# Patient Record
Sex: FEMALE | Race: BLACK OR AFRICAN AMERICAN | Employment: OTHER | URBAN - METROPOLITAN AREA
[De-identification: names, ages, dates, MRNs, and addresses within clinical notes are randomized per-mention and may not be internally consistent; named-entity substitution may affect disease eponyms.]

---

## 2017-05-28 ENCOUNTER — HOSPITAL ENCOUNTER (OUTPATIENT)
Facility: HOSPITAL | Age: 72
Setting detail: OBSERVATION
Discharge: HOME | End: 2017-06-01
Attending: EMERGENCY MEDICINE | Admitting: INTERNAL MEDICINE
Payer: MEDICARE

## 2017-05-28 ENCOUNTER — APPOINTMENT (OUTPATIENT)
Dept: GENERAL RADIOLOGY | Facility: HOSPITAL | Age: 72
End: 2017-05-28
Attending: EMERGENCY MEDICINE
Payer: MEDICARE

## 2017-05-28 ENCOUNTER — APPOINTMENT (OUTPATIENT)
Dept: ULTRASOUND IMAGING | Facility: HOSPITAL | Age: 72
End: 2017-05-28
Attending: HOSPITALIST
Payer: MEDICARE

## 2017-05-28 ENCOUNTER — APPOINTMENT (OUTPATIENT)
Dept: NUCLEAR MEDICINE | Facility: HOSPITAL | Age: 72
End: 2017-05-28
Attending: EMERGENCY MEDICINE
Payer: MEDICARE

## 2017-05-28 ENCOUNTER — APPOINTMENT (OUTPATIENT)
Dept: CV DIAGNOSTICS | Facility: HOSPITAL | Age: 72
End: 2017-05-28
Attending: HOSPITALIST
Payer: MEDICARE

## 2017-05-28 ENCOUNTER — APPOINTMENT (OUTPATIENT)
Dept: CT IMAGING | Facility: HOSPITAL | Age: 72
End: 2017-05-28
Attending: HOSPITALIST
Payer: MEDICARE

## 2017-05-28 DIAGNOSIS — I10 BENIGN ESSENTIAL HTN: ICD-10-CM

## 2017-05-28 DIAGNOSIS — R07.9 ACUTE CHEST PAIN: Primary | ICD-10-CM

## 2017-05-28 DIAGNOSIS — E11.8 TYPE 2 DIABETES MELLITUS WITH COMPLICATION, WITHOUT LONG-TERM CURRENT USE OF INSULIN (HCC): ICD-10-CM

## 2017-05-28 DIAGNOSIS — M54.9 MODERATE BACK PAIN: ICD-10-CM

## 2017-05-28 DIAGNOSIS — I25.10 CORONARY ARTERY DISEASE INVOLVING NATIVE HEART WITHOUT ANGINA PECTORIS, UNSPECIFIED VESSEL OR LESION TYPE: ICD-10-CM

## 2017-05-28 DIAGNOSIS — N18.9 CHRONIC KIDNEY DISEASE, UNSPECIFIED CKD STAGE: ICD-10-CM

## 2017-05-28 PROBLEM — K21.9 ESOPHAGEAL REFLUX: Chronic | Status: ACTIVE | Noted: 2017-05-28

## 2017-05-28 PROBLEM — E11.9 TYPE II OR UNSPECIFIED TYPE DIABETES MELLITUS WITHOUT MENTION OF COMPLICATION, NOT STATED AS UNCONTROLLED: Chronic | Status: ACTIVE | Noted: 2017-05-28

## 2017-05-28 PROCEDURE — 71275 CT ANGIOGRAPHY CHEST: CPT | Performed by: HOSPITALIST

## 2017-05-28 PROCEDURE — 93306 TTE W/DOPPLER COMPLETE: CPT | Performed by: HOSPITALIST

## 2017-05-28 PROCEDURE — 78580 LUNG PERFUSION IMAGING: CPT | Performed by: EMERGENCY MEDICINE

## 2017-05-28 PROCEDURE — 71010 XR CHEST AP PORTABLE  (CPT=71010): CPT | Performed by: EMERGENCY MEDICINE

## 2017-05-28 PROCEDURE — 99220 INITIAL OBSERVATION CARE,LEVL III: CPT | Performed by: INTERNAL MEDICINE

## 2017-05-28 PROCEDURE — 93970 EXTREMITY STUDY: CPT | Performed by: HOSPITALIST

## 2017-05-28 RX ORDER — SODIUM CHLORIDE 9 MG/ML
INJECTION, SOLUTION INTRAVENOUS CONTINUOUS
Status: ACTIVE | OUTPATIENT
Start: 2017-05-28 | End: 2017-05-28

## 2017-05-28 RX ORDER — ACETAMINOPHEN AND CODEINE PHOSPHATE 300; 30 MG/1; MG/1
1 TABLET ORAL EVERY 4 HOURS PRN
Status: DISCONTINUED | OUTPATIENT
Start: 2017-05-28 | End: 2017-06-01

## 2017-05-28 RX ORDER — ENOXAPARIN SODIUM 100 MG/ML
40 INJECTION SUBCUTANEOUS DAILY
Status: DISCONTINUED | OUTPATIENT
Start: 2017-05-28 | End: 2017-05-28

## 2017-05-28 RX ORDER — HYDROMORPHONE HYDROCHLORIDE 1 MG/ML
0.5 INJECTION, SOLUTION INTRAMUSCULAR; INTRAVENOUS; SUBCUTANEOUS EVERY 30 MIN PRN
Status: DISCONTINUED | OUTPATIENT
Start: 2017-05-28 | End: 2017-05-28

## 2017-05-28 RX ORDER — DOCUSATE SODIUM 100 MG/1
100 CAPSULE, LIQUID FILLED ORAL 2 TIMES DAILY
Status: DISCONTINUED | OUTPATIENT
Start: 2017-05-28 | End: 2017-06-01

## 2017-05-28 RX ORDER — MAGNESIUM HYDROXIDE/ALUMINUM HYDROXICE/SIMETHICONE 120; 1200; 1200 MG/30ML; MG/30ML; MG/30ML
10 SUSPENSION ORAL EVERY 6 HOURS PRN
Status: DISCONTINUED | OUTPATIENT
Start: 2017-05-28 | End: 2017-06-01

## 2017-05-28 RX ORDER — NITROGLYCERIN 0.4 MG/1
0.4 TABLET SUBLINGUAL EVERY 5 MIN PRN
Status: DISCONTINUED | OUTPATIENT
Start: 2017-05-28 | End: 2017-06-01

## 2017-05-28 RX ORDER — HEPARIN SODIUM AND DEXTROSE 10000; 5 [USP'U]/100ML; G/100ML
INJECTION INTRAVENOUS CONTINUOUS
Status: DISCONTINUED | OUTPATIENT
Start: 2017-05-28 | End: 2017-05-28

## 2017-05-28 RX ORDER — MONTELUKAST SODIUM 10 MG/1
10 TABLET ORAL NIGHTLY
Status: DISCONTINUED | OUTPATIENT
Start: 2017-05-28 | End: 2017-06-01

## 2017-05-28 RX ORDER — DEXTROSE MONOHYDRATE 25 G/50ML
50 INJECTION, SOLUTION INTRAVENOUS
Status: DISCONTINUED | OUTPATIENT
Start: 2017-05-28 | End: 2017-06-01

## 2017-05-28 RX ORDER — ACETAMINOPHEN AND CODEINE PHOSPHATE 300; 30 MG/1; MG/1
2 TABLET ORAL EVERY 4 HOURS PRN
Status: DISCONTINUED | OUTPATIENT
Start: 2017-05-28 | End: 2017-06-01

## 2017-05-28 RX ORDER — ALLOPURINOL 300 MG/1
300 TABLET ORAL DAILY
Status: DISCONTINUED | OUTPATIENT
Start: 2017-05-28 | End: 2017-06-01

## 2017-05-28 RX ORDER — ACETAMINOPHEN AND CODEINE PHOSPHATE 300; 30 MG/1; MG/1
2 TABLET ORAL EVERY 4 HOURS PRN
Status: DISCONTINUED | OUTPATIENT
Start: 2017-05-28 | End: 2017-05-28

## 2017-05-28 RX ORDER — KETOROLAC TROMETHAMINE 5 MG/ML
1 SOLUTION OPHTHALMIC 4 TIMES DAILY
Status: DISCONTINUED | OUTPATIENT
Start: 2017-05-28 | End: 2017-05-28

## 2017-05-28 RX ORDER — GABAPENTIN 300 MG/1
300 CAPSULE ORAL 3 TIMES DAILY
Status: DISCONTINUED | OUTPATIENT
Start: 2017-05-28 | End: 2017-06-01

## 2017-05-28 RX ORDER — ASPIRIN 325 MG
325 TABLET ORAL DAILY
Status: DISCONTINUED | OUTPATIENT
Start: 2017-05-28 | End: 2017-06-01

## 2017-05-28 RX ORDER — HYDROCHLOROTHIAZIDE 12.5 MG/1
12.5 CAPSULE, GELATIN COATED ORAL DAILY
Status: DISCONTINUED | OUTPATIENT
Start: 2017-05-28 | End: 2017-06-01

## 2017-05-28 RX ORDER — HEPARIN SODIUM 5000 [USP'U]/ML
80 INJECTION INTRAVENOUS; SUBCUTANEOUS ONCE
Status: DISCONTINUED | OUTPATIENT
Start: 2017-05-28 | End: 2017-05-28

## 2017-05-28 RX ORDER — ONDANSETRON 2 MG/ML
4 INJECTION INTRAMUSCULAR; INTRAVENOUS ONCE
Status: COMPLETED | OUTPATIENT
Start: 2017-05-28 | End: 2017-05-28

## 2017-05-28 RX ORDER — ENOXAPARIN SODIUM 100 MG/ML
40 INJECTION SUBCUTANEOUS NIGHTLY
Status: DISCONTINUED | OUTPATIENT
Start: 2017-05-28 | End: 2017-05-31

## 2017-05-28 RX ORDER — ALBUTEROL SULFATE 2.5 MG/3ML
2.5 SOLUTION RESPIRATORY (INHALATION) EVERY 6 HOURS PRN
Status: DISCONTINUED | OUTPATIENT
Start: 2017-05-28 | End: 2017-06-01

## 2017-05-28 RX ORDER — VALSARTAN 320 MG/1
320 TABLET ORAL DAILY
Status: DISCONTINUED | OUTPATIENT
Start: 2017-05-28 | End: 2017-06-01

## 2017-05-28 RX ORDER — ONDANSETRON 2 MG/ML
4 INJECTION INTRAMUSCULAR; INTRAVENOUS EVERY 4 HOURS PRN
Status: DISCONTINUED | OUTPATIENT
Start: 2017-05-28 | End: 2017-06-01

## 2017-05-28 RX ORDER — ONDANSETRON 4 MG/1
8 TABLET, ORALLY DISINTEGRATING ORAL EVERY 8 HOURS PRN
Status: DISCONTINUED | OUTPATIENT
Start: 2017-05-28 | End: 2017-06-01

## 2017-05-28 RX ORDER — ROSUVASTATIN CALCIUM 20 MG/1
20 TABLET, COATED ORAL NIGHTLY
Status: DISCONTINUED | OUTPATIENT
Start: 2017-05-28 | End: 2017-06-01

## 2017-05-28 RX ORDER — HEPARIN SODIUM AND DEXTROSE 10000; 5 [USP'U]/100ML; G/100ML
18 INJECTION INTRAVENOUS ONCE
Status: DISCONTINUED | OUTPATIENT
Start: 2017-05-28 | End: 2017-05-28

## 2017-05-28 RX ORDER — FAMOTIDINE 20 MG/1
20 TABLET ORAL DAILY
Status: DISCONTINUED | OUTPATIENT
Start: 2017-05-28 | End: 2017-05-29

## 2017-05-28 RX ORDER — DILTIAZEM HYDROCHLORIDE 240 MG/1
240 CAPSULE, COATED, EXTENDED RELEASE ORAL DAILY
Status: DISCONTINUED | OUTPATIENT
Start: 2017-05-28 | End: 2017-06-01

## 2017-05-28 NOTE — IMAGING NOTE
Informed floor RN and Patient to hold metformin for 48hrs post CT iodine injection. Pt injected on 5/28/17 @ 1110.

## 2017-05-28 NOTE — ED NOTES
Going to room 8606, reported to [employer]. Transport paged. Pt aware of her admission and verbalizing understanding.

## 2017-05-28 NOTE — CONSULTS
[facility]  Report of Consultation    Frutoso Poll Patient Status:  Observation    1945 MRN [id number]   [facility] 8NE-A Attending Chrissie Abad MD   Hosp Day # 0 PCP Kandis Kc     Reason fo solution 2.5 mg, 2.5 mg, Nebulization, Q6H PRN  •  allopurinol (ZYLOPRIM) tab 300 mg, 300 mg, Oral, Daily  •  Alum & Mag Hydroxide-Simeth (MAALOX) oral suspension 10 mL, 10 mL, Oral, Q6H PRN  •  DilTIAZem HCl ER Coated Beads (CARDIZEM CD) 24 hr cap 240 mg, resp. rate 20, height 64\", weight 222 lb 14.2 oz, SpO2 98 %.   Temp (24hrs), Av.8 °F (36.6 °C), Min:97.1 °F (36.2 °C), Max:98.4 °F (36.9 °C)    Wt Readings from Last 3 Encounters:  17 : 222 lb 14.2 oz      Telemetry: SR  General: Alert and orient

## 2017-05-28 NOTE — ED INITIAL ASSESSMENT (HPI)
Pt to ed from home by ems with c/o l side rib/chest pain, pt denies injury or trauma, pt having some lourdes, pt sx present for about 6 wks, pt had recent work up at [address] [place] [address], and [facility].

## 2017-05-28 NOTE — ED PROVIDER NOTES
Patient Seen in: [facility] Emergency Department    History   Patient presents with:  Chest Pain Angina (cardiovascular)    Stated Complaint: L SIDE RIB/CHEST PAIN    HPI     28-year-old female who has a history of chronic pain on Percocet, history allopurinol (ZYLOPRIM) 300 MG Oral Tab,  Take 300 mg by mouth daily. Aluminum & Magnesium Hydroxide 200-200 MG/5ML Oral Suspension,  Take 10 mL by mouth every 6 (six) hours as needed for Indigestion.    Meclizine HCl (ANTIVERT) 25 MG Oral Tab,  Take 25 mg otherwise stated in HPI.     Physical Exam       ED Triage Vitals   BP 05/28/17 0155 157/70 mmHg   Pulse 05/28/17 0155 74   Resp 05/28/17 0155 18   Temp 05/28/17 0155 97.1 °F (36.2 °C)   Temp src 05/28/17 0155 Temporal   SpO2 05/28/17 0155 99 %   O2 Device Units.    D-Dimer results of less than 0.5 ug/mL (FEU) have been shown to contribute to the exclusion of venous thromboembolism with a negative predictive value of approximately 95% when results are used as part of the total clinical evaluation of the [facility] emergency department. Patient remained stable throughout the emergency department observation period. Patient still complains of left-sided chest and posterior back pain. This does not appear to be acute coronary syndrome in nature.   She has had these s

## 2017-05-28 NOTE — PROGRESS NOTES
[facility] Pharmacy Note:  Renal Dose Adjustment    Jeana Mason has been prescribed famotidine (PEPCID) 20 mg orally every 12 hours. Estimated Creatinine Clearance: 27.1 mL/min (based on Cr of 1.62).     Her calculated creatinine clearance is <50 ml/min, th

## 2017-05-28 NOTE — PROGRESS NOTES
Dr. Dionna Anderson H+P noted, chart reviewed, diagnostic studies reviewed. Patient has left pleuritic chest pain, VQ scan shows possible mismatch on left side. Agree with CTA. Will also heparinize until we can obtain CTA.     Check echo and venous ultrasou

## 2017-05-28 NOTE — H&P
[facility]                                                               History & Physical         Tess Navanita Patient Status:  Emergency    1945 MRN [id number]   Location [facility] Attending Patti Hollingsworth MD Hypertension Other       Reviewed    Social history:   reports that she has never smoked. She does not have any smokeless tobacco history on file. She reports that she drinks alcohol. She reports that she does not use illicit drugs.     Allergies:    Hydroc DDIMER 0.65 05/28/2017   TROP <0.046 05/28/2017       Recent Labs   Lab  05/28/17   0248   PTP  13.2   INR  1.00       Recent Labs   Lab  05/28/17   0202   TROP  <0.046     Imaging:  portable chest x-ray preliminary results shows no acute pathology, abhishek

## 2017-05-29 ENCOUNTER — APPOINTMENT (OUTPATIENT)
Dept: CT IMAGING | Facility: HOSPITAL | Age: 72
End: 2017-05-29
Attending: HOSPITALIST
Payer: MEDICARE

## 2017-05-29 PROCEDURE — 99225 SUBSEQUENT OBSERVATION CARE: CPT | Performed by: HOSPITALIST

## 2017-05-29 PROCEDURE — 74177 CT ABD & PELVIS W/CONTRAST: CPT | Performed by: HOSPITALIST

## 2017-05-29 RX ORDER — SODIUM CHLORIDE 9 MG/ML
INJECTION, SOLUTION INTRAVENOUS ONCE
Status: COMPLETED | OUTPATIENT
Start: 2017-05-29 | End: 2017-05-30

## 2017-05-29 NOTE — PROGRESS NOTES
[facility] Cardiology Progress Note    Patient seen and examined.  Chart reviewed.      No chest pain or shortness of breath.     /64 mmHg  Pulse 88  Temp(Src) 97.6 °F (36.4 °C) (Oral)  Resp 20  Ht 5' 4\" (1.626 m)  Wt 222 lb 14.2 oz (101.1 kg)  BMI 38.24 kg

## 2017-05-29 NOTE — PROGRESS NOTES
[facility]  Progress Note     Shayy Orobons Patient Status:  Observation    1945 MRN [id number]   [facility] 8NE-A Attending Alvarado Warren MD   Hosp Day # 1 PCP Gene Silvestre     Chief Complaint: Abdominal pain    S: Patient Oral Nightly   • valsartan  320 mg Oral Daily   • docusate sodium  100 mg Oral BID   • gabapentin  300 mg Oral TID   • hydrochlorothiazide  12.5 mg Oral Daily   • famoTIDine  20 mg Oral Daily   • Montelukast Sodium  10 mg Oral Nightly   • aspirin  325 mg O

## 2017-05-29 NOTE — PLAN OF CARE
PAIN - ADULT    • Verbalizes/displays adequate comfort level or patient's stated pain goal Not Progressing          CARDIOVASCULAR - ADULT    • Absence of cardiac arrhythmias or at baseline Progressing        Diabetes/Glucose Control    • Glucose maintaine

## 2017-05-29 NOTE — PROGRESS NOTES
Dr. Evans Reza notified of CT tech concern about creatinine of 1.42 and CT abd w/contrast ordered. Dr. Evans Reza endorsed to proceed w/CTw/contrast by to given 0.9NS @ 83mL/hr x 12 hours. CT tech notified of update.      05/29/17 1116   Provider Notification

## 2017-05-29 NOTE — PLAN OF CARE
Comments:   Pt is A&OX4, VSS on RA, and maintaining NSR on telemetry. Admitted w/left-sided abdominal/chest pain. This is the patient's third hospital in an attempt to diagnose her pain; was at [facility], w/u's there were unremarkable per report.   Car vital signs, obtain 12 lead EKG if indicated  - Evaluate effectiveness of antiarrhythmic and heart rate control medications as ordered  - Initiate emergency measures for life threatening arrhythmias  - Monitor electrolytes and administer replacement therap

## 2017-05-30 ENCOUNTER — SURGERY (OUTPATIENT)
Age: 72
End: 2017-05-30

## 2017-05-30 PROCEDURE — 0DB98ZX EXCISION OF DUODENUM, VIA NATURAL OR ARTIFICIAL OPENING ENDOSCOPIC, DIAGNOSTIC: ICD-10-PCS | Performed by: INTERNAL MEDICINE

## 2017-05-30 PROCEDURE — 99225 SUBSEQUENT OBSERVATION CARE: CPT | Performed by: HOSPITALIST

## 2017-05-30 PROCEDURE — 0DB68ZX EXCISION OF STOMACH, VIA NATURAL OR ARTIFICIAL OPENING ENDOSCOPIC, DIAGNOSTIC: ICD-10-PCS | Performed by: INTERNAL MEDICINE

## 2017-05-30 RX ORDER — SODIUM CHLORIDE, SODIUM LACTATE, POTASSIUM CHLORIDE, CALCIUM CHLORIDE 600; 310; 30; 20 MG/100ML; MG/100ML; MG/100ML; MG/100ML
INJECTION, SOLUTION INTRAVENOUS CONTINUOUS
Status: DISCONTINUED | OUTPATIENT
Start: 2017-05-30 | End: 2017-06-01

## 2017-05-30 RX ORDER — MORPHINE SULFATE 4 MG/ML
4 INJECTION, SOLUTION INTRAMUSCULAR; INTRAVENOUS EVERY 2 HOUR PRN
Status: DISCONTINUED | OUTPATIENT
Start: 2017-05-30 | End: 2017-05-31

## 2017-05-30 RX ORDER — NALOXONE HYDROCHLORIDE 0.4 MG/ML
80 INJECTION, SOLUTION INTRAMUSCULAR; INTRAVENOUS; SUBCUTANEOUS AS NEEDED
Status: DISCONTINUED | OUTPATIENT
Start: 2017-05-30 | End: 2017-05-30 | Stop reason: HOSPADM

## 2017-05-30 RX ORDER — DEXTROSE MONOHYDRATE 25 G/50ML
50 INJECTION, SOLUTION INTRAVENOUS
Status: DISCONTINUED | OUTPATIENT
Start: 2017-05-30 | End: 2017-05-30 | Stop reason: HOSPADM

## 2017-05-30 RX ORDER — MORPHINE SULFATE 2 MG/ML
2 INJECTION, SOLUTION INTRAMUSCULAR; INTRAVENOUS EVERY 2 HOUR PRN
Status: DISCONTINUED | OUTPATIENT
Start: 2017-05-30 | End: 2017-05-31

## 2017-05-30 RX ORDER — PANTOPRAZOLE SODIUM 40 MG/1
40 TABLET, DELAYED RELEASE ORAL
Status: DISCONTINUED | OUTPATIENT
Start: 2017-05-31 | End: 2017-06-01

## 2017-05-30 NOTE — CONSULTS
[facility]                       Gastroenterology Consultation-[facility]    Gmoez Marlenadaphne Patient Status:  Observation    1945 MRN [id number]   [facility] 8NE-A Attending Corrina Flores MD   [address] Day # 2 PCP Samm Hammer complication, not stated as uncontrolled    • Esophageal reflux    • Heart attack ([facility] [address]    • Stroke [facility])    • Arthritis    • Anemia    • Gout    • Edema    • Renal disorder    • Migraines    • Shortness of breath    • Asthma    • Pneumonia due to organism PRN   glucose (DEX4) oral liquid 15 g 15 g Oral Q15 Min PRN   Or      Glucose-Vitamin C (DEX-4) 4-0.006 g chewable tab 4 tablet 4 tablet Oral Q15 Min PRN   Or      dextrose 50% injection 50 mL 50 mL Intravenous Q15 Min PRN   Or      glucose (DEX4) oral liq disease, or inflammatory bowel disease  ROS:  In addition to the pertinent positives described above:             Infectious Disease:  No chronic infections or recent fevers prior to the acute illness            Cardiovascular: History of CAD s/p stenting, dry; no rashes noted to back or trunk   Psychiatric: Appropriate mood and congruent affect without obvious depression or anxiety  Labs:   Lab Results  Component Value Date   WBC 5.6 05/30/2017   HGB 10.9 05/30/2017   HCT 32.3 05/30/2017   .0 05/30/2 liver. No evidence of acute cholecystitis. Normal pancreas. Bilateral kidney cysts. No ureteral stones bilaterally. Multiple focal colonic diverticulosis. Negative for acute diverticulitis. No acute appendicitis.  No bowel obstruction.     Unremarkable urin ANGIOGRAPHY, CHEST (CPT=71275) Once [[id number]] Collected: 05/28/17 1129   Order Status: Completed Updated: 05/28/17 1130   Narrative:     PROCEDURE:  CT ANGIOGRAM OF THE CHEST     COMPARISON:  GEORGES DEAN W/O CONTRAST, 12/01/2008, 8:03.     INDICATIONS  (CPT=71010), 5/28/2017, 2:22.     INDICATIONS:  eval for PE     TECHNIQUE:  Tc-99m MAA was injected intravenously and images subsequently obtained.     PHARMACEUTICAL:  5.4 mCi Tc-99m MAA     FINDINGS:    PERFUSION DEFECTS:  Moderate sized perfusion defec She reports unremarkable evaluations at [facility] for her symptoms. No change in pain with activity, p.o. intake, or BMs; no nausea/vomiting, change in appetite, change in bowel patterns.  CT abdomen/pelvis with IV contrast suggests mild gastric antral w

## 2017-05-30 NOTE — PLAN OF CARE
CARDIOVASCULAR - ADULT    • Absence of cardiac arrhythmias or at baseline Progressing        Diabetes/Glucose Control    • Glucose maintained within prescribed range Progressing        METABOLIC/FLUID AND ELECTROLYTES - ADULT    • Electrolytes maintained w

## 2017-05-30 NOTE — OPERATIVE REPORT
Operative Report-Esophagogastroduodenoscopy with cold biopsies  Dimple Elise 2/11/1945   [facility] [id number] MRN [id number]   Admission Date 5/28/2017 Operation Date 5/30/2017   Attending Physician Aldair Carrillo MD Operating Physician Virginia Hampton DO

## 2017-05-30 NOTE — PROGRESS NOTES
[facility]  Progress Note     Teresa aP Patient Status:  Observation    1945 MRN [id number]   [facility] 8NE-A Attending Elisa Peralta MD   Hosp Day # 2 PCP Darin Restrepo     Chief Complaint: Abdominal pain    S: Patient Imaging data reviewed in Epic.     Medications:   • pantoprazole (PROTONIX) IV push  40 mg Intravenous BID   • allopurinol  300 mg Oral Daily   • DilTIAZem HCl ER Coated Beads  240 mg Oral Daily   • Rosuvastatin Calcium  20 mg Oral Nightly   • valsartan  32

## 2017-05-30 NOTE — PAYOR COMM NOTE
Attending Physician: César Serrato MD    Review Type: ADMISSION   Reviewer: Ramirez Mckeon       Date: May 30, 2017 - 10:40 AM  Payor: MEDICARE PART B ONLY  Authorization Number: N/A  Admit date: 5/28/2017  1:51 AM   Admitted from Emergency Dept.: yes quadrant anterior to the spleen likely is a splenule.   MEDICATIONS ADMINISTERED IN LAST 1 DAY:  Acetaminophen-Codeine #3 (TYLENOL #3) 300-30 MG tab 2 tablet     Date Action Dose Route User    5/30/2017 0601 Given 2 tablet Oral Toy Velez, RN    5/30 B      Montelukast Sodium (SINGULAIR) tab 10 mg     Date Action Dose Route User    5/29/2017 2114 Given 10 mg Oral Hong Kc, RN      morphINE sulfate (PF) 2 MG/ML injection 2 mg     Date Action Dose Route User    5/30/2017 0848 Given 2 mg Yetta Roll Notable for the following:     Glucose 143 (*)     Creatinine 1.42 (*)     GFR 43 (*)     All other components within normal limits   URINALYSIS WITH CULTURE REFLEX - Abnormal; Notable for the following:     Protein Urine 30  (*)     Leukocyte Esterase Alicia Fried normal limits   TROPONIN I - Normal   PROTHROMBIN TIME (PT) - Normal   PTT, ACTIVATED - Normal    Narrative: The aPTT Heparin Therapeutic Range is approximately 65- 104 seconds.  The therapeutic range has been validated against 0.3-0.7 heparin anti-Xa u protocol  3. Essential hypertension–continue home medications, follow blood pressure  4. Hyperlipidemia  5. GERD– Pepcid  6.  History of CAD per patient  Renal insufficiency possibly chronic kidney disease stage III, rule out mild acute kidney injury-no bas

## 2017-05-31 ENCOUNTER — APPOINTMENT (OUTPATIENT)
Dept: ULTRASOUND IMAGING | Facility: HOSPITAL | Age: 72
End: 2017-05-31
Attending: INTERNAL MEDICINE
Payer: MEDICARE

## 2017-05-31 PROCEDURE — 76700 US EXAM ABDOM COMPLETE: CPT | Performed by: INTERNAL MEDICINE

## 2017-05-31 PROCEDURE — 99225 SUBSEQUENT OBSERVATION CARE: CPT | Performed by: HOSPITALIST

## 2017-05-31 RX ORDER — PANTOPRAZOLE SODIUM 40 MG/1
40 TABLET, DELAYED RELEASE ORAL
Qty: 60 TABLET | Refills: 0 | Status: SHIPPED | OUTPATIENT
Start: 2017-05-31 | End: 2017-07-30

## 2017-05-31 RX ORDER — ENOXAPARIN SODIUM 100 MG/ML
30 INJECTION SUBCUTANEOUS NIGHTLY
Status: DISCONTINUED | OUTPATIENT
Start: 2017-05-31 | End: 2017-06-01

## 2017-05-31 RX ORDER — MORPHINE SULFATE 2 MG/ML
2 INJECTION, SOLUTION INTRAMUSCULAR; INTRAVENOUS EVERY 4 HOURS PRN
Status: DISCONTINUED | OUTPATIENT
Start: 2017-05-31 | End: 2017-06-01

## 2017-05-31 RX ORDER — RANITIDINE 150 MG/1
150 TABLET ORAL NIGHTLY
Qty: 30 TABLET | Refills: 3 | Status: SHIPPED | OUTPATIENT
Start: 2017-05-31 | End: 2020-11-08 | Stop reason: CLARIF

## 2017-05-31 RX ORDER — ACETAMINOPHEN AND CODEINE PHOSPHATE 300; 30 MG/1; MG/1
1-2 TABLET ORAL EVERY 4 HOURS PRN
Qty: 30 TABLET | Refills: 0 | Status: SHIPPED | OUTPATIENT
Start: 2017-05-31 | End: 2018-02-28

## 2017-05-31 NOTE — PROGRESS NOTES
[facility]  Progress Note     Candance Prime Patient Status:  Observation    1945 MRN [id number]   [facility] 8NE-A Attending Rivera Gomez MD   Hosp Day # 3 PCP Grady Cassidy     Chief Complaint: Abdominal pain    S: Patient 240 mg Oral Daily   • Rosuvastatin Calcium  20 mg Oral Nightly   • valsartan  320 mg Oral Daily   • docusate sodium  100 mg Oral BID   • gabapentin  300 mg Oral TID   • hydrochlorothiazide  12.5 mg Oral Daily   • Montelukast Sodium  10 mg Oral Nightly   •

## 2017-05-31 NOTE — CM/SW NOTE
05/31/17 1400   CM/SW Referral Data   Referral Source Physician;Social Work (self-referral)   Reason for Referral Discharge planning;Psychoscial assessment   Informant Patient   Pertinent Medical Hx   Primary Care Physician Name [facility]   Patient Info

## 2017-05-31 NOTE — PROGRESS NOTES
Gastroenterology Progress Note  Patient Name: Patricia Martin  Chief Complaint: epigastric abdominal pain  S: Pt reports that her pain is 7/10. She ate dinner last night and had breakfast this morning. She reports that eating does not affect the pain.  She unspecified vessel or lesion type     Benign essential HTN     Chest pain      Impression: 67year-old female with CAD s/p stenting (30 years ago), asthma, CVA, arthritis, DM, migraines, and anemia admitted with a 6 week history of left sided flank pain wh

## 2017-05-31 NOTE — PROGRESS NOTES
[facility] Pharmacy Note:  Renal Dose Adjustment for Enoxaparin (LOVENOX)    Cisco Stewart has been prescribed Enoxaparin (LOVENOX) 40 mg subcutaneously every 24 hours. Estimated Creatinine Clearance: 28.9 mL/min (based on Cr of 1.52).     Her calculated crea

## 2017-06-01 VITALS
SYSTOLIC BLOOD PRESSURE: 147 MMHG | DIASTOLIC BLOOD PRESSURE: 78 MMHG | BODY MASS INDEX: 38.05 KG/M2 | RESPIRATION RATE: 18 BRPM | TEMPERATURE: 98 F | HEART RATE: 75 BPM | OXYGEN SATURATION: 98 % | HEIGHT: 64 IN | WEIGHT: 222.88 LBS

## 2017-06-01 PROCEDURE — 99217 OBSERVATION CARE DISCHARGE: CPT | Performed by: HOSPITALIST

## 2017-06-01 NOTE — PLAN OF CARE
CARDIOVASCULAR - ADULT    • Absence of cardiac arrhythmias or at baseline Progressing        Diabetes/Glucose Control    • Glucose maintained within prescribed range Progressing          Rcv'd A/o/3  denies pain   On tele with Sr   Lung sounds clear bilate

## 2017-06-01 NOTE — PROGRESS NOTES
PT DISCHARGED HOME AS ORDERED/SCHEDULED. IV D/C'D PRIOR TO DISCHARGED. TELE MONITOR OFF AND RETURNED. PT ACCOMPANIED BY TRANSPORT FOR D/C HOME VIA WHEELCHAIR.

## 2017-06-01 NOTE — CM/SW NOTE
SW notified Pt's [facility] RN of d/c, she is current with with [facility] [facility]   P: [phone]  Fax: [phone]

## 2017-06-01 NOTE — PROGRESS NOTES
Ultrasound negative, feels better overall, home today.     Faith Carlson MD  [facility]

## 2017-06-02 NOTE — CM/SW NOTE
06/02/17 1100   Discharge disposition   Discharged to: Home-Health   Name of Facillity/Home Care/Hospice [facility])   Discharge transportation Private car

## 2017-06-02 NOTE — DISCHARGE SUMMARY
[facility]  DISCHARGE SUMMARY     Sharda Corona Patient Status:  Observation    1945 MRN [id number]   [facility] 8NE-A Attending No att. providers found   Hosp Day # 4 PCP Monica Chaidez     Date of Admission: 2017  Date nausea vomiting.  Denies any constipation or diarrhea.  Denies any trauma. Brief Synopsis: Patient was admitted to the cardiac telemetry unit with initial history of chest pain. Patient had negative serial cardiac enzymes.   Patient underwent CTA which Instructions Prescription details    RaNITidine HCl 150 MG Tabs   Commonly known as:  ZANTAC   What changed:  when to take this        Take 1 tablet (150 mg total) by mouth nightly.     Quantity:  30 tablet   Refills:  3         CONTINUE taking these medic Rosuvastatin Calcium 20 MG Tabs   Last time this was given:  20 mg on 5/31/2017  8:36 PM   Commonly known as:  CRESTOR        Take 20 mg by mouth nightly.     Refills:  0       valsartan 320 MG Tabs   Last time this was given:  320 mg on 6/1/2017  8:39 AM

## 2017-08-08 ENCOUNTER — HOSPITAL ENCOUNTER (EMERGENCY)
Facility: HOSPITAL | Age: 72
Discharge: HOME | End: 2017-08-09
Attending: EMERGENCY MEDICINE
Payer: MEDICARE

## 2017-08-08 ENCOUNTER — APPOINTMENT (OUTPATIENT)
Dept: CT IMAGING | Facility: HOSPITAL | Age: 72
End: 2017-08-08
Attending: EMERGENCY MEDICINE
Payer: MEDICARE

## 2017-08-08 DIAGNOSIS — R10.32 LLQ ABDOMINAL PAIN: Primary | ICD-10-CM

## 2017-08-08 LAB
ALBUMIN SERPL-MCNC: 3.5 G/DL (ref 3.5–4.8)
ALP LIVER SERPL-CCNC: 151 U/L (ref 55–142)
ALT SERPL-CCNC: 14 U/L (ref 14–54)
AST SERPL-CCNC: 11 U/L (ref 15–41)
BASOPHILS # BLD AUTO: 0.01 X10(3) UL (ref 0–0.1)
BASOPHILS NFR BLD AUTO: 0.2 %
BILIRUB SERPL-MCNC: 0.2 MG/DL (ref 0.1–2)
BUN BLD-MCNC: 17 MG/DL (ref 8–20)
CALCIUM BLD-MCNC: 9 MG/DL (ref 8.3–10.3)
CHLORIDE: 110 MMOL/L (ref 101–111)
CO2: 21 MMOL/L (ref 22–32)
CREAT BLD-MCNC: 1.52 MG/DL (ref 0.55–1.02)
EOSINOPHIL # BLD AUTO: 0 X10(3) UL (ref 0–0.3)
EOSINOPHIL NFR BLD AUTO: 0 %
ERYTHROCYTE [DISTWIDTH] IN BLOOD BY AUTOMATED COUNT: 13.3 % (ref 11.5–16)
GLUCOSE BLD-MCNC: 205 MG/DL (ref 70–99)
HCT VFR BLD AUTO: 32.9 % (ref 34–50)
HGB BLD-MCNC: 11.2 G/DL (ref 12–16)
IMMATURE GRANULOCYTE COUNT: 0.02 X10(3) UL (ref 0–1)
IMMATURE GRANULOCYTE RATIO %: 0.3 %
LIPASE: 378 U/L (ref 73–393)
LYMPHOCYTES # BLD AUTO: 3.43 X10(3) UL (ref 0.9–4)
LYMPHOCYTES NFR BLD AUTO: 56.4 %
M PROTEIN MFR SERPL ELPH: 7.5 G/DL (ref 6.1–8.3)
MCH RBC QN AUTO: 31.4 PG (ref 27–33.2)
MCHC RBC AUTO-ENTMCNC: 34 G/DL (ref 31–37)
MCV RBC AUTO: 92.2 FL (ref 81–100)
MONOCYTES # BLD AUTO: 0.55 X10(3) UL (ref 0.1–0.6)
MONOCYTES NFR BLD AUTO: 9 %
NEUTROPHIL ABS PRELIM: 2.07 X10 (3) UL (ref 1.3–6.7)
NEUTROPHILS # BLD AUTO: 2.07 X10(3) UL (ref 1.3–6.7)
NEUTROPHILS NFR BLD AUTO: 34.1 %
PLATELET # BLD AUTO: 215 10(3)UL (ref 150–450)
POTASSIUM SERPL-SCNC: 3.5 MMOL/L (ref 3.6–5.1)
RBC # BLD AUTO: 3.57 X10(6)UL (ref 3.8–5.1)
RED CELL DISTRIBUTION WIDTH-SD: 45.1 FL (ref 35.1–46.3)
SODIUM SERPL-SCNC: 140 MMOL/L (ref 136–144)
WBC # BLD AUTO: 6.1 X10(3) UL (ref 4–13)

## 2017-08-08 PROCEDURE — 85025 COMPLETE CBC W/AUTO DIFF WBC: CPT | Performed by: EMERGENCY MEDICINE

## 2017-08-08 PROCEDURE — 99285 EMERGENCY DEPT VISIT HI MDM: CPT

## 2017-08-08 PROCEDURE — 96361 HYDRATE IV INFUSION ADD-ON: CPT

## 2017-08-08 PROCEDURE — 96375 TX/PRO/DX INJ NEW DRUG ADDON: CPT

## 2017-08-08 PROCEDURE — 83690 ASSAY OF LIPASE: CPT | Performed by: EMERGENCY MEDICINE

## 2017-08-08 PROCEDURE — 93005 ELECTROCARDIOGRAM TRACING: CPT

## 2017-08-08 PROCEDURE — 93010 ELECTROCARDIOGRAM REPORT: CPT

## 2017-08-08 PROCEDURE — 80053 COMPREHEN METABOLIC PANEL: CPT | Performed by: EMERGENCY MEDICINE

## 2017-08-08 PROCEDURE — 96374 THER/PROPH/DIAG INJ IV PUSH: CPT

## 2017-08-08 PROCEDURE — 74176 CT ABD & PELVIS W/O CONTRAST: CPT | Performed by: EMERGENCY MEDICINE

## 2017-08-08 RX ORDER — HYDROMORPHONE HYDROCHLORIDE 1 MG/ML
0.5 INJECTION, SOLUTION INTRAMUSCULAR; INTRAVENOUS; SUBCUTANEOUS EVERY 30 MIN PRN
Status: DISCONTINUED | OUTPATIENT
Start: 2017-08-08 | End: 2017-08-09

## 2017-08-08 RX ORDER — ONDANSETRON 2 MG/ML
4 INJECTION INTRAMUSCULAR; INTRAVENOUS ONCE
Status: COMPLETED | OUTPATIENT
Start: 2017-08-08 | End: 2017-08-08

## 2017-08-09 VITALS
SYSTOLIC BLOOD PRESSURE: 138 MMHG | RESPIRATION RATE: 18 BRPM | TEMPERATURE: 97 F | BODY MASS INDEX: 37.05 KG/M2 | DIASTOLIC BLOOD PRESSURE: 83 MMHG | WEIGHT: 217 LBS | HEIGHT: 64 IN | HEART RATE: 81 BPM | OXYGEN SATURATION: 99 %

## 2017-08-09 LAB
ATRIAL RATE: 70 BPM
P AXIS: 40 DEGREES
P-R INTERVAL: 168 MS
Q-T INTERVAL: 444 MS
QRS DURATION: 98 MS
QTC CALCULATION (BEZET): 479 MS
R AXIS: -26 DEGREES
T AXIS: 107 DEGREES
VENTRICULAR RATE: 70 BPM

## 2017-08-09 RX ORDER — TRAMADOL HYDROCHLORIDE 50 MG/1
TABLET ORAL EVERY 4 HOURS PRN
Qty: 20 TABLET | Refills: 0 | Status: SHIPPED | OUTPATIENT
Start: 2017-08-09 | End: 2017-08-16

## 2017-08-09 NOTE — ED PROVIDER NOTES
Patient Seen in: [facility] Emergency Department    History   Patient presents with:  Abdomen/Flank Pain (GI/)    Stated Complaint:     HPI    66-year-old female with a history of anemia, status post , status post hysterectomy, history co Wheezing. allopurinol (ZYLOPRIM) 300 MG Oral Tab,  Take 300 mg by mouth daily. Meclizine HCl (ANTIVERT) 25 MG Oral Tab,  Take 25 mg by mouth 3 (three) times daily as needed.    Diltiazem HCl ER Coated Beads (CARTIA XT) 240 MG Oral Capsule SR 24 [facility] Normocephalic, atraumatic. Pupils equal round reactive to light. Extra comes intact. Cardiovascular exam: Regular rate and rhythm. No murmurs, rubs, gallops. Lungs: Clear to auscultation. No audible wheezes, rales, rhonchi.   Abdomen: Left lower Eluterio Lien will be given a referral for a new primary care physician. Patient's given a prescription for tramadol for pain as needed.  ============================================================   Patient was placed in an exam room. She had an IV established.   Eduin Loera kidney. No urinary calculi or obstructive uropathy. AORTA/VASCULAR:  Mild atherosclerotic calcification of the abdominal aorta and iliac arteries  RETROPERITONEUM:  Unremarkable. BOWEL/MESENTERY:  Uncomplicated colonic diverticulosis.  No large or small b

## 2017-08-09 NOTE — ED NOTES
RE-EVAL PER OH MD WITH DETAILED DISCUSSION ON BOTH LABS/RADIOGRAPHIC FINDINGS AND OK FOR DC WITH FU INST WITH [facility]STR[facility] IN 1-2 DAYS AS DIRECTED. QUESTIONS ANSWERED PER MD.  IN AGREEMENT WITH POC. PIV DCD INTACT. AMB + GAIT PER SELF.

## 2018-02-24 ENCOUNTER — HOSPITAL ENCOUNTER (INPATIENT)
Facility: HOSPITAL | Age: 73
LOS: 3 days | Discharge: HOME | DRG: 871 | End: 2018-02-28
Attending: EMERGENCY MEDICINE | Admitting: HOSPITALIST
Payer: MEDICARE

## 2018-02-24 ENCOUNTER — APPOINTMENT (OUTPATIENT)
Dept: GENERAL RADIOLOGY | Facility: HOSPITAL | Age: 73
DRG: 871 | End: 2018-02-24
Attending: EMERGENCY MEDICINE
Payer: MEDICARE

## 2018-02-24 DIAGNOSIS — B34.9 VIRAL SYNDROME: ICD-10-CM

## 2018-02-24 DIAGNOSIS — R05.9 COUGH: ICD-10-CM

## 2018-02-24 DIAGNOSIS — R41.82 ALTERED MENTAL STATUS, UNSPECIFIED ALTERED MENTAL STATUS TYPE: Primary | ICD-10-CM

## 2018-02-24 DIAGNOSIS — J01.00 ACUTE MAXILLARY SINUSITIS, RECURRENCE NOT SPECIFIED: ICD-10-CM

## 2018-02-24 PROCEDURE — 71045 X-RAY EXAM CHEST 1 VIEW: CPT | Performed by: EMERGENCY MEDICINE

## 2018-02-24 PROCEDURE — 70450 CT HEAD/BRAIN W/O DYE: CPT | Performed by: EMERGENCY MEDICINE

## 2018-02-24 RX ORDER — ACETAMINOPHEN 500 MG
1000 TABLET ORAL ONCE
Status: COMPLETED | OUTPATIENT
Start: 2018-02-24 | End: 2018-02-24

## 2018-02-24 RX ORDER — ACETAMINOPHEN 500 MG
1000 TABLET ORAL ONCE
Status: DISCONTINUED | OUTPATIENT
Start: 2018-02-24 | End: 2018-02-28

## 2018-02-24 RX ORDER — SODIUM CHLORIDE 9 MG/ML
125 INJECTION, SOLUTION INTRAVENOUS CONTINUOUS
Status: DISCONTINUED | OUTPATIENT
Start: 2018-02-24 | End: 2018-02-25

## 2018-02-25 ENCOUNTER — APPOINTMENT (OUTPATIENT)
Dept: CT IMAGING | Facility: HOSPITAL | Age: 73
DRG: 871 | End: 2018-02-25
Attending: EMERGENCY MEDICINE
Payer: MEDICARE

## 2018-02-25 PROBLEM — R41.82 ALTERED MENTAL STATUS: Status: ACTIVE | Noted: 2018-02-25

## 2018-02-25 PROBLEM — R41.82 ALTERED MENTAL STATUS, UNSPECIFIED ALTERED MENTAL STATUS TYPE: Status: ACTIVE | Noted: 2018-02-25

## 2018-02-25 PROBLEM — J01.00 ACUTE MAXILLARY SINUSITIS, RECURRENCE NOT SPECIFIED: Status: ACTIVE | Noted: 2018-02-25

## 2018-02-25 PROBLEM — B34.9 VIRAL SYNDROME: Status: ACTIVE | Noted: 2018-02-25

## 2018-02-25 PROBLEM — R05.9 COUGH: Status: ACTIVE | Noted: 2018-02-25

## 2018-02-25 LAB
ADENOVIRUS PCR:: NEGATIVE
ALBUMIN SERPL-MCNC: 3.5 G/DL (ref 3.5–4.8)
ALP LIVER SERPL-CCNC: 124 U/L (ref 55–142)
ALT SERPL-CCNC: 13 U/L (ref 14–54)
AST SERPL-CCNC: 13 U/L (ref 15–41)
ATRIAL RATE: 106 BPM
B PERT DNA SPEC QL NAA+PROBE: NEGATIVE
BASOPHILS # BLD AUTO: 0.02 X10(3) UL (ref 0–0.1)
BASOPHILS # BLD AUTO: 0.02 X10(3) UL (ref 0–0.1)
BASOPHILS NFR BLD AUTO: 0.1 %
BASOPHILS NFR BLD AUTO: 0.1 %
BILIRUB SERPL-MCNC: 0.5 MG/DL (ref 0.1–2)
BILIRUB UR QL STRIP.AUTO: NEGATIVE
BUN BLD-MCNC: 15 MG/DL (ref 8–20)
BUN BLD-MCNC: 17 MG/DL (ref 8–20)
C PNEUM DNA SPEC QL NAA+PROBE: NEGATIVE
CALCIUM BLD-MCNC: 9 MG/DL (ref 8.3–10.3)
CALCIUM BLD-MCNC: 9.1 MG/DL (ref 8.3–10.3)
CHLORIDE: 103 MMOL/L (ref 101–111)
CHLORIDE: 105 MMOL/L (ref 101–111)
CLARITY UR REFRACT.AUTO: CLEAR
CO2: 21 MMOL/L (ref 22–32)
CO2: 22 MMOL/L (ref 22–32)
COLOR UR AUTO: YELLOW
CORONAVIRUS 229E PCR:: NEGATIVE
CORONAVIRUS HKU1 PCR:: NEGATIVE
CORONAVIRUS NL63 PCR:: NEGATIVE
CORONAVIRUS OC43 PCR:: NEGATIVE
CREAT BLD-MCNC: 1.4 MG/DL (ref 0.55–1.02)
CREAT BLD-MCNC: 1.59 MG/DL (ref 0.55–1.02)
EOSINOPHIL # BLD AUTO: 0 X10(3) UL (ref 0–0.3)
EOSINOPHIL # BLD AUTO: 0 X10(3) UL (ref 0–0.3)
EOSINOPHIL NFR BLD AUTO: 0 %
EOSINOPHIL NFR BLD AUTO: 0 %
ERYTHROCYTE [DISTWIDTH] IN BLOOD BY AUTOMATED COUNT: 13.7 % (ref 11.5–16)
ERYTHROCYTE [DISTWIDTH] IN BLOOD BY AUTOMATED COUNT: 14.1 % (ref 11.5–16)
FLUAV RNA SPEC QL NAA+PROBE: NEGATIVE
FLUBV RNA SPEC QL NAA+PROBE: NEGATIVE
GLUCOSE BLD-MCNC: 167 MG/DL (ref 70–99)
GLUCOSE BLD-MCNC: 168 MG/DL (ref 65–99)
GLUCOSE BLD-MCNC: 171 MG/DL (ref 70–99)
GLUCOSE BLD-MCNC: 188 MG/DL (ref 65–99)
GLUCOSE BLD-MCNC: 215 MG/DL (ref 65–99)
GLUCOSE BLD-MCNC: 268 MG/DL (ref 65–99)
GLUCOSE BLD-MCNC: 299 MG/DL (ref 65–99)
GLUCOSE BLD-MCNC: 303 MG/DL (ref 65–99)
GLUCOSE UR STRIP.AUTO-MCNC: NEGATIVE MG/DL
HCT VFR BLD AUTO: 32.2 % (ref 34–50)
HCT VFR BLD AUTO: 32.8 % (ref 34–50)
HGB BLD-MCNC: 10.7 G/DL (ref 12–16)
HGB BLD-MCNC: 11.1 G/DL (ref 12–16)
IMMATURE GRANULOCYTE COUNT: 0.14 X10(3) UL (ref 0–1)
IMMATURE GRANULOCYTE COUNT: 0.18 X10(3) UL (ref 0–1)
IMMATURE GRANULOCYTE RATIO %: 0.8 %
IMMATURE GRANULOCYTE RATIO %: 0.9 %
KETONES UR STRIP.AUTO-MCNC: NEGATIVE MG/DL
LACTIC ACID: 1.4 MMOL/L (ref 0.5–2)
LEUKOCYTE ESTERASE UR QL STRIP.AUTO: NEGATIVE
LYMPHOCYTES # BLD AUTO: 1.48 X10(3) UL (ref 0.9–4)
LYMPHOCYTES # BLD AUTO: 1.68 X10(3) UL (ref 0.9–4)
LYMPHOCYTES NFR BLD AUTO: 8.4 %
LYMPHOCYTES NFR BLD AUTO: 8.7 %
M PROTEIN MFR SERPL ELPH: 8.1 G/DL (ref 6.1–8.3)
MCH RBC QN AUTO: 31 PG (ref 27–33.2)
MCH RBC QN AUTO: 31.4 PG (ref 27–33.2)
MCHC RBC AUTO-ENTMCNC: 33.2 G/DL (ref 31–37)
MCHC RBC AUTO-ENTMCNC: 33.8 G/DL (ref 31–37)
MCV RBC AUTO: 92.7 FL (ref 81–100)
MCV RBC AUTO: 93.3 FL (ref 81–100)
METAPNEUMOVIRUS PCR:: NEGATIVE
MONOCYTES # BLD AUTO: 1.17 X10(3) UL (ref 0.1–1)
MONOCYTES # BLD AUTO: 1.3 X10(3) UL (ref 0.1–1)
MONOCYTES NFR BLD AUTO: 6.6 %
MONOCYTES NFR BLD AUTO: 6.7 %
MYCOPLASMA PNEUMONIA PCR:: NEGATIVE
NEUTROPHIL ABS PRELIM: 14.9 X10 (3) UL (ref 1.3–6.7)
NEUTROPHIL ABS PRELIM: 16.18 X10 (3) UL (ref 1.3–6.7)
NEUTROPHILS # BLD AUTO: 14.9 X10(3) UL (ref 1.3–6.7)
NEUTROPHILS # BLD AUTO: 16.18 X10(3) UL (ref 1.3–6.7)
NEUTROPHILS NFR BLD AUTO: 83.6 %
NEUTROPHILS NFR BLD AUTO: 84.1 %
NITRITE UR QL STRIP.AUTO: NEGATIVE
P AXIS: 9 DEGREES
P-R INTERVAL: 182 MS
PARAINFLUENZA 1 PCR:: NEGATIVE
PARAINFLUENZA 2 PCR:: NEGATIVE
PARAINFLUENZA 3 PCR:: NEGATIVE
PARAINFLUENZA 4 PCR:: NEGATIVE
PH UR STRIP.AUTO: 5 [PH] (ref 4.5–8)
PLATELET # BLD AUTO: 226 10(3)UL (ref 150–450)
PLATELET # BLD AUTO: 230 10(3)UL (ref 150–450)
POTASSIUM SERPL-SCNC: 3.6 MMOL/L (ref 3.6–5.1)
POTASSIUM SERPL-SCNC: 3.7 MMOL/L (ref 3.6–5.1)
PROCALCITONIN SERPL-MCNC: 0.59 NG/ML (ref ?–0.11)
PROT UR STRIP.AUTO-MCNC: 100 MG/DL
Q-T INTERVAL: 348 MS
QRS DURATION: 100 MS
QTC CALCULATION (BEZET): 462 MS
R AXIS: 92 DEGREES
RBC # BLD AUTO: 3.45 X10(6)UL (ref 3.8–5.1)
RBC # BLD AUTO: 3.54 X10(6)UL (ref 3.8–5.1)
RED CELL DISTRIBUTION WIDTH-SD: 46.9 FL (ref 35.1–46.3)
RED CELL DISTRIBUTION WIDTH-SD: 48 FL (ref 35.1–46.3)
RHINOVIRUS/ENTERO PCR:: NEGATIVE
RSV RNA SPEC QL NAA+PROBE: NEGATIVE
SODIUM SERPL-SCNC: 134 MMOL/L (ref 136–144)
SODIUM SERPL-SCNC: 136 MMOL/L (ref 136–144)
SP GR UR STRIP.AUTO: 1.02 (ref 1–1.03)
T AXIS: -68 DEGREES
TSI SER-ACNC: 0.65 MIU/ML (ref 0.35–5.5)
UROBILINOGEN UR STRIP.AUTO-MCNC: <2 MG/DL
VENTRICULAR RATE: 106 BPM
WBC # BLD AUTO: 17.7 X10(3) UL (ref 4–13)
WBC # BLD AUTO: 19.4 X10(3) UL (ref 4–13)

## 2018-02-25 PROCEDURE — 70450 CT HEAD/BRAIN W/O DYE: CPT | Performed by: EMERGENCY MEDICINE

## 2018-02-25 RX ORDER — ONDANSETRON 2 MG/ML
4 INJECTION INTRAMUSCULAR; INTRAVENOUS EVERY 4 HOURS PRN
Status: DISCONTINUED | OUTPATIENT
Start: 2018-02-25 | End: 2018-02-25

## 2018-02-25 RX ORDER — FAMOTIDINE 20 MG/1
20 TABLET ORAL DAILY
Status: DISCONTINUED | OUTPATIENT
Start: 2018-02-25 | End: 2018-02-28

## 2018-02-25 RX ORDER — PREDNISONE 20 MG/1
40 TABLET ORAL
Status: COMPLETED | OUTPATIENT
Start: 2018-02-27 | End: 2018-02-27

## 2018-02-25 RX ORDER — ALBUTEROL SULFATE 2.5 MG/3ML
SOLUTION RESPIRATORY (INHALATION)
Status: COMPLETED
Start: 2018-02-25 | End: 2018-02-25

## 2018-02-25 RX ORDER — MONTELUKAST SODIUM 10 MG/1
10 TABLET ORAL NIGHTLY
Status: DISCONTINUED | OUTPATIENT
Start: 2018-02-25 | End: 2018-02-28

## 2018-02-25 RX ORDER — ALBUTEROL SULFATE 2.5 MG/3ML
SOLUTION RESPIRATORY (INHALATION)
Status: DISPENSED
Start: 2018-02-25 | End: 2018-02-26

## 2018-02-25 RX ORDER — PREDNISONE 50 MG/1
50 TABLET ORAL
Status: COMPLETED | OUTPATIENT
Start: 2018-02-26 | End: 2018-02-26

## 2018-02-25 RX ORDER — PREDNISONE 10 MG/1
10 TABLET ORAL
Status: DISCONTINUED | OUTPATIENT
Start: 2018-03-02 | End: 2018-02-27

## 2018-02-25 RX ORDER — HYDROCHLOROTHIAZIDE 25 MG/1
12.5 TABLET ORAL DAILY
Status: DISCONTINUED | OUTPATIENT
Start: 2018-02-25 | End: 2018-02-28

## 2018-02-25 RX ORDER — ONDANSETRON 2 MG/ML
4 INJECTION INTRAMUSCULAR; INTRAVENOUS EVERY 6 HOURS PRN
Status: DISCONTINUED | OUTPATIENT
Start: 2018-02-25 | End: 2018-02-28

## 2018-02-25 RX ORDER — ROSUVASTATIN CALCIUM 20 MG/1
20 TABLET, COATED ORAL NIGHTLY
Status: DISCONTINUED | OUTPATIENT
Start: 2018-02-25 | End: 2018-02-28

## 2018-02-25 RX ORDER — ALBUTEROL SULFATE 90 UG/1
1 AEROSOL, METERED RESPIRATORY (INHALATION) EVERY 6 HOURS PRN
Status: DISCONTINUED | OUTPATIENT
Start: 2018-02-25 | End: 2018-02-25

## 2018-02-25 RX ORDER — VALSARTAN 320 MG/1
320 TABLET ORAL DAILY
Status: DISCONTINUED | OUTPATIENT
Start: 2018-02-25 | End: 2018-02-28

## 2018-02-25 RX ORDER — ACETAMINOPHEN 325 MG/1
650 TABLET ORAL EVERY 6 HOURS PRN
Status: DISCONTINUED | OUTPATIENT
Start: 2018-02-25 | End: 2018-02-28

## 2018-02-25 RX ORDER — BENZONATATE 200 MG/1
200 CAPSULE ORAL 3 TIMES DAILY PRN
Status: DISCONTINUED | OUTPATIENT
Start: 2018-02-25 | End: 2018-02-28

## 2018-02-25 RX ORDER — POTASSIUM CHLORIDE 20 MEQ/1
40 TABLET, EXTENDED RELEASE ORAL ONCE
Status: COMPLETED | OUTPATIENT
Start: 2018-02-25 | End: 2018-02-25

## 2018-02-25 RX ORDER — ALLOPURINOL 300 MG/1
300 TABLET ORAL DAILY
Status: DISCONTINUED | OUTPATIENT
Start: 2018-02-25 | End: 2018-02-28

## 2018-02-25 RX ORDER — SODIUM CHLORIDE 9 MG/ML
INJECTION, SOLUTION INTRAVENOUS CONTINUOUS
Status: DISCONTINUED | OUTPATIENT
Start: 2018-02-25 | End: 2018-02-26

## 2018-02-25 RX ORDER — DILTIAZEM HYDROCHLORIDE 240 MG/1
240 CAPSULE, COATED, EXTENDED RELEASE ORAL DAILY
Status: DISCONTINUED | OUTPATIENT
Start: 2018-02-25 | End: 2018-02-28

## 2018-02-25 RX ORDER — PREDNISONE 20 MG/1
20 TABLET ORAL
Status: DISCONTINUED | OUTPATIENT
Start: 2018-03-01 | End: 2018-02-27

## 2018-02-25 RX ORDER — SODIUM CHLORIDE 9 MG/ML
INJECTION, SOLUTION INTRAVENOUS CONTINUOUS
Status: ACTIVE | OUTPATIENT
Start: 2018-02-25 | End: 2018-02-25

## 2018-02-25 NOTE — ED PROVIDER NOTES
Patient Seen in: [facility] Emergency Department    History   Patient presents with:  Altered Mental Status (neurologic)  Fever (infectious)    Stated Complaint: AMS, Fever    HPI    This is a 44-year-old female who has had a cough for last 2 days. Physical Exam    General: . Patient's has a dry cough. The patient is in no respiratory distress. The patient is not septic or toxic    HEENT: Atraumatic, conjunctiva are not pale. There is no icterus. Oral mucosa Is wet. No facial trauma.   Benjie Fender DIFFERENTIAL WITH PLATELET.   Procedure                               Abnormality         Status                     ---------                               -----------         ------                     CBC W/ DIFFERENTIAL[[id number]]          Abnormal the right mid zone and left base. No significant change from 5/28/2017. CONCLUSION:  No acute cardiopulmonary abnormality. Atherosclerotic arch. Pulmonary scars. Stable chest from 5/28/2017.      Dictated by: Shahnaz Marlow MD on 2/25/2018 at 0:03

## 2018-02-25 NOTE — ED INITIAL ASSESSMENT (HPI)
Per patient's daughter, she called patient and patient \"wasn't making any sense\". Per daughter, patient is normally A&Ox3. She states she spoke to her last night on the phone and she was at her baseline. Daughter reports she has had a cough x1 wk.  Upon a

## 2018-02-25 NOTE — H&P
[facility]  History and Physical     Ryan Avendaño Patient Status:  Inpatient    1945 MRN [id number]   [facility] 5NW-A Attending Harry Lugo MD   Hosp Day # 0 PCP Beena King     Chief Complaint: Confusion, LGF    Hi Influenza Vaccines        Sulfa Antibiotics         Vicodin Tuss [Hydro*        Medications:    No current facility-administered medications on file prior to encounter.    Current Outpatient Prescriptions on File Prior to Encounter:  RaNITidine HC Pulse 94   Temp 100.1 °F (37.8 °C) (Oral)   Resp 18   Wt 220 lb (99.8 kg)   SpO2 98%   BMI 37.76 kg/m²   General: No acute distress. Alert and oriented x 3. HEENT: congested, hoarse  Neck: supple, neg Kernigs   Respiratory: decreased air entry.    Bijal Munoz MD  8/99/0568          **Certification      PHYSICIAN Certification of Need for Inpatient Hospitalization - Initial Certification    Patient will require inpatient services that will reasonably be expected to span two midnight's based on the clinical docum

## 2018-02-25 NOTE — PHYSICAL THERAPY NOTE
PHYSICAL THERAPY QUICK EVALUATION - INPATIENT    Room Number: 523/523-A  Evaluation Date: 2/25/2018  Presenting Problem: fever, AMS  Physician Order: PT Eval and Treat    Problem List  Principal Problem:    Altered mental status, unspecified altered ment head  Management Techniques: Activity promotion; Body mechanics;Breathing techniques;Relaxation;Repositioning   RANGE OF MOTION AND STRENGTH ASSESSMENT  Upper extremity ROM and strength are within functional limits     Lower extremity ROM is within function agreeable to plan. Patient End of Session: In bed;Needs met;Call light within reach;RN aware of session/findings; All patient questions and concerns addressed    ASSESSMENT   Patient is a 68year old female admitted on 2/24/2018 for fever and AMS.   Henry

## 2018-02-25 NOTE — PLAN OF CARE
DISCHARGE PLANNING    • Discharge to home or other facility with appropriate resources Progressing        Patient/Family Goals    • Patient/Family Long Term Goal Progressing    • Patient/Family Short Term Goal Progressing        RESPIRATORY - ADULT    • Ac

## 2018-02-25 NOTE — PROGRESS NOTES
02/25/18 1226   Clinical Encounter Type   Visited With Patient   Routine Visit (Responded to consult)   Continue Visiting Yes  (Visit was interrupted and pt. asked  to return later.)   Patient's Supportive Strategies/Resources  offered e

## 2018-02-26 ENCOUNTER — APPOINTMENT (OUTPATIENT)
Dept: CV DIAGNOSTICS | Facility: HOSPITAL | Age: 73
DRG: 871 | End: 2018-02-26
Attending: INTERNAL MEDICINE
Payer: MEDICARE

## 2018-02-26 LAB
BASOPHILS # BLD AUTO: 0.03 X10(3) UL (ref 0–0.1)
BASOPHILS NFR BLD AUTO: 0.2 %
BUN BLD-MCNC: 16 MG/DL (ref 8–20)
CALCIUM BLD-MCNC: 9.3 MG/DL (ref 8.3–10.3)
CHLORIDE: 109 MMOL/L (ref 101–111)
CO2: 19 MMOL/L (ref 22–32)
CREAT BLD-MCNC: 1.41 MG/DL (ref 0.55–1.02)
EOSINOPHIL # BLD AUTO: 0.03 X10(3) UL (ref 0–0.3)
EOSINOPHIL NFR BLD AUTO: 0.2 %
ERYTHROCYTE [DISTWIDTH] IN BLOOD BY AUTOMATED COUNT: 14.5 % (ref 11.5–16)
GLUCOSE BLD-MCNC: 177 MG/DL (ref 65–99)
GLUCOSE BLD-MCNC: 189 MG/DL (ref 65–99)
GLUCOSE BLD-MCNC: 204 MG/DL (ref 70–99)
GLUCOSE BLD-MCNC: 377 MG/DL (ref 65–99)
GLUCOSE BLD-MCNC: 388 MG/DL (ref 65–99)
HCT VFR BLD AUTO: 33 % (ref 34–50)
HGB BLD-MCNC: 10.7 G/DL (ref 12–16)
IMMATURE GRANULOCYTE COUNT: 0.1 X10(3) UL (ref 0–1)
IMMATURE GRANULOCYTE RATIO %: 0.6 %
LYMPHOCYTES # BLD AUTO: 1.54 X10(3) UL (ref 0.9–4)
LYMPHOCYTES NFR BLD AUTO: 9.2 %
MCH RBC QN AUTO: 30.8 PG (ref 27–33.2)
MCHC RBC AUTO-ENTMCNC: 32.4 G/DL (ref 31–37)
MCV RBC AUTO: 95.1 FL (ref 81–100)
MONOCYTES # BLD AUTO: 0.77 X10(3) UL (ref 0.1–1)
MONOCYTES NFR BLD AUTO: 4.6 %
NEUTROPHIL ABS PRELIM: 14.24 X10 (3) UL (ref 1.3–6.7)
NEUTROPHILS # BLD AUTO: 14.24 X10(3) UL (ref 1.3–6.7)
NEUTROPHILS NFR BLD AUTO: 85.2 %
PLATELET # BLD AUTO: 194 10(3)UL (ref 150–450)
POTASSIUM SERPL-SCNC: 3.7 MMOL/L (ref 3.6–5.1)
POTASSIUM SERPL-SCNC: 3.7 MMOL/L (ref 3.6–5.1)
RBC # BLD AUTO: 3.47 X10(6)UL (ref 3.8–5.1)
RED CELL DISTRIBUTION WIDTH-SD: 50.3 FL (ref 35.1–46.3)
SODIUM SERPL-SCNC: 137 MMOL/L (ref 136–144)
WBC # BLD AUTO: 16.7 X10(3) UL (ref 4–13)

## 2018-02-26 PROCEDURE — 93306 TTE W/DOPPLER COMPLETE: CPT | Performed by: INTERNAL MEDICINE

## 2018-02-26 PROCEDURE — 99233 SBSQ HOSP IP/OBS HIGH 50: CPT | Performed by: HOSPITALIST

## 2018-02-26 RX ORDER — POTASSIUM CHLORIDE 20 MEQ/1
40 TABLET, EXTENDED RELEASE ORAL ONCE
Status: COMPLETED | OUTPATIENT
Start: 2018-02-26 | End: 2018-02-26

## 2018-02-26 RX ORDER — ALBUTEROL SULFATE 2.5 MG/3ML
2.5 SOLUTION RESPIRATORY (INHALATION)
Status: DISCONTINUED | OUTPATIENT
Start: 2018-02-26 | End: 2018-02-27

## 2018-02-26 RX ORDER — ALBUTEROL SULFATE 2.5 MG/3ML
2.5 SOLUTION RESPIRATORY (INHALATION)
Status: DISCONTINUED | OUTPATIENT
Start: 2018-02-26 | End: 2018-02-26

## 2018-02-26 RX ORDER — ALBUTEROL SULFATE 2.5 MG/3ML
SOLUTION RESPIRATORY (INHALATION)
Status: COMPLETED
Start: 2018-02-26 | End: 2018-02-26

## 2018-02-26 NOTE — CM/SW NOTE
Patient is fully independent.  Denies needs, concerns f     02/26/18 1500   CM/SW Referral Data   Referral Source    Reason for Referral Discharge planning   Informant Patient   Pertinent Medical Hx   Primary Care Physician Name (Dr Nubia Zhao)   Yvetta Kayser

## 2018-02-26 NOTE — PROGRESS NOTES
[facility]  Progress note     Ryan Mkdarius Patient Status:  Inpatient    1945 MRN [id number]   [facility] 5NW-A Attending Harry Lugo MD   Hosp Day # 1 PCP Beena King     Chief Complaint: Confusion, LGF    Maik Meza Epic.      ASSESSMENT / PLAN:     1. Strep pneumo septicemia and pneumonia-continue rocephin-repeat cultures  2. Asthma with mild exacerbation  1. [address] nebs  2. Short course steroids  3. Hypertension  4.  FMG  5. DM type 2       Quality:  · DVT Prophylaxis:

## 2018-02-26 NOTE — CONSULTS
INFECTIOUS DISEASE CONSULT NOTE    Marylu Elise Patient Status:  Inpatient    1945 MRN [id number]   [facility] 5NW-A Attending Nai Turner MD   Hosp Day # 1 PCP PAIGE, [facility] • Other and unspecified hyperlipidemia    • Pneumonia due to organism    • Pulmonary emphysema ([facility] [address]    • Renal disorder    • Shortness of breath    • Sleep apnea    • Stroke [facility])    • Type II or unspecified type diabetes mellitus without mention of com with breakfast  •  Insulin Aspart Pen (NOVOLOG) 100 UNIT/ML flexpen 1-5 Units, 1-5 Units, Subcutaneous, TID CC and HS  •  CefTRIAXone Sodium (ROCEPHIN) 2 g in sodium chloride 0.9 % 100 mL MBP/ADD-vantage, 2 g, Intravenous, Q24H  •  acetaminophen (TYLENOL E 02/24/18   2342  02/25/18   0645  02/26/18   0624   RBC  3.54*  3.45*  3.47*   HGB  11.1*  10.7*  10.7*   HCT  32.8*  32.2*  33.0*   MCV  92.7  93.3  95.1   MCH  31.4  31.0  30.8   MCHC  33.8  33.2  32.4   RDW  13.7  14.1  14.5   NEPRELIM  14.90*  16.18* hyperdense intracranial vessels. SINUSES:           There is moderate sinus disease with air-fluid levels identified within the left maxillary sinus and a few of the bilateral ethmoid air cells. MASTOIDS:          No sign of acute inflammation.  SKULL: complicated with a Strep pneumo infection eventually resulting in a blood stream infection    2. Acute sinusitis - due to Strep pneumo    3. Suspected S pneumo pna    4. Acute encephalopathy due to above- resolved    5. Leukocytosis- due to above.  Better b

## 2018-02-26 NOTE — PROGRESS NOTES
02/26/18 1258   Clinical Encounter Type   Visited With Patient   Continue Visiting No  ( to remain available at pager [phone].)   Patient Spiritual Encounters   Spiritual Interventions  provided emotional/spiritual care/follow up visit with

## 2018-02-27 ENCOUNTER — APPOINTMENT (OUTPATIENT)
Dept: GENERAL RADIOLOGY | Facility: HOSPITAL | Age: 73
DRG: 871 | End: 2018-02-27
Attending: INTERNAL MEDICINE
Payer: MEDICARE

## 2018-02-27 PROBLEM — E11.8 TYPE 2 DIABETES MELLITUS WITH COMPLICATION (HCC): Status: ACTIVE | Noted: 2017-05-28

## 2018-02-27 LAB
GLUCOSE BLD-MCNC: 193 MG/DL (ref 65–99)
GLUCOSE BLD-MCNC: 248 MG/DL (ref 65–99)
GLUCOSE BLD-MCNC: 269 MG/DL (ref 65–99)
GLUCOSE BLD-MCNC: 416 MG/DL (ref 65–99)
POTASSIUM SERPL-SCNC: 3.8 MMOL/L (ref 3.6–5.1)

## 2018-02-27 PROCEDURE — 71046 X-RAY EXAM CHEST 2 VIEWS: CPT | Performed by: INTERNAL MEDICINE

## 2018-02-27 PROCEDURE — 99232 SBSQ HOSP IP/OBS MODERATE 35: CPT | Performed by: HOSPITALIST

## 2018-02-27 RX ORDER — ENOXAPARIN SODIUM 100 MG/ML
40 INJECTION SUBCUTANEOUS DAILY
Status: DISCONTINUED | OUTPATIENT
Start: 2018-02-27 | End: 2018-02-28

## 2018-02-27 RX ORDER — ALBUTEROL SULFATE 2.5 MG/3ML
2.5 SOLUTION RESPIRATORY (INHALATION) EVERY 4 HOURS PRN
Status: DISCONTINUED | OUTPATIENT
Start: 2018-02-27 | End: 2018-02-28

## 2018-02-27 RX ORDER — POTASSIUM CHLORIDE 20 MEQ/1
40 TABLET, EXTENDED RELEASE ORAL ONCE
Status: COMPLETED | OUTPATIENT
Start: 2018-02-27 | End: 2018-02-27

## 2018-02-27 NOTE — PLAN OF CARE
Pt back to normal mental status today, denies any complaints except for a headache which was relieved with Tylenol administration. Occasional non-productive cough. VS stable. BS exacerbated by steroids, MD notified and additional insulin coverage given.

## 2018-02-27 NOTE — PROGRESS NOTES
[address] [facility]  TEL: [phone]  FAX: [phone]    Patricia Martin Patient Status:  Inpatient    1945 MRN [id number]   [facility] 5NW-A Attending Jose Guadalupe Jackson MD   Hosp Day # 2 PCP Sherry Green Microbiology    Reviewed in EMR,   Bcx S pneumo pansus  Repeat Bcx neg to date    Radiology: Xr Chest Pa + Lat Chest (cpt=71046)    Result Date: 2/27/2018  PROCEDURE:  XR CHEST PA + LAT CHEST (CPT=71046)  INDICATIONS:  abnl cxr  COMPARISON:  DIANNE that received \"pneumonia vaccine\" in the past. Not sure whether had prevnar, pneumovax or both. Instructed her to check with her PCP, should ideally have received both  -hope to transition to po abx tomorrow if she cont to do well and new Bcx remain neg.

## 2018-02-27 NOTE — PROGRESS NOTES
DIANNE HOSPITALIST  Progress note     Angela Bell Patient Status:  Inpatient    1945 MRN [id number]   [facility] 5NW-A Attending Jose Del Angel MD   Hosp Day # 2 PCP India Koo     Chief Complaint: Confusion, LGF    Penny Hwang input(s): PTP, INR in the last 72 hours. No results for input(s): TROP, CK in the last 72 hours. Imaging: Imaging data reviewed in Epic. ASSESSMENT / PLAN:     1.  Strep pneumo septicemia and pneumonia-continue rocephin-repeat cultures pending  2

## 2018-02-28 VITALS
BODY MASS INDEX: 37 KG/M2 | DIASTOLIC BLOOD PRESSURE: 83 MMHG | WEIGHT: 214.69 LBS | RESPIRATION RATE: 18 BRPM | OXYGEN SATURATION: 98 % | HEART RATE: 101 BPM | TEMPERATURE: 99 F | SYSTOLIC BLOOD PRESSURE: 138 MMHG

## 2018-02-28 PROBLEM — J01.00 ACUTE MAXILLARY SINUSITIS: Status: ACTIVE | Noted: 2018-02-25

## 2018-02-28 LAB
GLUCOSE BLD-MCNC: 181 MG/DL (ref 65–99)
GLUCOSE BLD-MCNC: 200 MG/DL (ref 65–99)
POTASSIUM SERPL-SCNC: 4 MMOL/L (ref 3.6–5.1)

## 2018-02-28 PROCEDURE — 99239 HOSP IP/OBS DSCHRG MGMT >30: CPT | Performed by: INTERNAL MEDICINE

## 2018-02-28 RX ORDER — LEVOFLOXACIN 750 MG/1
750 TABLET ORAL EVERY OTHER DAY
Qty: 7 TABLET | Refills: 0 | Status: SHIPPED | OUTPATIENT
Start: 2018-02-28 | End: 2018-03-14

## 2018-02-28 NOTE — PROGRESS NOTES
DIANNE HOSPITALIST  Progress note     Jeana Mason Patient Status:  Inpatient    1945 MRN [id number]   [facility] 5NW-A Attending Irma Pedroza MD   Hosp Day # 3 PCP David Luna     Chief Complaint: Confusion, LGF    Bhumika Bouche very clear; no sob  3. Hypertension  4.  FMG  5. DM type 2 -resume cf insulin      Quality:  · DVT Prophylaxis: lovenox  · CODE status: full  · Gurrola: no    Plan of care discussed with pt and ID    Paige Coronado MD

## 2018-02-28 NOTE — DISCHARGE SUMMARY
DIANNE HOSPITALIST  DISCHARGE SUMMARY     Aida Madrigal Patient Status:  Inpatient    1945 MRN [id number]   [facility] 5NW-A Attending Davina Ledezma MD   Hosp Day # 3 PCP [facility]     Date of Admission: 2018  Date of Pankaj Matthias during hospitalization:   • none    Incidental or significant findings and recommendations (brief descriptions):  • none    Lab/Test results pending at Discharge:   · none    Consultants:  • ID    Discharge Medication List:     Discharge Medications      S as: CRESTOR      Take 20 mg by mouth nightly. Refills:  0     valsartan 320 MG Tabs  Commonly known as:  DIOVAN      Take 320 mg by mouth daily.    Refills:  0        STOP taking these medications    Acetaminophen-Codeine #3 300-30 MG Tabs  Commonly know

## 2018-02-28 NOTE — PROGRESS NOTES
[address] [place] [address]  TEL: [phone]  FAX: [phone]    Hardik Mora Patient Status:  Inpatient    1945 MRN [id number]   [facility] 5NW-A Attending Ashleigh Amaya MD   Hosp Day # 3 PCP Ángel Lopez PATIENT STATED HISTORY: (As transcribed by Technologist)  Patient stated that she has had strep and pneumonia for 3 weeks. She is short of breath and has chest congestion. FINDINGS:   The appearance of the chest is essentially unchanged.   Lungs appear s home and come back if fevers persist  She will f/u with her primary. May benefit from seeing ENT as well as outpt. Can also f/u with me as needed.      Michael Roberts

## 2018-02-28 NOTE — PROGRESS NOTES
NURSING DISCHARGE NOTE    Discharged Home via Wheelchair. Accompanied by Family member  Belongings Taken by patient/family   PT COMPLETED DOSE OF ROCEPHIN. PT DISCHARGED TO HOME AS ORDERED BY DR. Anya Tabares. . D/C INSTRUCTIONS GIVEN TO PT.

## 2018-11-21 ENCOUNTER — APPOINTMENT (OUTPATIENT)
Dept: GENERAL RADIOLOGY | Facility: HOSPITAL | Age: 73
End: 2018-11-21
Attending: EMERGENCY MEDICINE
Payer: MEDICARE

## 2018-11-21 ENCOUNTER — APPOINTMENT (OUTPATIENT)
Dept: ULTRASOUND IMAGING | Facility: HOSPITAL | Age: 73
End: 2018-11-21
Attending: EMERGENCY MEDICINE
Payer: MEDICARE

## 2018-11-21 ENCOUNTER — HOSPITAL ENCOUNTER (EMERGENCY)
Facility: HOSPITAL | Age: 73
Discharge: HOME | End: 2018-11-21
Attending: EMERGENCY MEDICINE
Payer: MEDICARE

## 2018-11-21 VITALS
WEIGHT: 217 LBS | OXYGEN SATURATION: 99 % | HEIGHT: 64 IN | RESPIRATION RATE: 18 BRPM | BODY MASS INDEX: 37.05 KG/M2 | SYSTOLIC BLOOD PRESSURE: 148 MMHG | HEART RATE: 85 BPM | TEMPERATURE: 99 F | DIASTOLIC BLOOD PRESSURE: 86 MMHG

## 2018-11-21 DIAGNOSIS — M25.512 ACUTE PAIN OF LEFT SHOULDER: ICD-10-CM

## 2018-11-21 DIAGNOSIS — M25.522 LEFT ELBOW PAIN: Primary | ICD-10-CM

## 2018-11-21 PROCEDURE — 73080 X-RAY EXAM OF ELBOW: CPT | Performed by: EMERGENCY MEDICINE

## 2018-11-21 PROCEDURE — 96374 THER/PROPH/DIAG INJ IV PUSH: CPT

## 2018-11-21 PROCEDURE — 80053 COMPREHEN METABOLIC PANEL: CPT | Performed by: EMERGENCY MEDICINE

## 2018-11-21 PROCEDURE — 85610 PROTHROMBIN TIME: CPT | Performed by: EMERGENCY MEDICINE

## 2018-11-21 PROCEDURE — 85730 THROMBOPLASTIN TIME PARTIAL: CPT | Performed by: EMERGENCY MEDICINE

## 2018-11-21 PROCEDURE — 73060 X-RAY EXAM OF HUMERUS: CPT | Performed by: EMERGENCY MEDICINE

## 2018-11-21 PROCEDURE — 93971 EXTREMITY STUDY: CPT | Performed by: EMERGENCY MEDICINE

## 2018-11-21 PROCEDURE — 99285 EMERGENCY DEPT VISIT HI MDM: CPT

## 2018-11-21 PROCEDURE — 73030 X-RAY EXAM OF SHOULDER: CPT | Performed by: EMERGENCY MEDICINE

## 2018-11-21 PROCEDURE — 85025 COMPLETE CBC W/AUTO DIFF WBC: CPT | Performed by: EMERGENCY MEDICINE

## 2018-11-21 RX ORDER — TRAMADOL HYDROCHLORIDE 50 MG/1
50 TABLET ORAL EVERY 8 HOURS PRN
Qty: 10 TABLET | Refills: 0 | Status: SHIPPED | OUTPATIENT
Start: 2018-11-21 | End: 2020-11-08 | Stop reason: CLARIF

## 2018-11-21 RX ORDER — KETOROLAC TROMETHAMINE 30 MG/ML
15 INJECTION, SOLUTION INTRAMUSCULAR; INTRAVENOUS ONCE
Status: COMPLETED | OUTPATIENT
Start: 2018-11-21 | End: 2018-11-21

## 2018-11-21 NOTE — ED NOTES
Pt has returned from [address], resting abed watching TV. Call light in reach. VSS. Updated on POC

## 2018-11-21 NOTE — ED INITIAL ASSESSMENT (HPI)
Pt c/o left shoulder pain - reports she woke up three days ago with pain - states that pain has gotten increasingly worse until today when it became unbearable - pt guarding arm. No deformity noted.      EMS reports giving fentanyl 50mcg IM

## 2018-11-21 NOTE — ED PROVIDER NOTES
Patient Seen in: [facility] Emergency Department    History   Patient presents with:  Upper Extremity Injury (musculoskeletal)    Stated Complaint: Left shoulder pain     HPI    The patient is a 24-year-old female who presents emergency room with a DO Bryanna at [address] [facility] ENDOSCOPY   • HC  SECTION LEVEL I     • KNEE SURGERY             Social History    Tobacco Use      Smoking status: Never Smoker      Smokeless tobacco: Never Used    Alcohol use: No      Comment: social years back    Drug use: No is no overlying erythema appreciated. There is no specific tenderness to palpation throughout the left forearm or left wrist.  NEURO: Patient is awake, alert and oriented to time place and person. Motor strength is 5 over 5 in all 4 extremities.  There are the posterior elbow joint proximal to the olecranon. There is no joint effusion.   There is bony irregularity around the neck of the radius, may be related to component  of arthropathy, and there is no distinct fracture line, but advise correlation for any FOR FOLLOW UP WITH ORTHOPEDIC MD FOR SYMPTOMS. WILL DC TO HOME AT THIS TIME.     She had an IV line established and a blood work done including a CBC, chemistries, BUN and creatinine, and blood sugar which showed evidence of some mild elevation the patient tablet, R-0

## 2019-11-30 ENCOUNTER — HOSPITAL ENCOUNTER (EMERGENCY)
Facility: HOSPITAL | Age: 74
Discharge: HOME | End: 2019-11-30
Attending: EMERGENCY MEDICINE
Payer: MEDICARE

## 2019-11-30 ENCOUNTER — APPOINTMENT (OUTPATIENT)
Dept: GENERAL RADIOLOGY | Facility: HOSPITAL | Age: 74
End: 2019-11-30
Attending: EMERGENCY MEDICINE
Payer: MEDICARE

## 2019-11-30 VITALS
RESPIRATION RATE: 18 BRPM | WEIGHT: 198 LBS | BODY MASS INDEX: 33.8 KG/M2 | HEART RATE: 64 BPM | OXYGEN SATURATION: 99 % | SYSTOLIC BLOOD PRESSURE: 167 MMHG | HEIGHT: 64 IN | DIASTOLIC BLOOD PRESSURE: 65 MMHG

## 2019-11-30 DIAGNOSIS — S60.222A CONTUSION OF LEFT HAND, INITIAL ENCOUNTER: Primary | ICD-10-CM

## 2019-11-30 PROCEDURE — 73130 X-RAY EXAM OF HAND: CPT | Performed by: EMERGENCY MEDICINE

## 2019-11-30 PROCEDURE — 99283 EMERGENCY DEPT VISIT LOW MDM: CPT

## 2019-11-30 PROCEDURE — 73110 X-RAY EXAM OF WRIST: CPT | Performed by: EMERGENCY MEDICINE

## 2019-12-01 NOTE — ED INITIAL ASSESSMENT (HPI)
Patient reports she fell yesterday and landed on her left hand. Now reports pain to her left hand and wrist. Denies any LOC, denies being on any blood thinners.

## 2019-12-01 NOTE — ED PROVIDER NOTES
Patient Seen in: [facility] Emergency Department      History   Patient presents with:  Upper Extremity Injury (musculoskeletal)    Stated Complaint: fall yesterday and has pain to right wrist.    HPI    61-year-old female presents with pain to the stated complaint: fall yesterday and has pain to right wrist.  Other systems are as noted in HPI. Constitutional and vital signs reviewed. All other systems reviewed and negative except as noted above.     Physical Exam     ED Triage Vitals [11/30/19 (CPT=73110)  TECHNIQUE:  Three views were obtained. COMPARISON:  None.   INDICATIONS:  fall yesterday and has pain to right wrist.  PATIENT STATED HISTORY: (As transcribed by Technologist)  Patient stated she fell yesterday and has right posterior hand and Prescribed:  Current Discharge Medication List

## 2020-09-21 ENCOUNTER — APPOINTMENT (OUTPATIENT)
Dept: GENERAL RADIOLOGY | Facility: HOSPITAL | Age: 75
End: 2020-09-21
Attending: EMERGENCY MEDICINE
Payer: MEDICARE

## 2020-09-21 ENCOUNTER — HOSPITAL ENCOUNTER (EMERGENCY)
Facility: HOSPITAL | Age: 75
Discharge: HOME | End: 2020-09-21
Attending: EMERGENCY MEDICINE
Payer: MEDICARE

## 2020-09-21 VITALS
DIASTOLIC BLOOD PRESSURE: 72 MMHG | SYSTOLIC BLOOD PRESSURE: 152 MMHG | RESPIRATION RATE: 18 BRPM | OXYGEN SATURATION: 99 % | WEIGHT: 198.44 LBS | TEMPERATURE: 98 F | BODY MASS INDEX: 34 KG/M2 | HEART RATE: 68 BPM

## 2020-09-21 DIAGNOSIS — M15.9 OSTEOARTHRITIS OF MULTIPLE JOINTS, UNSPECIFIED OSTEOARTHRITIS TYPE: ICD-10-CM

## 2020-09-21 DIAGNOSIS — M79.601 PAIN OF RIGHT UPPER EXTREMITY: Primary | ICD-10-CM

## 2020-09-21 DIAGNOSIS — M79.7 FIBROMYALGIA: ICD-10-CM

## 2020-09-21 PROCEDURE — 96372 THER/PROPH/DIAG INJ SC/IM: CPT

## 2020-09-21 PROCEDURE — 99285 EMERGENCY DEPT VISIT HI MDM: CPT

## 2020-09-21 PROCEDURE — 73030 X-RAY EXAM OF SHOULDER: CPT | Performed by: EMERGENCY MEDICINE

## 2020-09-21 PROCEDURE — 73090 X-RAY EXAM OF FOREARM: CPT | Performed by: EMERGENCY MEDICINE

## 2020-09-21 PROCEDURE — 99284 EMERGENCY DEPT VISIT MOD MDM: CPT

## 2020-09-21 RX ORDER — KETOROLAC TROMETHAMINE 30 MG/ML
15 INJECTION, SOLUTION INTRAMUSCULAR; INTRAVENOUS ONCE
Status: COMPLETED | OUTPATIENT
Start: 2020-09-21 | End: 2020-09-21

## 2020-09-21 RX ORDER — TRAMADOL HYDROCHLORIDE 50 MG/1
TABLET ORAL EVERY 6 HOURS PRN
Qty: 10 TABLET | Refills: 0 | Status: SHIPPED | OUTPATIENT
Start: 2020-09-21 | End: 2020-09-28

## 2020-09-21 RX ORDER — TRAMADOL HYDROCHLORIDE 50 MG/1
50 TABLET ORAL ONCE
Status: COMPLETED | OUTPATIENT
Start: 2020-09-21 | End: 2020-09-21

## 2020-09-21 NOTE — ED PROVIDER NOTES
Patient Seen in: [facility] Emergency Department      History   Patient presents with:  Arm or Hand Injury    Stated Complaint: pt arrives with increased arm and hip pain    HPI    80-year-old female with a history of anemia, asthma, gastroesophagea tobacco: Never Used    Alcohol use: No      Comment: social years back    Drug use: No             Review of Systems    Positive for stated complaint: pt arrives with increased arm and hip pain  Other systems are as noted in HPI.   Constitutional and vital prior to discharge, that an emergency medical condition was not or was no longer present. There was no indication for further evaluation, treatment or admission on an emergency basis.   Comprehensive verbal and written discharge and follow-up instructions

## 2020-09-21 NOTE — ED INITIAL ASSESSMENT (HPI)
Pt arrives with c/o increased Right arm and right hip pain, also mid back pain since yesterday morning. Pt denies fall. No injury. Pt has a Lidoderm patch on her right forearm. Pt took Tylenol without relief.

## 2020-11-05 ENCOUNTER — OFFICE VISIT (OUTPATIENT)
Dept: NEPHROLOGY | Facility: CLINIC | Age: 75
End: 2020-11-05
Payer: MEDICARE

## 2020-11-05 VITALS — DIASTOLIC BLOOD PRESSURE: 72 MMHG | SYSTOLIC BLOOD PRESSURE: 128 MMHG | WEIGHT: 227.38 LBS | BODY MASS INDEX: 39 KG/M2

## 2020-11-05 DIAGNOSIS — N18.30 STAGE 3 CHRONIC KIDNEY DISEASE, UNSPECIFIED WHETHER STAGE 3A OR 3B CKD (HCC): Primary | ICD-10-CM

## 2020-11-05 PROBLEM — Z98.890 H/O ESOPHAGOGASTRODUODENOSCOPY: Status: ACTIVE | Noted: 2017-07-27

## 2020-11-05 PROBLEM — R29.6 RECURRENT FALLS: Status: ACTIVE | Noted: 2020-07-07

## 2020-11-05 PROBLEM — F41.9 ANXIETY DISORDER: Status: ACTIVE | Noted: 2017-08-18

## 2020-11-05 PROCEDURE — 99215 OFFICE O/P EST HI 40 MIN: CPT | Performed by: INTERNAL MEDICINE

## 2020-11-05 RX ORDER — BIOTIN 1 MG
TABLET ORAL
COMMUNITY
End: 2020-11-08 | Stop reason: CLARIF

## 2020-11-05 RX ORDER — CYCLOBENZAPRINE HCL 5 MG
5 TABLET ORAL NIGHTLY
Status: ON HOLD | COMMUNITY
Start: 2020-10-28 | End: 2021-11-09

## 2020-11-05 RX ORDER — LIDOCAINE 50 MG/G
PATCH TOPICAL
COMMUNITY
Start: 2020-10-28 | End: 2020-11-08 | Stop reason: CLARIF

## 2020-11-05 RX ORDER — METOPROLOL SUCCINATE 100 MG/1
100 TABLET, EXTENDED RELEASE ORAL DAILY
COMMUNITY
Start: 2020-10-28

## 2020-11-05 RX ORDER — LEVOCETIRIZINE DIHYDROCHLORIDE 5 MG/1
5 TABLET, FILM COATED ORAL DAILY
COMMUNITY
Start: 2020-10-15 | End: 2020-11-08 | Stop reason: CLARIF

## 2020-11-05 RX ORDER — OMEPRAZOLE 40 MG/1
40 CAPSULE, DELAYED RELEASE ORAL DAILY
COMMUNITY
Start: 2020-10-28

## 2020-11-05 RX ORDER — DILTIAZEM HYDROCHLORIDE 120 MG/1
120 TABLET, FILM COATED ORAL DAILY
Status: ON HOLD | COMMUNITY
Start: 2020-10-28 | End: 2020-11-11

## 2020-11-05 RX ORDER — MELATONIN
1000 DAILY
COMMUNITY
Start: 2020-10-28

## 2020-11-05 RX ORDER — FLUTICASONE PROPIONATE 50 MCG
SPRAY, SUSPENSION (ML) NASAL
COMMUNITY
Start: 2020-10-22 | End: 2020-11-08 | Stop reason: CLARIF

## 2020-11-05 RX ORDER — GABAPENTIN 300 MG/1
CAPSULE ORAL
COMMUNITY
Start: 2020-10-28 | End: 2020-11-08 | Stop reason: CLARIF

## 2020-11-05 RX ORDER — EMPAGLIFLOZIN 10 MG/1
10 TABLET, FILM COATED ORAL DAILY
COMMUNITY
Start: 2020-10-28

## 2020-11-05 RX ORDER — DULOXETIN HYDROCHLORIDE 30 MG/1
30 CAPSULE, DELAYED RELEASE ORAL 2 TIMES DAILY
COMMUNITY
Start: 2020-10-28

## 2020-11-05 RX ORDER — HYDROXYZINE HYDROCHLORIDE 25 MG/1
25 TABLET, FILM COATED ORAL NIGHTLY PRN
Status: ON HOLD | COMMUNITY
Start: 2020-10-28 | End: 2021-11-09

## 2020-11-05 NOTE — PROGRESS NOTES
Nephrology Consult Note      REASON FOR CONSULT:  CKD         HPI:   José Miguel Whitt is a 76year old female with Patient presents with:   Other: elevated creatinine    PAIGE, KIESHA    77 y/o AA female with longstanding hx of DM - 40 yrs, HTN, gout presents visit):  Current Outpatient Medications   Medication Sig Dispense Refill   • Omeprazole 40 MG Oral Capsule Delayed Release Take 40 mg by mouth daily. • Metoprolol Succinate  MG Oral Tablet 24 Hr Take 100 mg by mouth daily.      • lidocaine 5 % Ext SR 24 Hr Take 240 mg by mouth daily. • Rosuvastatin Calcium (CRESTOR) 20 MG Oral Tab Take 20 mg by mouth nightly. • valsartan (DIOVAN) 320 MG Oral Tab Take 320 mg by mouth daily.      • docusate sodium (COLACE) 100 MG Oral Cap Take 100 mg by mouth 2 Without clubbing, cyanosis or edema.   Neurologic:  normal affect, cranial nerves grossly intact, moving all extremities  Skin: Warm and dry, no rashes    Labs 8/2020  Cr 1.67  Lyes normal  TSH 1.37  Hgb 12.2  Hgb A1c 8.3  Mg 1.9       ASSESSMENT/PLAN:   Ve

## 2020-11-08 ENCOUNTER — APPOINTMENT (OUTPATIENT)
Dept: GENERAL RADIOLOGY | Facility: HOSPITAL | Age: 75
End: 2020-11-08
Attending: EMERGENCY MEDICINE
Payer: MEDICARE

## 2020-11-08 ENCOUNTER — APPOINTMENT (OUTPATIENT)
Dept: CT IMAGING | Facility: HOSPITAL | Age: 75
End: 2020-11-08
Attending: EMERGENCY MEDICINE
Payer: MEDICARE

## 2020-11-08 ENCOUNTER — HOSPITAL ENCOUNTER (OUTPATIENT)
Facility: HOSPITAL | Age: 75
Setting detail: OBSERVATION
Discharge: HOME HEALTH | End: 2020-11-11
Attending: EMERGENCY MEDICINE | Admitting: INTERNAL MEDICINE
Payer: MEDICARE

## 2020-11-08 ENCOUNTER — APPOINTMENT (OUTPATIENT)
Dept: MRI IMAGING | Facility: HOSPITAL | Age: 75
End: 2020-11-08
Attending: EMERGENCY MEDICINE
Payer: MEDICARE

## 2020-11-08 DIAGNOSIS — R41.82 ALTERED MENTAL STATUS, UNSPECIFIED ALTERED MENTAL STATUS TYPE: Primary | ICD-10-CM

## 2020-11-08 PROBLEM — R73.9 HYPERGLYCEMIA: Status: ACTIVE | Noted: 2020-11-08

## 2020-11-08 PROCEDURE — 99220 INITIAL OBSERVATION CARE,LEVL III: CPT | Performed by: INTERNAL MEDICINE

## 2020-11-08 PROCEDURE — 71045 X-RAY EXAM CHEST 1 VIEW: CPT | Performed by: EMERGENCY MEDICINE

## 2020-11-08 PROCEDURE — 70551 MRI BRAIN STEM W/O DYE: CPT | Performed by: EMERGENCY MEDICINE

## 2020-11-08 PROCEDURE — 70450 CT HEAD/BRAIN W/O DYE: CPT | Performed by: EMERGENCY MEDICINE

## 2020-11-08 PROCEDURE — 99205 OFFICE O/P NEW HI 60 MIN: CPT | Performed by: INTERNAL MEDICINE

## 2020-11-08 PROCEDURE — 70547 MR ANGIOGRAPHY NECK W/O DYE: CPT | Performed by: EMERGENCY MEDICINE

## 2020-11-08 PROCEDURE — 70544 MR ANGIOGRAPHY HEAD W/O DYE: CPT | Performed by: EMERGENCY MEDICINE

## 2020-11-08 RX ORDER — ACETAMINOPHEN 325 MG/1
650 TABLET ORAL EVERY 6 HOURS PRN
Status: DISCONTINUED | OUTPATIENT
Start: 2020-11-08 | End: 2020-11-11

## 2020-11-08 RX ORDER — HYDRALAZINE HYDROCHLORIDE 20 MG/ML
10 INJECTION INTRAMUSCULAR; INTRAVENOUS EVERY 2 HOUR PRN
Status: DISCONTINUED | OUTPATIENT
Start: 2020-11-08 | End: 2020-11-11

## 2020-11-08 RX ORDER — METOCLOPRAMIDE HYDROCHLORIDE 5 MG/ML
5 INJECTION INTRAMUSCULAR; INTRAVENOUS EVERY 8 HOURS PRN
Status: DISCONTINUED | OUTPATIENT
Start: 2020-11-08 | End: 2020-11-11

## 2020-11-08 RX ORDER — ASPIRIN 300 MG
300 SUPPOSITORY, RECTAL RECTAL DAILY
Status: DISCONTINUED | OUTPATIENT
Start: 2020-11-08 | End: 2020-11-11

## 2020-11-08 RX ORDER — METOPROLOL TARTRATE 5 MG/5ML
5 INJECTION INTRAVENOUS ONCE
Status: COMPLETED | OUTPATIENT
Start: 2020-11-08 | End: 2020-11-08

## 2020-11-08 RX ORDER — ATORVASTATIN CALCIUM 40 MG/1
40 TABLET, FILM COATED ORAL NIGHTLY
Status: DISCONTINUED | OUTPATIENT
Start: 2020-11-08 | End: 2020-11-11

## 2020-11-08 RX ORDER — PANTOPRAZOLE SODIUM 40 MG/1
40 TABLET, DELAYED RELEASE ORAL
Status: DISCONTINUED | OUTPATIENT
Start: 2020-11-09 | End: 2020-11-11

## 2020-11-08 RX ORDER — SODIUM CHLORIDE 9 MG/ML
INJECTION, SOLUTION INTRAVENOUS CONTINUOUS
Status: DISCONTINUED | OUTPATIENT
Start: 2020-11-08 | End: 2020-11-09

## 2020-11-08 RX ORDER — ONDANSETRON 2 MG/ML
4 INJECTION INTRAMUSCULAR; INTRAVENOUS EVERY 4 HOURS PRN
Status: DISCONTINUED | OUTPATIENT
Start: 2020-11-08 | End: 2020-11-11

## 2020-11-08 RX ORDER — GABAPENTIN 300 MG/1
600 CAPSULE ORAL NIGHTLY
COMMUNITY

## 2020-11-08 RX ORDER — DULOXETIN HYDROCHLORIDE 30 MG/1
30 CAPSULE, DELAYED RELEASE ORAL 2 TIMES DAILY
Status: DISCONTINUED | OUTPATIENT
Start: 2020-11-08 | End: 2020-11-11

## 2020-11-08 RX ORDER — DEXTROSE MONOHYDRATE 25 G/50ML
50 INJECTION, SOLUTION INTRAVENOUS
Status: DISCONTINUED | OUTPATIENT
Start: 2020-11-08 | End: 2020-11-11

## 2020-11-08 RX ORDER — ONDANSETRON 2 MG/ML
4 INJECTION INTRAMUSCULAR; INTRAVENOUS EVERY 6 HOURS PRN
Status: DISCONTINUED | OUTPATIENT
Start: 2020-11-08 | End: 2020-11-11

## 2020-11-08 RX ORDER — METOPROLOL SUCCINATE 100 MG/1
100 TABLET, EXTENDED RELEASE ORAL DAILY
Status: DISCONTINUED | OUTPATIENT
Start: 2020-11-08 | End: 2020-11-09

## 2020-11-08 RX ORDER — HEPARIN SODIUM 5000 [USP'U]/ML
5000 INJECTION, SOLUTION INTRAVENOUS; SUBCUTANEOUS EVERY 8 HOURS SCHEDULED
Status: DISCONTINUED | OUTPATIENT
Start: 2020-11-08 | End: 2020-11-11

## 2020-11-08 RX ORDER — ASPIRIN 325 MG
325 TABLET ORAL DAILY
Status: DISCONTINUED | OUTPATIENT
Start: 2020-11-08 | End: 2020-11-11

## 2020-11-08 RX ORDER — ASPIRIN 81 MG/1
324 TABLET, CHEWABLE ORAL ONCE
Status: COMPLETED | OUTPATIENT
Start: 2020-11-08 | End: 2020-11-08

## 2020-11-08 RX ORDER — DILTIAZEM HYDROCHLORIDE 240 MG/1
240 CAPSULE, COATED, EXTENDED RELEASE ORAL DAILY
Status: DISCONTINUED | OUTPATIENT
Start: 2020-11-08 | End: 2020-11-11

## 2020-11-08 RX ORDER — SODIUM CHLORIDE, SODIUM LACTATE, POTASSIUM CHLORIDE, CALCIUM CHLORIDE 600; 310; 30; 20 MG/100ML; MG/100ML; MG/100ML; MG/100ML
INJECTION, SOLUTION INTRAVENOUS CONTINUOUS
Status: DISCONTINUED | OUTPATIENT
Start: 2020-11-08 | End: 2020-11-08

## 2020-11-08 NOTE — ED INITIAL ASSESSMENT (HPI)
Pt to the emergency room for intermittent ams since yesterday. Per ems family reports that she has had this happen previously when taking medications incorrectly. Pt unable to comprehend questions and has not been answering questions correctly when asked.

## 2020-11-08 NOTE — ED PROVIDER NOTES
Patient Seen in: [facility] Emergency Department      History   Patient presents with:  Altered Mental Status    Stated Complaint: AMS    HPI    70-year-old female brought in by EMS personnel due to report of alteration mental status over the past 2 due to organism    • Pulmonary emphysema (HCC)    • Renal disorder    • Shortness of breath    • Sleep apnea    • Stroke [facility])    • Type II or unspecified type diabetes mellitus without mention of complication, not stated as uncontrolled    • Unspecified e masses appreciated. Bowel sounds are normoactive. No CVA tenderness. Scar from previous  noted. Extremities: Tone in the extremities is normal.  She is moving all 4 extremities. No obvious deformities noted.     ED Course     Labs Reviewed   C Protein 1.21 (*)     All other components within normal limits   SED RATE, WESTERGREN (AUTOMATED) - Abnormal; Notable for the following components:    Sed Rate 26 (*)     All other components within normal limits   BASIC METABOLIC PANEL (8) - Abnormal; Not Notable for the following components:    POC Glucose 227 (*)     All other components within normal limits   POCT GLUCOSE - Abnormal; Notable for the following components:    POC Glucose 191 (*)     All other components within normal limits   POCT GLUCOSE BLOOD CULTURE   CBC W/ DIFFERENTIAL     EKG    Rate, intervals and axes as noted on EKG Report.   Rate: 110  Rhythm: Sinus Rhythm  Reading: Sinus rhythm with LVH T wave inversion seen in lead I but upright T's in lead III which is different compared to [facility]         No sign of acute sinusitis.     MASTOIDS:          No sign of acute inflammation. SKULL:             No evidence for fracture or osseous abnormality.    OTHER:             None.                           Impression     CONCLUSION: Antonia Hodge is mild s MIDDLE CEREBRALS:         No visible stenosis or aneurysm. POSTERIOR COMMUNICATING: No visible stenosis or aneurysm.    SUPERIOR CEREBELLARS:         Superior cerebellar arteries are not visualized.  This is probably technical.   BASILAR:             No function as she has a history of chronic kidney disease. CBC performed lactic acid performed. Urinalysis will be performed. Admission disposition: 11/8/2020  9:28 PM       On reevaluation at the time of 1650 p.m.   The patient was telling me that she had mental status. What caused it of concern initially is when she called her mother she did not answer the phone so she went to the home and found the neighbor at the home of her mother.   She says there have been times when her mother is taken excessive pain Disp-60 tablet, R-1                            Present on Admission  Date Reviewed: 11/5/2020          ICD-10-CM Noted POA    * (Principal) Altered mental status, unspecified altered mental status type R41.82 2/25/2018 Unknown    Elevated troponin R77.8 Un

## 2020-11-09 ENCOUNTER — APPOINTMENT (OUTPATIENT)
Dept: CV DIAGNOSTICS | Facility: HOSPITAL | Age: 75
End: 2020-11-09
Attending: INTERNAL MEDICINE
Payer: MEDICARE

## 2020-11-09 ENCOUNTER — NURSE ONLY (OUTPATIENT)
Dept: ELECTROPHYSIOLOGY | Facility: HOSPITAL | Age: 75
End: 2020-11-09
Attending: INTERNAL MEDICINE
Payer: MEDICARE

## 2020-11-09 PROCEDURE — 99203 OFFICE O/P NEW LOW 30 MIN: CPT | Performed by: OTHER

## 2020-11-09 PROCEDURE — 93306 TTE W/DOPPLER COMPLETE: CPT | Performed by: INTERNAL MEDICINE

## 2020-11-09 PROCEDURE — 99214 OFFICE O/P EST MOD 30 MIN: CPT | Performed by: INTERNAL MEDICINE

## 2020-11-09 PROCEDURE — 95819 EEG AWAKE AND ASLEEP: CPT | Performed by: OTHER

## 2020-11-09 PROCEDURE — 99225 SUBSEQUENT OBSERVATION CARE: CPT | Performed by: INTERNAL MEDICINE

## 2020-11-09 RX ORDER — VALSARTAN 320 MG/1
320 TABLET ORAL DAILY
Status: DISCONTINUED | OUTPATIENT
Start: 2020-11-09 | End: 2020-11-11

## 2020-11-09 RX ORDER — SODIUM CHLORIDE 9 MG/ML
INJECTION, SOLUTION INTRAVENOUS CONTINUOUS
Status: ACTIVE | OUTPATIENT
Start: 2020-11-09 | End: 2020-11-10

## 2020-11-09 RX ORDER — ALLOPURINOL 300 MG/1
300 TABLET ORAL DAILY
Status: DISCONTINUED | OUTPATIENT
Start: 2020-11-09 | End: 2020-11-11

## 2020-11-09 RX ORDER — GABAPENTIN 300 MG/1
600 CAPSULE ORAL NIGHTLY
Status: DISCONTINUED | OUTPATIENT
Start: 2020-11-09 | End: 2020-11-11

## 2020-11-09 RX ORDER — LEVETIRACETAM 500 MG/1
500 TABLET ORAL 2 TIMES DAILY
Status: DISCONTINUED | OUTPATIENT
Start: 2020-11-09 | End: 2020-11-11

## 2020-11-09 RX ORDER — DOCUSATE SODIUM 100 MG/1
100 CAPSULE, LIQUID FILLED ORAL 2 TIMES DAILY
Status: DISCONTINUED | OUTPATIENT
Start: 2020-11-09 | End: 2020-11-11

## 2020-11-09 NOTE — PLAN OF CARE
Problem: Impaired Swallowing  Goal: Minimize aspiration risk  Description: Interventions:  - Patient should be alert and upright for all feedings (90 degrees preferred)  - Offer food and liquids at a slow rate  - No straws  - Encourage small bites of emanuel

## 2020-11-09 NOTE — PLAN OF CARE
Problem: Impaired Activities of Daily Living  Goal: Achieve highest/safest level of independence in self care  Description: Interventions:  - Assess ability and encourage patient to participate in ADLs to maximize function  - Promote sitting position [facility]

## 2020-11-09 NOTE — CONSULTS
CARDIOLOGY CONSULT - [facility]      NAME: Luis E Seat - ROOM: [facility]016-L - MRN: [id number] - Age: 76year old - :  1945    Date of Admission: 2020  3:25 PM  Admission Diagnosis: Altered mental status, unspecif • Stroke [facility])    • Type II or unspecified type diabetes mellitus without mention of complication, not stated as uncontrolled    • Unspecified essential hypertension         PSH:   Past Surgical History:   Procedure Laterality Date   • ESOPHAGOGASTRODUO Inhalation Aero Soln, Inhale 2 puffs into the lungs every 6 (six) hours as needed for Wheezing.  , Disp: , Rfl: , Past Week at Unknown time    •  allopurinol (ZYLOPRIM) 300 MG Oral Tab, Take 300 mg by mouth daily. , Disp: , Rfl: , 11/3/2020    •  Diltiazem Wt Readings from Last 3 Encounters:  11/08/20 : 240 lb (108.9 kg)  11/05/20 : 227 lb 6.4 oz (103.1 kg)  09/21/20 : 198 lb 6.6 oz (90 kg)        Intake/Output Summary (Last 24 hours) at 11/8/2020 5071  Last data filed at 11/8/2020 1645  G

## 2020-11-09 NOTE — PROGRESS NOTES
DIANNE HOSPITALIST  Progress Note     Dalton Lee Patient Status:  Observation    1945 MRN [id number]   [facility] 7NE-A Attending Lesia Michelle DO   Hosp Day # 0 PCP July Andrew     Chief Complaint: confusion    S: Patient's spee Sodium  40 mg Oral QAM AC   • Metoprolol Succinate ER  100 mg Oral Daily   • DULoxetine HCl  30 mg Oral BID   • dilTIAZem HCl ER Coated Beads  240 mg Oral Daily   • Heparin Sodium (Porcine)  5,000 Units Subcutaneous Q8H [address]   • aspirin  300 mg Rectal Daily the clinical documentation in H+P. Based on patients current state of illness, I anticipate that, after discharge, patient will require TBD.

## 2020-11-09 NOTE — PHYSICAL THERAPY NOTE
PHYSICAL THERAPY QUICK EVALUATION - INPATIENT    Room Number: 6745/5701-A  Evaluation Date: 11/9/2020  Presenting Problem: AMS  Physician Order: PT Eval and Treat    Problem List  Principal Problem:    Altered mental status, unspecified altered mental st Restriction: None                PAIN ASSESSMENT  Ratin         RANGE OF MOTION AND STRENGTH ASSESSMENT  Upper extremity ROM and strength are within functional limits     Lower extremity ROM is within functional limits     Lower extremity strength is w session/findings; All patient questions and concerns addressed; Discussed recommendations with /    ASSESSMENT   Patient is a 76year old female admitted on 11/8/2020 for AMS.   Pertinent comorbidities and personal factors impacting t

## 2020-11-09 NOTE — CM/SW NOTE
Patient failed inpatient criteria. Second level of review completed and supports observation. UR committee in agreement. Discussed with Dr. Helane Closs  who approves observation status. MOON given to the patient and order written.

## 2020-11-09 NOTE — H&P
DIANNE HOSPITALIST  History and Physical     Petey Factor Patient Status:  Emergency    1945 MRN [id number]   Location [address] [facility] Attending Ugo Pacheco MD   Hosp Day # 0 PCP Monica Chaidez     Chief Complaint: AMS 01/01/1989   • High blood pressure    • High cholesterol    • History of stomach ulcers    • IBS (irritable bowel syndrome)    • Migraines    • Neuropathy    • Osteoarthritis    • Other and unspecified hyperlipidemia    • Pneumonia due to organism    • Pul Propionate 50 MCG/ACT Nasal Suspension, INSTILL 1 SPRAY(S) IN EACH NOSTRIL TWICE A DAY, Disp: , Rfl:     •  JARDIANCE 10 MG Oral Tab, Take 1 tablet by mouth daily. , Disp: , Rfl:     •  DULoxetine HCl 30 MG Oral Cap DR Particles, Take 30 mg by mouth 2 (two) Tab, Take 12.5 mg by mouth daily. , Disp: , Rfl:     •  MetFORMIN HCl (GLUCOPHAGE) 500 MG Oral Tab, Take 750 mg by mouth 2 (two) times daily with meals.   , Disp: , Rfl:     •  Montelukast Sodium (SINGULAIR) 10 MG Oral Tab, Take 10 mg by mouth nightly., Disp place  3. ASA/Statin  4. Lipid panel, A1c  5. Neurology consult  6. Check EEG  7. MR imaging pending  2. Hypertensive emergency  1. Noncompliance of meds due to altered mental state  2.  Resume AVN agents, monitor, allowing for permissive HTN due to #1, Leroy Luong

## 2020-11-09 NOTE — PROGRESS NOTES
[facility]  Cardiology Progress Note    Lázaro Estela Patient Status:  Observation    1945 MRN [id number]   [facility] 7NE-A Attending Kristen Silverman DO   Hosp Day # 0 PCP May Eli     Subjective:  Still with word finding dif Oral Daily   • Pantoprazole Sodium  40 mg Oral QAM AC   • Metoprolol Succinate ER  100 mg Oral Daily   • DULoxetine HCl  30 mg Oral BID   • dilTIAZem HCl ER Coated Beads  240 mg Oral Daily   • Heparin Sodium (Porcine)  5,000 Units Subcutaneous Q8H [facility]   • BP on admission, the patient currently denies any chest pain or shortness of breath. This does not appear to be ACS.   In light of her risk factors it may be reasonable to consider noninvasive ischemic evaluation as long as she does not have a significant

## 2020-11-09 NOTE — PROGRESS NOTES
Received pt from ED @ 2200. Pt a/o x2-3, to person (but does not remember own birthday), place and situation. Pt able to tell this RN pt is here for word finding issues. Occasionally struggling understanding - Global aphasia. Neuro q2 started @ 2200.

## 2020-11-09 NOTE — PROGRESS NOTES
[facility] Note:  Renal Dose Adjustment for Metoclopramide (REGLAN)    Sada Holder has been prescribed Metoclopramide (REGLAN) 10 mg every 8 hours as needed for nausea/vomiting.     Estimated Creatinine Clearance: 24.4 mL/min (A) (based on SCr of 1.79 m

## 2020-11-09 NOTE — CONSULTS
[facility] [address]   Neurology; INITIAL Consultation VISIT  2020, 11:08 AM     Petey Factor Patient Status:  Observation    1945 MRN [id number]   [facility] 7NE-A Attending Pavel Da Silva DO     PCP Gayle Hines reports that she has never smoked. She has never used smokeless tobacco. She reports that she does not drink alcohol or use drugs.     ALLERGIES:    Influenza Vaccines      OTHER (SEE COMMENTS)    Comment:Fever  Dust Mite Extract       UNKNOWN    Comment: nightly. •  MetFORMIN HCl (GLUCOPHAGE) 500 MG Oral Tab, Take 500 mg by mouth 3 (three) times daily.           • gabapentin  600 mg Oral Nightly   • allopurinol  300 mg Oral Daily   • docusate sodium  100 mg Oral BID   • valsartan  320 mg Oral Daily   • P Collection Time: 11/08/20  3:33 PM   Result Value Ref Range    Ventricular rate 110 BPM    Atrial rate 110 BPM    P-R Interval 158 ms    QRS Duration 94 ms    Q-T Interval 354 ms    QTC Calculation (Bezet) 479 ms    P Axis 38 degrees    R Axis -27 degrees Esterase Urine Negative Negative    WBC Urine 1-4 <5 /HPF    RBC URINE 0-2 0 - 2 /HPF    Bacteria Urine None Seen None Seen    Squamous Epi.  Cells None Seen Small /LPF    Renal Tubular Epithelial Cells None Seen Small /LPF    Transitional Cells None Seen S %    Eosinophil % 0.0 %    Basophil % 0.4 %    Immature Granulocyte % 0.3 %   HEMOGLOBIN A1C    Collection Time: 11/08/20  4:14 PM   Result Value Ref Range    HgbA1C 8.9 (H) <5.7 %    Estimated Average Glucose 209 (H) 68 - 126 mg/dL   POCT GLUCOSE    Jose De Jesus Comment Result Value Ref Range    POC Glucose 198 (H) 70 - 99 mg/dL        IMAGING:  Ct Brain Or Head (51783)    Result Date: 11/8/2020  CONCLUSION:  There is mild small vessel ischemic change.   There is no acute intracranial abnormality on the noncontrast CT of notes reviewed    Treatments:  aspirin      Fabián Mesa MD  Vascular & General Neurology  Director, Multiple Sclerosis program  [place] [address]  11/9/2020

## 2020-11-09 NOTE — SLP NOTE
ADULT CLINICAL SWALLOWING EVALUATION    ASSESSMENT    ASSESSMENT/OVERALL IMPRESSION:  76year old female with past medical history of type 2 diabetes mellitus, hyperlipidemia, fibromyalgia, CAD presents to the emergency department with altered mental statu precautions  Discharge Recommendations/Plan: Undetermined    HISTORY   MEDICAL HISTORY  Reason for Referral: Stroke protocol    Problem List  Principal Problem:    Altered mental status, unspecified altered mental status type  Active Problems:    Hyperglyc evident. 2.) CT of the brain completed 11/8:  CONCLUSION:  There is mild small vessel ischemic change. There is no acute intracranial abnormality on the noncontrast CT of the head.        SUBJECTIVE   Pt pleasant, awake, alert and demonstrating active p over 2 session(s). In Progress   Goal #3 The patient will tolerate trial upgrade of regular hard solid diet consistency and thin liquids without overt signs or symptoms of aspiration with 100 % accuracy over 1 session(s).   Not Addressed   Goal #4 The pa

## 2020-11-09 NOTE — CM/SW NOTE
11/09/20 0900   CM/SW Referral Data   Referral Source Physician   Reason for Referral Discharge planning   Informant Children   Social History   Recreational Drug/Alcohol Use no   Major Changes Last 6 Months no   Domestic/Partner Violence no   Suicidal

## 2020-11-10 ENCOUNTER — APPOINTMENT (OUTPATIENT)
Dept: CT IMAGING | Facility: HOSPITAL | Age: 75
End: 2020-11-10
Attending: Other
Payer: MEDICARE

## 2020-11-10 PROCEDURE — 99213 OFFICE O/P EST LOW 20 MIN: CPT | Performed by: OTHER

## 2020-11-10 PROCEDURE — 70496 CT ANGIOGRAPHY HEAD: CPT | Performed by: OTHER

## 2020-11-10 PROCEDURE — 70498 CT ANGIOGRAPHY NECK: CPT | Performed by: OTHER

## 2020-11-10 PROCEDURE — 99225 SUBSEQUENT OBSERVATION CARE: CPT | Performed by: INTERNAL MEDICINE

## 2020-11-10 PROCEDURE — 99214 OFFICE O/P EST MOD 30 MIN: CPT | Performed by: INTERNAL MEDICINE

## 2020-11-10 RX ORDER — POTASSIUM CHLORIDE 20 MEQ/1
40 TABLET, EXTENDED RELEASE ORAL ONCE
Status: COMPLETED | OUTPATIENT
Start: 2020-11-10 | End: 2020-11-10

## 2020-11-10 NOTE — SLP NOTE
SPEECH DAILY NOTE - INPATIENT    ASSESSMENT & PLAN   ASSESSMENT  Pt seen for dysphagia tx to assess tolerance with recommended diet, ensure proper utilization of aspiration precautions and provide pt/family education.   Patient contacted at the bedside with mechanical soft (chopped) diet consistency and thin liquids without overt signs or symptoms of aspiration with 90 % accuracy over 1 session(s).   Met   Goal #2 The patient/family/caregiver will demonstrate understanding and implementation of aspiration prec

## 2020-11-10 NOTE — PROGRESS NOTES
[address] [facility] Neurology Progress Note    Arabella Quezada Patient Status:  Observation    1945 MRN [id number]   [facility] 7NE-A Attending Estrella Hidalgo, DO   Hosp Day # 0 PCP Darin Restrepo         Subjective:  Arabella Schusterer is a(n clinically    Ct Brain Or Head ([id number])     Result Date: 11/8/2020  CONCLUSION:  There is mild small vessel ischemic change. There is no acute intracranial abnormality on the noncontrast CT of the head.    Dictated by (CST): Arnol Spain MD on 11/08/2020 a (S):     13mm Hg. Peak gradient (S): 28mm Hg. Valve area (VTI): 1.44cm^2. Indexed     valve area (VTI): 0.69cm^2/m^2.  4. Aorta: Ascending aorta diameter was 3.8cm. Aortic root was 3.8cm at the     sinus of Valsalva.   5. Mitral valve: Mildly calcified loc mental status type     Cough     Viral syndrome     Acute maxillary sinusitis     Streptococcal septicemia (HCC)     Urge urinary incontinence     Undiagnosed cardiac murmurs     Tinnitus of both ears     Senile nuclear sclerosis     S/P hysterectomy with Oral BID   • Pantoprazole Sodium  40 mg Oral QAM AC   • DULoxetine HCl  30 mg Oral BID   • dilTIAZem HCl ER Coated Beads  240 mg Oral Daily   • Heparin Sodium (Porcine)  5,000 Units Subcutaneous Q8H [address]   • aspirin  300 mg Rectal Daily    Or   • aspirin  3 Troponin 0.053 (HH) <0.045 ng/mL   TSH+FREE T4    Collection Time: 11/10/20  6:57 AM   Result Value Ref Range    Free T4 1.1 0.8 - 1.7 ng/dL    TSH 1.130 0.358 - 3.740 mIU/mL   VITAMIN B12    Collection Time: 11/10/20  6:57 AM   Result Value Ref Range    V 70 - 99 mg/dL     Cta Brain + Cta Carotids (HWS=[id number])    Result Date: 11/10/2020  CONCLUSION:  No acute brain abnormality. Stable atrophy when compared to the prior exam.  No sign of bleed or developing acute infarct.   CTA demonstrates moderate ath

## 2020-11-10 NOTE — PLAN OF CARE
Assumed care at 0730  A&Ox2-3, forgetful of time and situation  Word finding difficulties at times, some receptive aphasia noted  NSR on tele  Neuro checks Q4H  EEG and Echo completed  CTA brain/carotids ordered for tomorrow  Worked with , on carb contro

## 2020-11-10 NOTE — PLAN OF CARE
Assumed care @ 1930. Pt a/o x2 to person and place, forgetful. Neuro q4. Global aphasia improving compared to night before. Abn EEG. No signs of seizure activity. Seizure precaution started per protocol. VSS. Tele SR. HR 60's.   No acute respirat Patient will achieve maximal communication potential  Description: Interventions:  - Allow additional time for processing after asking questions or providing instructions  Outcome: Progressing     Problem: Impaired Swallowing  Goal: Minimize aspiration ris

## 2020-11-10 NOTE — PROGRESS NOTES
[facility]  Cardiology Progress Note    Shannan Milner Patient Status:  Observation    1945 MRN [id number]   [facility] 7NE-A Attending Dago Cruz, DO   Hosp Day # 0 PCP Carlyn Farfan     Subjective:  Still with some word findin moderate stenosis. Peak velocity (S): 2.57m/sec. Mean gradient (S):     13mm Hg. Peak gradient (S): 28mm Hg. Valve area (VTI): 1.44cm^2. Indexed     valve area (VTI): 0.69cm^2/m^2.  4. Aorta: Ascending aorta diameter was 3.8cm.  Aortic root was 3.8cm at the BB  · CRI, Cr 1.52  · HLD on statin   • T2DM, A1c 8.9    Plan:   • Neuro workup in process CTA head/neck pending. EEG ? seizures  • Continue ASA,Statin   • Continue BB, CCB, ARB  • Further cardiac work up pending neuro work up        Discussed plan of care

## 2020-11-10 NOTE — PROGRESS NOTES
[facility]  Progress Note     Phoebe Serve Patient Status:  Observation    1945 MRN [id number]   [facility] 7NE-A Attending Chela Huerta,    Hosp Day # 0 PCP [facility] Fore     Chief Complaint: confusion    S: Patient's spee PTP, INR in the last 168 hours. Recent Labs   Lab 11/08/20  1547 11/09/20  0714 11/09/20  1457   TROP 0.062* 0.077* 0.071*            Imaging: Imaging data reviewed in Epic.     Medications:   • gabapentin  600 mg Oral Nightly   • allopurinol  300 mg Neto Ortega negative, may discontinue isolation: yes     Will the patient be referred to TCC on discharge?: no  Estimated date of discharge: 1-2 days  Discharge is dependent on: progress  At this point Ms. Kaitlin Bergman is expected to be discharge to: tbd    Plan of care [facility]

## 2020-11-10 NOTE — PLAN OF CARE
Problem: Impaired Communication  Goal: Patient will achieve maximal communication potential  Description: Interventions:  Outcome: Adequate for Discharge     Problem: Impaired Swallowing  Goal: Minimize aspiration risk  Description: Interventions:  Don Bah

## 2020-11-10 NOTE — CM/SW NOTE
MSW spoke with the patient's daughter again this morning as PT is recommending 24 hour supervision and HHC. She stated that they will have to discuss as a family how they can coordinate 24 hour supervision.   She was unable to find the name of the [facility] AT [facility]

## 2020-11-10 NOTE — PROCEDURES
[facility] in [place]  in affiliation with [facility]  [address]  [place], [address] [place] [address]  [phone]  Fax [phone]    [facility]      Pawan Valentino  2/11/1945  Da

## 2020-11-10 NOTE — HOME CARE LIAISON
Received referral from [address] [place] [address]. Met with patient at the bedside and provided choice. Patient is agreeable to [facility]. Residential brochure provided with contact information. All questions addressed and answered.

## 2020-11-11 ENCOUNTER — TELEPHONE (OUTPATIENT)
Dept: NEPHROLOGY | Facility: CLINIC | Age: 75
End: 2020-11-11

## 2020-11-11 ENCOUNTER — APPOINTMENT (OUTPATIENT)
Dept: CV DIAGNOSTICS | Facility: HOSPITAL | Age: 75
End: 2020-11-11
Attending: INTERNAL MEDICINE
Payer: MEDICARE

## 2020-11-11 VITALS
HEART RATE: 63 BPM | BODY MASS INDEX: 37 KG/M2 | OXYGEN SATURATION: 100 % | TEMPERATURE: 98 F | WEIGHT: 221.13 LBS | SYSTOLIC BLOOD PRESSURE: 113 MMHG | DIASTOLIC BLOOD PRESSURE: 65 MMHG | RESPIRATION RATE: 18 BRPM

## 2020-11-11 DIAGNOSIS — R29.6 RECURRENT FALLS: Primary | ICD-10-CM

## 2020-11-11 PROCEDURE — 99213 OFFICE O/P EST LOW 20 MIN: CPT | Performed by: OTHER

## 2020-11-11 PROCEDURE — 99217 OBSERVATION CARE DISCHARGE: CPT | Performed by: HOSPITALIST

## 2020-11-11 PROCEDURE — 93017 CV STRESS TEST TRACING ONLY: CPT | Performed by: INTERNAL MEDICINE

## 2020-11-11 PROCEDURE — 93018 CV STRESS TEST I&R ONLY: CPT | Performed by: INTERNAL MEDICINE

## 2020-11-11 PROCEDURE — 99214 OFFICE O/P EST MOD 30 MIN: CPT | Performed by: INTERNAL MEDICINE

## 2020-11-11 PROCEDURE — 78452 HT MUSCLE IMAGE SPECT MULT: CPT | Performed by: INTERNAL MEDICINE

## 2020-11-11 RX ORDER — ASPIRIN 81 MG/1
81 TABLET ORAL DAILY
Qty: 30 TABLET | Refills: 0 | Status: SHIPPED | COMMUNITY
Start: 2020-11-11

## 2020-11-11 RX ORDER — LEVETIRACETAM 500 MG/1
500 TABLET ORAL 2 TIMES DAILY
Qty: 60 TABLET | Refills: 1 | Status: ON HOLD | OUTPATIENT
Start: 2020-11-11 | End: 2020-11-18

## 2020-11-11 NOTE — DIETARY NOTE
[address]     Admitting diagnosis:  Altered mental status, unspecified altered mental status type [R41.82]    Ht:  5'5\"  Wt: 100.3 kg (221 lb 1.6 oz). This is 177 % of IBW  Body mass index is 36.79 kg/m².   IBW:

## 2020-11-11 NOTE — PROGRESS NOTES
[facility]  Progress Note     Teresa Andragisel Patient Status:  Observation    1945 MRN [id number]   [facility] 7NE-A Attending Hossein Farley, DO   Hosp Day # 0 PCP Raffi Guy     Chief Complaint: confusion    S: No speech issu 28.8 mL/min (A) (based on SCr of 1.52 mg/dL (H)). No results for input(s): PTP, INR in the last 168 hours. Recent Labs   Lab 11/09/20  0714 11/09/20  1457 11/10/20  0657   TROP 0.077* 0.071* 0.053*            Imaging: Imaging data reviewed in Epic. planning pending stress test and cardiology neurology recommendations    Quality:  · DVT Prophylaxis: Heparin  · CODE status: Full  Will the patient be referred to TCC on discharge?: no  Estimated date of discharge: Possibly today  Discharge is dependent o

## 2020-11-11 NOTE — CM/SW NOTE
Pt may d/c home today. Pt will go home with [facility]. Order placed for TCC clinic as pt's PCP is leaving her practice and will not sign for [place].

## 2020-11-11 NOTE — PROGRESS NOTES
[facility] Cardiology Progress Note    Subjective:  Feels well, no further symptoms. Daughter at bedside and feels she is at her baseline.      Objective:  /61 (BP Location: Left arm)   Pulse 74   Temp 98 °F (36.7 °C) (Oral)   Resp 22   Wt 221 lb 1.6 oz ECG, new inferolateral wall motion abnormality. Nuclear stress test today, no ischemia, LVEF 62%  · Mod AS  · HTN, uncontrolled on admit, much better trends with systolic 465-816 last 24 hours.  BB increased,   · Dyslipidemia, LDL 45 on statin  · DM  · CKD

## 2020-11-11 NOTE — PLAN OF CARE
Assumed care at 0730  A&Ox4, forgetful at times  Easily reoriented  Neuro checks done Q4H  Seizure precautions maintained  NSR on tele  CTA brain completed today  Plan for stress test tomorrow  Will continue to monitor

## 2020-11-11 NOTE — PROGRESS NOTES
[id number] [facility] Neu[facility] Progress Note    Valley Angles Patient Status:  Observation    1945 MRN [id number]   [facility] 7NE-A Attending Erika Cuenca MD   Hosp Day # 0 PCP Stephenie Barcenas     CC: Confusion    Subjective:  Bart Oseguera chronic small vessel ischemic change evident.     Assessment/Plan:  · Episodes of confusion and word finding difficulty  · MRI negative for stroke; did show white matter disease  · Continue ASA  · EEG suspicious for seizures  · Seizure precautions  · Keppra Temp 98 °F (36.7 °C) (Oral)   Resp 22   Wt 221 lb 1.6 oz (100.3 kg)   SpO2 99%   BMI 36.79 kg/m²     (x) based on my examination,  The APN/PA visit and findings are all validated.       Recent Results (from the past 24 hour(s))   POCT GLUCOSE    Collectio

## 2020-11-11 NOTE — PROGRESS NOTES
CARDIODIAGNOSTIC PRELIMINARY REPORT:      LEXISCAN injection completed with no new EKG changes noted; no arrhythmias noted      Denied cardiac symptoms      Base:   134/84, HR: 70;  Peak:   102/76, HR: 80  Second set of images pending

## 2020-11-11 NOTE — CM/SW NOTE
Rosy from [facility] accepted pt for [facility]. HH/F2F written.

## 2020-11-11 NOTE — PLAN OF CARE
Received pt at 1930  Pt NSR on tele, RA  Neuros q4  Orthostatic (-)  Needs met will continue to monitor      Problem: Impaired Cognition  Goal: Patient will exhibit improved attention, thought processing and/or memory  Description: Interventions:  Outcome:

## 2020-11-12 ENCOUNTER — PATIENT OUTREACH (OUTPATIENT)
Dept: CASE MANAGEMENT | Age: 75
End: 2020-11-12

## 2020-11-12 DIAGNOSIS — Z02.9 ENCOUNTERS FOR UNSPECIFIED ADMINISTRATIVE PURPOSE: ICD-10-CM

## 2020-11-12 PROCEDURE — 1111F DSCHRG MED/CURRENT MED MERGE: CPT | Performed by: CLINICAL NURSE SPECIALIST

## 2020-11-12 NOTE — CM/SW NOTE
11/12/20 0900   Discharge disposition   Expected discharge disposition Home-Health   Name of Facillity/Home Care/Hospice [facility]   Home services after discharge Skilled home care   Discharge transportation Private car

## 2020-11-12 NOTE — PROGRESS NOTES
NURSING DISCHARGE NOTE      Discharge AVS completed  Patient okay to discharge per all consults. Discharge orders given and reviewed with patient. All questions answered, verbalized understanding.   Signs and symptoms of stroke and when to call 911 and

## 2020-11-12 NOTE — TELEPHONE ENCOUNTER
Request is for 90 day supply. Needs hospital follow up appt. Attempted to reach patient, phone line is busy. Will try calling again latter.       Medication: LEVETIRACETAM 500 MG Oral Tab    Date of last refill: 11/11/2020 (#60/1)  Date last filled per ILP

## 2020-11-12 NOTE — PROGRESS NOTES
Attempted to reach the patient to complete [facility] FU call. Left a message for the pt to call NCM back at, [phone]. The number for the TCC was also given to the patient to schedule at, [phone].

## 2020-11-12 NOTE — DISCHARGE SUMMARY
[facility]  DISCHARGE SUMMARY     Jordi Agent Patient Status:  Observation    1945 MRN [id number]   [facility] 7NE-A Attending No att. providers found   Hosp Day # 0 PCP Ángel Lopez     Date of Admission: 2020  Date of supplement the history. Patient lives alone in an independent living facility. Her daughter went to see her yesterday and patient's appeared confused but did not have any slurred speech or weakness or facial droop.   As she is diabetic her daughter Claudia Deras 81 MG Tbec      Take 1 tablet (81 mg total) by mouth daily. Quantity: 30 tablet  Refills: 0     levETIRAcetam 500 MG Tabs  Commonly known as: KEPPRA      Take 1 tablet (500 mg total) by mouth 2 (two) times daily.    Quantity: 60 tablet  Refills: 1 ATARAX  Notes to patient: Continue home medication as previously prescribed      Take 25 mg by mouth nightly as needed.  AT BEDTIME   Refills: 0     Jardiance 10 MG Tabs  Generic drug: Empagliflozin  Notes to patient: Continue home medication as previously about where to get these medications is not yet available    Ask your nurse or doctor about these medications  · aspirin 81 MG Tbec         Follow-up appointment:   nAgel Comer [address] [facility]

## 2020-11-12 NOTE — PLAN OF CARE
Patient A/O x4, VSS, RA. Neuro assessments  q 4hr, no new deficits, denies any aphasia or confusion. Seizure precautions maintained. Lexiscan stress test completed. Stroke education/binder completed. Plan to discharge home with daughter Neal Mcknight. degrees preferred)  - Offer food and liquids at a slow rate  - No straws  - Encourage small bites of food and small sips of liquid  - Offer pills one at a time, crush or deliver with applesauce as needed  - Discontinue feeding and notify MD (or speech path

## 2020-11-13 ENCOUNTER — TELEPHONE (OUTPATIENT)
Dept: INTERNAL MEDICINE CLINIC | Facility: CLINIC | Age: 75
End: 2020-11-13

## 2020-11-13 ENCOUNTER — PATIENT OUTREACH (OUTPATIENT)
Dept: CASE MANAGEMENT | Age: 75
End: 2020-11-13

## 2020-11-13 NOTE — PROGRESS NOTES
1st attempt TCC/Neuro apt request    [facility]  [address] [place] [facility] [phone] [id number]  Apt made for Wed. Nov. 18th @10:30pm    [address]  [address]

## 2020-11-13 NOTE — PROGRESS NOTES
Initial Post Discharge Follow Up   Discharge Date: 11/11/20  Contact Date: 11/13/2020    Consent Verification:  Assessment Completed With: Patient  HIPAA Verified?   Yes    Discharge Dx:     AMS, expressive Aphasia  Hypertensive emergency   Elevated trop activities of living as you have prior to your hospital stay (dressing, bathing, ambulating to the bathroom, etc)? yes; NCM did review/stress the importance of fall precautions. The patient did confirm family being with the patient daily since discharge. daily.     • docusate sodium (COLACE) 100 MG Oral Cap Take 100 mg by mouth 2 (two) times daily. • MetFORMIN HCl (GLUCOPHAGE) 500 MG Oral Tab Take 500 mg by mouth 3 (three) times daily.        • Montelukast Sodium (SINGULAIR) 10 MG Oral Tab Take 10 mg by room at discharge. The patient is aware the charge RN will be contacting the patient back directly. The patient is to continue the NCM with any further questions or concerns.        Follow up appointments:      Your appointments     Date & Time Appointment directed. Patient symptoms were assessed, education was completed for signs/symptoms to monitor, and reviewed when to contact providers vs go to the ED/call 911. Appointments were reviewed and stressed the importance of a close follow up with providers.  A

## 2020-11-13 NOTE — PROGRESS NOTES
Please also assist the patient's daughter with scheduling for the following specialist:     Schedule an appointment with  Hardy Clark MD as soon  as possible for a visit  Call in AM to schedule a one month  follow up with Dr. Kristen Parson, neurology  E

## 2020-11-13 NOTE — PROGRESS NOTES
The patient is requesting assistance with scheduling an appointment with the TCC. The patient is requesting her daughter, Delaney Peña, coordinate the appointments. TCM/HFU appt recommended by 11/18/2020 as pt is a high risk for readmission.       BOOK BY DATE

## 2020-11-16 ENCOUNTER — APPOINTMENT (OUTPATIENT)
Dept: CT IMAGING | Facility: HOSPITAL | Age: 75
End: 2020-11-16
Attending: EMERGENCY MEDICINE
Payer: MEDICARE

## 2020-11-16 ENCOUNTER — HOSPITAL ENCOUNTER (OUTPATIENT)
Facility: HOSPITAL | Age: 75
Setting detail: OBSERVATION
Discharge: HOME HEALTH | End: 2020-11-18
Attending: EMERGENCY MEDICINE | Admitting: HOSPITALIST
Payer: MEDICARE

## 2020-11-16 ENCOUNTER — NURSE ONLY (OUTPATIENT)
Dept: ELECTROPHYSIOLOGY | Facility: HOSPITAL | Age: 75
End: 2020-11-16
Attending: HOSPITALIST
Payer: MEDICARE

## 2020-11-16 DIAGNOSIS — R40.4 TRANSIENT ALTERATION OF AWARENESS: Primary | ICD-10-CM

## 2020-11-16 PROCEDURE — 99213 OFFICE O/P EST LOW 20 MIN: CPT | Performed by: OTHER

## 2020-11-16 PROCEDURE — 95816 EEG AWAKE AND DROWSY: CPT | Performed by: OTHER

## 2020-11-16 PROCEDURE — 70496 CT ANGIOGRAPHY HEAD: CPT | Performed by: EMERGENCY MEDICINE

## 2020-11-16 PROCEDURE — 70498 CT ANGIOGRAPHY NECK: CPT | Performed by: EMERGENCY MEDICINE

## 2020-11-16 PROCEDURE — 99219 INITIAL OBSERVATION CARE,LEVL II: CPT | Performed by: HOSPITALIST

## 2020-11-16 RX ORDER — ALBUTEROL SULFATE 90 UG/1
2 AEROSOL, METERED RESPIRATORY (INHALATION) EVERY 6 HOURS PRN
Status: DISCONTINUED | OUTPATIENT
Start: 2020-11-16 | End: 2020-11-18

## 2020-11-16 RX ORDER — ENOXAPARIN SODIUM 100 MG/ML
40 INJECTION SUBCUTANEOUS DAILY
Status: DISCONTINUED | OUTPATIENT
Start: 2020-11-16 | End: 2020-11-18

## 2020-11-16 RX ORDER — LORAZEPAM 2 MG/ML
1 INJECTION INTRAMUSCULAR EVERY 4 HOURS PRN
Status: DISCONTINUED | OUTPATIENT
Start: 2020-11-16 | End: 2020-11-18

## 2020-11-16 RX ORDER — BENZONATATE 100 MG/1
100 CAPSULE ORAL 3 TIMES DAILY PRN
Status: DISCONTINUED | OUTPATIENT
Start: 2020-11-16 | End: 2020-11-18

## 2020-11-16 RX ORDER — ROSUVASTATIN CALCIUM 20 MG/1
20 TABLET, COATED ORAL NIGHTLY
Status: DISCONTINUED | OUTPATIENT
Start: 2020-11-16 | End: 2020-11-18

## 2020-11-16 RX ORDER — DOCUSATE SODIUM 100 MG/1
100 CAPSULE, LIQUID FILLED ORAL 2 TIMES DAILY
Status: DISCONTINUED | OUTPATIENT
Start: 2020-11-16 | End: 2020-11-18

## 2020-11-16 RX ORDER — DILTIAZEM HYDROCHLORIDE 240 MG/1
240 CAPSULE, COATED, EXTENDED RELEASE ORAL DAILY
Status: DISCONTINUED | OUTPATIENT
Start: 2020-11-16 | End: 2020-11-18

## 2020-11-16 RX ORDER — MAGNESIUM HYDROXIDE/ALUMINUM HYDROXICE/SIMETHICONE 120; 1200; 1200 MG/30ML; MG/30ML; MG/30ML
30 SUSPENSION ORAL DAILY PRN
Status: DISCONTINUED | OUTPATIENT
Start: 2020-11-16 | End: 2020-11-18

## 2020-11-16 RX ORDER — ASPIRIN 325 MG
325 TABLET, DELAYED RELEASE (ENTERIC COATED) ORAL DAILY
Status: DISCONTINUED | OUTPATIENT
Start: 2020-11-16 | End: 2020-11-18

## 2020-11-16 RX ORDER — LORAZEPAM 2 MG/ML
1 INJECTION INTRAMUSCULAR ONCE
Status: COMPLETED | OUTPATIENT
Start: 2020-11-16 | End: 2020-11-16

## 2020-11-16 RX ORDER — CALCIUM CARBONATE 200(500)MG
500 TABLET,CHEWABLE ORAL
Status: DISCONTINUED | OUTPATIENT
Start: 2020-11-16 | End: 2020-11-18

## 2020-11-16 RX ORDER — TRAZODONE HYDROCHLORIDE 50 MG/1
50 TABLET ORAL NIGHTLY PRN
Status: DISCONTINUED | OUTPATIENT
Start: 2020-11-16 | End: 2020-11-18

## 2020-11-16 RX ORDER — ALLOPURINOL 300 MG/1
300 TABLET ORAL DAILY
Status: DISCONTINUED | OUTPATIENT
Start: 2020-11-16 | End: 2020-11-18

## 2020-11-16 RX ORDER — ONDANSETRON 2 MG/ML
8 INJECTION INTRAMUSCULAR; INTRAVENOUS EVERY 6 HOURS PRN
Status: DISCONTINUED | OUTPATIENT
Start: 2020-11-16 | End: 2020-11-18

## 2020-11-16 RX ORDER — METOPROLOL SUCCINATE 50 MG/1
100 TABLET, EXTENDED RELEASE ORAL DAILY
Status: DISCONTINUED | OUTPATIENT
Start: 2020-11-16 | End: 2020-11-18

## 2020-11-16 RX ORDER — SIMETHICONE 80 MG
80 TABLET,CHEWABLE ORAL 4 TIMES DAILY PRN
Status: DISCONTINUED | OUTPATIENT
Start: 2020-11-16 | End: 2020-11-18

## 2020-11-16 RX ORDER — ASPIRIN 81 MG/1
81 TABLET ORAL DAILY
Status: DISCONTINUED | OUTPATIENT
Start: 2020-11-16 | End: 2020-11-16

## 2020-11-16 RX ORDER — DULOXETIN HYDROCHLORIDE 30 MG/1
30 CAPSULE, DELAYED RELEASE ORAL 2 TIMES DAILY
Status: DISCONTINUED | OUTPATIENT
Start: 2020-11-16 | End: 2020-11-18

## 2020-11-16 RX ORDER — PANTOPRAZOLE SODIUM 40 MG/1
40 TABLET, DELAYED RELEASE ORAL
Status: DISCONTINUED | OUTPATIENT
Start: 2020-11-17 | End: 2020-11-18

## 2020-11-16 RX ORDER — VALSARTAN 320 MG/1
320 TABLET ORAL DAILY
Status: DISCONTINUED | OUTPATIENT
Start: 2020-11-16 | End: 2020-11-18

## 2020-11-16 RX ORDER — MONTELUKAST SODIUM 10 MG/1
10 TABLET ORAL NIGHTLY
Status: DISCONTINUED | OUTPATIENT
Start: 2020-11-16 | End: 2020-11-18

## 2020-11-16 RX ORDER — LORAZEPAM 2 MG/ML
INJECTION INTRAMUSCULAR
Status: DISPENSED
Start: 2020-11-16 | End: 2020-11-16

## 2020-11-16 RX ORDER — SODIUM CHLORIDE 9 MG/ML
INJECTION, SOLUTION INTRAVENOUS CONTINUOUS
Status: DISCONTINUED | OUTPATIENT
Start: 2020-11-16 | End: 2020-11-18

## 2020-11-16 RX ORDER — DEXTROSE MONOHYDRATE 25 G/50ML
50 INJECTION, SOLUTION INTRAVENOUS
Status: DISCONTINUED | OUTPATIENT
Start: 2020-11-16 | End: 2020-11-18

## 2020-11-16 RX ORDER — GABAPENTIN 300 MG/1
600 CAPSULE ORAL NIGHTLY
Status: DISCONTINUED | OUTPATIENT
Start: 2020-11-16 | End: 2020-11-18

## 2020-11-16 RX ORDER — BISACODYL 10 MG
10 SUPPOSITORY, RECTAL RECTAL
Status: DISCONTINUED | OUTPATIENT
Start: 2020-11-16 | End: 2020-11-18

## 2020-11-16 RX ORDER — ACETAMINOPHEN 325 MG/1
650 TABLET ORAL EVERY 6 HOURS PRN
Status: DISCONTINUED | OUTPATIENT
Start: 2020-11-16 | End: 2020-11-18

## 2020-11-16 NOTE — DIETARY NOTE
[address]     Admitting diagnosis:  Altered mental status, unspecified altered mental status type [R41.82]    Ht:  5'5\"  Wt: 100.3kg This is 177 % of IBW  There is no height or weight on file to calculate BMI.

## 2020-11-16 NOTE — PROGRESS NOTES
Spoke with patients daughter [facility] and patient is readmitted into hospital. Will call us once patient is discharged.

## 2020-11-16 NOTE — PROCEDURES
Date of Procedure: 11/16/2020    Procedure: EEG (ELECTROENCEPHALOGRAM)     DX: AMS  HX: PT IS A 74 YO FEMALE WHO PRESENTED TO [facility] ED ON 11/16/20 W TRANSIENT ALTERATION OF AWARENESS.  EEG REQUESTED DUE TO AMS AND POSSIBLE SZ. DURING EEG SET UP, PT MOANING, T

## 2020-11-16 NOTE — H&P
[facility]  History and Physical     Clista Jacobo Patient Status:  Emergency    1945 MRN [id number]   Location [address] [facility] Attending Adrian Olivarez MD   Hosp Day # 0 JANNY Cassidy     Chief Complaint: ams    His tobacco. She reports that she does not drink alcohol or use drugs.     Family History:   Family History   Problem Relation Age of Onset   • Hypertension Other       Allergies:   Influenza Vaccines      OTHER (SEE COMMENTS)    Comment:Fever  Dust Mite Extrac Hr, Take 240 mg by mouth daily. , Disp: , Rfl:     •  Rosuvastatin Calcium (CRESTOR) 20 MG Oral Tab, Take 20 mg by mouth nightly., Disp: , Rfl:     •  valsartan (DIOVAN) 320 MG Oral Tab, Take 320 mg by mouth daily. , Disp: , Rfl:     •  docusate sodium (COLA 22.0 23.0  --  23.0   ALKPHO  --   --   --  130   AST  --   --   --  13*   ALT  --   --   --  18   BILT  --   --   --  0.4   TP  --   --   --  8.8*       Estimated Creatinine Clearance: 21.9 mL/min (A) (based on SCr of 2 mg/dL (H)).     No results for input

## 2020-11-16 NOTE — HOME CARE LIAISON
Ptnt current with [facility] for sn/pt  Will need a Dudley order on or before dc    Thanks  Ajay Veronica

## 2020-11-16 NOTE — PLAN OF CARE
Pt A/Ox1-2. Drowsy and confused. EEG completed and abnormal with possible seizures. Neurology following. Plan to increase Keppra and if no improvement tomorrow, possible MRI. Purewick in place. PT/OT to eval.  Staff will cont to monitor.     Problem: Instruct patient/family to call for assistance with activity based on assessment  Outcome: Progressing  Goal: Achieves maximal functionality and self care  Description: INTERVENTIONS  - Monitor swallowing and airway patency with patient fatigue and changes

## 2020-11-16 NOTE — PHYSICAL THERAPY NOTE
PT order received via mobility assessment, chart reviewed. Pt admitted with recurrent aphasia and moaning after recent admit for similar symptoms. Will await neuro consult and further medical w/u prior to initiating PT.

## 2020-11-16 NOTE — ED INITIAL ASSESSMENT (HPI)
Per paramedics patient was at her neighbors house today and the neighbor reported the patient has been moaning and complaining of a headache since 3pm. Paramedics report unknown last known well time

## 2020-11-16 NOTE — PROGRESS NOTES
NURSING ADMISSION NOTE      Patient admitted via Cart  Oriented to room 3608  Safety precautions initiated. Bed in low position. Call light in reach.     Admission navigator partially completed, med rec is pending, daughter will bring in bottles from

## 2020-11-16 NOTE — ED PROVIDER NOTES
Patient Seen in: [facility] Emergency Department      History   Patient presents with:  Headache    Stated Complaint:     HPI    Patient presents via EMS with altered mental status.   She had a friend's house and apparently has been moaning, possibly Exam     ED Triage Vitals [11/16/20 0130]   BP (!) 177/86   Pulse 76   Resp 24   Temp 97.3 °F (36.3 °C)   Temp src    SpO2 100 %   O2 Device None (Room air)       Current:/89   Pulse 70   Temp 97.3 °F (36.3 °C)   Resp 11   SpO2 95%         Physical E Abnormal; Notable for the following components:    RDW-SD 47.8 (*)     All other components within normal limits   RAPID SARS-COV-2 BY PCR - Normal   CBC WITH DIFFERENTIAL WITH PLATELET    Narrative:      The following orders were created for panel order CB extremities appropriately and with good strength. No sensation deficits on exam.              MDM          Patient presents with continued episodic altered mental status.     Recent admission for the same, questionable seizure activity on EEG and started o

## 2020-11-16 NOTE — CONSULTS
[address] [facility] Consultation    Date of consult: 11/16/2020    Reason for consult: seizure    HPI: Arabella Quezada is a 76year old female with past medical history as listed below presents with episodes of aphasia and moaning.  patient cannot Oral, Daily PRN    •  benzocaine-menthol (CEPACOL (SUGAR-FREE)) 1 lozenge, 1 lozenge, Oral, PRN    •  benzonatate (TESSALON) cap 100 mg, 100 mg, Oral, TID PRN    •  bisacodyl (DULCOLAX) rectal suppository 10 mg, 10 mg, Rectal, Daily PRN    •  Calcium Carbo unspecified    • Fibromyalgia    • Gout    • Heart attack ([email or web address] 01/01/1989   • High blood pressure    • High cholesterol    • History of stomach ulcers    • IBS (irritable bowel syndrome)    • Migraines    • Neuropathy    • Osteoarthritis    • Other and un Reviewed on 11/16/2020  Labs Reviewed:   Recent Labs   Lab 11/16/20  0134 11/16/20  0927   RBC 4.15 3.89   HGB 12.9 12.1   HCT 38.8 37.3   MCV 93.5 95.9   MCH 31.1 31.1   MCHC 33.2 32.4   RDW 14.0 14.3   NEPRELIM 2.89  --    WBC 7.3 6.3   .0 232.0

## 2020-11-16 NOTE — CM/SW NOTE
11/16/20 1400   CM/SW Referral Data   Referral Source Social Work (self-referral)   Reason for Referral Discharge planning;Readmission   Informant Children  (Dtr)   Patient Info   Patient's [facility]

## 2020-11-17 PROBLEM — I10 ESSENTIAL HYPERTENSION: Status: ACTIVE | Noted: 2017-05-28

## 2020-11-17 PROCEDURE — 99213 OFFICE O/P EST LOW 20 MIN: CPT | Performed by: OTHER

## 2020-11-17 PROCEDURE — 99225 SUBSEQUENT OBSERVATION CARE: CPT | Performed by: HOSPITALIST

## 2020-11-17 NOTE — PLAN OF CARE
Problem: Impaired Activities of Daily Living  Goal: Achieve highest/safest level of independence in self care  Description: Interventions:  - Assess ability and encourage patient to participate in ADLs to maximize function  - Promote sitting position [facility]

## 2020-11-17 NOTE — CM/SW NOTE
4pm  MSW called by [facility] [phone], they state they used to serve client but then last admission client went home with [facility]. MSW spoke to pt and asked her which provider she wanted to do, she asked MSW call her dtr nathan.  MSW called dtr

## 2020-11-17 NOTE — PHYSICAL THERAPY NOTE
PHYSICAL THERAPY EVALUATION - INPATIENT     Room Number: 6726/2923-G  Evaluation Date: 11/17/2020  Type of Evaluation: Initial  Physician Order: PT Eval and Treat    Presenting Problem: TIA-aphasia  Reason for Therapy: Mobility Dysfunction and Discha Problem List  Principal Problem:    Transient alteration of awareness  Active Problems:    Essential hypertension    Altered mental status    Seizure (HCC)    HIREN (acute kidney injury) [facility])      Past Medical History  Past Medical History:   Diagnosis time\"    Patient self-stated goal is incr stability    OBJECTIVE  Precautions: Bed/chair alarm;Seizure  Fall Risk: High fall risk    WEIGHT BEARING RESTRICTION  Weight Bearing Restriction: None                PAIN ASSESSMENT  Ratin          COGNITION 3-5 steps with a railing?: A Little       AM-PAC Score:  Raw Score: 18   Approx Degree of Impairment: 46.58%   Standardized Score (AM-PAC Scale): 43.63   CMS Modifier (G-Code): CK    FUNCTIONAL ABILITY STATUS  Gait Assessment   Gait Assistance: Minimum ass in mobility. Based on this evaluation, patient's clinical presentation is stable and overall the evaluation complexity is considered low.   These impairments and comorbidities manifest themselves as functional limitations in independent bed mobility, transf

## 2020-11-17 NOTE — SLP NOTE
SPEECH/LANGUAGE/COGNITIVE EVALUATION - INPATIENT    Admission Date: 11/16/2020  Evaluation Date: 11/17/20    Reason for Referral: Stroke protocol(TIA or Seizure)    ASSESSMENT & PLAN   ASSESSMENT & IMPRESSION  Orders were received for a communication asses appreciated. No large vessel occlusion is identified. 2. Stable appearance of a dominant left vertebral artery with atheromatous plaque and tortuosity noted near the origin.   The right vertebral artery is fairly diminutive throughout its course and large

## 2020-11-17 NOTE — PLAN OF CARE
A&O x3-4, confused at times. Room air. NSR on tele. Accu check qid. 0.9%NS at 83mL/hr. No c/o pain. Daily weights. Electrolyte protocol. Seizure, fall and safety precautions in place. Will continue to monitor for safety.      Problem: Diabetes/Glucose Contr maximal functionality and self care  Description: INTERVENTIONS  - Monitor swallowing and airway patency with patient fatigue and changes in neurological status  - Encourage and assist patient to increase activity and self care with guidance from PT/OT  -

## 2020-11-17 NOTE — PROGRESS NOTES
[address] [facility] Neu[facility] Progress Note    Morris Whitfield Patient Status:  Observation    1945 MRN [id number]   [facility] 3NE-A Attending Kareen Ruiz MD   Hosp Day # 0 PCP Blanca Spence     CC: Seizure     Subjective:  Monae Reyes Diagnostics:  11/16/2020 EEG  Abnormal EEG with the presence of intermittent sharps in bilateral frontocentral regions suggestive of high susceptibility for seizures.      Assessment/Plan:  · Acute encephalopathy- DDx: seizure vs TIA  · Keppra increas

## 2020-11-17 NOTE — PROGRESS NOTES
[facility]ITAL[facility]  Progress Note     Gwen Cisnerosalds Patient Status:  Observation    1945 MRN [id number]   [facility] 3NE-A Attending Catherine Scott MD   Hosp Day # 0 PCP Marc Faustin     Chief Complaint: slurred speech    S: Patient p Subcutaneous Daily   • Insulin Aspart Pen  1-5 Units Subcutaneous TID CC and HS   • allopurinol  300 mg Oral Daily   • dilTIAZem HCl ER Coated Beads  240 mg Oral Daily   • docusate sodium  100 mg Oral BID   • DULoxetine HCl  30 mg Oral BID   • gabapentin

## 2020-11-17 NOTE — OCCUPATIONAL THERAPY NOTE
OCCUPATIONAL THERAPY EVALUATION - INPATIENT     Room Number: 3506/2302-Q  Evaluation Date: 11/17/2020  Type of Evaluation: Initial  Presenting Problem: Transient alteration of awareness    Physician Order: IP Consult to Occupational Therapy  Reason for The Asthma    • Back problem    • Cataract    • Depressive disorder 8/7/2010   • Diverticulitis of colon 4/17/2009   • Edema    • Esophageal reflux    • Extrinsic asthma, unspecified    • Fibromyalgia    • Gout    • [facility] 01[facility]   • High blood limits  Following Commands:  follows one step commands with repetition    VISION  Current Vision: wears glasses only for reading    PERCEPTION  Overall Perception Status:   pt does not report and changes to perception    SENSATION  Light touch:  intact leaning backward. Pt required CGA/MIN A to stabilize utilizing RW. Pt facilitated in functional mobility within room requiring CGA utilizing RW. Pt instructed in LB dressing sim/doffing socks at [place] [address].  Pt completed hand washing at sink at [facility] while sta TBD    PLAN  OT Treatment Plan: Balance activities; Energy conservation/work simplification techniques;ADL training;IADL training;Functional transfer training;UE strengthening/ROM; Endurance training;Cognitive reorientation;Patient/Family education;Patient/F

## 2020-11-18 ENCOUNTER — PATIENT OUTREACH (OUTPATIENT)
Dept: CASE MANAGEMENT | Age: 75
End: 2020-11-18

## 2020-11-18 VITALS
TEMPERATURE: 98 F | SYSTOLIC BLOOD PRESSURE: 153 MMHG | DIASTOLIC BLOOD PRESSURE: 54 MMHG | OXYGEN SATURATION: 98 % | RESPIRATION RATE: 18 BRPM | BODY MASS INDEX: 38 KG/M2 | WEIGHT: 225.5 LBS | HEART RATE: 64 BPM

## 2020-11-18 PROCEDURE — 99213 OFFICE O/P EST LOW 20 MIN: CPT | Performed by: OTHER

## 2020-11-18 PROCEDURE — 99217 OBSERVATION CARE DISCHARGE: CPT | Performed by: HOSPITALIST

## 2020-11-18 RX ORDER — LEVETIRACETAM 750 MG/1
750 TABLET ORAL 2 TIMES DAILY
Qty: 60 TABLET | Refills: 2 | Status: ON HOLD | OUTPATIENT
Start: 2020-11-18 | End: 2020-12-15

## 2020-11-18 NOTE — CM/SW NOTE
[facility] informed of discharge home today via secure chat.     Epi Hernandez RN, BSN   [phone]

## 2020-11-18 NOTE — PROGRESS NOTES
1st attempt TCC and Neuro apt request    [facility]  [address] [place] [employer] [phone]  Apt made for Tues. Nov. 24th @2pm      F/U with Dr.Henry Adelfo Hastings in 2-4 weeks  [employer]  [id number]

## 2020-11-18 NOTE — PLAN OF CARE
Pt is A&O x3 and she occasionally has mild hallucinations. She thought the hallway was \"outside in the snow\". Reorientation provided as needed. VSS- NSR on tele. SpO2 remains stable on RA. Pt denies pain/nausea/SOB. IV keppra given as ordered.  Seizure pr interventions as ordered  Outcome: Progressing  Goal: Absence of seizures  Description: INTERVENTIONS  - Monitor for seizure activity  - Administer anti-seizure medications as ordered  - Monitor neurological status  Outcome: Progressing  Goal: Remains free level and precautions during self-care    Outcome: Progressing

## 2020-11-18 NOTE — PROGRESS NOTES
[id number] [facility] Neurology Progress Note    Wendy  Patient Status:  Observation    1945 MRN [id number]   [facility] 3NE-A Attending Regan Cunningham MD   Hosp Day # 0 PCP Pawan Valentino       Subjective:  Wendy  is a Oral, PRN    •  benzonatate (TESSALON) cap 100 mg, 100 mg, Oral, TID PRN    •  bisacodyl (DULCOLAX) rectal suppository 10 mg, 10 mg, Rectal, Daily PRN    •  Calcium Carbonate Antacid (TUMS) chewable tab 500 mg, 500 mg, Oral, Daily PRN    •  phenol (CHLORAS Intact, follows commands, fund of knowledge appears intact  Cranial Nerves: PERRL 3mm brisk, EOMI, no nystagmus, facial sensation intact, face symmetric, tongue midline, shoulder shrug equal, remainder CN intact  Motor: No drift, no focal arm or leg weakne

## 2020-11-18 NOTE — PLAN OF CARE
Assumed care at 0730, A/Ox4, R/A, NSR on tele. Briefed but continent. Up to BR w/standby to void. Seiz prec maintained. DW. Regular diet. QID accuchecks. Covered with novolog twice. [email or web address] running in [facility]. Likely discharge. Home w/HH.  Case management notifie

## 2020-11-19 ENCOUNTER — PATIENT OUTREACH (OUTPATIENT)
Dept: CASE MANAGEMENT | Age: 75
End: 2020-11-19

## 2020-11-19 DIAGNOSIS — N18.30 TYPE 2 DIABETES MELLITUS WITH STAGE 3 CHRONIC KIDNEY DISEASE, WITHOUT LONG-TERM CURRENT USE OF INSULIN, UNSPECIFIED WHETHER STAGE 3A OR 3B CKD (HCC): ICD-10-CM

## 2020-11-19 DIAGNOSIS — R56.9 SEIZURE (HCC): ICD-10-CM

## 2020-11-19 DIAGNOSIS — R41.82 ALTERED MENTAL STATUS, UNSPECIFIED ALTERED MENTAL STATUS TYPE: ICD-10-CM

## 2020-11-19 DIAGNOSIS — N17.9 AKI (ACUTE KIDNEY INJURY) (HCC): ICD-10-CM

## 2020-11-19 DIAGNOSIS — N18.9 CHRONIC KIDNEY DISEASE, UNSPECIFIED CKD STAGE: ICD-10-CM

## 2020-11-19 DIAGNOSIS — Z02.9 ENCOUNTERS FOR UNSPECIFIED ADMINISTRATIVE PURPOSE: ICD-10-CM

## 2020-11-19 DIAGNOSIS — E11.22 TYPE 2 DIABETES MELLITUS WITH STAGE 3 CHRONIC KIDNEY DISEASE, WITHOUT LONG-TERM CURRENT USE OF INSULIN, UNSPECIFIED WHETHER STAGE 3A OR 3B CKD (HCC): ICD-10-CM

## 2020-11-19 PROCEDURE — 1111F DSCHRG MED/CURRENT MED MERGE: CPT

## 2020-11-19 RX ORDER — LEVETIRACETAM 500 MG/1
TABLET ORAL
Qty: 180 TABLET | Refills: 0 | OUTPATIENT
Start: 2020-11-19

## 2020-11-19 NOTE — TELEPHONE ENCOUNTER
Patient received refill yesterday on kera. Need to be seen for follow-up visit to get additional refills.

## 2020-11-19 NOTE — DISCHARGE SUMMARY
[facility] HOSPITALIST  DISCHARGE SUMMARY     Mukund Badillo Patient Status:  Observation    1945 MRN [id number]   [facility] 3NE-A Attending No att. providers found   Hosp Day # 0 PCP Cristino Singletary     Date of Admission: 2020  Date o how you take these medications      Instructions Prescription details   levetiracetam 750 MG Tabs  Commonly known as: KEPPRA  What changed:   · medication strength  · how much to take      Take 1 tablet (750 mg total) by mouth 2 (two) times daily.    Debra Armenta Metoprolol Succinate  MG Tb24  Commonly known as: Toprol XL      Take 100 mg by mouth daily. Refills: 0     Montelukast Sodium 10 MG Tabs  Commonly known as: SINGULAIR      Take 10 mg by mouth nightly.    Refills: 0     Omeprazole 40 MG Cpdr MD Duane 11/18/2020    Time spent:  > 30 minutes

## 2020-11-19 NOTE — TELEPHONE ENCOUNTER
Per Epic review, pt scheduled with Bella Dwyer PA-C on 12/2/2020. Routed to Ary office to see if #90 appropriate or to keep refill sent 11/18/2020 as is.

## 2020-11-19 NOTE — PROGRESS NOTES
Initial Post Discharge Follow Up   Discharge Date: 11/18/20  Contact Date: 11/19/2020    Consent Verification:  Assessment Completed With: Patient  Other: Tomer patient's daughter Permission received per patient?  verbal  HIPAA Verified?   Yes    Dischar time.   • Do you have any pain since discharge? no  • When you were leaving the hospital were your discharge instructions reviewed with you? yes  • Do you have any questions about your discharge instructions?   No  • Before leaving the hospital was your [facility] needed for Wheezing. • allopurinol 100 MG Oral Tab Take 100 mg by mouth daily. • Diltiazem HCl ER Coated Beads (CARTIA XT) 240 MG Oral Capsule SR 24 Hr Take 240 mg by mouth daily.      • Rosuvastatin Calcium (CRESTOR) 20 MG Oral Tab Take 20 mg b Date & Time Appointment Department [facility])    Nov 19, 2020  2:40 PM CST Exam - Established with MD Jacqueline Mark 26 Nephrology ([address] [facility])        Nov 24, 2020  2:00 PM [address] [facility] Follow Up with Elizabeth Martin patient to call PCP with any questions or needs, she states she will.     CCM referral placed:  Not Applicable    BOOK BY DATE: 12/2/2020    [x]  Discharge Summary, Discharge medications reviewed/discussed/and reconciled against outpatient medications with

## 2020-11-19 NOTE — PROGRESS NOTES
NURSING DISCHARGE NOTE    Discharged Home via [place]. Accompanied by Support staff  Belongings Taken by patient/family. Discharge paperwork provided and explained. Prescriptions sent to pharmacy of choice. All questions and concerns addressed.  TERESA inf

## 2020-11-20 ENCOUNTER — TELEPHONE (OUTPATIENT)
Dept: NEPHROLOGY | Facility: CLINIC | Age: 75
End: 2020-11-20

## 2020-12-14 ENCOUNTER — HOSPITAL ENCOUNTER (OUTPATIENT)
Facility: HOSPITAL | Age: 75
Setting detail: OBSERVATION
Discharge: HOME HEALTH | End: 2020-12-15
Attending: EMERGENCY MEDICINE | Admitting: INTERNAL MEDICINE
Payer: MEDICARE

## 2020-12-14 ENCOUNTER — APPOINTMENT (OUTPATIENT)
Dept: CT IMAGING | Facility: HOSPITAL | Age: 75
End: 2020-12-14
Attending: EMERGENCY MEDICINE
Payer: MEDICARE

## 2020-12-14 DIAGNOSIS — N18.9 ACUTE ON CHRONIC RENAL INSUFFICIENCY: ICD-10-CM

## 2020-12-14 DIAGNOSIS — N28.9 ACUTE ON CHRONIC RENAL INSUFFICIENCY: ICD-10-CM

## 2020-12-14 DIAGNOSIS — R41.82 ALTERED MENTAL STATUS, UNSPECIFIED ALTERED MENTAL STATUS TYPE: Primary | ICD-10-CM

## 2020-12-14 PROCEDURE — 70450 CT HEAD/BRAIN W/O DYE: CPT | Performed by: EMERGENCY MEDICINE

## 2020-12-14 PROCEDURE — 99220 INITIAL OBSERVATION CARE,LEVL III: CPT | Performed by: HOSPITALIST

## 2020-12-14 RX ORDER — DULOXETIN HYDROCHLORIDE 30 MG/1
30 CAPSULE, DELAYED RELEASE ORAL 2 TIMES DAILY
Status: DISCONTINUED | OUTPATIENT
Start: 2020-12-14 | End: 2020-12-15

## 2020-12-14 RX ORDER — DILTIAZEM HYDROCHLORIDE 240 MG/1
240 CAPSULE, COATED, EXTENDED RELEASE ORAL DAILY
Status: DISCONTINUED | OUTPATIENT
Start: 2020-12-14 | End: 2020-12-15

## 2020-12-14 RX ORDER — ACETAMINOPHEN 325 MG/1
650 TABLET ORAL EVERY 6 HOURS PRN
Status: DISCONTINUED | OUTPATIENT
Start: 2020-12-14 | End: 2020-12-15

## 2020-12-14 RX ORDER — PANTOPRAZOLE SODIUM 40 MG/1
40 TABLET, DELAYED RELEASE ORAL
Status: DISCONTINUED | OUTPATIENT
Start: 2020-12-15 | End: 2020-12-15

## 2020-12-14 RX ORDER — ROSUVASTATIN CALCIUM 20 MG/1
20 TABLET, COATED ORAL NIGHTLY
Status: DISCONTINUED | OUTPATIENT
Start: 2020-12-14 | End: 2020-12-15

## 2020-12-14 RX ORDER — HEPARIN SODIUM 5000 [USP'U]/ML
5000 INJECTION, SOLUTION INTRAVENOUS; SUBCUTANEOUS EVERY 12 HOURS SCHEDULED
Status: DISCONTINUED | OUTPATIENT
Start: 2020-12-14 | End: 2020-12-15

## 2020-12-14 RX ORDER — ONDANSETRON 2 MG/ML
4 INJECTION INTRAMUSCULAR; INTRAVENOUS EVERY 6 HOURS PRN
Status: DISCONTINUED | OUTPATIENT
Start: 2020-12-14 | End: 2020-12-15

## 2020-12-14 RX ORDER — MONTELUKAST SODIUM 10 MG/1
10 TABLET ORAL NIGHTLY
Status: DISCONTINUED | OUTPATIENT
Start: 2020-12-14 | End: 2020-12-15

## 2020-12-14 RX ORDER — SODIUM CHLORIDE 9 MG/ML
INJECTION, SOLUTION INTRAVENOUS CONTINUOUS
Status: DISCONTINUED | OUTPATIENT
Start: 2020-12-14 | End: 2020-12-15

## 2020-12-14 RX ORDER — METOPROLOL SUCCINATE 100 MG/1
100 TABLET, EXTENDED RELEASE ORAL
Status: DISCONTINUED | OUTPATIENT
Start: 2020-12-15 | End: 2020-12-15

## 2020-12-14 RX ORDER — GABAPENTIN 300 MG/1
600 CAPSULE ORAL NIGHTLY
Status: DISCONTINUED | OUTPATIENT
Start: 2020-12-14 | End: 2020-12-15

## 2020-12-14 RX ORDER — DEXTROSE MONOHYDRATE 25 G/50ML
50 INJECTION, SOLUTION INTRAVENOUS
Status: DISCONTINUED | OUTPATIENT
Start: 2020-12-14 | End: 2020-12-15

## 2020-12-14 RX ORDER — DOCUSATE SODIUM 100 MG/1
100 CAPSULE, LIQUID FILLED ORAL 2 TIMES DAILY
Status: DISCONTINUED | OUTPATIENT
Start: 2020-12-14 | End: 2020-12-15

## 2020-12-14 NOTE — ED PROVIDER NOTES
Patient Seen in: [facility] Emergency Department      History   Patient presents with:  Stroke    Stated Complaint: confusion, r/o stroke    HPI    Patient is a 77-year-old female who was admitted admitted here twice recently for episodes of aphasia No             Review of Systems    Positive for stated complaint: confusion, r/o stroke  Other systems are as noted in HPI. Constitutional and vital signs reviewed. All other systems reviewed and negative except as noted above.     Physical Exam for the following components:       Result Value    Glucose 147 (*)     Chloride 114 (*)     CO2 17.0 (*)     BUN 20 (*)     Creatinine 2.10 (*)     BUN/CREA Ratio 9.5 (*)     GFR, Non- 23 (*)     GFR, -American 26 (*)     AST 10 (*) information is transmitted to the ACR ([employer] of Radiology) NRDR ([address]) which includes the Dose Index Registry. PATIENT STATED HISTORY: (As transcribed by Technologist)  Patient with confusion today.  History of transi were performed through the carotid and vertebral arteries. All measurements obtained in this exam were performed using NASCET criteria. Dose reduction techniques were used.  Dose information is transmitted to the  [facility] of Radiology) Jack Retana 35 (N diminutive throughout its course which can be seen as a normal variant. 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 The right vertebral artery largely terminates into the right PICA.   The right PICA is intracranial aneurysm or vascular malformation is identified. No large vessel occlusion is appreciated. OTHER:  There is a 10 mm hypoattenuating lesion in the anterior aspect of the left thyroid lobe, unchanged from the previous CT.   The visualized upper exam is unchanged now, no worsening. Discussed with Dr. Darryn Pereira of neurology remembers the patient from prior admission. He does feel be reasonable to keep the patient in the hospital overnight for observation. Probable EEG in the morning.

## 2020-12-14 NOTE — ED INITIAL ASSESSMENT (HPI)
Per medic, pt lives alone and has a neighbor come check on her. Today pt was more confused and unsure of her surroundings. Pt has hx of TIA. Per medics this started around noon. Last known normal unknown. Pt arrives A&O x4, but slow to respond.

## 2020-12-15 ENCOUNTER — NURSE ONLY (OUTPATIENT)
Dept: ELECTROPHYSIOLOGY | Facility: HOSPITAL | Age: 75
End: 2020-12-15
Attending: HOSPITALIST
Payer: MEDICARE

## 2020-12-15 VITALS
HEIGHT: 64 IN | SYSTOLIC BLOOD PRESSURE: 136 MMHG | BODY MASS INDEX: 36.7 KG/M2 | RESPIRATION RATE: 15 BRPM | OXYGEN SATURATION: 99 % | DIASTOLIC BLOOD PRESSURE: 61 MMHG | WEIGHT: 215 LBS | HEART RATE: 68 BPM | TEMPERATURE: 98 F

## 2020-12-15 PROCEDURE — 95816 EEG AWAKE AND DROWSY: CPT | Performed by: OTHER

## 2020-12-15 PROCEDURE — 99213 OFFICE O/P EST LOW 20 MIN: CPT | Performed by: OTHER

## 2020-12-15 PROCEDURE — 99217 OBSERVATION CARE DISCHARGE: CPT | Performed by: HOSPITALIST

## 2020-12-15 RX ORDER — POTASSIUM CHLORIDE 20 MEQ/1
40 TABLET, EXTENDED RELEASE ORAL EVERY 4 HOURS
Status: COMPLETED | OUTPATIENT
Start: 2020-12-15 | End: 2020-12-15

## 2020-12-15 RX ORDER — LEVETIRACETAM 1000 MG/1
1000 TABLET ORAL 2 TIMES DAILY
Qty: 60 TABLET | Refills: 0 | Status: SHIPPED | OUTPATIENT
Start: 2020-12-15 | End: 2021-01-06

## 2020-12-15 RX ORDER — LEVETIRACETAM 500 MG/1
1000 TABLET ORAL 2 TIMES DAILY
Status: DISCONTINUED | OUTPATIENT
Start: 2020-12-15 | End: 2020-12-15

## 2020-12-15 RX ORDER — ASPIRIN 81 MG/1
81 TABLET ORAL DAILY
Status: DISCONTINUED | OUTPATIENT
Start: 2020-12-15 | End: 2020-12-15

## 2020-12-15 NOTE — PROGRESS NOTES
12/15/20 1115   Clinical Encounter Type   Referral To Nurse  (Per nurse, patient is Full Code so no need for POLST form, Patient's daughter is at the bedside.)    remains available at pager [phone].

## 2020-12-15 NOTE — HOME CARE LIAISON
Patient is current with [facility] for RN and PT. Patient will need [facility]O order entered on or before day of discharge if returning to home health.

## 2020-12-15 NOTE — CM/SW NOTE
Per RN, pt will d/c'd home today. Pt will continue with [facility] - [facility] order written. Pt's grandson will be able to stay with pt once she is d/c'd.

## 2020-12-15 NOTE — CM/SW NOTE
Pt is a 75 yo female admitted for AMS. Pt lives home alone at the [facility]. Pt has a dtr living locally. PT is recommending 24 hr supervision. Pt is current with [facility]. [facility] order entered.   Left message for pt's dtr Riccardo Drew to discuss 25

## 2020-12-15 NOTE — PROGRESS NOTES
[facility] HOSPITALIST  Progress Note     Damian Kenyon Patient Status:  Observation    1945 MRN [id number]   [facility] 7NE-A Attending Raheem Griffith MD   Hosp Day # 0 PCP Araceli Artis     Chief Complaint: AMS    S: Patient reports she Potassium Chloride ER  40 mEq Oral Q4H   • dilTIAZem HCl ER Coated Beads  240 mg Oral Daily   • docusate sodium  100 mg Oral BID   • DULoxetine HCl  30 mg Oral BID   • gabapentin  600 mg Oral Nightly   • levetiracetam  750 mg Oral BID   • Metoprolol Succin

## 2020-12-15 NOTE — PLAN OF CARE
NURSING ADMISSION NOTE      Patient admitted via Cart  Oriented to room. Safety precautions initiated. Bed in low position. Call light in reach.     Arrived to floor at 2030  Admission navigator and med rec complete  Alert & oriented x3-4; forgetful ordered  - Monitor patient for seizure activity, document and report duration and description of seizure to MD/LIP  - If seizure occurs, turn patient to side and suction secretions as needed  - Reorient patient post seizure  - Seizure pads on all 4 side ra

## 2020-12-15 NOTE — PHYSICAL THERAPY NOTE
PHYSICAL THERAPY QUICK EVALUATION - INPATIENT    Room Number: 8730/5422-D  Evaluation Date: 12/15/2020  Presenting Problem: AMS  Physician Order: PT Eval and Treat    History related to current admission: Pt is 76year old female admitted on 12/14/2020 f Performed by Smiley Goodman DO at [facility] ENDOSCOPY   • [address] SITUATION  Type of Home: Apartment(5th floor but has elevator )   Home Layout: One level  Stairs to Enter : 0     Stairs to Bedroom: 0       L bedclothes, sheets and blankets)?: None   -   Sitting down on and standing up from a chair with arms (e.g., wheelchair, bedside commode, etc.): None   -   Moving from lying on back to sitting on the side of the bed?: None   How much help from another perso worker    ASSESSMENT   Patient is a 76year old female admitted on 12/14/2020 for AMS. Pertinent comorbidities and personal factors impacting therapy include those listed above in history related to current admission.   Functional outcome measures complete

## 2020-12-15 NOTE — H&P
All wounds are looking better apparently home health did not change him last week so he had been a whole week without changing his dressing. It had some drainage but was not heavily draining therefore we will start changing him on a weekly basis. Wound Center Progress Note Patient: Kathrine Alejo MRN: [id number]  SSN: [id number] YOB: 1941  Age: 66 y.o. Sex: male Subjective: Chief Complaint: Kathrine Alejo is a 66 y.o. BLACK OR  male who presents with R lower leg lateral wound of 4 weeks duration. History of Present Illness:    
 
Wound Caused By: chronic pressure/irritation due to venous insufficiency clinically Associated Signs and Symptoms: Mild pain Timing: Intermittent Quality: Dull Severity: 3/10 Modifying Factors: Chronic venous insufficiency Current Wound Care: See nurses notes Past Medical History:  
Diagnosis Date  Acute renal failure superimposed on stage 3 chronic kidney disease ([address] 9/21/2016  Anemia   
 pt states he receives iron infusions  Arthritis OA generalized  Chronic kidney disease   
 not on HD- surgery done for access and fistula being removed. Per patient, no plans to proceed with dialysis  Chronic pancreatitis ([address] 9/22/2016  Dermatophytosis of nail 12/6/2016  Diabetic neuropathy ([facility] 9/22/2016  
 insulin sliding scale only- no oral meds- avg fastings avg fasting \"low 200's;  hypo @ 80 A1C 7.1 9/2019 per patient  Essential hypertension 9/22/2016  
 medication  GERD (gastroesophageal reflux disease)   
 controlled with med  Hypercholesterolemia  Iron (Fe) deficiency anemia 9/22/2016  Septicemia due to Klebsiella pneumoniae ([address] 9/22/2016  Systolic CHF, chronic ([address] 9/23/2016 ECHO 4/2018 EF- 60-65%  Type II diabetes mellitus with nephropathy ([facility] 09/22/2016  Venous insufficiency 12/6/2016  Xerosis cutis 12/6/2016 Past Surgical History: DIANNE HOSPITALIST  History and Physical     Chayito Sham Patient Status:  Emergency    1945 MRN [id number]   Location [address] [facility] Attending Arnel Diehl MD   Hosp Day # 0 PCP Beena King     Chief Complaint: Diffic Procedure Laterality Date  HX COLONOSCOPY    
 HX HERNIA REPAIR Left 2010  HX PROSTATECTOMY  2012  HX TURP  2012 FOR BPH  VASCULAR SURGERY PROCEDURE UNLIST Left 10/26/2017 AVF  VASCULAR SURGERY PROCEDURE UNLIST Left 2019 Ligation of left brachiobasilic fistula Family History Problem Relation Age of Onset  Diabetes Mother  Stroke Mother  Hypertension Mother  No Known Problems Father  Diabetes Sister  Diabetes Brother  Diabetes Sister  Diabetes Sister  Other Sister   
     fibromyalgia Social History Tobacco Use  Smoking status: Former Smoker Packs/day: 2.00 Years: 20.00 Pack years: 40.00 Last attempt to quit:  Years since quittin.9  Smokeless tobacco: Current User Substance Use Topics  Alcohol use: No  
   
Prior to Admission medications Medication Sig Start Date End Date Taking? Authorizing Provider  
cephALEXin (KEFLEX) 500 mg capsule Take 1 Cap by mouth four (4) times daily for 10 days. 19  Vince Modi MD  
ampicillin (PRINCIPEN) 500 mg capsule Take 1 Cap by mouth Before breakfast, lunch, dinner and at bedtime for 10 days. 19  Vince Modi MD  
torsemide (DEMADEX) 20 mg tablet Take 40 mg by mouth daily. Provider, Historical  
insulin glargine (LANTUS,BASAGLAR) 100 unit/mL (3 mL) inpn 15 Units by SubCUTAneous route daily (with breakfast). Provider, Historical  
oxymetazoline (AFRIN) 0.05 % nasal spray 2 Sprays by Both Nostrils route once as needed for Congestion. 19   Provider, Historical  
Lactoperoxi/Gluc Oxid/Pot Thio (BIOTENE DRY MOUTH MM) Take 2 Sprays by mouth as needed for Other (dry mouth). Provider, Historical  
lipase-protease-amylase (CREON) 36,000-114,000- 180,000 unit cpDR capsule Take 3,600 Units by mouth See Admin Instructions.  Take 2 capsules by mouth Social History:  reports that she has never smoked. She has never used smokeless tobacco. She reports that she does not drink alcohol or use drugs.     Family History:   Family History   Problem Relation Age of Onset   • Hypertension Other         A every 6 (six) hours as needed for Wheezing.  , Disp: , Rfl:     •  allopurinol 100 MG Oral Tab, Take 100 mg by mouth daily. , Disp: , Rfl:     •  Diltiazem HCl ER Coated Beads (CARTIA XT) 240 MG Oral Capsule SR 24 Hr, Take 240 mg by mouth daily. , Disp: , with each meal, and 1 capsule by mouth with each snack. Zhanna David MD  
dicyclomine (BENTYL) 10 mg capsule Take 10 mg by mouth two (2) times a day. Provider, Historical  
acetaminophen (TYLENOL) 325 mg tablet Take 2 Tabs by mouth every four (4) hours as needed for Pain. 11/9/19   Aurelia Orr MD  
ondansetron (ZOFRAN ODT) 4 mg disintegrating tablet Take 1 Tab by mouth every eight (8) hours as needed for Nausea. 11/9/19   Aurelia Orr MD  
warfarin (COUMADIN) 5 mg tablet Take 1 Tab by mouth every evening. Patient taking differently: Take 4 mg by mouth every evening. 11/9/19   Aurelia Orr MD  
pantothenic ac-min oil-pet,hyd (AQUAPHOR) 41 % ointment Apply  to affected area daily. Apply over legs 11/10/19   Aurelia Orr MD  
colestipol (COLESTID) 1 gram tablet Take 1 g by mouth two (2) times a day. Provider, Historical  
sodium bicarbonate 650 mg tablet Take  by mouth three (3) times daily. Provider, Historical  
traMADol (ULTRAM) 50 mg tablet Take 50 mg by mouth every six (6) hours as needed for Pain. Provider, Historical  
cloNIDine (CATAPRES) 0.1 mg/24 hr ptwk 1 Patch by TransDERmal route every seven (7) days. Changes on Tuesdays- on ELVIRA chest 9/11/2019 Provider, Historical  
lidocaine 4 % patch Cut to appropriate size. Apply to intact skin for 12 hours, remove for 12 hours. Patient taking differently: 1 Patch by TransDERmal route as needed. Cut to appropriate size. Apply to intact skin for 12 hours, remove for 12 hours. States uses only occasionally and not on today 9/11/2019 Do not take morning of procedure. 7/12/19   JAMARCUS Smiley  
CALCITRIOL, BULK, by Does Not Apply route. Provider, Historical  
hydrALAZINE (APRESOLINE) 100 mg tablet Take 1 Tab by mouth three (3) times daily. Patient taking differently: Take 100 mg by mouth three (3) times daily. Take morning of procedure.  4/19/18   Patricia Banda MD  
 12*   BILT 0.4   TP 8.6*       Estimated Creatinine Clearance: 20 mL/min (A) (based on SCr of 2.1 mg/dL (H)). Recent Labs   Lab 12/14/20  1615   PTP 13.2   INR 0.97       No results for input(s): TROP, CK in the last 168 hours.     Imaging: Imaging data pregabalin (LYRICA) 50 mg capsule Take 1 Cap by mouth daily. Max Daily Amount: 50 mg. Patient taking differently: Take 150 mg by mouth two (2) times a day. 4/19/18   Annia Escobar MD  
pravastatin (PRAVACHOL) 40 mg tablet Take 1 Tab by mouth nightly. 4/4/18   Eve Delaney MD  
[facility] INSULIN 100 unit/mL kwikpen 5 units three times a day with meals plus correction scale, 2 units/50 > 150, max daily dose 25 units Patient not taking: Reported on 11/11/2019 2/28/18   Gara Breath A, PA-C  
calcifediol (RAYALDEE) 30 mcg Cs24 Take  by mouth nightly. Provider, Historical  
amLODIPine (NORVASC) 10 mg tablet Take 10 mg by mouth every morning. Provider, Historical  
metoprolol succinate (TOPROL-XL) 50 mg XL tablet Take 50 mg by mouth every morning. Provider, Historical  
omeprazole (PRILOSEC) 20 mg capsule Take 20 mg by mouth every morning. Provider, Historical  
 
No Known Allergies Review of Systems: A comprehensive review of systems was negative except for that written in the History of Present Illness. Lab Results Component Value Date/Time Hemoglobin A1c 8.4 (H) 11/03/2019 09:25 PM  
 Hemoglobin A1c (POC) 6.5 03/14/2018 11:20 AM  
  
 
Immunization History Administered Date(s) Administered  Pneumococcal Polysaccharide (PPSV-23) 02/07/2012  TB Skin Test (PPD) Intradermal 04/02/2018, 04/16/2018, 04/30/2018, 11/03/2019 Body mass index is 25.09 kg/m². Counseling regarding nutrition done: No  
 
Current medications: 
Current Outpatient Medications Medication Sig Dispense Refill  cephALEXin (KEFLEX) 500 mg capsule Take 1 Cap by mouth four (4) times daily for 10 days. 40 Cap 0  
 ampicillin (PRINCIPEN) 500 mg capsule Take 1 Cap by mouth Before breakfast, lunch, dinner and at bedtime for 10 days. 40 Cap 0  
 torsemide (DEMADEX) 20 mg tablet Take 40 mg by mouth daily.     
 insulin glargine (LANTUS,BASAGLAR) 100 unit/mL (3 mL) inpn 15 Units by SubCUTAneous route daily (with breakfast).  oxymetazoline (AFRIN) 0.05 % nasal spray 2 Sprays by Both Nostrils route once as needed for Congestion.  Lactoperoxi/Gluc Oxid/Pot Thio (BIOTENE DRY MOUTH MM) Take 2 Sprays by mouth as needed for Other (dry mouth).  lipase-protease-amylase (CREON) 36,000-114,000- 180,000 unit cpDR capsule Take 3,600 Units by mouth See Admin Instructions. Take 2 capsules by mouth with each meal, and 1 capsule by mouth with each snack.  dicyclomine (BENTYL) 10 mg capsule Take 10 mg by mouth two (2) times a day.  acetaminophen (TYLENOL) 325 mg tablet Take 2 Tabs by mouth every four (4) hours as needed for Pain. 20 Tab 0  
 ondansetron (ZOFRAN ODT) 4 mg disintegrating tablet Take 1 Tab by mouth every eight (8) hours as needed for Nausea. 20 Tab 0  
 warfarin (COUMADIN) 5 mg tablet Take 1 Tab by mouth every evening. (Patient taking differently: Take 4 mg by mouth every evening.) 7 Tab 0  
 pantothenic ac-min oil-pet,hyd (AQUAPHOR) 41 % ointment Apply  to affected area daily. Apply over legs 53 g 0  
 colestipol (COLESTID) 1 gram tablet Take 1 g by mouth two (2) times a day.  sodium bicarbonate 650 mg tablet Take  by mouth three (3) times daily.  traMADol (ULTRAM) 50 mg tablet Take 50 mg by mouth every six (6) hours as needed for Pain.  cloNIDine (CATAPRES) 0.1 mg/24 hr ptwk 1 Patch by TransDERmal route every seven (7) days. Changes on Tuesdays- on ELVIRA chest 9/11/2019  lidocaine 4 % patch Cut to appropriate size. Apply to intact skin for 12 hours, remove for 12 hours. (Patient taking differently: 1 Patch by TransDERmal route as needed. Cut to appropriate size. Apply to intact skin for 12 hours, remove for 12 hours. States uses only occasionally and not on today 9/11/2019 Do not take morning of procedure.) 14 Patch 0  
 CALCITRIOL, BULK, by Does Not Apply route.     
 hydrALAZINE (APRESOLINE) 100 mg tablet Take 1 Tab by mouth three (3) times daily. (Patient taking differently: Take 100 mg by mouth three (3) times daily. Take morning of procedure.) 90 Tab 2  pregabalin (LYRICA) 50 mg capsule Take 1 Cap by mouth daily. Max Daily Amount: 50 mg. (Patient taking differently: Take 150 mg by mouth two (2) times a day.) 10 Cap 0  
 pravastatin (PRAVACHOL) 40 mg tablet Take 1 Tab by mouth nightly. 30 Tab 0  
 HUMALOG KWIKPEN INSULIN 100 unit/mL kwikpen 5 units three times a day with meals plus correction scale, 2 units/50 > 150, max daily dose 25 units (Patient not taking: Reported on 11/11/2019) 5 Pen 11  
 calcifediol (RAYALDEE) 30 mcg Cs24 Take  by mouth nightly.  amLODIPine (NORVASC) 10 mg tablet Take 10 mg by mouth every morning.  metoprolol succinate (TOPROL-XL) 50 mg XL tablet Take 50 mg by mouth every morning.  omeprazole (PRILOSEC) 20 mg capsule Take 20 mg by mouth every morning. Objective:  
 
Physical Exam:  
 
Visit Vitals Ht 6' (1.829 m) Wt 83.9 kg (185 lb) BMI 25.09 kg/m² General: well developed, well nourished, pleasant , NAD. Hygiene good Psych: cooperative. Pleasant. No anxiety or depression. Normal mood and affect. Neuro: alert and oriented to person/place/situation. Otherwise nonfocal. 
Derm: Normal turgor for age, dry skin HEENT: Normocephalic, atraumatic. EOMI. Conjunctiva clear. No scleral icterus. Neck: Normal range of motion. No masses. Chest: Good air entry bilaterally. Respirations nonlabored Cardio: Normal heart sounds,no rubs, murmurs or gallops Abdomen: Soft, nontender, nondistended, normoactive bowel sounds Lower extremities: color normal; temperature normal. Hair growth is not present. Calves are supple, nontender, approximately equally sized in comparison. Capillary refill <3 sec Ulcer Description: Wound Leg Anterior;Mid;Right (Active) Number of days: 615 Wound Pretibial Right;Lateral (Active) Number of days: 37  
   
 Wound Leg lower Right;Lateral (Active) Dressing Status Clean, dry, and intact 12/17/2019 11:31 AM  
Non-staged Wound Description Full thickness 12/17/2019 11:31 AM  
Wound Length (cm) 20 cm 12/17/2019 11:31 AM  
Wound Width (cm) 4 cm 12/17/2019 11:31 AM  
Wound Depth (cm) 0.1 cm 12/17/2019 11:31 AM  
Wound Surface Area (cm^2) 80 cm^2 12/17/2019 11:31 AM  
Wound Volume (cm^3) 8 cm^3 12/17/2019 11:31 AM  
Condition of Base Granulation;Epithelializing;Eschar 12/17/2019 11:31 AM  
Tissue Type Percent Black 20 % 12/17/2019 11:31 AM  
Tissue Type Percent Pink 70 12/17/2019 11:31 AM  
Tissue Type Percent Red 10 12/17/2019 11:31 AM  
Tissue Type Percent Yellow 40 12/3/2019  2:04 PM  
Drainage Amount Moderate 12/17/2019 11:31 AM  
Drainage Color Serosanguinous 12/17/2019 11:31 AM  
Wound Odor None 12/17/2019 11:31 AM  
Tammy-wound Assessment Edema 12/17/2019 11:31 AM  
Margins Attached edges 12/10/2019 10:27 AM  
Cleansing and Cleansing Agents  Soap and water 12/17/2019 11:31 AM  
Number of days: 14 Wound Leg lower Left;Lateral (Active) Dressing Status Clean, dry, and intact 12/17/2019 11:31 AM  
Wound Length (cm) 3 cm 12/17/2019 11:31 AM  
Wound Width (cm) 5.5 cm 12/17/2019 11:31 AM  
Wound Depth (cm) 0 cm 12/17/2019 11:31 AM  
Wound Surface Area (cm^2) 16.5 cm^2 12/17/2019 11:31 AM  
Wound Volume (cm^3) 0 cm^3 12/17/2019 11:31 AM  
Condition of Base Other (comment) 12/17/2019 11:31 AM  
Drainage Amount Small 12/17/2019 11:31 AM  
Drainage Color Serous 12/17/2019 11:31 AM  
Wound Odor None 12/17/2019 11:31 AM  
Tammy-wound Assessment Intact 12/17/2019 11:31 AM  
Cleansing and Cleansing Agents  Soap and water 12/17/2019 11:31 AM  
Dressing Changed Changed/New 12/10/2019 10:27 AM  
Number of days: 14  
   
[REMOVED] Wound Leg Left; Anterior (Removed) Number of days: 375 [REMOVED] Wound Arm Left (Removed) Number of days: 167 [REMOVED] Wound Left (Removed) Number of days: 30 Data Review: No results found for this or any previous visit (from the past 24 hour(s)). Assessment:  
 
66 y.o. male with both lower extremities venous stasis ulcer. Problem List  Date Reviewed: 2/1/2019 Codes Class Noted ESRD (end stage renal disease) ([facility] ICD-10-CM: N18.6 ICD-9-CM: 585.6  11/3/2019 Open wound of skin ICD-10-CM: T14. Timoteo Bang ICD-9-CM: 879.8  11/3/2019 DVT (deep venous thrombosis) (HCC) ICD-10-CM: I82.409 ICD-9-CM: 453.40  11/3/2019 Rhabdomyolysis ICD-10-CM: P79.84 ICD-9-CM: 728.88  4/30/2018 Metabolic acidosis MYP-67-BI: E87.2 ICD-9-CM: 276.2  4/30/2018 Renal failure (ARF), acute on chronic (HCC) ICD-10-CM: N17.9, N18.9 ICD-9-CM: 584.9, 585.9  4/29/2018 Dementia without behavioral disturbance (HCC) (Chronic) ICD-10-CM: F03.90 ICD-9-CM: 294.20  4/19/2018 Hypernatremia ICD-10-CM: E87.0 ICD-9-CM: 276.0  4/18/2018 Seizure ([facility] ICD-10-CM: R56.9 ICD-9-CM: 780.39  4/18/2018 Volume overload ICD-10-CM: E87.70 ICD-9-CM: 276.69  4/4/2018 Chest pain ICD-10-CM: R07.9 ICD-9-CM: 786.50  4/4/2018 Cauda equina compression (HCC) ICD-10-CM: G83.4 ICD-9-CM: 344.60  4/2/2018 Hypercholesterolemia ICD-10-CM: E78.00 ICD-9-CM: 272.0  Unknown Uncontrolled diabetes mellitus, with long-term current use of insulin (HCC) (Chronic) ICD-10-CM: E11.65, Z79.4 ICD-9-CM: 250.02, V58.67  2/28/2018 Carotid bruit ICD-10-CM: R09.89 ICD-9-CM: 785.9  2/28/2018 Arteriovenous fistula (HCC) ICD-10-CM: I77.0 ICD-9-CM: 447.0  10/31/2017 Overview Signed 10/31/2017  3:13 PM by Sravan Mahajan NP  
  10/26/17 (Dr. Paola Murphy) Creation of left brachiobasilic fistula. Onychomycosis ICD-10-CM: B35.1 ICD-9-CM: 110.1  9/13/2017 Controlled type 2 diabetes mellitus with complication, with long-term current use of insulin (HCC) (Chronic) ICD-10-CM: E11.8, Z79.4 ICD-9-CM: 250.90, V58.67  9/13/2017 Stage 4 chronic kidney disease (HCC) (Chronic) ICD-10-CM: N18.4 ICD-9-CM: 585.4  4/4/2017 Stasis edema of both lower extremities (Chronic) ICD-10-CM: M97.869 ICD-9-CM: 459.30  4/4/2017 Cellulitis of left lower leg ICD-10-CM: L03.116 ICD-9-CM: 682.6  4/4/2017 Venous stasis ulcer of left lower extremity ([facility] ICD-10-CM: K20.430, J09.773 ICD-9-CM: 454.0  4/1/2017 Venous insufficiency (Chronic) ICD-10-CM: Q16.1 ICD-9-CM: 459.81  12/6/2016 Chronic systolic congestive heart failure (HCC) (Chronic) ICD-10-CM: K81.37 ICD-9-CM: 428.22, 428.0  9/23/2016 Septicemia due to Klebsiella pneumoniae [facility]) ICD-10-CM: A41.4 ICD-9-CM: 038.3  9/22/2016 Type II diabetes mellitus with nephropathy (HCC) (Chronic) ICD-10-CM: E11.21 
ICD-9-CM: 250.40, 583.81  9/22/2016 Essential hypertension ICD-10-CM: I10 
ICD-9-CM: 401.9  9/22/2016 GERD (gastroesophageal reflux disease) ICD-10-CM: K21.9 ICD-9-CM: 530.81  9/22/2016 Diabetic neuropathy (HCC) ICD-10-CM: E11.40 ICD-9-CM: 250.60, 357.2  9/22/2016 Chronic pancreatitis (HCC) (Chronic) ICD-10-CM: K86.1 ICD-9-CM: 929.4  9/22/2016 Iron (Fe) deficiency anemia ICD-10-CM: D50.9 ICD-9-CM: 280.9  9/22/2016 Acute renal failure superimposed on stage 3 chronic kidney disease (HCC) ICD-10-CM: N17.9, N18.3 ICD-9-CM: 584.9, 585.3  9/21/2016 Plan: No orders of the defined types were placed in this encounter. Patient understood and agrees with plan. Questions answered. Follow-up Information None Any procedures done today in the [address] [facility] are documented in a separate note in [address] [facility] and made part of this record by reference. Dictated using voice recognition software; proofread, but unrecognized errors may exist. 
 
Signed By: Ted Negron MD   
 December 17, 2019

## 2020-12-15 NOTE — DISCHARGE SUMMARY
[facility]  DISCHARGE SUMMARY     Gwendolyn Shelton Patient Status:  Observation    1945 MRN [id number]   [facility] 7NE-A Attending Yash Jefferson MD   Hosp Day # 0 JANNY Gant     Date of Admission: 2020  Date of Disc High Risk  29-58 Medium Risk  0-28   Low Risk         TCM Follow-Up Recommendation:  LACE > 58:  High Risk of readmission after discharge from the hospital.    Procedures during hospitalization: none    Consultants:  Neurology         Discharge Medications mouth daily. Refills: 0     metFORMIN HCl 500 MG Tabs  Commonly known as: GLUCOPHAGE      Take 1 tablet (500 mg total) by mouth 2 (two) times daily with meals.    Stop taking on: December 18, 2020  Quantity: 60 tablet  Refills: 0     Metoprolol Succinate

## 2020-12-15 NOTE — PROCEDURES
Date of Procedure: 12/15/2020    Procedure: EEG (ELECTROENCEPHALOGRAM)     DX: AMS, CONFUSION  HX: PT IS A 74 YO FEMALE WHO PRESENTED TO THE ED ON 12/14/2020 FOR AMS AND CONFUSION.  REPORTEDLY PT WAS FOUND BY NEIGHBOR CONFUSED AND HAVING DIFFICULTY SPEAKING

## 2020-12-15 NOTE — DIETARY NOTE
[address]     Admitting diagnosis:  Acute on chronic renal insufficiency [N28.9, N18.9]  Altered mental status, unspecified altered mental status type [R41.82]    Ht: 162.6 cm (5' 4\")  Wt: 97.5 kg (215 lb).  This

## 2020-12-15 NOTE — CONSULTS
[id number] [address] Neurology Consultation    Date of consult: 12/15/2020    Reason for consult: non convulsive seizure    HPI: Jordi Colorado is a 76year old female with a PMH of subclinical seizures, CKD, dyslipidemia, fibromyalgia, and peripheral neur Or    •  glucose (DEX4) oral liquid 30 g, 30 g, Oral, Q15 Min PRN    Or    •  Glucose-Vitamin C (DEX-4) chewable tab 8 tablet, 8 tablet, Oral, Q15 Min PRN    •  Insulin Aspart Pen (NOVOLOG) 100 UNIT/ML flexpen 1-10 Units, 1-10 Units, Subcutaneous, TID AC a BMI 36.90 kg/m²   Lungs: clear to auscultation   CV: S1, S2 +  Abdomen: soft, non tender, no masses  Extremities: no edema  Carotid bruits: no  Neurological Examination:  Mental status: A & O X 3  Language: Fluency with normal naming and repetition, compre

## 2020-12-16 NOTE — CM/SW NOTE
12/16/20 1000   Discharge disposition   Expected discharge disposition Home-Health   Name of Lance Proc. [facility] Steve 1 services after discharge Skilled home care   Discharge transportation Private car

## 2020-12-16 NOTE — PLAN OF CARE
NURSING DISCHARGE NOTE    DC cleared by all consults  Discharged Home via Wheelchair. Belongings Taken by patient/family.   PIV and tele dc'd  Medications, follow up appointments, and DC instructions reviewed  Questions and concerns addressed  Pt corwin

## 2020-12-16 NOTE — TELEPHONE ENCOUNTER
Refused 90 days supply. Patient needs to be seen in office.        Medication: LEVETIRACETAM 1000 MG Oral Tab    Date of last refill: 12/15/2020 (#60/0)  [place]    Last office visit: Not in office  Due back to clinic

## 2020-12-17 RX ORDER — LEVETIRACETAM 1000 MG/1
TABLET ORAL
Qty: 180 TABLET | Refills: 0 | OUTPATIENT
Start: 2020-12-17

## 2021-01-05 DIAGNOSIS — G40.909 SEIZURE DISORDER (HCC): Primary | ICD-10-CM

## 2021-01-05 NOTE — TELEPHONE ENCOUNTER
Spoke with Jessica at [facility] Jae RAMIREZ. Informed her that pt needs to have an appt in order to get the refill. Let Terrence Nation know that patient has missed last two appts. Terrence Nation will contact pt's daughter to schedule appt.

## 2021-01-05 NOTE — TELEPHONE ENCOUNTER
Per Epic review, pt has not been seen in clinic. Had appt 12/2/2020 that was cancelled, 12/9/2020 no show. Has seen Dr. Michela Steinberg in hospital. Can be scheduled with provider.

## 2021-01-05 NOTE — TELEPHONE ENCOUNTER
Spoke with pt's daughter; pt is scheduled in [place] with Dr. Sayra Bass on 2/3/21. Pt will need refill before that time.

## 2021-01-05 NOTE — TELEPHONE ENCOUNTER
Pt's care giver called to request refill of Keppra. States pt gets multi dose packs. Pt saw Dr. Kash Drew 11/17/20 in office. Pt was in hospital 12/14/20, Dr. Nancy Zapien signed refill 12/15/20. TE from 12/15/20 state pt needs to have OV.   Please advise w

## 2021-01-05 NOTE — TELEPHONE ENCOUNTER
Shannon Keller from [facility] called to give pt's daughter's phone number in case there is a problem with refilling Rx for pt. Demond Shay phone number [phone].       Please send refill to [address] [facility] fax# [phone]

## 2021-01-06 RX ORDER — LEVETIRACETAM 1000 MG/1
1000 TABLET ORAL 2 TIMES DAILY
Qty: 60 TABLET | Refills: 0 | Status: SHIPPED | OUTPATIENT
Start: 2021-01-06

## 2021-01-26 DIAGNOSIS — Z23 NEED FOR VACCINATION: ICD-10-CM

## 2021-02-03 ENCOUNTER — OFFICE VISIT (OUTPATIENT)
Dept: NEUROLOGY | Facility: CLINIC | Age: 76
End: 2021-02-03
Payer: MEDICARE

## 2021-02-03 VITALS
BODY MASS INDEX: 36 KG/M2 | WEIGHT: 212 LBS | SYSTOLIC BLOOD PRESSURE: 114 MMHG | HEART RATE: 68 BPM | DIASTOLIC BLOOD PRESSURE: 60 MMHG | RESPIRATION RATE: 16 BRPM

## 2021-02-03 DIAGNOSIS — G40.309 GENERALIZED NON-CONVULSIVE EPILEPSY (HCC): Primary | ICD-10-CM

## 2021-02-03 PROCEDURE — 99214 OFFICE O/P EST MOD 30 MIN: CPT | Performed by: OTHER

## 2021-02-03 NOTE — PROGRESS NOTES
[facility] Neurology outpatient progress note  Date of service: 2/3/2021    Patient here for a follow-up visit for non convulsive seizure. Since last seen in house, no seizure reported, Tolerating medications without side effects, she is on keppra 1gm bid.  No new (5 MG/ML) 0.5% Inhalation Nebu Soln, Take 2.5 mg by nebulization every 6 (six) hours as needed for Wheezing., Disp: , Rfl:   •  Albuterol Sulfate HFA (PROVENTIL HFA) 108 (90 BASE) MCG/ACT Inhalation Aero Soln, Inhale 2 puffs into the lungs every 6 (six) ho N/A 2017    Performed by Liliana Ayala DO at [facility] ENDOSCOPY   • HC  SECTION LEVEL I     • KNEE SURGERY       Social History:  Social History    Tobacco Use      Smoking status: Never Smoker      Smokeless tobacco: Never Used    Alcohol use:

## 2021-06-07 ENCOUNTER — HOSPITAL ENCOUNTER (OUTPATIENT)
Facility: HOSPITAL | Age: 76
Setting detail: OBSERVATION
LOS: 1 days | Discharge: HOME HEALTH | End: 2021-06-09
Attending: EMERGENCY MEDICINE | Admitting: HOSPITALIST
Payer: MEDICARE

## 2021-06-07 ENCOUNTER — APPOINTMENT (OUTPATIENT)
Dept: CT IMAGING | Facility: HOSPITAL | Age: 76
End: 2021-06-07
Attending: EMERGENCY MEDICINE
Payer: MEDICARE

## 2021-06-07 ENCOUNTER — APPOINTMENT (OUTPATIENT)
Dept: GENERAL RADIOLOGY | Facility: HOSPITAL | Age: 76
End: 2021-06-07
Attending: EMERGENCY MEDICINE
Payer: MEDICARE

## 2021-06-07 DIAGNOSIS — R41.0 CONFUSION: Primary | ICD-10-CM

## 2021-06-07 PROCEDURE — 71045 X-RAY EXAM CHEST 1 VIEW: CPT | Performed by: EMERGENCY MEDICINE

## 2021-06-07 PROCEDURE — 4A00X4Z MEASUREMENT OF CENTRAL NERVOUS ELECTRICAL ACTIVITY, EXTERNAL APPROACH: ICD-10-PCS | Performed by: OTHER

## 2021-06-07 PROCEDURE — 99220 INITIAL OBSERVATION CARE,LEVL III: CPT | Performed by: HOSPITALIST

## 2021-06-07 PROCEDURE — 70450 CT HEAD/BRAIN W/O DYE: CPT | Performed by: EMERGENCY MEDICINE

## 2021-06-07 RX ORDER — METOCLOPRAMIDE HYDROCHLORIDE 5 MG/ML
5 INJECTION INTRAMUSCULAR; INTRAVENOUS EVERY 8 HOURS PRN
Status: DISCONTINUED | OUTPATIENT
Start: 2021-06-07 | End: 2021-06-09

## 2021-06-07 RX ORDER — SODIUM CHLORIDE 9 MG/ML
INJECTION, SOLUTION INTRAVENOUS CONTINUOUS
Status: ACTIVE | OUTPATIENT
Start: 2021-06-07 | End: 2021-06-07

## 2021-06-07 RX ORDER — VALSARTAN 320 MG/1
320 TABLET ORAL DAILY
Status: DISCONTINUED | OUTPATIENT
Start: 2021-06-07 | End: 2021-06-09

## 2021-06-07 RX ORDER — MONTELUKAST SODIUM 10 MG/1
10 TABLET ORAL NIGHTLY
Status: DISCONTINUED | OUTPATIENT
Start: 2021-06-07 | End: 2021-06-07

## 2021-06-07 RX ORDER — LEVETIRACETAM 1000 MG/1
1000 TABLET ORAL 2 TIMES DAILY
Status: ON HOLD | COMMUNITY
Start: 2021-04-30 | End: 2021-06-09

## 2021-06-07 RX ORDER — ROSUVASTATIN CALCIUM 20 MG/1
20 TABLET, COATED ORAL NIGHTLY
Status: DISCONTINUED | OUTPATIENT
Start: 2021-06-07 | End: 2021-06-09

## 2021-06-07 RX ORDER — ACETAMINOPHEN 325 MG/1
650 TABLET ORAL EVERY 6 HOURS PRN
Status: DISCONTINUED | OUTPATIENT
Start: 2021-06-07 | End: 2021-06-09

## 2021-06-07 RX ORDER — LORAZEPAM 2 MG/ML
0.5 INJECTION INTRAMUSCULAR ONCE
Status: COMPLETED | OUTPATIENT
Start: 2021-06-07 | End: 2021-06-07

## 2021-06-07 RX ORDER — DILTIAZEM HYDROCHLORIDE 240 MG/1
240 CAPSULE, COATED, EXTENDED RELEASE ORAL DAILY
Status: DISCONTINUED | OUTPATIENT
Start: 2021-06-07 | End: 2021-06-07 | Stop reason: SDUPTHER

## 2021-06-07 RX ORDER — LEVETIRACETAM 500 MG/1
1000 TABLET ORAL 2 TIMES DAILY
Status: DISCONTINUED | OUTPATIENT
Start: 2021-06-07 | End: 2021-06-09

## 2021-06-07 RX ORDER — DEXTROSE MONOHYDRATE 25 G/50ML
50 INJECTION, SOLUTION INTRAVENOUS
Status: DISCONTINUED | OUTPATIENT
Start: 2021-06-07 | End: 2021-06-09

## 2021-06-07 RX ORDER — SODIUM CHLORIDE 9 MG/ML
INJECTION, SOLUTION INTRAVENOUS CONTINUOUS
Status: DISCONTINUED | OUTPATIENT
Start: 2021-06-07 | End: 2021-06-09

## 2021-06-07 RX ORDER — MONTELUKAST SODIUM 10 MG/1
10 TABLET ORAL DAILY
Status: DISCONTINUED | OUTPATIENT
Start: 2021-06-08 | End: 2021-06-09

## 2021-06-07 RX ORDER — DULOXETIN HYDROCHLORIDE 30 MG/1
30 CAPSULE, DELAYED RELEASE ORAL 2 TIMES DAILY
Status: DISCONTINUED | OUTPATIENT
Start: 2021-06-07 | End: 2021-06-09

## 2021-06-07 RX ORDER — GABAPENTIN 300 MG/1
600 CAPSULE ORAL NIGHTLY
Status: DISCONTINUED | OUTPATIENT
Start: 2021-06-07 | End: 2021-06-09

## 2021-06-07 RX ORDER — ASPIRIN 81 MG/1
81 TABLET ORAL DAILY
Status: DISCONTINUED | OUTPATIENT
Start: 2021-06-07 | End: 2021-06-09

## 2021-06-07 RX ORDER — METOPROLOL SUCCINATE 50 MG/1
100 TABLET, EXTENDED RELEASE ORAL DAILY
Status: DISCONTINUED | OUTPATIENT
Start: 2021-06-07 | End: 2021-06-09

## 2021-06-07 RX ORDER — ALLOPURINOL 100 MG/1
100 TABLET ORAL DAILY
Status: DISCONTINUED | OUTPATIENT
Start: 2021-06-07 | End: 2021-06-09

## 2021-06-07 RX ORDER — PANTOPRAZOLE SODIUM 40 MG/1
40 TABLET, DELAYED RELEASE ORAL
Status: DISCONTINUED | OUTPATIENT
Start: 2021-06-08 | End: 2021-06-09

## 2021-06-07 RX ORDER — ALBUTEROL SULFATE 90 UG/1
2 AEROSOL, METERED RESPIRATORY (INHALATION) EVERY 6 HOURS PRN
Status: DISCONTINUED | OUTPATIENT
Start: 2021-06-07 | End: 2021-06-09

## 2021-06-07 RX ORDER — DILTIAZEM HYDROCHLORIDE 240 MG/1
240 CAPSULE, COATED, EXTENDED RELEASE ORAL DAILY
Status: DISCONTINUED | OUTPATIENT
Start: 2021-06-07 | End: 2021-06-09

## 2021-06-07 RX ORDER — ONDANSETRON 2 MG/ML
4 INJECTION INTRAMUSCULAR; INTRAVENOUS EVERY 6 HOURS PRN
Status: DISCONTINUED | OUTPATIENT
Start: 2021-06-07 | End: 2021-06-09

## 2021-06-07 NOTE — ED INITIAL ASSESSMENT (HPI)
Patient brought in by EMS for AMS. Medics state patients LKN was Saturday. Patient has a history of a seizure disorder and daughter on scene says patient becomes confused after seizure. Patient a&oX1 on arrival to ED.

## 2021-06-07 NOTE — ED PROVIDER NOTES
Patient Seen in: [facility] Emergency Department      History   Patient presents with:  Altered Mental Status    Stated Complaint: ams    HPI/Subjective:   HPI    49-year-old Afro-American female presents emergency room today for complaint of confus reviewed. All other systems reviewed and negative except as noted above.     Physical Exam     ED Triage Vitals [06/07/21 1217]   BP (!) 180/89   Pulse 86   Resp 16   Temp 97.8 °F (36.6 °C)   Temp src Temporal   SpO2 99 %   O2 Device None (Room air) 25 (*)     GFR, -American 29 (*)     Total Protein 8.7 (*)     Globulin  4.6 (*)     A/G Ratio 0.9 (*)     All other components within normal limits   POCT GLUCOSE - Abnormal; Notable for the following components:    POC Glucose 274 (*)     All othe nonspecific although   compatible with chronic microvascular ischemic changes of aging. SINUSES:           No sign of acute sinusitis.     MASTOIDS:          No sign of acute inflammation. SKULL:             Hyperostosis frontalis.

## 2021-06-08 ENCOUNTER — NURSE ONLY (OUTPATIENT)
Dept: ELECTROPHYSIOLOGY | Facility: HOSPITAL | Age: 76
End: 2021-06-08
Attending: HOSPITALIST
Payer: MEDICARE

## 2021-06-08 PROCEDURE — 95816 EEG AWAKE AND DROWSY: CPT | Performed by: OTHER

## 2021-06-08 PROCEDURE — 99225 SUBSEQUENT OBSERVATION CARE: CPT | Performed by: INTERNAL MEDICINE

## 2021-06-08 PROCEDURE — 99214 OFFICE O/P EST MOD 30 MIN: CPT | Performed by: OTHER

## 2021-06-08 NOTE — CONSULTS
[id number] Vaishali Mirza Neurology Consult Note    Dalton Lee Patient Status:  Inpatient    1945 MRN [id number]   [facility] 3NE-A Attending Jama Bustillo Day # 1 PCP Scarlett Spencer     REASON FOR CONSULTATION:    Alt Procedure Laterality Date   • HC  SECTION LEVEL I     • KNEE SURGERY         FAMILY HISTORY:  family history includes Hypertension in an other family member. SOCIAL HISTORY:   reports that she has never smoked.  She has never used smokeless tob injection 4 mg, 4 mg, Intravenous, Q6H PRN  Metoclopramide HCl (REGLAN) injection 5 mg, 5 mg, Intravenous, Q8H PRN  Montelukast Sodium (SINGULAIR) tab 10 mg, 10 mg, Oral, Daily  dilTIAZem HCl ER Coated Beads (cardIZEM CD) 24 hr cap 240 mg, 240 mg, Oral, Da pt and daughter re; I do not recommend change keppra dose at this time, may need to adjust sleep pattern    Pt was seen and [facility] Les Ferro MD   Neurology   [place] 8  6/8/202

## 2021-06-08 NOTE — PROGRESS NOTES
[facility] Pharmacy Note:  Renal Dose Adjustment for Metoclopramide (REGLAN)    Shannan Milner has been prescribed Metoclopramide (REGLAN) 10 mg every 8 hours as needed for nausea/vomiting,.     Estimated Creatinine Clearance: 21.4 mL/min (A) (based on SCr of 1.93

## 2021-06-08 NOTE — PROGRESS NOTES
Received pt from ER   Pt A&Ox2 confused  Admit navigator completed   Up with 1 assist  Neuro check at admit, confusion noted, strength wnl  Fall risk precautions in place  Seizure precautions in place

## 2021-06-08 NOTE — PHYSICAL THERAPY NOTE
PHYSICAL THERAPY QUICK EVALUATION - INPATIENT    Room Number: 4606/2335-N  Evaluation Date: 6/8/2021  Presenting Problem: Confusion and possible seizure  Physician Order: See Comment for Specific Order     Chest xray 6/7/21-CONCLUSION:    1.  No focal con Daughter  Drives: Yes  Patient Owned Equipment: Rolling walker  Patient Regularly Uses: Reading glasses    Prior Level of [place]: lives with daughter. Independent with ADL, but daughter provides supervision for showering.     3 times/week (AM-PAC Scale): 53.28   CMS Modifier (G-Code): CJ      FUNCTIONAL ABILITY STATUS  Gait Assessment  Gait Assistance: Supervision  Distance (ft): 150  Assistive Device: Rolling walker;None (during amb requested not to use RW and was supervision)  Pattern:  Sh Please re-order if a new functional limitation presents during this admission. GOALS  Patient was able to achieve the following goals . ..     Patient was able to transfer At previous, functional level   Patient able to ambulate on level surfaces At previ

## 2021-06-08 NOTE — OCCUPATIONAL THERAPY NOTE
OCCUPATIONAL THERAPY QUICK EVALUATION - INPATIENT    Room Number: 4150/1747-T  Evaluation Date: 6/8/2021     Type of Evaluation: Quick Eval  Presenting Problem: altered mental status    Physician Order: IP Consult to Occupational Therapy  Reason for [facility]) Date   • HC  SECTION LEVEL I     • KNEE SURGERY         OCCUPATIONAL PROFILE    HOME SITUATION  Type of Home: Apartment (5th floor, elevator access)  Home Layout: One level  Lives With: Daughter    Toilet and Equipment: Standard height toilet     O Neurology APN entered the room and completed UE MMT and neuro exam.   Fine motor and visual perception intact. Supervision to stand. Functional ambulation with PT. Pt donned and removed facial mask independently.  Cueing provided to shift attention to nex

## 2021-06-08 NOTE — PLAN OF CARE
Assumed care of patient at [address] [place] [address]. Patient A&Ox3, forgetful at times to situation with some mild difficulty speaking. Seizure precautions in place. EEG completed this AM.  On RA. . NSR/SB on tele. Accucheck QID. Briefed/mixed continence.   Up x 1 assist rails  - Instruct patient/family to notify RN of any seizure activity  - Instruct patient/family to call for assistance with activity based on assessment  Outcome: Progressing

## 2021-06-08 NOTE — PROCEDURES
Date of Procedure: 6/8/2021    Procedure: EEG (ELECTROENCEPHALOGRAM)     DX: AMS  HX: 67 Y/O FEMALE WITH HX OF SUBCLINICAL SZ BROUGHT TO HOSPITAL FOR CONFUSION AND DIFFICULTY SPEAKING.   PT HAD SIMILAR EPISODE IN DECEMBER AND THOUGHT TO BE Massiel Oz

## 2021-06-08 NOTE — PLAN OF CARE
Pt. A&O x1 upon original assessment. This AM pt. A&O x3. On RA, tele shows NSR. VSS. Up x1/walker. QID accuchecks. 0.9 infusing at 75/hr. Fall and safety precautions in place. Will continue to monitor.      Problem: Patient/Family Goals  Goal: Patient/Famil assessment  Outcome: Progressing

## 2021-06-08 NOTE — H&P
[facility] HOSPITAL[facility]  History and Physical     Iain Dinh Patient Status:  Inpatient    1945 MRN [id number]   [facility] 3NE-A Attending Carlyn Steel MD   Hosp Day # 0 PCP India Koo     Chief Complaint: AMS    History of Prese Past Surgical History:   Past Surgical History:   Procedure Laterality Date   • HC  SECTION LEVEL I     • KNEE SURGERY         Social History:  reports that she has never smoked.  She has never used smokeless tobacco. She reports that she merchant Inhalation Aero Soln, Inhale 2 puffs into the lungs every 6 (six) hours as needed for Wheezing.  , Disp: , Rfl:   allopurinol 100 MG Oral Tab, Take 100 mg by mouth daily.   , Disp: , Rfl:   Diltiazem HCl ER Coated Beads (CARTIA XT) 240 MG Oral Capsule SR 24 input(s): PTP, INR in the last 168 hours.     COVID-19 Lab Results    COVID-19  Lab Results   Component Value Date    COVID19 Not Detected 06/07/2021    COVID19 Not Detected 12/14/2020    COVID19 Not Detected 11/16/2020       Pro-Calcitonin  No results for

## 2021-06-08 NOTE — PROGRESS NOTES
[facility]  Progress Note     Auraldine Heart Patient Status:  Inpatient    1945 MRN [id number]   [facility] 3NE-A Attending Radha Cochran, DO   Hosp Day # 1 PCP Vesna Medina     Chief Complaint: confusion, Angelika Jobs 11/16/2020       Pro-Calcitonin  No results for input(s): PCT in the last 168 hours. Cardiac  Recent Labs   Lab 06/07/21  1221   TROP <0.045       Creatinine Kinase  No results for input(s): CK in the last 168 hours.     Inflammatory Markers  No results

## 2021-06-09 ENCOUNTER — PATIENT OUTREACH (OUTPATIENT)
Dept: CASE MANAGEMENT | Age: 76
End: 2021-06-09

## 2021-06-09 VITALS
TEMPERATURE: 97 F | DIASTOLIC BLOOD PRESSURE: 63 MMHG | BODY MASS INDEX: 36 KG/M2 | HEART RATE: 60 BPM | OXYGEN SATURATION: 99 % | WEIGHT: 212.06 LBS | SYSTOLIC BLOOD PRESSURE: 136 MMHG | RESPIRATION RATE: 20 BRPM

## 2021-06-09 PROCEDURE — 99214 OFFICE O/P EST MOD 30 MIN: CPT | Performed by: OTHER

## 2021-06-09 PROCEDURE — 99217 OBSERVATION CARE DISCHARGE: CPT | Performed by: INTERNAL MEDICINE

## 2021-06-09 RX ORDER — ALENDRONATE SODIUM 70 MG/1
70 TABLET ORAL WEEKLY
COMMUNITY

## 2021-06-09 RX ORDER — CALCIUM CARBONATE/VITAMIN D3 600 MG-10
1 TABLET ORAL 2 TIMES DAILY
COMMUNITY

## 2021-06-09 NOTE — PLAN OF CARE
Pt. A&O x4, confused at times. On RA, tele shows NSR to sinus bradycardia. Night time Cardizem held. Up x1/walker. 0.9 infusing at 75/hr. No c/o pain currently. QID accuchecks. No seizure activity noted. Seizure, fall, and safety precautions in place.  Will activity  - Instruct patient/family to call for assistance with activity based on assessment  Outcome: Progressing

## 2021-06-09 NOTE — DIETARY NOTE
[address]     Admitting diagnosis: Confusion [R41.0]    Ht: 5' 4\"  Wt: 96.2 kg (212 lb 1.3 oz). Body mass index is 36.4 kg/m².   IBW: 54.6 kg    Wt Readings from Last 6 Encounters:  06/07/21 : 96.2 kg (212 lb 1

## 2021-06-09 NOTE — PLAN OF CARE
NURSING DISCHARGE NOTE    Discharged Home via Wheelchair. Accompanied by Support staff  Belongings Taken by patient/family. PIV removed. Tele removed. Discharge instruction reviewed with patient and daughter, both verbalized understanding.

## 2021-06-09 NOTE — PROGRESS NOTES
1st attempt to contact Pt's daughter Keenan Nelson at [phone] to ask who Pt's PCP is.     Set up appt with Dr. Eric Gtz at [facility], Wednesday 06- at 3:00 to 5:00 pm (MD goes to Pt's home)     Info added to AVS

## 2021-06-09 NOTE — DISCHARGE SUMMARY
[facility] HOSPITALIST  DISCHARGE SUMMARY     Davonvladimir Esteves Patient Status:  Observation    1945 MRN [id number]   [facility] 3NE-A Attending Karla Mckinley, DO   Hosp Day # 1 JANNY Herbert     Date of Admission: 2021  Date of LACE > 58:  High Risk of readmission after discharge from the hospital.    Procedures during hospitalization:   • none    Incidental or significant findings and recommendations (brief descriptions):  • As above    Lab/Test results pending at Discharge:   · Tabs  Generic drug: empagliflozin      Take 10 mg by mouth daily. Refills: 0     metFORMIN HCl 500 MG Tabs  Commonly known as: GLUCOPHAGE      Take 500 mg by mouth 3 (three) times daily with meals.    Refills: 0     Metoprolol Succinate  MG Tb24  Co guarding. Neurologic: No focal neurological deficits. Musculoskeletal: Moves all extremities. Extremities: No edema.   -----------------------------------------------------------------------------------------------  PATIENT DISCHARGE INSTRUCTIONS: See e

## 2021-11-09 ENCOUNTER — APPOINTMENT (OUTPATIENT)
Dept: CT IMAGING | Facility: HOSPITAL | Age: 76
End: 2021-11-09
Attending: EMERGENCY MEDICINE
Payer: MEDICARE

## 2021-11-09 ENCOUNTER — APPOINTMENT (OUTPATIENT)
Dept: GENERAL RADIOLOGY | Facility: HOSPITAL | Age: 76
End: 2021-11-09
Attending: EMERGENCY MEDICINE
Payer: MEDICARE

## 2021-11-09 ENCOUNTER — HOSPITAL ENCOUNTER (OUTPATIENT)
Facility: HOSPITAL | Age: 76
Setting detail: OBSERVATION
Discharge: HOME | End: 2021-11-12
Attending: EMERGENCY MEDICINE | Admitting: HOSPITALIST
Payer: MEDICARE

## 2021-11-09 DIAGNOSIS — R55 SYNCOPE AND COLLAPSE: Primary | ICD-10-CM

## 2021-11-09 PROBLEM — E11.9 TYPE 2 DIABETES MELLITUS WITHOUT COMPLICATION, WITHOUT LONG-TERM CURRENT USE OF INSULIN (HCC): Status: ACTIVE | Noted: 2017-05-28

## 2021-11-09 PROCEDURE — 73560 X-RAY EXAM OF KNEE 1 OR 2: CPT | Performed by: EMERGENCY MEDICINE

## 2021-11-09 PROCEDURE — B246ZZZ ULTRASONOGRAPHY OF RIGHT AND LEFT HEART: ICD-10-PCS | Performed by: INTERNAL MEDICINE

## 2021-11-09 PROCEDURE — 70450 CT HEAD/BRAIN W/O DYE: CPT | Performed by: EMERGENCY MEDICINE

## 2021-11-09 PROCEDURE — 73090 X-RAY EXAM OF FOREARM: CPT | Performed by: EMERGENCY MEDICINE

## 2021-11-09 PROCEDURE — 99219 INITIAL OBSERVATION CARE,LEVL II: CPT | Performed by: STUDENT IN AN ORGANIZED HEALTH CARE EDUCATION/TRAINING PROGRAM

## 2021-11-09 PROCEDURE — 72125 CT NECK SPINE W/O DYE: CPT | Performed by: EMERGENCY MEDICINE

## 2021-11-09 RX ORDER — DEXTROSE MONOHYDRATE 25 G/50ML
50 INJECTION, SOLUTION INTRAVENOUS
Status: DISCONTINUED | OUTPATIENT
Start: 2021-11-09 | End: 2021-11-12

## 2021-11-09 RX ORDER — BISACODYL 10 MG
10 SUPPOSITORY, RECTAL RECTAL
Status: DISCONTINUED | OUTPATIENT
Start: 2021-11-09 | End: 2021-11-12

## 2021-11-09 RX ORDER — GABAPENTIN 300 MG/1
600 CAPSULE ORAL NIGHTLY
Status: DISCONTINUED | OUTPATIENT
Start: 2021-11-09 | End: 2021-11-11

## 2021-11-09 RX ORDER — ENOXAPARIN SODIUM 100 MG/ML
30 INJECTION SUBCUTANEOUS NIGHTLY
Status: DISCONTINUED | OUTPATIENT
Start: 2021-11-09 | End: 2021-11-12

## 2021-11-09 RX ORDER — METOCLOPRAMIDE HYDROCHLORIDE 5 MG/ML
5 INJECTION INTRAMUSCULAR; INTRAVENOUS EVERY 8 HOURS PRN
Status: DISCONTINUED | OUTPATIENT
Start: 2021-11-09 | End: 2021-11-12

## 2021-11-09 RX ORDER — LEVETIRACETAM 500 MG/1
1000 TABLET ORAL 2 TIMES DAILY
Status: DISCONTINUED | OUTPATIENT
Start: 2021-11-09 | End: 2021-11-12

## 2021-11-09 RX ORDER — MONTELUKAST SODIUM 10 MG/1
10 TABLET ORAL NIGHTLY
Status: DISCONTINUED | OUTPATIENT
Start: 2021-11-09 | End: 2021-11-12

## 2021-11-09 RX ORDER — ROSUVASTATIN CALCIUM 20 MG/1
20 TABLET, COATED ORAL NIGHTLY
Status: DISCONTINUED | OUTPATIENT
Start: 2021-11-09 | End: 2021-11-12

## 2021-11-09 RX ORDER — SODIUM CHLORIDE 9 MG/ML
INJECTION, SOLUTION INTRAVENOUS CONTINUOUS
Status: DISCONTINUED | OUTPATIENT
Start: 2021-11-09 | End: 2021-11-11

## 2021-11-09 RX ORDER — POLYETHYLENE GLYCOL 3350 17 G/17G
17 POWDER, FOR SOLUTION ORAL DAILY PRN
Status: DISCONTINUED | OUTPATIENT
Start: 2021-11-09 | End: 2021-11-12

## 2021-11-09 RX ORDER — MECLIZINE HYDROCHLORIDE 25 MG/1
25 TABLET ORAL ONCE
Status: COMPLETED | OUTPATIENT
Start: 2021-11-09 | End: 2021-11-09

## 2021-11-09 RX ORDER — DILTIAZEM HYDROCHLORIDE 60 MG/1
120 TABLET, FILM COATED ORAL
Status: DISCONTINUED | OUTPATIENT
Start: 2021-11-10 | End: 2021-11-10

## 2021-11-09 RX ORDER — METOPROLOL SUCCINATE 100 MG/1
100 TABLET, EXTENDED RELEASE ORAL DAILY
Status: DISCONTINUED | OUTPATIENT
Start: 2021-11-10 | End: 2021-11-12

## 2021-11-09 RX ORDER — ACETAMINOPHEN 325 MG/1
650 TABLET ORAL EVERY 6 HOURS PRN
Status: DISCONTINUED | OUTPATIENT
Start: 2021-11-09 | End: 2021-11-12

## 2021-11-09 RX ORDER — ALBUTEROL SULFATE 2.5 MG/3ML
2.5 SOLUTION RESPIRATORY (INHALATION) EVERY 6 HOURS PRN
Status: DISCONTINUED | OUTPATIENT
Start: 2021-11-09 | End: 2021-11-12

## 2021-11-09 RX ORDER — TRAMADOL HYDROCHLORIDE 50 MG/1
50 TABLET ORAL EVERY 12 HOURS PRN
Status: DISCONTINUED | OUTPATIENT
Start: 2021-11-09 | End: 2021-11-09

## 2021-11-09 RX ORDER — DILTIAZEM HYDROCHLORIDE 120 MG/1
120 TABLET, FILM COATED ORAL
COMMUNITY
End: 2021-11-12

## 2021-11-09 RX ORDER — ALLOPURINOL 100 MG/1
100 TABLET ORAL DAILY
Status: DISCONTINUED | OUTPATIENT
Start: 2021-11-10 | End: 2021-11-12

## 2021-11-09 RX ORDER — DULOXETIN HYDROCHLORIDE 30 MG/1
30 CAPSULE, DELAYED RELEASE ORAL 2 TIMES DAILY
Status: DISCONTINUED | OUTPATIENT
Start: 2021-11-09 | End: 2021-11-12

## 2021-11-09 RX ORDER — PANTOPRAZOLE SODIUM 20 MG/1
20 TABLET, DELAYED RELEASE ORAL
Status: DISCONTINUED | OUTPATIENT
Start: 2021-11-10 | End: 2021-11-12

## 2021-11-09 RX ORDER — ONDANSETRON 2 MG/ML
4 INJECTION INTRAMUSCULAR; INTRAVENOUS EVERY 6 HOURS PRN
Status: DISCONTINUED | OUTPATIENT
Start: 2021-11-09 | End: 2021-11-12

## 2021-11-09 RX ORDER — ALBUTEROL SULFATE 90 UG/1
2 AEROSOL, METERED RESPIRATORY (INHALATION) EVERY 6 HOURS PRN
Status: DISCONTINUED | OUTPATIENT
Start: 2021-11-09 | End: 2021-11-09

## 2021-11-09 RX ORDER — VALSARTAN 320 MG/1
320 TABLET ORAL DAILY
Status: DISCONTINUED | OUTPATIENT
Start: 2021-11-10 | End: 2021-11-11

## 2021-11-09 RX ORDER — DILTIAZEM HYDROCHLORIDE 240 MG/1
240 CAPSULE, COATED, EXTENDED RELEASE ORAL DAILY
Status: DISCONTINUED | OUTPATIENT
Start: 2021-11-10 | End: 2021-11-11

## 2021-11-09 RX ORDER — ASPIRIN 81 MG/1
81 TABLET ORAL DAILY
Status: DISCONTINUED | OUTPATIENT
Start: 2021-11-10 | End: 2021-11-12

## 2021-11-09 NOTE — ED INITIAL ASSESSMENT (HPI)
Pt arrives after a  Fall last night resulted in wrist and back pain. Pt states that she stood up, and began walking to the bathroom when she reports \"passing out\". Pt arrives AOx4 with complaints of right wrist pain.  Pt states that she is also having for

## 2021-11-09 NOTE — ED PROVIDER NOTES
Patient Seen in: [facility] Emergency Department      History   Patient presents with:  Syncope    Stated Complaint: Wrist pain     Subjective:   HPI    Patient is a 68-year-old female presents with a syncopal episode.   Patient presents by EMS with unremarkable, no exudate. NECK: Supple, trachea midline, no lymphadenopathy. LUNG: Lungs clear to auscultation bilaterally, no wheezing, no rales, no rhonchi. CARDIOVASCULAR: Regular rate and rhythm. Normal S1S2.   No J9Y8.  3/6 systolic ejection murmu individual orders. RAINBOW DRAW LAVENDER   RAINBOW DRAW LIGHT GREEN   RAINBOW DRAW BLUE   BUN 19. Creatinine 1.85. Hemoglobin 11.6  EKG    Rate, intervals and axes as noted on EKG Report.   Rate: 63  Rhythm: Sinus Rhythm  Reading: LVH with repolarizatio FOREARM (2 VIEWS), RIGHT (CPT=73090)  TECHNIQUE:  Two views were obtained. COMPARISON:  None. INDICATIONS:  Wrist pain  PATIENT STATED HISTORY: (As transcribed by Technologist)  There is no additional history at this time.    FINDINGS:  No evidence of acu MD on 11/09/2021 at 3:01 PM     Finalized by (CST): Jenna Sofia MD on 11/09/2021 at 3:06 PM       CT SPINE CERVICAL (CPT=72125)    Result Date: 11/9/2021  PROCEDURE:  CT SPINE CERVICAL (CPT=72125)  COMPARISON:  None.   INDICATIONS:  Wrist pain  TECHNIQUE: multilevel degenerative disc disease in the cervical spine which is described in detail level by level above. 2. There is no acute fracture or traumatic subluxation.    Dictated by (CST): Tejinder Palmer MD on 11/09/2021 at 3:12 PM     Finalized by (CST): Sofía

## 2021-11-09 NOTE — ED QUICK NOTES
Orders for admission, patient is aware of plan and ready to go upstairs. Any questions, please call ED RN Laine Ellis at extension [phone]. Vaccinated?  Yes   Type of COVID test sent: Rapid   COVID Suspicion level: Low      Titratable drug(s) infusing:NO  Rate

## 2021-11-10 ENCOUNTER — APPOINTMENT (OUTPATIENT)
Dept: CV DIAGNOSTICS | Facility: HOSPITAL | Age: 76
End: 2021-11-10
Attending: STUDENT IN AN ORGANIZED HEALTH CARE EDUCATION/TRAINING PROGRAM
Payer: MEDICARE

## 2021-11-10 ENCOUNTER — APPOINTMENT (OUTPATIENT)
Dept: GENERAL RADIOLOGY | Facility: HOSPITAL | Age: 76
End: 2021-11-10
Attending: NURSE PRACTITIONER
Payer: MEDICARE

## 2021-11-10 PROCEDURE — 93306 TTE W/DOPPLER COMPLETE: CPT | Performed by: STUDENT IN AN ORGANIZED HEALTH CARE EDUCATION/TRAINING PROGRAM

## 2021-11-10 PROCEDURE — 99225 SUBSEQUENT OBSERVATION CARE: CPT | Performed by: HOSPITALIST

## 2021-11-10 PROCEDURE — 73110 X-RAY EXAM OF WRIST: CPT | Performed by: NURSE PRACTITIONER

## 2021-11-10 PROCEDURE — 99214 OFFICE O/P EST MOD 30 MIN: CPT | Performed by: OTHER

## 2021-11-10 NOTE — CONSULTS
[facility]    Report of Consultation    Melia Aponte Patient Status:  Observation    1945 MRN [id number]   [facility] 7NE-A Attending Julia Sheets MD   Hosp Day # 0 PCP PHYSICIAN NONSTAFF     Date of Admission:  2021  D • TOTAL KNEE REPLACEMENT       Family History   Problem Relation Age of Onset   • Hypertension Other       reports that she has never smoked. She has never used smokeless tobacco. She reports that she does not drink alcohol and does not use drugs.     A mg, Oral, Nightly  •  levETIRAcetam (KEPPRA) tab 1,000 mg, 1,000 mg, Oral, BID  •  metoprolol succinate (Toprol XL) 24 hr tab 100 mg, 100 mg, Oral, Daily  •  montelukast (SINGULAIR) tab 10 mg, 10 mg, Oral, Nightly  •  pantoprazole (PROTONIX) EC tab 20 mg, the noncontrast CT of the head.           Assessment  Patient Active Problem List:     Acute chest pain     Esophageal reflux     Type II or unspecified type diabetes mellitus without mention of complication, not stated as uncontrolled     Essential hyperte symptoms suggestive of possible gastroparesis. No further neurologic work up is indicated. + orthostatic per note review.     Epilepsy- nonconvulsive seizures, continue Keppra home dose, follow up with primary neurologist    Thank you for allowing me to par

## 2021-11-10 NOTE — PLAN OF CARE
NURSING ADMISSION NOTE      Patient admitted via Cart  Oriented to room. Safety precautions initiated. Bed in low position. Call light in reach. Assumed patient  care @ 1800. Patient  alert and oriented x4. NSR. On room air.  Patient  c/o R wrist pa

## 2021-11-10 NOTE — PLAN OF CARE
Assumed care at 0730  VSS  A+Ox3  RA  NSR, orthostatic. ECHO completed EF 60-65%  Tolerating regular diet, QID accucheck  Voids in the bathroom  Complains of 9/10 wrist pain, declined medication. 10/10 headache, tylenol given with relief.   Up x2 with a wal

## 2021-11-10 NOTE — PROGRESS NOTES
[facility] Pharmacy Note:  Renal Dose Adjustment for enoxaparin (LOVENOX)    Sander Soulier has been prescribed enoxaparin 40 mg subcutaneously every 24 hours. Estimated Creatinine Clearance: 22.3 mL/min (A) (based on SCr of 1.85 mg/dL (H)).     Calculated CrCl

## 2021-11-10 NOTE — PLAN OF CARE
Assumed care at 1.   VSS- NSR on tele, RA  AOx3- disoriented to time, lightheadedness with activity; otherwise neuro intact, no acute changes overnight  R wrist pain, declined pain medication  Up to bathroom with assist x1 and walker  PT to see, plan for

## 2021-11-10 NOTE — PROGRESS NOTES
[facility]     Hospitalist Progress Note     Ky Sour Patient Status:  Observation    1945 MRN [id number]   [facility] 7NE-A Attending Isabel Perez MD   Hosp Day # 0 PCP PHYSICIAN NONSTAFF     Chief Complaint:  LOC/ fall ? <0.045       Creatinine Kinase  Recent Labs   Lab 11/09/21  1447          Inflammatory Markers  No results for input(s): CRP, JASON, LDH, DDIMER in the last 168 hours. Imaging: Imaging data reviewed in Epic.     Medications:   • enoxaparin  30 mg Sub above note. Pt. Seen and examined.     Gen: NAD  CVS: s1s2  Resp: CTA  Abd: soft    -as above  -unclear when noon time cardizem was added, hold for now    Cinthya Dimas MD              Supplementary Documentation:     Quality:  · DVT Prophylaxis:  Scd, lo

## 2021-11-10 NOTE — PHYSICAL THERAPY NOTE
PHYSICAL THERAPY EVALUATION - INPATIENT     Room Number: 4696/6776-C  Evaluation Date: 11/10/2021  Type of Evaluation: Initial  Physician Order: PT Eval and Treat    Presenting Problem: syncope and collapse  Reason for Therapy: Mobility Dysfunction a impairment in mobility. Research supports that patients with this level of impairment may benefit from home with HHPT and  intermittent supervision.   Based on this evaluation, patient's clinical presentation is stable and overall the evaluation complexity SUBJECTIVE  \"my dtr knows I'm here but hasn't even bothered to call\"      OBJECTIVE  Precautions:  (orthostatic )       WEIGHT BEARING RESTRICTION  Weight Bearing Restriction: None                PAIN ASSESSMENT     Location: R wrist   Device: Rolling walker  Pattern: Within Functional Limits          Skilled Therapy Provided:     Bed Mobility:  Rolling: indep  Supine to sit: indep   Sit to supine: indep     Transfer Mobility:  Sit to stand: CGA   Stand to sit: CGA  Gait = CGA    Exercis

## 2021-11-11 PROCEDURE — 99225 SUBSEQUENT OBSERVATION CARE: CPT | Performed by: HOSPITALIST

## 2021-11-11 RX ORDER — MIDODRINE HYDROCHLORIDE 2.5 MG/1
2.5 TABLET ORAL 3 TIMES DAILY
Status: DISCONTINUED | OUTPATIENT
Start: 2021-11-11 | End: 2021-11-12

## 2021-11-11 RX ORDER — MIDODRINE HYDROCHLORIDE 2.5 MG/1
2.5 TABLET ORAL 3 TIMES DAILY
Status: DISCONTINUED | OUTPATIENT
Start: 2021-11-11 | End: 2021-11-11

## 2021-11-11 NOTE — PROGRESS NOTES
[facility]     Hospitalist Progress Note     Lisha Farooq Patient Status:  Observation    1945 MRN [id number]   [facility] 7NE-A Attending Noemy Grijalva MD   Hosp Day # 0 PCP PHYSICIAN NONSTAFF     Chief Complaint:  LOC/ fall ? Pro-Calcitonin  No results for input(s): PCT in the last 168 hours.     Cardiac  Recent Labs   Lab 11/09/21  1447 11/10/21  0623   TROP <0.045 <0.045       Creatinine Kinase  Recent Labs   Lab 11/09/21  1447          Inflammatory Markers  No res held   midodrine started   Concern about her dexterity in dealing w/ pens/ needles      Plan of care discussed with  RN, pt      Clarisse Greco, NP      Addendum:    Agree with above note. Pt. Seen and examined.     Gen: NAD  CVS: s1s2  Resp: CTA  Abd: s

## 2021-11-11 NOTE — PHYSICAL THERAPY NOTE
PHYSICAL THERAPY TREATMENT NOTE - INPATIENT    Room Number: 2798/0686-F     Session: 1    Number of Visits to Meet Established Goals: 4    Presenting Problem: syncope and collapse    ASSESSMENT     The patient continues to be limited in mobility due to Static Sitting: Good  Dynamic Sitting: Good           Static Standing: Fair -  Dynamic Standing: Fair -    ACTIVITY TOLERANCE      orthostatic BP  Supine: 154/58  Sitting 145/57  Standin notified.        THERAPEUTIC EXERCISES  Lower Extremity Alternating marching  Ankle pumps  Hip AB/AD  Heel raises  Heel slides  LAQ  SLR     Upper Extremity Elbow flex/ext, Shoulder flex/ext and Wrist flex/ext     Position Sitting and Supine     Repetitions

## 2021-11-11 NOTE — PLAN OF CARE
Assumed care at 0730. Alert and orientated X3. NSR on tele., RA. Orthostatics positive, Dr. Hanny Hernandez notified. Meds adjusted. Patient denies pain. Regular diet tolerated, QID accucheck. Patient updated on plan of care. Call light within reach.  Bed a

## 2021-11-11 NOTE — CM/SW NOTE
11/11/21 1000   CM/SW Referral Data   Referral Source    Reason for Referral Discharge planning   Informant Patient   Patient Info   Patient's Current Mental Status at Time of Assessment Alert;Oriented   Patient's Home Environment Condo/Apt

## 2021-11-12 VITALS
WEIGHT: 220 LBS | RESPIRATION RATE: 18 BRPM | HEIGHT: 64 IN | HEART RATE: 69 BPM | OXYGEN SATURATION: 100 % | SYSTOLIC BLOOD PRESSURE: 177 MMHG | BODY MASS INDEX: 37.56 KG/M2 | TEMPERATURE: 98 F | DIASTOLIC BLOOD PRESSURE: 95 MMHG

## 2021-11-12 PROCEDURE — 99217 OBSERVATION CARE DISCHARGE: CPT | Performed by: HOSPITALIST

## 2021-11-12 RX ORDER — NATEGLINIDE 120 MG/1
60 TABLET, COATED ORAL
Qty: 90 TABLET | Refills: 1 | Status: SHIPPED | OUTPATIENT
Start: 2021-11-12

## 2021-11-12 NOTE — CM/SW NOTE
Patient to dc home today. Per [facility] hospitalist LUCIO, patient declines NANETTE. Updates sent to [facility] via [employer].

## 2021-11-12 NOTE — PHYSICAL THERAPY NOTE
PHYSICAL THERAPY TREATMENT NOTE - INPATIENT    Room Number: 7797/8633-D     Session: 1    Number of Visits to Meet Established Goals: 4    Presenting Problem: syncope and collapse    ASSESSMENT     The patient continues to be limited in mobility due to PAIN ASSESSMENT   Rating: Unable to rate  Location: R wrist  Management Techniques: Body mechanics; Activity promotion;Breathing techniques;Relaxation;Repositioning    BALANCE seen in pm.   Transfer Mobility:  Sit<>Stand: cga for safety. Stand<>Sit: cga for safety. Gait: in pm, pt amb with RW and chair follow. Reaches for objects without walker, standing rest needed due to dizziness.  Pt shuffles her feet with poor foot to f

## 2021-11-12 NOTE — PLAN OF CARE
Assumed care of pt this pm. Alert and oriented- forgetful at times. NSR;Tele. RA. Pt denies pain. Tolerates 1 assist w/ walker. Possible dc tomorrow w/ HH? Needs attended to, will continue to monitor.

## 2021-11-12 NOTE — PROGRESS NOTES
[facility]     Hospitalist Progress Note     Melia Zacjustine Patient Status:  Observation    1945 MRN [id number]   [facility] 7NE-A Attending Julia Sheets MD   Hosp Day # 0 PCP PHYSICIAN NONSTAFF     Chief Complaint:  LOC/ fall ? hours. Cardiac  Recent Labs   Lab 11/09/21  1447 11/10/21  0623   TROP <0.045 <0.045       Creatinine Kinase  Recent Labs   Lab 11/09/21  1447          Inflammatory Markers  No results for input(s): CRP, JASON, LDH, DDIMER in the last 168 hours. home  keppra level P   Needs to use walker   Will update daughter       Plan of care discussed with  RN, pt  Darryn Daughters  Dr Breanne Parra, NP    Addendum:    Agree with above note. Pt. Seen and examined.     Gen: NAD  CVS: s1s2  Resp: CTA  Abd:

## 2021-11-12 NOTE — PROGRESS NOTES
11/12/21 0747 11/12/21 0751 11/12/21 0759   Vital Signs   Pulse 60 63 60   Heart Rate Source Monitor Monitor Monitor   Resp 18 18 18   Respiratory Quality Normal Normal Normal   BP (!) 169/61 155/68 128/66   MAP (mmHg) 90 89 102   BP Location Right arm

## 2021-11-12 NOTE — PLAN OF CARE
Assumed care at 0730. Alert and orientated X2-3. Patient is confused at times. NSR on tele., RA. Orthostatics positive, MD notified. Patient reports HA, denies pain medications. Patient is eating meals, QID accucheck.   Patient updated on plan of care

## 2021-11-13 NOTE — DISCHARGE SUMMARY
[facility] HOSPITALIST  DISCHARGE SUMMARY     Iain Dinh Patient Status:  Observation    1945 MRN [id number]   [facility] 7NE-A Attending No att. providers found   [facility] Day # 0 PCP PHYSICIAN NONSTAFF     Date of Admission: 2021  Da 10.3. Patient on Metformin Jardiance will add Starlix. Patient likely not be able to handle insulin pens. Patient says she checks her sugars at home. Diabetic education reinforced.  Patient is has moments of confusion other times is quite clear with events    consistent with a pseudonormal left ventricular filling pattern, with  concomitant abnormal relaxation and increased filling pressure - grade 2  diastolic dysfunction.    2. Aortic valve: Probably trileaflet; moderately thickened, moderately   calcifie 0     albuterol 108 (90 Base) MCG/ACT Aers      Inhale 2 puffs into the lungs every 6 (six) hours as needed for Wheezing. Refills: 0     alendronate 70 MG Tabs  Commonly known as: FOSAMAX      Take 70 mg by mouth once a week.    Refills: 0     allopurinol These medications were sent to [address], [address] [place] [address], [phone], [phone]  [address] [id number]    Phone: [phone]   · nateglinide 120 MG Tabs         ILP

## 2022-03-01 ENCOUNTER — APPOINTMENT (OUTPATIENT)
Dept: GENERAL RADIOLOGY | Facility: HOSPITAL | Age: 77
End: 2022-03-01
Attending: EMERGENCY MEDICINE
Payer: MEDICARE

## 2022-03-01 ENCOUNTER — APPOINTMENT (OUTPATIENT)
Dept: CT IMAGING | Facility: HOSPITAL | Age: 77
End: 2022-03-01
Attending: EMERGENCY MEDICINE
Payer: MEDICARE

## 2022-03-01 ENCOUNTER — HOSPITAL ENCOUNTER (EMERGENCY)
Facility: HOSPITAL | Age: 77
Discharge: HOME | End: 2022-03-01
Attending: EMERGENCY MEDICINE
Payer: MEDICARE

## 2022-03-01 VITALS
RESPIRATION RATE: 14 BRPM | SYSTOLIC BLOOD PRESSURE: 175 MMHG | HEART RATE: 70 BPM | TEMPERATURE: 98 F | OXYGEN SATURATION: 98 % | DIASTOLIC BLOOD PRESSURE: 82 MMHG

## 2022-03-01 DIAGNOSIS — R56.9 SEIZURE (HCC): Primary | ICD-10-CM

## 2022-03-01 DIAGNOSIS — N28.9 RENAL INSUFFICIENCY: ICD-10-CM

## 2022-03-01 DIAGNOSIS — G40.909 SEIZURE DISORDER (HCC): ICD-10-CM

## 2022-03-01 LAB
ALBUMIN SERPL-MCNC: 3.7 G/DL (ref 3.4–5)
ALBUMIN/GLOB SERPL: 0.8 {RATIO} (ref 1–2)
ALP LIVER SERPL-CCNC: 84 U/L
ALT SERPL-CCNC: 16 U/L
ANION GAP SERPL CALC-SCNC: 6 MMOL/L (ref 0–18)
APTT PPP: 27.9 SECONDS (ref 23.3–35.6)
AST SERPL-CCNC: 13 U/L (ref 15–37)
BASOPHILS # BLD AUTO: 0.02 X10(3) UL (ref 0–0.2)
BASOPHILS NFR BLD AUTO: 0.3 %
BILIRUB SERPL-MCNC: 0.3 MG/DL (ref 0.1–2)
BILIRUB UR QL STRIP.AUTO: NEGATIVE
BUN BLD-MCNC: 22 MG/DL (ref 7–18)
CALCIUM BLD-MCNC: 10.2 MG/DL (ref 8.5–10.1)
CHLORIDE SERPL-SCNC: 110 MMOL/L (ref 98–112)
CHOLEST SERPL-MCNC: 138 MG/DL (ref ?–200)
CLARITY UR REFRACT.AUTO: CLEAR
CO2 SERPL-SCNC: 23 MMOL/L (ref 21–32)
COLOR UR AUTO: YELLOW
CREAT BLD-MCNC: 1.83 MG/DL
EOSINOPHIL # BLD AUTO: 0.07 X10(3) UL (ref 0–0.7)
EOSINOPHIL NFR BLD AUTO: 1.2 %
ERYTHROCYTE [DISTWIDTH] IN BLOOD BY AUTOMATED COUNT: 13.6 %
GLOBULIN PLAS-MCNC: 4.6 G/DL (ref 2.8–4.4)
GLUCOSE BLD-MCNC: 122 MG/DL (ref 70–99)
GLUCOSE BLD-MCNC: 129 MG/DL (ref 70–99)
GLUCOSE UR STRIP.AUTO-MCNC: >=500 MG/DL
HCT VFR BLD AUTO: 37.8 %
HDLC SERPL-MCNC: 55 MG/DL (ref 40–59)
HGB BLD-MCNC: 12.5 G/DL
IMM GRANULOCYTES # BLD AUTO: 0.02 X10(3) UL (ref 0–1)
INR BLD: 1 (ref 0.8–1.2)
KETONES UR STRIP.AUTO-MCNC: NEGATIVE MG/DL
LDLC SERPL CALC-MCNC: 58 MG/DL (ref ?–100)
LEUKOCYTE ESTERASE UR QL STRIP.AUTO: NEGATIVE
LYMPHOCYTES # BLD AUTO: 3.32 X10(3) UL (ref 1–4)
LYMPHOCYTES NFR BLD AUTO: 57 %
MCH RBC QN AUTO: 31.6 PG (ref 26–34)
MCHC RBC AUTO-ENTMCNC: 33.1 G/DL (ref 31–37)
MCV RBC AUTO: 95.5 FL
MONOCYTES # BLD AUTO: 0.49 X10(3) UL (ref 0.1–1)
MONOCYTES NFR BLD AUTO: 8.4 %
NEUTROPHILS # BLD AUTO: 1.9 X10 (3) UL (ref 1.5–7.7)
NEUTROPHILS # BLD AUTO: 1.9 X10(3) UL (ref 1.5–7.7)
NEUTROPHILS NFR BLD AUTO: 32.8 %
NITRITE UR QL STRIP.AUTO: NEGATIVE
NONHDLC SERPL-MCNC: 83 MG/DL (ref ?–130)
OSMOLALITY SERPL CALC.SUM OF ELEC: 293 MOSM/KG (ref 275–295)
PH UR STRIP.AUTO: 5 [PH] (ref 5–8)
PLATELET # BLD AUTO: 237 10(3)UL (ref 150–450)
POTASSIUM SERPL-SCNC: 3.9 MMOL/L (ref 3.5–5.1)
PROT SERPL-MCNC: 8.3 G/DL (ref 6.4–8.2)
PROT UR STRIP.AUTO-MCNC: 100 MG/DL
PROTHROMBIN TIME: 13.2 SECONDS (ref 11.6–14.8)
RBC # BLD AUTO: 3.96 X10(6)UL
SARS-COV-2 RNA RESP QL NAA+PROBE: NOT DETECTED
SODIUM SERPL-SCNC: 139 MMOL/L (ref 136–145)
SP GR UR STRIP.AUTO: 1.02 (ref 1–1.03)
TRIGL SERPL-MCNC: 146 MG/DL (ref 30–149)
TROPONIN I HIGH SENSITIVITY: 71 NG/L
TROPONIN I HIGH SENSITIVITY: 73 NG/L
UROBILINOGEN UR STRIP.AUTO-MCNC: <2 MG/DL
VLDLC SERPL CALC-MCNC: 21 MG/DL (ref 0–30)
WBC # BLD AUTO: 5.8 X10(3) UL (ref 4–11)

## 2022-03-01 PROCEDURE — 80177 DRUG SCRN QUAN LEVETIRACETAM: CPT | Performed by: EMERGENCY MEDICINE

## 2022-03-01 PROCEDURE — 81001 URINALYSIS AUTO W/SCOPE: CPT | Performed by: EMERGENCY MEDICINE

## 2022-03-01 PROCEDURE — 93005 ELECTROCARDIOGRAM TRACING: CPT

## 2022-03-01 PROCEDURE — 99285 EMERGENCY DEPT VISIT HI MDM: CPT

## 2022-03-01 PROCEDURE — 70496 CT ANGIOGRAPHY HEAD: CPT | Performed by: EMERGENCY MEDICINE

## 2022-03-01 PROCEDURE — 84484 ASSAY OF TROPONIN QUANT: CPT | Performed by: EMERGENCY MEDICINE

## 2022-03-01 PROCEDURE — 85025 COMPLETE CBC W/AUTO DIFF WBC: CPT | Performed by: EMERGENCY MEDICINE

## 2022-03-01 PROCEDURE — 85730 THROMBOPLASTIN TIME PARTIAL: CPT | Performed by: EMERGENCY MEDICINE

## 2022-03-01 PROCEDURE — 96361 HYDRATE IV INFUSION ADD-ON: CPT

## 2022-03-01 PROCEDURE — 82962 GLUCOSE BLOOD TEST: CPT

## 2022-03-01 PROCEDURE — 96365 THER/PROPH/DIAG IV INF INIT: CPT

## 2022-03-01 PROCEDURE — 80053 COMPREHEN METABOLIC PANEL: CPT | Performed by: EMERGENCY MEDICINE

## 2022-03-01 PROCEDURE — 70498 CT ANGIOGRAPHY NECK: CPT | Performed by: EMERGENCY MEDICINE

## 2022-03-01 PROCEDURE — 93010 ELECTROCARDIOGRAM REPORT: CPT

## 2022-03-01 PROCEDURE — 70450 CT HEAD/BRAIN W/O DYE: CPT | Performed by: EMERGENCY MEDICINE

## 2022-03-01 PROCEDURE — 85610 PROTHROMBIN TIME: CPT | Performed by: EMERGENCY MEDICINE

## 2022-03-01 PROCEDURE — 80061 LIPID PANEL: CPT | Performed by: EMERGENCY MEDICINE

## 2022-03-01 RX ORDER — LEVETIRACETAM 500 MG/5ML
500 INJECTION, SOLUTION, CONCENTRATE INTRAVENOUS ONCE
Status: COMPLETED | OUTPATIENT
Start: 2022-03-01 | End: 2022-03-01

## 2022-03-01 RX ORDER — IOHEXOL 350 MG/ML
75 INJECTION, SOLUTION INTRAVENOUS
Status: COMPLETED | OUTPATIENT
Start: 2022-03-01 | End: 2022-03-01

## 2022-03-01 RX ORDER — SODIUM CHLORIDE 9 MG/ML
INJECTION, SOLUTION INTRAVENOUS CONTINUOUS
Status: DISCONTINUED | OUTPATIENT
Start: 2022-03-01 | End: 2022-03-01

## 2022-03-01 NOTE — ED INITIAL ASSESSMENT (HPI)
Pt was found at 1450 acting different from neighbor. Pt unable to answer questions appropiately. Pt repeats \"[facility]\".

## 2022-03-03 LAB
ATRIAL RATE: 75 BPM
P AXIS: 62 DEGREES
P-R INTERVAL: 168 MS
Q-T INTERVAL: 422 MS
QRS DURATION: 94 MS
QTC CALCULATION (BEZET): 471 MS
R AXIS: -29 DEGREES
T AXIS: 114 DEGREES
VENTRICULAR RATE: 75 BPM

## 2022-03-04 LAB — LEVETIRACETAM (KEPPRA): 44 UG/ML

## 2022-07-22 ENCOUNTER — APPOINTMENT (OUTPATIENT)
Dept: GENERAL RADIOLOGY | Facility: HOSPITAL | Age: 77
End: 2022-07-22
Attending: EMERGENCY MEDICINE
Payer: MEDICARE

## 2022-07-22 ENCOUNTER — HOSPITAL ENCOUNTER (OUTPATIENT)
Facility: HOSPITAL | Age: 77
Setting detail: OBSERVATION
Discharge: HOME HEALTH | End: 2022-07-23
Attending: EMERGENCY MEDICINE | Admitting: HOSPITALIST
Payer: MEDICARE

## 2022-07-22 DIAGNOSIS — R53.83 FATIGUE, UNSPECIFIED TYPE: ICD-10-CM

## 2022-07-22 DIAGNOSIS — D69.6 THROMBOCYTOPENIA (HCC): ICD-10-CM

## 2022-07-22 DIAGNOSIS — N18.9 CHRONIC KIDNEY DISEASE, UNSPECIFIED CKD STAGE: ICD-10-CM

## 2022-07-22 DIAGNOSIS — R77.8 ELEVATED TROPONIN: Primary | ICD-10-CM

## 2022-07-22 LAB
ALBUMIN SERPL-MCNC: 3.5 G/DL (ref 3.4–5)
ALBUMIN/GLOB SERPL: 0.8 {RATIO} (ref 1–2)
ALP LIVER SERPL-CCNC: 86 U/L
ALT SERPL-CCNC: 13 U/L
ANION GAP SERPL CALC-SCNC: 4 MMOL/L (ref 0–18)
AST SERPL-CCNC: 14 U/L (ref 15–37)
BASOPHILS # BLD AUTO: 0.03 X10(3) UL (ref 0–0.2)
BASOPHILS NFR BLD AUTO: 0.5 %
BILIRUB SERPL-MCNC: 0.2 MG/DL (ref 0.1–2)
BILIRUB UR QL STRIP.AUTO: NEGATIVE
BUN BLD-MCNC: 21 MG/DL (ref 7–18)
CALCIUM BLD-MCNC: 9.1 MG/DL (ref 8.5–10.1)
CHLORIDE SERPL-SCNC: 110 MMOL/L (ref 98–112)
CHOLEST SERPL-MCNC: 125 MG/DL (ref ?–200)
CLARITY UR REFRACT.AUTO: CLEAR
CO2 SERPL-SCNC: 25 MMOL/L (ref 21–32)
COLOR UR AUTO: YELLOW
CREAT BLD-MCNC: 1.83 MG/DL
EOSINOPHIL # BLD AUTO: 0.01 X10(3) UL (ref 0–0.7)
EOSINOPHIL NFR BLD AUTO: 0.2 %
ERYTHROCYTE [DISTWIDTH] IN BLOOD BY AUTOMATED COUNT: 13.2 %
GLOBULIN PLAS-MCNC: 4.6 G/DL (ref 2.8–4.4)
GLUCOSE BLD-MCNC: 90 MG/DL (ref 70–99)
GLUCOSE UR STRIP.AUTO-MCNC: >=1000 MG/DL
HCT VFR BLD AUTO: 34.3 %
HDLC SERPL-MCNC: 52 MG/DL (ref 40–59)
HGB BLD-MCNC: 11.8 G/DL
IMM GRANULOCYTES # BLD AUTO: 0.03 X10(3) UL (ref 0–1)
IMM GRANULOCYTES NFR BLD: 0.5 %
KETONES UR STRIP.AUTO-MCNC: NEGATIVE MG/DL
LDLC SERPL CALC-MCNC: 47 MG/DL (ref ?–100)
LEUKOCYTE ESTERASE UR QL STRIP.AUTO: NEGATIVE
LIPASE SERPL-CCNC: 217 U/L (ref 73–393)
LYMPHOCYTES # BLD AUTO: 3.12 X10(3) UL (ref 1–4)
LYMPHOCYTES NFR BLD AUTO: 48.5 %
MCH RBC QN AUTO: 31.6 PG (ref 26–34)
MCHC RBC AUTO-ENTMCNC: 34.4 G/DL (ref 31–37)
MCV RBC AUTO: 92 FL
MONOCYTES # BLD AUTO: 0.5 X10(3) UL (ref 0.1–1)
MONOCYTES NFR BLD AUTO: 7.8 %
NEUTROPHILS # BLD AUTO: 2.74 X10 (3) UL (ref 1.5–7.7)
NEUTROPHILS # BLD AUTO: 2.74 X10(3) UL (ref 1.5–7.7)
NEUTROPHILS NFR BLD AUTO: 42.5 %
NITRITE UR QL STRIP.AUTO: NEGATIVE
NONHDLC SERPL-MCNC: 73 MG/DL (ref ?–130)
OSMOLALITY SERPL CALC.SUM OF ELEC: 291 MOSM/KG (ref 275–295)
PH UR STRIP.AUTO: 6 [PH] (ref 5–8)
PLATELET # BLD AUTO: 147 10(3)UL (ref 150–450)
POTASSIUM SERPL-SCNC: 3.6 MMOL/L (ref 3.5–5.1)
PROT SERPL-MCNC: 8.1 G/DL (ref 6.4–8.2)
PROT UR STRIP.AUTO-MCNC: >=300 MG/DL
RBC # BLD AUTO: 3.73 X10(6)UL
SARS-COV-2 RNA RESP QL NAA+PROBE: NOT DETECTED
SODIUM SERPL-SCNC: 139 MMOL/L (ref 136–145)
SP GR UR STRIP.AUTO: 1.02 (ref 1–1.03)
TRIGL SERPL-MCNC: 151 MG/DL (ref 30–149)
TROPONIN I HIGH SENSITIVITY: 58 NG/L
UROBILINOGEN UR STRIP.AUTO-MCNC: 1 MG/DL
VLDLC SERPL CALC-MCNC: 21 MG/DL (ref 0–30)
WBC # BLD AUTO: 6.4 X10(3) UL (ref 4–11)

## 2022-07-22 PROCEDURE — 99220 INITIAL OBSERVATION CARE,LEVL III: CPT | Performed by: HOSPITALIST

## 2022-07-22 PROCEDURE — 71045 X-RAY EXAM CHEST 1 VIEW: CPT | Performed by: EMERGENCY MEDICINE

## 2022-07-22 RX ORDER — ASPIRIN 81 MG/1
324 TABLET, CHEWABLE ORAL ONCE
Status: COMPLETED | OUTPATIENT
Start: 2022-07-22 | End: 2022-07-22

## 2022-07-22 NOTE — ED INITIAL ASSESSMENT (HPI)
Pt feels fatigue during the day. States when she is standing for long period of time she has urinary incontinence. <<----- Click to add NO significant Past Surgical History

## 2022-07-23 ENCOUNTER — APPOINTMENT (OUTPATIENT)
Dept: CV DIAGNOSTICS | Facility: HOSPITAL | Age: 77
End: 2022-07-23
Attending: HOSPITALIST
Payer: MEDICARE

## 2022-07-23 VITALS
SYSTOLIC BLOOD PRESSURE: 158 MMHG | HEART RATE: 70 BPM | BODY MASS INDEX: 39.21 KG/M2 | OXYGEN SATURATION: 99 % | TEMPERATURE: 98 F | RESPIRATION RATE: 19 BRPM | DIASTOLIC BLOOD PRESSURE: 75 MMHG | HEIGHT: 63 IN | WEIGHT: 221.31 LBS

## 2022-07-23 PROBLEM — D69.6 THROMBOCYTOPENIA (HCC): Status: ACTIVE | Noted: 2022-07-23

## 2022-07-23 PROBLEM — R53.83 FATIGUE, UNSPECIFIED TYPE: Status: ACTIVE | Noted: 2022-07-23

## 2022-07-23 PROBLEM — R79.89 ELEVATED TROPONIN: Status: ACTIVE | Noted: 2022-07-23

## 2022-07-23 PROBLEM — R77.8 ELEVATED TROPONIN: Status: ACTIVE | Noted: 2022-07-23

## 2022-07-23 LAB
ANION GAP SERPL CALC-SCNC: 5 MMOL/L (ref 0–18)
ATRIAL RATE: 68 BPM
BUN BLD-MCNC: 20 MG/DL (ref 7–18)
CALCIUM BLD-MCNC: 8.9 MG/DL (ref 8.5–10.1)
CHLORIDE SERPL-SCNC: 112 MMOL/L (ref 98–112)
CO2 SERPL-SCNC: 23 MMOL/L (ref 21–32)
CREAT BLD-MCNC: 1.71 MG/DL
ERYTHROCYTE [DISTWIDTH] IN BLOOD BY AUTOMATED COUNT: 13.4 %
EST. AVERAGE GLUCOSE BLD GHB EST-MCNC: 157 MG/DL (ref 68–126)
GLUCOSE BLD-MCNC: 121 MG/DL (ref 70–99)
GLUCOSE BLD-MCNC: 135 MG/DL (ref 70–99)
GLUCOSE BLD-MCNC: 171 MG/DL (ref 70–99)
HBA1C MFR BLD: 7.1 % (ref ?–5.7)
HCT VFR BLD AUTO: 33.6 %
HGB BLD-MCNC: 10.9 G/DL
MCH RBC QN AUTO: 31.5 PG (ref 26–34)
MCHC RBC AUTO-ENTMCNC: 32.4 G/DL (ref 31–37)
MCV RBC AUTO: 97.1 FL
OSMOLALITY SERPL CALC.SUM OF ELEC: 294 MOSM/KG (ref 275–295)
P AXIS: 68 DEGREES
P-R INTERVAL: 176 MS
PLATELET # BLD AUTO: 198 10(3)UL (ref 150–450)
POTASSIUM SERPL-SCNC: 3.3 MMOL/L (ref 3.5–5.1)
POTASSIUM SERPL-SCNC: 4.5 MMOL/L (ref 3.5–5.1)
Q-T INTERVAL: 446 MS
QRS DURATION: 96 MS
QTC CALCULATION (BEZET): 474 MS
R AXIS: -33 DEGREES
RBC # BLD AUTO: 3.46 X10(6)UL
SODIUM SERPL-SCNC: 140 MMOL/L (ref 136–145)
T AXIS: 69 DEGREES
VENTRICULAR RATE: 68 BPM
WBC # BLD AUTO: 5.3 X10(3) UL (ref 4–11)

## 2022-07-23 PROCEDURE — 93306 TTE W/DOPPLER COMPLETE: CPT | Performed by: HOSPITALIST

## 2022-07-23 PROCEDURE — 99217 OBSERVATION CARE DISCHARGE: CPT | Performed by: HOSPITALIST

## 2022-07-23 RX ORDER — POTASSIUM CHLORIDE 1.5 G/1.77G
40 POWDER, FOR SOLUTION ORAL EVERY 4 HOURS
Status: COMPLETED | OUTPATIENT
Start: 2022-07-23 | End: 2022-07-23

## 2022-07-23 RX ORDER — MONTELUKAST SODIUM 10 MG/1
10 TABLET ORAL NIGHTLY
Status: DISCONTINUED | OUTPATIENT
Start: 2022-07-23 | End: 2022-07-23

## 2022-07-23 RX ORDER — SODIUM CHLORIDE 9 MG/ML
INJECTION, SOLUTION INTRAVENOUS CONTINUOUS
Status: DISCONTINUED | OUTPATIENT
Start: 2022-07-23 | End: 2022-07-23

## 2022-07-23 RX ORDER — CYCLOBENZAPRINE HCL 5 MG
5 TABLET ORAL 3 TIMES DAILY PRN
COMMUNITY

## 2022-07-23 RX ORDER — POLYETHYLENE GLYCOL 3350 17 G/17G
17 POWDER, FOR SOLUTION ORAL DAILY PRN
Status: DISCONTINUED | OUTPATIENT
Start: 2022-07-23 | End: 2022-07-23

## 2022-07-23 RX ORDER — NICOTINE POLACRILEX 4 MG
30 LOZENGE BUCCAL
Status: DISCONTINUED | OUTPATIENT
Start: 2022-07-22 | End: 2022-07-23

## 2022-07-23 RX ORDER — GALANTAMINE HYDROBROMIDE 4 MG/1
4 TABLET, FILM COATED ORAL 2 TIMES DAILY
Status: DISCONTINUED | OUTPATIENT
Start: 2022-07-23 | End: 2022-07-23

## 2022-07-23 RX ORDER — ONDANSETRON 2 MG/ML
8 INJECTION INTRAMUSCULAR; INTRAVENOUS EVERY 6 HOURS PRN
Status: DISCONTINUED | OUTPATIENT
Start: 2022-07-23 | End: 2022-07-23

## 2022-07-23 RX ORDER — GABAPENTIN 300 MG/1
600 CAPSULE ORAL NIGHTLY
Status: DISCONTINUED | OUTPATIENT
Start: 2022-07-23 | End: 2022-07-23

## 2022-07-23 RX ORDER — METOPROLOL SUCCINATE 100 MG/1
100 TABLET, EXTENDED RELEASE ORAL DAILY
Status: DISCONTINUED | OUTPATIENT
Start: 2022-07-23 | End: 2022-07-23

## 2022-07-23 RX ORDER — CYCLOBENZAPRINE HCL 5 MG
5 TABLET ORAL 3 TIMES DAILY PRN
Status: DISCONTINUED | OUTPATIENT
Start: 2022-07-23 | End: 2022-07-23

## 2022-07-23 RX ORDER — BENZONATATE 200 MG/1
200 CAPSULE ORAL 3 TIMES DAILY PRN
Status: DISCONTINUED | OUTPATIENT
Start: 2022-07-23 | End: 2022-07-23

## 2022-07-23 RX ORDER — DILTIAZEM HYDROCHLORIDE 60 MG/1
120 TABLET, FILM COATED ORAL DAILY
Status: DISCONTINUED | OUTPATIENT
Start: 2022-07-23 | End: 2022-07-23

## 2022-07-23 RX ORDER — ALLOPURINOL 100 MG/1
100 TABLET ORAL DAILY
Status: DISCONTINUED | OUTPATIENT
Start: 2022-07-23 | End: 2022-07-23

## 2022-07-23 RX ORDER — ECHINACEA PURPUREA EXTRACT 125 MG
1 TABLET ORAL
Status: DISCONTINUED | OUTPATIENT
Start: 2022-07-23 | End: 2022-07-23

## 2022-07-23 RX ORDER — HEPARIN SODIUM 5000 [USP'U]/ML
5000 INJECTION, SOLUTION INTRAVENOUS; SUBCUTANEOUS EVERY 12 HOURS SCHEDULED
Status: DISCONTINUED | OUTPATIENT
Start: 2022-07-23 | End: 2022-07-23

## 2022-07-23 RX ORDER — LEVETIRACETAM 500 MG/1
1000 TABLET ORAL 2 TIMES DAILY
Status: DISCONTINUED | OUTPATIENT
Start: 2022-07-23 | End: 2022-07-23

## 2022-07-23 RX ORDER — DILTIAZEM HYDROCHLORIDE 240 MG/1
240 CAPSULE, COATED, EXTENDED RELEASE ORAL
Status: DISCONTINUED | OUTPATIENT
Start: 2022-07-23 | End: 2022-07-23

## 2022-07-23 RX ORDER — DILTIAZEM HYDROCHLORIDE 240 MG/1
240 CAPSULE, COATED, EXTENDED RELEASE ORAL
COMMUNITY

## 2022-07-23 RX ORDER — NICOTINE POLACRILEX 4 MG
15 LOZENGE BUCCAL
Status: DISCONTINUED | OUTPATIENT
Start: 2022-07-22 | End: 2022-07-23

## 2022-07-23 RX ORDER — DEXTROSE MONOHYDRATE 25 G/50ML
50 INJECTION, SOLUTION INTRAVENOUS
Status: DISCONTINUED | OUTPATIENT
Start: 2022-07-22 | End: 2022-07-23

## 2022-07-23 RX ORDER — VALSARTAN 320 MG/1
320 TABLET ORAL DAILY
Status: DISCONTINUED | OUTPATIENT
Start: 2022-07-23 | End: 2022-07-23

## 2022-07-23 RX ORDER — ASPIRIN 81 MG/1
81 TABLET ORAL DAILY
Status: DISCONTINUED | OUTPATIENT
Start: 2022-07-23 | End: 2022-07-23

## 2022-07-23 RX ORDER — CALCIUM CARBONATE/VITAMIN D3 250-3.125
2 TABLET ORAL 2 TIMES DAILY
Status: DISCONTINUED | OUTPATIENT
Start: 2022-07-23 | End: 2022-07-23

## 2022-07-23 RX ORDER — ACETAMINOPHEN 500 MG
500 TABLET ORAL EVERY 4 HOURS PRN
Status: DISCONTINUED | OUTPATIENT
Start: 2022-07-23 | End: 2022-07-23

## 2022-07-23 RX ORDER — MAGNESIUM OXIDE 400 MG/1
400 TABLET ORAL DAILY
COMMUNITY

## 2022-07-23 RX ORDER — MELATONIN
1000 DAILY
Status: DISCONTINUED | OUTPATIENT
Start: 2022-07-23 | End: 2022-07-23

## 2022-07-23 RX ORDER — SENNOSIDES 8.6 MG
17.2 TABLET ORAL NIGHTLY PRN
Status: DISCONTINUED | OUTPATIENT
Start: 2022-07-23 | End: 2022-07-23

## 2022-07-23 RX ORDER — PANTOPRAZOLE SODIUM 40 MG/1
40 TABLET, DELAYED RELEASE ORAL
Status: DISCONTINUED | OUTPATIENT
Start: 2022-07-23 | End: 2022-07-23

## 2022-07-23 RX ORDER — BISACODYL 10 MG
10 SUPPOSITORY, RECTAL RECTAL
Status: DISCONTINUED | OUTPATIENT
Start: 2022-07-23 | End: 2022-07-23

## 2022-07-23 RX ORDER — MELATONIN
3 NIGHTLY PRN
Status: DISCONTINUED | OUTPATIENT
Start: 2022-07-23 | End: 2022-07-23

## 2022-07-23 RX ORDER — GLYBURIDE 5 MG/1
5 TABLET ORAL
COMMUNITY

## 2022-07-23 RX ORDER — DILTIAZEM HYDROCHLORIDE 120 MG/1
120 TABLET, FILM COATED ORAL DAILY
COMMUNITY

## 2022-07-23 RX ORDER — DULOXETIN HYDROCHLORIDE 30 MG/1
30 CAPSULE, DELAYED RELEASE ORAL 2 TIMES DAILY
Status: DISCONTINUED | OUTPATIENT
Start: 2022-07-23 | End: 2022-07-23

## 2022-07-23 RX ORDER — ROSUVASTATIN CALCIUM 20 MG/1
20 TABLET, COATED ORAL NIGHTLY
Status: DISCONTINUED | OUTPATIENT
Start: 2022-07-23 | End: 2022-07-23

## 2022-07-23 RX ORDER — MAGNESIUM OXIDE 400 MG/1
400 TABLET ORAL DAILY
Status: DISCONTINUED | OUTPATIENT
Start: 2022-07-23 | End: 2022-07-23

## 2022-07-23 RX ORDER — VALSARTAN 160 MG/1
320 TABLET ORAL DAILY
COMMUNITY

## 2022-07-23 RX ORDER — GALANTAMINE HYDROBROMIDE 4 MG/1
4 TABLET, FILM COATED ORAL 2 TIMES DAILY
COMMUNITY

## 2022-07-23 NOTE — ED QUICK NOTES
Orders for admission, patient is aware of plan and ready to go upstairs.  Any questions, please call ED BERKLEY Baker at extension [phone]    Patient Covid vaccination status: Unvaccinated     COVID Test Ordered in ED: Rapid SARS-CoV-2 by PCR    COVID Suspicion at Admission: Low clinical suspicion for COVID    Running Infusions:  None    Mental Status/LOC at time of transport: A&Ox4    Other pertinent information:   CIWA score: N/A   NIH score:  N/A

## 2022-07-23 NOTE — PROGRESS NOTES
NURSING DISCHARGE NOTE    Discharged Home via Wheelchair. Accompanied by Family member  Belongings Taken by patient/family. Telemetry dc'd. IV removed, catheter intact. Home medication returned. Discharge teaching completed, pt verbalized understanding.

## 2022-07-23 NOTE — PROGRESS NOTES
NSR on telemetry. VSS. No c/o cardiac symptoms. On room air. SPO2 remaining >90%.  maintained. No c/o shortness of breath. Lung sounds diminished. Ambulating with standby assist, tolerating well. States she feels stronger than when she came to the hospital.    At approximately 13:00 patient upset by \"worms on the ceiling. \" A/O x3, disoriented to time. Delirium screen negative. Daughter states this is not new for her. MD notified.

## 2022-07-23 NOTE — ED QUICK NOTES
Purewick, external catheter, placed on patient. Tolerated well. Given warm blanket for comfort. Denies any other needs at this time.

## 2022-07-23 NOTE — PLAN OF CARE
A&Ox4. Denies pain. Right hand IV running NS @ 100mL. Stand by patient has steady gait. Room air. Plan for echo. Problem: RESPIRATORY - ADULT  Goal: Achieves optimal ventilation and oxygenation  Description: INTERVENTIONS:  - Assess for changes in respiratory status  - Assess for changes in mentation and behavior  - Position to facilitate oxygenation and minimize respiratory effort  - Oxygen supplementation based on oxygen saturation or ABGs  - Provide Smoking Cessation handout, if applicable  - Encourage broncho-pulmonary hygiene including cough, deep breathe, Incentive Spirometry  - Assess the need for suctioning and perform as needed  - Assess and instruct to report SOB or any respiratory difficulty  - Respiratory Therapy support as indicated  - Manage/alleviate anxiety  - Monitor for signs/symptoms of CO2 retention  Outcome: Progressing     Problem: CARDIOVASCULAR - ADULT  Goal: Maintains optimal cardiac output and hemodynamic stability  Description: INTERVENTIONS:  - Monitor vital signs, rhythm, and trends  - Monitor for bleeding, hypotension and signs of decreased cardiac output  - Evaluate effectiveness of vasoactive medications to optimize hemodynamic stability  - Monitor arterial and/or venous puncture sites for bleeding and/or hematoma  - Assess quality of pulses, skin color and temperature  - Assess for signs of decreased coronary artery perfusion - ex.  Angina  - Evaluate fluid balance, assess for edema, trend weights  Outcome: Progressing  Goal: Absence of cardiac arrhythmias or at baseline  Description: INTERVENTIONS:  - Continuous cardiac monitoring, monitor vital signs, obtain 12 lead EKG if indicated  - Evaluate effectiveness of antiarrhythmic and heart rate control medications as ordered  - Initiate emergency measures for life threatening arrhythmias  - Monitor electrolytes and administer replacement therapy as ordered  Outcome: Progressing     Problem: SAFETY ADULT - FALL  Goal: Free from fall injury  Description: INTERVENTIONS:  - Assess pt frequently for physical needs  - Identify cognitive and physical deficits and behaviors that affect risk of falls.   - [place] fall precautions as indicated by assessment.  - Educate pt/family on patient safety including physical limitations  - Instruct pt to call for assistance with activity based on assessment  - Modify environment to reduce risk of injury  - Provide assistive devices as appropriate  - Consider OT/PT consult to assist with strengthening/mobility  - Encourage toileting schedule  Outcome: Progressing

## 2022-08-26 ENCOUNTER — APPOINTMENT (OUTPATIENT)
Dept: GENERAL RADIOLOGY | Facility: HOSPITAL | Age: 77
End: 2022-08-26
Attending: EMERGENCY MEDICINE
Payer: MEDICARE

## 2022-08-26 ENCOUNTER — HOSPITAL ENCOUNTER (OUTPATIENT)
Facility: HOSPITAL | Age: 77
Setting detail: OBSERVATION
Discharge: HOME | End: 2022-08-27
Attending: EMERGENCY MEDICINE | Admitting: INTERNAL MEDICINE
Payer: MEDICARE

## 2022-08-26 DIAGNOSIS — R07.9 CHEST PAIN OF UNCERTAIN ETIOLOGY: Primary | ICD-10-CM

## 2022-08-26 PROBLEM — D64.9 ANEMIA: Status: ACTIVE | Noted: 2022-08-26

## 2022-08-26 LAB
ADENOVIRUS PCR:: NOT DETECTED
ALBUMIN SERPL-MCNC: 3.3 G/DL (ref 3.4–5)
ALBUMIN/GLOB SERPL: 0.7 {RATIO} (ref 1–2)
ALP LIVER SERPL-CCNC: 88 U/L
ALT SERPL-CCNC: 13 U/L
ANION GAP SERPL CALC-SCNC: 6 MMOL/L (ref 0–18)
AST SERPL-CCNC: 11 U/L (ref 15–37)
ATRIAL RATE: 60 BPM
B PARAPERT DNA SPEC QL NAA+PROBE: NOT DETECTED
B PERT DNA SPEC QL NAA+PROBE: NOT DETECTED
BASOPHILS # BLD AUTO: 0.02 X10(3) UL (ref 0–0.2)
BASOPHILS NFR BLD AUTO: 0.4 %
BILIRUB SERPL-MCNC: 0.5 MG/DL (ref 0.1–2)
BUN BLD-MCNC: 17 MG/DL (ref 7–18)
C PNEUM DNA SPEC QL NAA+PROBE: NOT DETECTED
CALCIUM BLD-MCNC: 9.5 MG/DL (ref 8.5–10.1)
CHLORIDE SERPL-SCNC: 109 MMOL/L (ref 98–112)
CO2 SERPL-SCNC: 23 MMOL/L (ref 21–32)
CORONAVIRUS 229E PCR:: NOT DETECTED
CORONAVIRUS HKU1 PCR:: NOT DETECTED
CORONAVIRUS NL63 PCR:: NOT DETECTED
CORONAVIRUS OC43 PCR:: NOT DETECTED
CREAT BLD-MCNC: 1.88 MG/DL
D DIMER PPP FEU-MCNC: 0.69 UG/ML FEU (ref ?–0.77)
EOSINOPHIL # BLD AUTO: 0 X10(3) UL (ref 0–0.7)
EOSINOPHIL NFR BLD AUTO: 0 %
ERYTHROCYTE [DISTWIDTH] IN BLOOD BY AUTOMATED COUNT: 13.2 %
FLUAV RNA SPEC QL NAA+PROBE: NOT DETECTED
FLUBV RNA SPEC QL NAA+PROBE: NOT DETECTED
GFR SERPLBLD BASED ON 1.73 SQ M-ARVRAT: 27 ML/MIN/1.73M2 (ref 60–?)
GLOBULIN PLAS-MCNC: 4.6 G/DL (ref 2.8–4.4)
GLUCOSE BLD-MCNC: 102 MG/DL (ref 70–99)
GLUCOSE BLD-MCNC: 157 MG/DL (ref 70–99)
GLUCOSE BLD-MCNC: 179 MG/DL (ref 70–99)
GLUCOSE BLD-MCNC: 74 MG/DL (ref 70–99)
HCT VFR BLD AUTO: 34.5 %
HGB BLD-MCNC: 11.3 G/DL
IMM GRANULOCYTES # BLD AUTO: 0.01 X10(3) UL (ref 0–1)
IMM GRANULOCYTES NFR BLD: 0.2 %
LACTATE SERPL-SCNC: 0.9 MMOL/L (ref 0.4–2)
LYMPHOCYTES # BLD AUTO: 2.02 X10(3) UL (ref 1–4)
LYMPHOCYTES NFR BLD AUTO: 41.3 %
MCH RBC QN AUTO: 31.7 PG (ref 26–34)
MCHC RBC AUTO-ENTMCNC: 32.8 G/DL (ref 31–37)
MCV RBC AUTO: 96.9 FL
METAPNEUMOVIRUS PCR:: NOT DETECTED
MONOCYTES # BLD AUTO: 0.39 X10(3) UL (ref 0.1–1)
MONOCYTES NFR BLD AUTO: 8 %
MRSA DNA SPEC QL NAA+PROBE: NEGATIVE
MYCOPLASMA PNEUMONIA PCR:: NOT DETECTED
NEUTROPHILS # BLD AUTO: 2.45 X10 (3) UL (ref 1.5–7.7)
NEUTROPHILS # BLD AUTO: 2.45 X10(3) UL (ref 1.5–7.7)
NEUTROPHILS NFR BLD AUTO: 50.1 %
NT-PROBNP SERPL-MCNC: 1328 PG/ML (ref ?–450)
OSMOLALITY SERPL CALC.SUM OF ELEC: 288 MOSM/KG (ref 275–295)
P AXIS: 81 DEGREES
P-R INTERVAL: 170 MS
PARAINFLUENZA 1 PCR:: NOT DETECTED
PARAINFLUENZA 2 PCR:: NOT DETECTED
PARAINFLUENZA 3 PCR:: NOT DETECTED
PARAINFLUENZA 4 PCR:: NOT DETECTED
PLATELET # BLD AUTO: 236 10(3)UL (ref 150–450)
POTASSIUM SERPL-SCNC: 4.1 MMOL/L (ref 3.5–5.1)
PROT SERPL-MCNC: 7.9 G/DL (ref 6.4–8.2)
Q-T INTERVAL: 460 MS
QRS DURATION: 94 MS
QTC CALCULATION (BEZET): 460 MS
R AXIS: -29 DEGREES
RBC # BLD AUTO: 3.56 X10(6)UL
RHINOVIRUS/ENTERO PCR:: NOT DETECTED
RSV RNA SPEC QL NAA+PROBE: NOT DETECTED
SARS-COV-2 RNA NPH QL NAA+NON-PROBE: NOT DETECTED
SARS-COV-2 RNA RESP QL NAA+PROBE: NOT DETECTED
SODIUM SERPL-SCNC: 138 MMOL/L (ref 136–145)
T AXIS: 117 DEGREES
TROPONIN I HIGH SENSITIVITY: 47 NG/L
TROPONIN I HIGH SENSITIVITY: 51 NG/L
TROPONIN I HIGH SENSITIVITY: 51 NG/L
TROPONIN I HIGH SENSITIVITY: 52 NG/L
VENTRICULAR RATE: 60 BPM
WBC # BLD AUTO: 4.9 X10(3) UL (ref 4–11)

## 2022-08-26 PROCEDURE — 87449 NOS EACH ORGANISM AG IA: CPT | Performed by: INTERNAL MEDICINE

## 2022-08-26 PROCEDURE — 96375 TX/PRO/DX INJ NEW DRUG ADDON: CPT

## 2022-08-26 PROCEDURE — 71045 X-RAY EXAM CHEST 1 VIEW: CPT | Performed by: EMERGENCY MEDICINE

## 2022-08-26 PROCEDURE — 96367 TX/PROPH/DG ADDL SEQ IV INF: CPT

## 2022-08-26 PROCEDURE — 96366 THER/PROPH/DIAG IV INF ADDON: CPT

## 2022-08-26 PROCEDURE — 83605 ASSAY OF LACTIC ACID: CPT | Performed by: EMERGENCY MEDICINE

## 2022-08-26 PROCEDURE — 36415 COLL VENOUS BLD VENIPUNCTURE: CPT

## 2022-08-26 PROCEDURE — 82962 GLUCOSE BLOOD TEST: CPT

## 2022-08-26 PROCEDURE — 94660 CPAP INITIATION&MGMT: CPT

## 2022-08-26 PROCEDURE — 93010 ELECTROCARDIOGRAM REPORT: CPT

## 2022-08-26 PROCEDURE — 99285 EMERGENCY DEPT VISIT HI MDM: CPT

## 2022-08-26 PROCEDURE — 96372 THER/PROPH/DIAG INJ SC/IM: CPT

## 2022-08-26 PROCEDURE — 96365 THER/PROPH/DIAG IV INF INIT: CPT

## 2022-08-26 PROCEDURE — 0202U NFCT DS 22 TRGT SARS-COV-2: CPT | Performed by: INTERNAL MEDICINE

## 2022-08-26 PROCEDURE — 94799 UNLISTED PULMONARY SVC/PX: CPT

## 2022-08-26 PROCEDURE — 85379 FIBRIN DEGRADATION QUANT: CPT | Performed by: EMERGENCY MEDICINE

## 2022-08-26 PROCEDURE — 87641 MR-STAPH DNA AMP PROBE: CPT | Performed by: INTERNAL MEDICINE

## 2022-08-26 PROCEDURE — 84484 ASSAY OF TROPONIN QUANT: CPT | Performed by: INTERNAL MEDICINE

## 2022-08-26 PROCEDURE — 85025 COMPLETE CBC W/AUTO DIFF WBC: CPT | Performed by: EMERGENCY MEDICINE

## 2022-08-26 PROCEDURE — 93005 ELECTROCARDIOGRAM TRACING: CPT

## 2022-08-26 PROCEDURE — 80053 COMPREHEN METABOLIC PANEL: CPT | Performed by: EMERGENCY MEDICINE

## 2022-08-26 PROCEDURE — 83880 ASSAY OF NATRIURETIC PEPTIDE: CPT | Performed by: INTERNAL MEDICINE

## 2022-08-26 PROCEDURE — 87040 BLOOD CULTURE FOR BACTERIA: CPT | Performed by: EMERGENCY MEDICINE

## 2022-08-26 PROCEDURE — 84484 ASSAY OF TROPONIN QUANT: CPT | Performed by: EMERGENCY MEDICINE

## 2022-08-26 RX ORDER — CALCIUM CARBONATE/VITAMIN D3 600 MG-10
1 TABLET ORAL 2 TIMES DAILY
COMMUNITY
Start: 2022-08-23

## 2022-08-26 RX ORDER — GABAPENTIN 300 MG/1
600 CAPSULE ORAL NIGHTLY
Status: DISCONTINUED | OUTPATIENT
Start: 2022-08-26 | End: 2022-08-27

## 2022-08-26 RX ORDER — METOCLOPRAMIDE HYDROCHLORIDE 5 MG/ML
5 INJECTION INTRAMUSCULAR; INTRAVENOUS EVERY 8 HOURS PRN
Status: DISCONTINUED | OUTPATIENT
Start: 2022-08-26 | End: 2022-08-27

## 2022-08-26 RX ORDER — ONDANSETRON 2 MG/ML
4 INJECTION INTRAMUSCULAR; INTRAVENOUS EVERY 6 HOURS PRN
Status: DISCONTINUED | OUTPATIENT
Start: 2022-08-26 | End: 2022-08-27

## 2022-08-26 RX ORDER — MONTELUKAST SODIUM 10 MG/1
10 TABLET ORAL DAILY
Status: DISCONTINUED | OUTPATIENT
Start: 2022-08-27 | End: 2022-08-27

## 2022-08-26 RX ORDER — MIRTAZAPINE 15 MG/1
15 TABLET, FILM COATED ORAL NIGHTLY
COMMUNITY
Start: 2022-08-24

## 2022-08-26 RX ORDER — ENOXAPARIN SODIUM 100 MG/ML
30 INJECTION SUBCUTANEOUS NIGHTLY
Status: DISCONTINUED | OUTPATIENT
Start: 2022-08-26 | End: 2022-08-27

## 2022-08-26 RX ORDER — LOSARTAN POTASSIUM 50 MG/1
50 TABLET ORAL DAILY
Status: DISCONTINUED | OUTPATIENT
Start: 2022-08-27 | End: 2022-08-27

## 2022-08-26 RX ORDER — ALBUTEROL SULFATE 90 UG/1
2 AEROSOL, METERED RESPIRATORY (INHALATION) EVERY 6 HOURS PRN
Status: DISCONTINUED | OUTPATIENT
Start: 2022-08-26 | End: 2022-08-27

## 2022-08-26 RX ORDER — FUROSEMIDE 10 MG/ML
20 INJECTION INTRAMUSCULAR; INTRAVENOUS ONCE
Status: COMPLETED | OUTPATIENT
Start: 2022-08-26 | End: 2022-08-26

## 2022-08-26 RX ORDER — DILTIAZEM HYDROCHLORIDE 60 MG/1
120 TABLET, FILM COATED ORAL
Status: DISCONTINUED | OUTPATIENT
Start: 2022-08-27 | End: 2022-08-27

## 2022-08-26 RX ORDER — HYDROCHLOROTHIAZIDE 25 MG/1
25 TABLET ORAL DAILY
COMMUNITY
Start: 2022-08-23 | End: 2022-08-27

## 2022-08-26 RX ORDER — PANTOPRAZOLE SODIUM 40 MG/1
40 TABLET, DELAYED RELEASE ORAL
Status: DISCONTINUED | OUTPATIENT
Start: 2022-08-27 | End: 2022-08-27

## 2022-08-26 RX ORDER — METOPROLOL SUCCINATE 100 MG/1
100 TABLET, EXTENDED RELEASE ORAL
Status: DISCONTINUED | OUTPATIENT
Start: 2022-08-27 | End: 2022-08-27

## 2022-08-26 RX ORDER — ASPIRIN 325 MG
325 TABLET ORAL DAILY
Status: DISCONTINUED | OUTPATIENT
Start: 2022-08-26 | End: 2022-08-27

## 2022-08-26 RX ORDER — NICOTINE POLACRILEX 4 MG
15 LOZENGE BUCCAL
Status: DISCONTINUED | OUTPATIENT
Start: 2022-08-26 | End: 2022-08-27

## 2022-08-26 RX ORDER — ACETAMINOPHEN 500 MG
500 TABLET ORAL EVERY 4 HOURS PRN
Status: DISCONTINUED | OUTPATIENT
Start: 2022-08-26 | End: 2022-08-27

## 2022-08-26 RX ORDER — LOSARTAN POTASSIUM 50 MG/1
50 TABLET ORAL DAILY
COMMUNITY
Start: 2022-08-23

## 2022-08-26 RX ORDER — ASPIRIN 81 MG/1
81 TABLET ORAL DAILY
Status: DISCONTINUED | OUTPATIENT
Start: 2022-08-26 | End: 2022-08-26

## 2022-08-26 RX ORDER — ALLOPURINOL 100 MG/1
100 TABLET ORAL DAILY
Status: DISCONTINUED | OUTPATIENT
Start: 2022-08-27 | End: 2022-08-27

## 2022-08-26 RX ORDER — MELATONIN
400 DAILY
COMMUNITY
Start: 2022-08-23

## 2022-08-26 RX ORDER — DEXTROSE MONOHYDRATE 25 G/50ML
50 INJECTION, SOLUTION INTRAVENOUS
Status: DISCONTINUED | OUTPATIENT
Start: 2022-08-26 | End: 2022-08-27

## 2022-08-26 RX ORDER — NITROGLYCERIN 0.4 MG/1
0.4 TABLET SUBLINGUAL EVERY 5 MIN PRN
Status: DISCONTINUED | OUTPATIENT
Start: 2022-08-26 | End: 2022-08-27

## 2022-08-26 RX ORDER — NICOTINE POLACRILEX 4 MG
30 LOZENGE BUCCAL
Status: DISCONTINUED | OUTPATIENT
Start: 2022-08-26 | End: 2022-08-27

## 2022-08-26 RX ORDER — VANCOMYCIN/0.9 % SOD CHLORIDE 1.75 G/5
25 PLASTIC BAG, INJECTION (ML) INTRAVENOUS ONCE
Status: COMPLETED | OUTPATIENT
Start: 2022-08-26 | End: 2022-08-26

## 2022-08-26 RX ORDER — HYDROXYZINE HYDROCHLORIDE 25 MG/1
25 TABLET, FILM COATED ORAL NIGHTLY PRN
COMMUNITY
Start: 2022-08-23

## 2022-08-26 RX ORDER — ROSUVASTATIN CALCIUM 20 MG/1
20 TABLET, COATED ORAL DAILY
Status: DISCONTINUED | OUTPATIENT
Start: 2022-08-27 | End: 2022-08-27

## 2022-08-26 RX ORDER — LEVETIRACETAM 500 MG/1
1000 TABLET ORAL 2 TIMES DAILY
Status: DISCONTINUED | OUTPATIENT
Start: 2022-08-26 | End: 2022-08-27

## 2022-08-26 RX ORDER — ASPIRIN 81 MG/1
324 TABLET, CHEWABLE ORAL ONCE
Status: DISCONTINUED | OUTPATIENT
Start: 2022-08-26 | End: 2022-08-27

## 2022-08-26 RX ORDER — GALANTAMINE HYDROBROMIDE 4 MG/1
4 TABLET, FILM COATED ORAL 2 TIMES DAILY
Status: DISCONTINUED | OUTPATIENT
Start: 2022-08-26 | End: 2022-08-27

## 2022-08-26 RX ORDER — DULOXETIN HYDROCHLORIDE 30 MG/1
30 CAPSULE, DELAYED RELEASE ORAL 2 TIMES DAILY
Status: DISCONTINUED | OUTPATIENT
Start: 2022-08-26 | End: 2022-08-27

## 2022-08-26 NOTE — PROGRESS NOTES
[facility] Pharmacy Note:  Renal Dose Adjustment for enoxaparin (LOVENOX)    Jarret Franks has been prescribed enoxaparin 40 mg subcutaneously every 24 hours. Estimated Creatinine Clearance: 21.6 mL/min (A) (based on SCr of 1.88 mg/dL (H)). Calculated CrCl 20 to 30 mL/min so the dose of Enoxaparin (LOVENOX) has been changed to enoxaparin 30 mg every 24 hours per P&T approved protocol. Pharmacy will continue to follow, and make additional adjustments if needed.       Thank you,  Laura Lutz, PharmD  8/26/2022 4:50 PM

## 2022-08-26 NOTE — PROGRESS NOTES
ED Antibiotic Dose Adjustment by Pharmacy    vancomycin (VANCOCIN) 2 g x1 dose has been ordered. Pharmacy has adjusted the dose to vancomycin (VANCOCIN) 1.75 g x1 per hospital protocol.  25 mg/kg based on adjusted body weight as patients actual weight exceeds 140% of ideal body weight    Mica Pagan, PharmD  08/26/22, 12:29 PM

## 2022-08-26 NOTE — ED QUICK NOTES
Pt reports daughter passed away two weeks ago and has been feeling more sad. States she has not eaten x3 days because of feeling sad.

## 2022-08-26 NOTE — ED QUICK NOTES
Orders for admission, patient is aware of plan and ready to go upstairs. Any questions, please call ED RN Juancarlos Gather at extension [phone].      Patient Covid vaccination status: Unvaccinated     COVID Test Ordered in ED: Rapid SARS-CoV-2 by PCR    COVID Suspicion at Admission: Low clinical suspicion for COVID    Running Infusions:  None    Mental Status/LOC at time of transport: A/Ox3, sometimes forgetful    Other pertinent information:   CIWA score: N/A   NIH score:  N/A

## 2022-08-27 VITALS
HEIGHT: 64 IN | RESPIRATION RATE: 18 BRPM | HEART RATE: 59 BPM | SYSTOLIC BLOOD PRESSURE: 150 MMHG | DIASTOLIC BLOOD PRESSURE: 63 MMHG | OXYGEN SATURATION: 100 % | BODY MASS INDEX: 37.16 KG/M2 | TEMPERATURE: 98 F | WEIGHT: 217.63 LBS

## 2022-08-27 LAB
ANION GAP SERPL CALC-SCNC: 6 MMOL/L (ref 0–18)
BASOPHILS # BLD AUTO: 0.03 X10(3) UL (ref 0–0.2)
BASOPHILS NFR BLD AUTO: 0.7 %
BUN BLD-MCNC: 21 MG/DL (ref 7–18)
CALCIUM BLD-MCNC: 8.5 MG/DL (ref 8.5–10.1)
CHLORIDE SERPL-SCNC: 112 MMOL/L (ref 98–112)
CHOLEST SERPL-MCNC: 104 MG/DL (ref ?–200)
CO2 SERPL-SCNC: 24 MMOL/L (ref 21–32)
CREAT BLD-MCNC: 1.86 MG/DL
EOSINOPHIL # BLD AUTO: 0 X10(3) UL (ref 0–0.7)
EOSINOPHIL NFR BLD AUTO: 0 %
ERYTHROCYTE [DISTWIDTH] IN BLOOD BY AUTOMATED COUNT: 13.2 %
GFR SERPLBLD BASED ON 1.73 SQ M-ARVRAT: 28 ML/MIN/1.73M2 (ref 60–?)
GLUCOSE BLD-MCNC: 104 MG/DL (ref 70–99)
GLUCOSE BLD-MCNC: 121 MG/DL (ref 70–99)
GLUCOSE BLD-MCNC: 160 MG/DL (ref 70–99)
HCT VFR BLD AUTO: 31.1 %
HDLC SERPL-MCNC: 42 MG/DL (ref 40–59)
HGB BLD-MCNC: 10 G/DL
IMM GRANULOCYTES # BLD AUTO: 0.01 X10(3) UL (ref 0–1)
IMM GRANULOCYTES NFR BLD: 0.2 %
L PNEUMO AG UR QL: NEGATIVE
LDLC SERPL CALC-MCNC: 43 MG/DL (ref ?–100)
LYMPHOCYTES # BLD AUTO: 1.9 X10(3) UL (ref 1–4)
LYMPHOCYTES NFR BLD AUTO: 44.8 %
MAGNESIUM SERPL-MCNC: 2 MG/DL (ref 1.6–2.6)
MCH RBC QN AUTO: 31.5 PG (ref 26–34)
MCHC RBC AUTO-ENTMCNC: 32.2 G/DL (ref 31–37)
MCV RBC AUTO: 98.1 FL
MONOCYTES # BLD AUTO: 0.52 X10(3) UL (ref 0.1–1)
MONOCYTES NFR BLD AUTO: 12.3 %
NEUTROPHILS # BLD AUTO: 1.78 X10 (3) UL (ref 1.5–7.7)
NEUTROPHILS # BLD AUTO: 1.78 X10(3) UL (ref 1.5–7.7)
NEUTROPHILS NFR BLD AUTO: 42 %
NONHDLC SERPL-MCNC: 62 MG/DL (ref ?–130)
OSMOLALITY SERPL CALC.SUM OF ELEC: 297 MOSM/KG (ref 275–295)
PLATELET # BLD AUTO: 199 10(3)UL (ref 150–450)
POTASSIUM SERPL-SCNC: 3.7 MMOL/L (ref 3.5–5.1)
RBC # BLD AUTO: 3.17 X10(6)UL
SODIUM SERPL-SCNC: 142 MMOL/L (ref 136–145)
STREP PNEUMO ANTIGEN, URINE: NEGATIVE
TRIGL SERPL-MCNC: 100 MG/DL (ref 30–149)
VLDLC SERPL CALC-MCNC: 14 MG/DL (ref 0–30)
WBC # BLD AUTO: 4.2 X10(3) UL (ref 4–11)

## 2022-08-27 PROCEDURE — 82962 GLUCOSE BLOOD TEST: CPT

## 2022-08-27 PROCEDURE — 83735 ASSAY OF MAGNESIUM: CPT | Performed by: INTERNAL MEDICINE

## 2022-08-27 PROCEDURE — 85025 COMPLETE CBC W/AUTO DIFF WBC: CPT | Performed by: INTERNAL MEDICINE

## 2022-08-27 PROCEDURE — 80061 LIPID PANEL: CPT | Performed by: INTERNAL MEDICINE

## 2022-08-27 PROCEDURE — 97530 THERAPEUTIC ACTIVITIES: CPT

## 2022-08-27 PROCEDURE — 94799 UNLISTED PULMONARY SVC/PX: CPT

## 2022-08-27 PROCEDURE — 80048 BASIC METABOLIC PNL TOTAL CA: CPT | Performed by: INTERNAL MEDICINE

## 2022-08-27 PROCEDURE — 97161 PT EVAL LOW COMPLEX 20 MIN: CPT

## 2022-08-27 RX ORDER — CEPHALEXIN 500 MG/1
500 CAPSULE ORAL 3 TIMES DAILY
Qty: 15 CAPSULE | Refills: 0 | Status: SHIPPED | OUTPATIENT
Start: 2022-08-27 | End: 2022-09-01

## 2022-08-27 NOTE — PLAN OF CARE
RN SHIFT NOTE    Assumed care of pt at 0700. Pt denies pain at this time. Patient is alert and oriented x 4, but very forgetful. Patient is NSR on tele, S1 and S2 present and pt denies cardiac symptoms. Lung sounds diminished. Abdomen in soft and round. Last BM 8/25. Skin is intact. Tolerating medications and care needs have been met at this time.        POC: IV Rocephin and Azithromycin q 24hrs, stool sample, sputum sample    Problem: Patient/Family Goals  Goal: Patient/Family Long Term Goal  Description: Patient's Long Term Goal: to stay out of hospital    Interventions:  - take meds as prescribed, attend follow up appts, eat healthy/exercise  - See additional Care Plan goals for specific interventions     Outcome: Progressing  Goal: Patient/Family Short Term Goal  Description: Patient's Short Term Goal: to go home    Interventions:   - follow medication regimen, therapeutic labs/procedures, walk TID  - See additional Care Plan goals for specific interventions     Outcome: Progressing     Problem: Diabetes/Glucose Control  Goal: Glucose maintained within prescribed range  Description: INTERVENTIONS:  - Monitor Blood Glucose as ordered  - Assess for signs and symptoms of hyperglycemia and hypoglycemia  - Administer ordered medications to maintain glucose within target range  - Assess barriers to adequate nutritional intake and initiate nutrition consult as needed  - Instruct patient on self management of diabetes  Outcome: Progressing     Problem: RESPIRATORY - ADULT  Goal: Achieves optimal ventilation and oxygenation  Description: INTERVENTIONS:  - Assess for changes in respiratory status  - Assess for changes in mentation and behavior  - Position to facilitate oxygenation and minimize respiratory effort  - Oxygen supplementation based on oxygen saturation or ABGs  - Provide Smoking Cessation handout, if applicable  - Encourage broncho-pulmonary hygiene including cough, deep breathe, Incentive Spirometry  - Assess the need for suctioning and perform as needed  - Assess and instruct to report SOB or any respiratory difficulty  - Respiratory Therapy support as indicated  - Manage/alleviate anxiety  - Monitor for signs/symptoms of CO2 retention  Outcome: Progressing     Problem: CARDIOVASCULAR - ADULT  Goal: Maintains optimal cardiac output and hemodynamic stability  Description: INTERVENTIONS:  - Monitor vital signs, rhythm, and trends  - Monitor for bleeding, hypotension and signs of decreased cardiac output  - Evaluate effectiveness of vasoactive medications to optimize hemodynamic stability  - Monitor arterial and/or venous puncture sites for bleeding and/or hematoma  - Assess quality of pulses, skin color and temperature  - Assess for signs of decreased coronary artery perfusion - ex.  Angina  - Evaluate fluid balance, assess for edema, trend weights  Outcome: Progressing  Goal: Absence of cardiac arrhythmias or at baseline  Description: INTERVENTIONS:  - Continuous cardiac monitoring, monitor vital signs, obtain 12 lead EKG if indicated  - Evaluate effectiveness of antiarrhythmic and heart rate control medications as ordered  - Initiate emergency measures for life threatening arrhythmias  - Monitor electrolytes and administer replacement therapy as ordered  Outcome: Progressing     Problem: PAIN - ADULT  Goal: Verbalizes/displays adequate comfort level or patient's stated pain goal  Description: INTERVENTIONS:  - Encourage pt to monitor pain and request assistance  - Assess pain using appropriate pain scale  - Administer analgesics based on type and severity of pain and evaluate response  - Implement non-pharmacological measures as appropriate and evaluate response  - Consider cultural and social influences on pain and pain management  - Manage/alleviate anxiety  - Utilize distraction and/or relaxation techniques  - Monitor for opioid side effects  - Notify MD/LIP if interventions unsuccessful or patient reports new pain  - Anticipate increased pain with activity and pre-medicate as appropriate  Outcome: Progressing     Problem: RISK FOR INFECTION - ADULT  Goal: Absence of fever/infection during anticipated neutropenic period  Description: INTERVENTIONS  - Monitor WBC  - Administer growth factors as ordered  - Implement neutropenic guidelines  Outcome: Progressing     Problem: SAFETY ADULT - FALL  Goal: Free from fall injury  Description: INTERVENTIONS:  - Assess pt frequently for physical needs  - Identify cognitive and physical deficits and behaviors that affect risk of falls.   - [place] fall precautions as indicated by assessment.  - Educate pt/family on patient safety including physical limitations  - Instruct pt to call for assistance with activity based on assessment  - Modify environment to reduce risk of injury  - Provide assistive devices as appropriate  - Consider OT/PT consult to assist with strengthening/mobility  - Encourage toileting schedule  Outcome: Progressing     Problem: DISCHARGE PLANNING  Goal: Discharge to home or other facility with appropriate resources  Description: INTERVENTIONS:  - Identify barriers to discharge w/pt and caregiver  - Include patient/family/discharge partner in discharge planning  - Arrange for needed discharge resources and transportation as appropriate  - Identify discharge learning needs (meds, wound care, etc)  - Arrange for interpreters to assist at discharge as needed  - Consider post-discharge preferences of patient/family/discharge partner  - Complete POLST form as appropriate  - Assess patient's ability to be responsible for managing their own health  - Refer to Case Management Department for coordinating discharge planning if the patient needs post-hospital services based on physician/LIP order or complex needs related to functional status, cognitive ability or social support system  Outcome: Progressing

## 2022-08-27 NOTE — PLAN OF CARE
Assumed care of patient at 1. Alert and oriented x2-3--orientated to person and place and at times situation, disoriented to time, No C/O chest pain or SOB, SPO2 on %, Heart rate SR 60s. Breath sounds clear. Bed is locked and in low position. Personal belonging within reach. Updated on plan of care and verbalized understanding. Will continue to monitor.      Problem: Patient/Family Goals  Goal: Patient/Family Long Term Goal  Description: Patient's Long Term Goal: to stay out of hospital    Interventions:  - take meds as prescribed, attend follow up appts, eat healthy/exercise  - See additional Care Plan goals for specific interventions     Outcome: Progressing  Goal: Patient/Family Short Term Goal  Description: Patient's Short Term Goal: to go home    Interventions:   - follow medication regimen, therapeutic labs/procedures, walk TID  - See additional Care Plan goals for specific interventions     Outcome: Progressing     Problem: Diabetes/Glucose Control  Goal: Glucose maintained within prescribed range  Description: INTERVENTIONS:  - Monitor Blood Glucose as ordered  - Assess for signs and symptoms of hyperglycemia and hypoglycemia  - Administer ordered medications to maintain glucose within target range  - Assess barriers to adequate nutritional intake and initiate nutrition consult as needed  - Instruct patient on self management of diabetes  Outcome: Progressing     Problem: RESPIRATORY - ADULT  Goal: Achieves optimal ventilation and oxygenation  Description: INTERVENTIONS:  - Assess for changes in respiratory status  - Assess for changes in mentation and behavior  - Position to facilitate oxygenation and minimize respiratory effort  - Oxygen supplementation based on oxygen saturation or ABGs  - Provide Smoking Cessation handout, if applicable  - Encourage broncho-pulmonary hygiene including cough, deep breathe, Incentive Spirometry  - Assess the need for suctioning and perform as needed  - Assess and instruct to report SOB or any respiratory difficulty  - Respiratory Therapy support as indicated  - Manage/alleviate anxiety  - Monitor for signs/symptoms of CO2 retention  Outcome: Progressing     Problem: CARDIOVASCULAR - ADULT  Goal: Maintains optimal cardiac output and hemodynamic stability  Description: INTERVENTIONS:  - Monitor vital signs, rhythm, and trends  - Monitor for bleeding, hypotension and signs of decreased cardiac output  - Evaluate effectiveness of vasoactive medications to optimize hemodynamic stability  - Monitor arterial and/or venous puncture sites for bleeding and/or hematoma  - Assess quality of pulses, skin color and temperature  - Assess for signs of decreased coronary artery perfusion - ex.  Angina  - Evaluate fluid balance, assess for edema, trend weights  Outcome: Progressing  Goal: Absence of cardiac arrhythmias or at baseline  Description: INTERVENTIONS:  - Continuous cardiac monitoring, monitor vital signs, obtain 12 lead EKG if indicated  - Evaluate effectiveness of antiarrhythmic and heart rate control medications as ordered  - Initiate emergency measures for life threatening arrhythmias  - Monitor electrolytes and administer replacement therapy as ordered  Outcome: Progressing     Problem: PAIN - ADULT  Goal: Verbalizes/displays adequate comfort level or patient's stated pain goal  Description: INTERVENTIONS:  - Encourage pt to monitor pain and request assistance  - Assess pain using appropriate pain scale  - Administer analgesics based on type and severity of pain and evaluate response  - Implement non-pharmacological measures as appropriate and evaluate response  - Consider cultural and social influences on pain and pain management  - Manage/alleviate anxiety  - Utilize distraction and/or relaxation techniques  - Monitor for opioid side effects  - Notify MD/LIP if interventions unsuccessful or patient reports new pain  - Anticipate increased pain with activity and pre-medicate as appropriate  Outcome: Progressing     Problem: RISK FOR INFECTION - ADULT  Goal: Absence of fever/infection during anticipated neutropenic period  Description: INTERVENTIONS  - Monitor WBC  - Administer growth factors as ordered  - Implement neutropenic guidelines  Outcome: Progressing     Problem: SAFETY ADULT - FALL  Goal: Free from fall injury  Description: INTERVENTIONS:  - Assess pt frequently for physical needs  - Identify cognitive and physical deficits and behaviors that affect risk of falls.   - [place] fall precautions as indicated by assessment.  - Educate pt/family on patient safety including physical limitations  - Instruct pt to call for assistance with activity based on assessment  - Modify environment to reduce risk of injury  - Provide assistive devices as appropriate  - Consider OT/PT consult to assist with strengthening/mobility  - Encourage toileting schedule  Outcome: Progressing     Problem: DISCHARGE PLANNING  Goal: Discharge to home or other facility with appropriate resources  Description: INTERVENTIONS:  - Identify barriers to discharge w/pt and caregiver  - Include patient/family/discharge partner in discharge planning  - Arrange for needed discharge resources and transportation as appropriate  - Identify discharge learning needs (meds, wound care, etc)  - Arrange for interpreters to assist at discharge as needed  - Consider post-discharge preferences of patient/family/discharge partner  - Complete POLST form as appropriate  - Assess patient's ability to be responsible for managing their own health  - Refer to Case Management Department for coordinating discharge planning if the patient needs post-hospital services based on physician/LIP order or complex needs related to functional status, cognitive ability or social support system  Outcome: Progressing

## 2022-08-27 NOTE — PLAN OF CARE
Assumed pt care at 1600, admission navigator completed, oriented to room. A&Ox4, forgetful. RA, IS 1750. denies pain/SOB, lung sounds clear, VSS, NSRon tele. Voids. Up with SB/walker. POC IV abx, monitor trops, POC updated with pt and family, questions answered, verbalized understanding. Will continue to monitor.       Problem: Patient/Family Goals  Goal: Patient/Family Long Term Goal  Description: Patient's Long Term Goal: to stay out of hospital    Interventions:  - take meds as prescribed, attend follow up appts, eat healthy/exercise  - See additional Care Plan goals for specific interventions     Outcome: Progressing  Goal: Patient/Family Short Term Goal  Description: Patient's Short Term Goal: to go home    Interventions:   - follow medication regimen, therapeutic labs/procedures, walk TID  - See additional Care Plan goals for specific interventions     Outcome: Progressing     Problem: Diabetes/Glucose Control  Goal: Glucose maintained within prescribed range  Description: INTERVENTIONS:  - Monitor Blood Glucose as ordered  - Assess for signs and symptoms of hyperglycemia and hypoglycemia  - Administer ordered medications to maintain glucose within target range  - Assess barriers to adequate nutritional intake and initiate nutrition consult as needed  - Instruct patient on self management of diabetes  Outcome: Progressing     Problem: RESPIRATORY - ADULT  Goal: Achieves optimal ventilation and oxygenation  Description: INTERVENTIONS:  - Assess for changes in respiratory status  - Assess for changes in mentation and behavior  - Position to facilitate oxygenation and minimize respiratory effort  - Oxygen supplementation based on oxygen saturation or ABGs  - Provide Smoking Cessation handout, if applicable  - Encourage broncho-pulmonary hygiene including cough, deep breathe, Incentive Spirometry  - Assess the need for suctioning and perform as needed  - Assess and instruct to report SOB or any respiratory difficulty  - Respiratory Therapy support as indicated  - Manage/alleviate anxiety  - Monitor for signs/symptoms of CO2 retention  Outcome: Progressing     Problem: CARDIOVASCULAR - ADULT  Goal: Maintains optimal cardiac output and hemodynamic stability  Description: INTERVENTIONS:  - Monitor vital signs, rhythm, and trends  - Monitor for bleeding, hypotension and signs of decreased cardiac output  - Evaluate effectiveness of vasoactive medications to optimize hemodynamic stability  - Monitor arterial and/or venous puncture sites for bleeding and/or hematoma  - Assess quality of pulses, skin color and temperature  - Assess for signs of decreased coronary artery perfusion - ex.  Angina  - Evaluate fluid balance, assess for edema, trend weights  Outcome: Progressing  Goal: Absence of cardiac arrhythmias or at baseline  Description: INTERVENTIONS:  - Continuous cardiac monitoring, monitor vital signs, obtain 12 lead EKG if indicated  - Evaluate effectiveness of antiarrhythmic and heart rate control medications as ordered  - Initiate emergency measures for life threatening arrhythmias  - Monitor electrolytes and administer replacement therapy as ordered  Outcome: Progressing     Problem: PAIN - ADULT  Goal: Verbalizes/displays adequate comfort level or patient's stated pain goal  Description: INTERVENTIONS:  - Encourage pt to monitor pain and request assistance  - Assess pain using appropriate pain scale  - Administer analgesics based on type and severity of pain and evaluate response  - Implement non-pharmacological measures as appropriate and evaluate response  - Consider cultural and social influences on pain and pain management  - Manage/alleviate anxiety  - Utilize distraction and/or relaxation techniques  - Monitor for opioid side effects  - Notify MD/LIP if interventions unsuccessful or patient reports new pain  - Anticipate increased pain with activity and pre-medicate as appropriate  Outcome: Progressing Problem: RISK FOR INFECTION - ADULT  Goal: Absence of fever/infection during anticipated neutropenic period  Description: INTERVENTIONS  - Monitor WBC  - Administer growth factors as ordered  - Implement neutropenic guidelines  Outcome: Progressing     Problem: SAFETY ADULT - FALL  Goal: Free from fall injury  Description: INTERVENTIONS:  - Assess pt frequently for physical needs  - Identify cognitive and physical deficits and behaviors that affect risk of falls.   - [place] fall precautions as indicated by assessment.  - Educate pt/family on patient safety including physical limitations  - Instruct pt to call for assistance with activity based on assessment  - Modify environment to reduce risk of injury  - Provide assistive devices as appropriate  - Consider OT/PT consult to assist with strengthening/mobility  - Encourage toileting schedule  Outcome: Progressing     Problem: DISCHARGE PLANNING  Goal: Discharge to home or other facility with appropriate resources  Description: INTERVENTIONS:  - Identify barriers to discharge w/pt and caregiver  - Include patient/family/discharge partner in discharge planning  - Arrange for needed discharge resources and transportation as appropriate  - Identify discharge learning needs (meds, wound care, etc)  - Arrange for interpreters to assist at discharge as needed  - Consider post-discharge preferences of patient/family/discharge partner  - Complete POLST form as appropriate  - Assess patient's ability to be responsible for managing their own health  - Refer to Case Management Department for coordinating discharge planning if the patient needs post-hospital services based on physician/LIP order or complex needs related to functional status, cognitive ability or social support system  Outcome: Progressing

## 2022-08-27 NOTE — CM/SW NOTE
Patient has history of hhc with [facility]. Office currently closed until Monday. Will need to confirm status then.   Phone [phone]

## 2022-08-27 NOTE — CM/SW NOTE
Patient cleared for discharge. Message left for daughter Deedee Whitmore (cell ) as she is listed as discharge partner.   Will send AVS to [facility] [address]     Nurse to fax completed AVS toMid [id number]  Phone  [phone]  [facility]  [phone]    Family to  pt; nurse to fax AVS.    [address] confirmed pt is active with agency

## 2022-08-27 NOTE — PROGRESS NOTES
08/26/22 1546 08/26/22 1551 08/26/22 1554   Vital Signs   BP (!) 174/66 159/65 137/67   MAP (mmHg) 92 89 85   BP Location Left arm Left arm Left arm   BP Method Automatic Automatic Automatic   Patient Position Lying Sitting Standing

## 2022-08-27 NOTE — DISCHARGE PLANNING
NURSING DISCHARGE NOTE    Discharged Home via Wheelchair. Accompanied by Support staff  Belongings Taken by patient/family. AVS printed and given to patient. Education completed and questions answered. IV and tele removed. Patient was discharged with no complaints.

## 2022-08-29 ENCOUNTER — PATIENT OUTREACH (OUTPATIENT)
Dept: CASE MANAGEMENT | Age: 77
End: 2022-08-29

## 2022-08-29 NOTE — PROGRESS NOTES
NCM attempted to contact patient for TCM, no answer, unable to LM, MB not setup. NCM will try calling back at a later time.

## 2022-08-30 NOTE — PROGRESS NOTES
NCM attempted to contact the patient for HFU. No answer and unable to leave a message as the VM is not set up. NCM will try again another time.

## 2022-10-24 ENCOUNTER — APPOINTMENT (OUTPATIENT)
Dept: GENERAL RADIOLOGY | Facility: HOSPITAL | Age: 77
End: 2022-10-24
Attending: EMERGENCY MEDICINE
Payer: MEDICARE

## 2022-10-24 ENCOUNTER — HOSPITAL ENCOUNTER (OUTPATIENT)
Facility: HOSPITAL | Age: 77
Setting detail: OBSERVATION
Discharge: HOME HEALTH | End: 2022-10-27
Attending: EMERGENCY MEDICINE | Admitting: HOSPITALIST
Payer: MEDICARE

## 2022-10-24 ENCOUNTER — APPOINTMENT (OUTPATIENT)
Dept: CT IMAGING | Facility: HOSPITAL | Age: 77
End: 2022-10-24
Attending: EMERGENCY MEDICINE
Payer: MEDICARE

## 2022-10-24 DIAGNOSIS — R47.01 EXPRESSIVE APHASIA: ICD-10-CM

## 2022-10-24 DIAGNOSIS — R07.89 CHEST PAIN, ATYPICAL: ICD-10-CM

## 2022-10-24 DIAGNOSIS — R41.82 ALTERED MENTAL STATUS, UNSPECIFIED ALTERED MENTAL STATUS TYPE: Primary | ICD-10-CM

## 2022-10-24 LAB
ALBUMIN SERPL-MCNC: 3.4 G/DL (ref 3.4–5)
ALBUMIN/GLOB SERPL: 0.7 {RATIO} (ref 1–2)
ALP LIVER SERPL-CCNC: 87 U/L
ALT SERPL-CCNC: 15 U/L
ANION GAP SERPL CALC-SCNC: 8 MMOL/L (ref 0–18)
APTT PPP: 29.7 SECONDS (ref 23.3–35.6)
AST SERPL-CCNC: 13 U/L (ref 15–37)
ATRIAL RATE: 68 BPM
BASOPHILS # BLD AUTO: 0.03 X10(3) UL (ref 0–0.2)
BASOPHILS NFR BLD AUTO: 0.5 %
BILIRUB SERPL-MCNC: 0.4 MG/DL (ref 0.1–2)
BILIRUB UR QL STRIP.AUTO: NEGATIVE
BUN BLD-MCNC: 29 MG/DL (ref 7–18)
CALCIUM BLD-MCNC: 9.5 MG/DL (ref 8.5–10.1)
CHLORIDE SERPL-SCNC: 110 MMOL/L (ref 98–112)
CHOLEST SERPL-MCNC: 122 MG/DL (ref ?–200)
CLARITY UR REFRACT.AUTO: CLEAR
CO2 SERPL-SCNC: 19 MMOL/L (ref 21–32)
COLOR UR AUTO: YELLOW
CREAT BLD-MCNC: 2.27 MG/DL
EOSINOPHIL # BLD AUTO: 0.07 X10(3) UL (ref 0–0.7)
EOSINOPHIL NFR BLD AUTO: 1.2 %
ERYTHROCYTE [DISTWIDTH] IN BLOOD BY AUTOMATED COUNT: 13.2 %
EST. AVERAGE GLUCOSE BLD GHB EST-MCNC: 146 MG/DL (ref 68–126)
GFR SERPLBLD BASED ON 1.73 SQ M-ARVRAT: 22 ML/MIN/1.73M2 (ref 60–?)
GLOBULIN PLAS-MCNC: 5.2 G/DL (ref 2.8–4.4)
GLUCOSE BLD-MCNC: 120 MG/DL (ref 70–99)
GLUCOSE BLD-MCNC: 132 MG/DL (ref 70–99)
GLUCOSE BLD-MCNC: 132 MG/DL (ref 70–99)
GLUCOSE BLD-MCNC: 94 MG/DL (ref 70–99)
GLUCOSE UR STRIP.AUTO-MCNC: >=500 MG/DL
HBA1C MFR BLD: 6.7 % (ref ?–5.7)
HCT VFR BLD AUTO: 37.9 %
HDLC SERPL-MCNC: 42 MG/DL (ref 40–59)
HGB BLD-MCNC: 12.7 G/DL
IMM GRANULOCYTES # BLD AUTO: 0.03 X10(3) UL (ref 0–1)
IMM GRANULOCYTES NFR BLD: 0.5 %
INR BLD: 0.97 (ref 0.85–1.16)
KETONES UR STRIP.AUTO-MCNC: NEGATIVE MG/DL
LDLC SERPL CALC-MCNC: 54 MG/DL (ref ?–100)
LEUKOCYTE ESTERASE UR QL STRIP.AUTO: NEGATIVE
LIPASE SERPL-CCNC: 256 U/L (ref 73–393)
LYMPHOCYTES # BLD AUTO: 2.97 X10(3) UL (ref 1–4)
LYMPHOCYTES NFR BLD AUTO: 51.6 %
MCH RBC QN AUTO: 31.9 PG (ref 26–34)
MCHC RBC AUTO-ENTMCNC: 33.5 G/DL (ref 31–37)
MCV RBC AUTO: 95.2 FL
MONOCYTES # BLD AUTO: 0.4 X10(3) UL (ref 0.1–1)
MONOCYTES NFR BLD AUTO: 6.9 %
NEUTROPHILS # BLD AUTO: 2.26 X10 (3) UL (ref 1.5–7.7)
NEUTROPHILS # BLD AUTO: 2.26 X10(3) UL (ref 1.5–7.7)
NEUTROPHILS NFR BLD AUTO: 39.3 %
NITRITE UR QL STRIP.AUTO: NEGATIVE
NONHDLC SERPL-MCNC: 80 MG/DL (ref ?–130)
OSMOLALITY SERPL CALC.SUM OF ELEC: 292 MOSM/KG (ref 275–295)
P AXIS: 61 DEGREES
P-R INTERVAL: 168 MS
PH UR STRIP.AUTO: 5 [PH] (ref 5–8)
PLATELET # BLD AUTO: 306 10(3)UL (ref 150–450)
POTASSIUM SERPL-SCNC: 4.4 MMOL/L (ref 3.5–5.1)
PROT SERPL-MCNC: 8.6 G/DL (ref 6.4–8.2)
PROT UR STRIP.AUTO-MCNC: 100 MG/DL
PROTHROMBIN TIME: 12.9 SECONDS (ref 11.6–14.8)
Q-T INTERVAL: 438 MS
QRS DURATION: 96 MS
QTC CALCULATION (BEZET): 465 MS
R AXIS: -37 DEGREES
RBC # BLD AUTO: 3.98 X10(6)UL
RBC UR QL AUTO: NEGATIVE
SARS-COV-2 RNA RESP QL NAA+PROBE: NOT DETECTED
SODIUM SERPL-SCNC: 137 MMOL/L (ref 136–145)
SP GR UR STRIP.AUTO: 1.02 (ref 1–1.03)
T AXIS: 131 DEGREES
TRIGL SERPL-MCNC: 152 MG/DL (ref 30–149)
TROPONIN I HIGH SENSITIVITY: 48 NG/L
UROBILINOGEN UR STRIP.AUTO-MCNC: <2 MG/DL
VENTRICULAR RATE: 68 BPM
VLDLC SERPL CALC-MCNC: 22 MG/DL (ref 0–30)
WBC # BLD AUTO: 5.8 X10(3) UL (ref 4–11)

## 2022-10-24 PROCEDURE — 99497 ADVNCD CARE PLAN 30 MIN: CPT | Performed by: HOSPITALIST

## 2022-10-24 PROCEDURE — 99220 INITIAL OBSERVATION CARE,LEVL III: CPT | Performed by: HOSPITALIST

## 2022-10-24 PROCEDURE — 70498 CT ANGIOGRAPHY NECK: CPT | Performed by: EMERGENCY MEDICINE

## 2022-10-24 PROCEDURE — 71045 X-RAY EXAM CHEST 1 VIEW: CPT | Performed by: EMERGENCY MEDICINE

## 2022-10-24 PROCEDURE — 70496 CT ANGIOGRAPHY HEAD: CPT | Performed by: EMERGENCY MEDICINE

## 2022-10-24 PROCEDURE — 70450 CT HEAD/BRAIN W/O DYE: CPT | Performed by: EMERGENCY MEDICINE

## 2022-10-24 RX ORDER — SODIUM CHLORIDE 9 MG/ML
INJECTION, SOLUTION INTRAVENOUS CONTINUOUS
Status: ACTIVE | OUTPATIENT
Start: 2022-10-24 | End: 2022-10-24

## 2022-10-24 RX ORDER — ACETAMINOPHEN 650 MG/1
650 SUPPOSITORY RECTAL EVERY 4 HOURS PRN
Status: DISCONTINUED | OUTPATIENT
Start: 2022-10-24 | End: 2022-10-27

## 2022-10-24 RX ORDER — SODIUM CHLORIDE 9 MG/ML
1000 INJECTION, SOLUTION INTRAVENOUS ONCE
Status: COMPLETED | OUTPATIENT
Start: 2022-10-24 | End: 2022-10-24

## 2022-10-24 RX ORDER — DULOXETIN HYDROCHLORIDE 30 MG/1
30 CAPSULE, DELAYED RELEASE ORAL 2 TIMES DAILY
Status: DISCONTINUED | OUTPATIENT
Start: 2022-10-24 | End: 2022-10-27

## 2022-10-24 RX ORDER — ALBUTEROL SULFATE 90 UG/1
2 AEROSOL, METERED RESPIRATORY (INHALATION) EVERY 6 HOURS PRN
Status: DISCONTINUED | OUTPATIENT
Start: 2022-10-24 | End: 2022-10-27

## 2022-10-24 RX ORDER — ASPIRIN 81 MG/1
81 TABLET ORAL DAILY
Status: DISCONTINUED | OUTPATIENT
Start: 2022-10-24 | End: 2022-10-24 | Stop reason: DRUGHIGH

## 2022-10-24 RX ORDER — ENOXAPARIN SODIUM 100 MG/ML
40 INJECTION SUBCUTANEOUS DAILY
Status: DISCONTINUED | OUTPATIENT
Start: 2022-10-24 | End: 2022-10-24 | Stop reason: ALTCHOICE

## 2022-10-24 RX ORDER — PANTOPRAZOLE SODIUM 40 MG/1
40 TABLET, DELAYED RELEASE ORAL
Status: DISCONTINUED | OUTPATIENT
Start: 2022-10-25 | End: 2022-10-27

## 2022-10-24 RX ORDER — METOCLOPRAMIDE HYDROCHLORIDE 5 MG/ML
5 INJECTION INTRAMUSCULAR; INTRAVENOUS EVERY 8 HOURS PRN
Status: DISCONTINUED | OUTPATIENT
Start: 2022-10-24 | End: 2022-10-27

## 2022-10-24 RX ORDER — ASPIRIN 81 MG/1
324 TABLET, CHEWABLE ORAL ONCE
Status: COMPLETED | OUTPATIENT
Start: 2022-10-24 | End: 2022-10-24

## 2022-10-24 RX ORDER — ONDANSETRON 2 MG/ML
4 INJECTION INTRAMUSCULAR; INTRAVENOUS EVERY 6 HOURS PRN
Status: DISCONTINUED | OUTPATIENT
Start: 2022-10-24 | End: 2022-10-27

## 2022-10-24 RX ORDER — ASPIRIN 300 MG/1
300 SUPPOSITORY RECTAL DAILY
Status: DISCONTINUED | OUTPATIENT
Start: 2022-10-25 | End: 2022-10-25

## 2022-10-24 RX ORDER — BISACODYL 10 MG
10 SUPPOSITORY, RECTAL RECTAL
Status: DISCONTINUED | OUTPATIENT
Start: 2022-10-24 | End: 2022-10-27

## 2022-10-24 RX ORDER — POLYETHYLENE GLYCOL 3350 17 G/17G
17 POWDER, FOR SOLUTION ORAL DAILY PRN
Status: DISCONTINUED | OUTPATIENT
Start: 2022-10-24 | End: 2022-10-27

## 2022-10-24 RX ORDER — NICOTINE POLACRILEX 4 MG
15 LOZENGE BUCCAL
Status: DISCONTINUED | OUTPATIENT
Start: 2022-10-24 | End: 2022-10-27

## 2022-10-24 RX ORDER — LEVETIRACETAM 500 MG/1
1000 TABLET ORAL 2 TIMES DAILY
Status: DISCONTINUED | OUTPATIENT
Start: 2022-10-24 | End: 2022-10-27

## 2022-10-24 RX ORDER — IOHEXOL 350 MG/ML
75 INJECTION, SOLUTION INTRAVENOUS
Status: COMPLETED | OUTPATIENT
Start: 2022-10-24 | End: 2022-10-24

## 2022-10-24 RX ORDER — LABETALOL HYDROCHLORIDE 5 MG/ML
10 INJECTION, SOLUTION INTRAVENOUS EVERY 10 MIN PRN
Status: DISCONTINUED | OUTPATIENT
Start: 2022-10-24 | End: 2022-10-27

## 2022-10-24 RX ORDER — SENNOSIDES 8.6 MG
17.2 TABLET ORAL NIGHTLY PRN
Status: DISCONTINUED | OUTPATIENT
Start: 2022-10-24 | End: 2022-10-27

## 2022-10-24 RX ORDER — ASPIRIN 325 MG
325 TABLET ORAL DAILY
Status: DISCONTINUED | OUTPATIENT
Start: 2022-10-25 | End: 2022-10-25

## 2022-10-24 RX ORDER — HEPARIN SODIUM 5000 [USP'U]/ML
5000 INJECTION, SOLUTION INTRAVENOUS; SUBCUTANEOUS EVERY 8 HOURS SCHEDULED
Status: DISCONTINUED | OUTPATIENT
Start: 2022-10-24 | End: 2022-10-27

## 2022-10-24 RX ORDER — ROSUVASTATIN CALCIUM 20 MG/1
20 TABLET, COATED ORAL DAILY
Status: DISCONTINUED | OUTPATIENT
Start: 2022-10-25 | End: 2022-10-27

## 2022-10-24 RX ORDER — HYDRALAZINE HYDROCHLORIDE 20 MG/ML
10 INJECTION INTRAMUSCULAR; INTRAVENOUS EVERY 2 HOUR PRN
Status: DISCONTINUED | OUTPATIENT
Start: 2022-10-24 | End: 2022-10-27

## 2022-10-24 RX ORDER — SODIUM CHLORIDE 9 MG/ML
INJECTION, SOLUTION INTRAVENOUS CONTINUOUS
Status: ACTIVE | OUTPATIENT
Start: 2022-10-24 | End: 2022-10-26

## 2022-10-24 RX ORDER — NICOTINE POLACRILEX 4 MG
30 LOZENGE BUCCAL
Status: DISCONTINUED | OUTPATIENT
Start: 2022-10-24 | End: 2022-10-27

## 2022-10-24 RX ORDER — DEXTROSE MONOHYDRATE 25 G/50ML
50 INJECTION, SOLUTION INTRAVENOUS
Status: DISCONTINUED | OUTPATIENT
Start: 2022-10-24 | End: 2022-10-27

## 2022-10-24 RX ORDER — ALLOPURINOL 100 MG/1
100 TABLET ORAL DAILY
Status: DISCONTINUED | OUTPATIENT
Start: 2022-10-25 | End: 2022-10-27

## 2022-10-24 RX ORDER — ACETAMINOPHEN 325 MG/1
650 TABLET ORAL EVERY 4 HOURS PRN
Status: DISCONTINUED | OUTPATIENT
Start: 2022-10-24 | End: 2022-10-27

## 2022-10-24 NOTE — ED QUICK NOTES
Orders for admission, patient is aware of plan and ready to go upstairs. Any questions, please call ED RN Meghan Myles at extension [phone].      Patient Covid vaccination status: Unvaccinated     COVID Test Ordered in ED: Rapid SARS-CoV-2 by PCR    COVID Suspicion at Admission: Low clinical suspicion for COVID    Running Infusions:      Mental Status/LOC at time of transport: a/ox3    Other pertinent information: pt original NIH was 2, I am now giving pt a 1  CIWA score: N/A   NIH score:  1

## 2022-10-24 NOTE — PROGRESS NOTES
[facility] Pharmacy Note:  Renal Dose Adjustment for enoxaparin (LOVENOX)    Ashley Iverson has been prescribed enoxaparin 40 mg subcutaneously every 24 hours. Estimated Creatinine Clearance: 17.9 mL/min (A) (based on SCr of 2.27 mg/dL (H)). Calculated CrCl less than 20 mL/min so the dose of Enoxaparin (LOVENOX) has been changed to heparin 5000 units every 8 hours per P&T approved protocol. Pharmacy will continue to follow, and make additional adjustments if needed.       Thank you,  Ines Rogel, PharmD  10/24/2022 4:59 PM

## 2022-10-25 ENCOUNTER — NURSE ONLY (OUTPATIENT)
Dept: ELECTROPHYSIOLOGY | Facility: HOSPITAL | Age: 77
End: 2022-10-25
Attending: Other
Payer: MEDICARE

## 2022-10-25 ENCOUNTER — APPOINTMENT (OUTPATIENT)
Dept: MRI IMAGING | Facility: HOSPITAL | Age: 77
End: 2022-10-25
Attending: NURSE PRACTITIONER
Payer: MEDICARE

## 2022-10-25 ENCOUNTER — APPOINTMENT (OUTPATIENT)
Dept: CV DIAGNOSTICS | Facility: HOSPITAL | Age: 77
End: 2022-10-25
Attending: HOSPITALIST
Payer: MEDICARE

## 2022-10-25 ENCOUNTER — APPOINTMENT (OUTPATIENT)
Dept: GENERAL RADIOLOGY | Facility: HOSPITAL | Age: 77
End: 2022-10-25
Attending: HOSPITALIST
Payer: MEDICARE

## 2022-10-25 LAB
ANION GAP SERPL CALC-SCNC: 6 MMOL/L (ref 0–18)
BUN BLD-MCNC: 27 MG/DL (ref 7–18)
CALCIUM BLD-MCNC: 8.7 MG/DL (ref 8.5–10.1)
CHLORIDE SERPL-SCNC: 112 MMOL/L (ref 98–112)
CO2 SERPL-SCNC: 22 MMOL/L (ref 21–32)
CREAT BLD-MCNC: 1.98 MG/DL
GFR SERPLBLD BASED ON 1.73 SQ M-ARVRAT: 26 ML/MIN/1.73M2 (ref 60–?)
GLUCOSE BLD-MCNC: 102 MG/DL (ref 70–99)
GLUCOSE BLD-MCNC: 110 MG/DL (ref 70–99)
GLUCOSE BLD-MCNC: 117 MG/DL (ref 70–99)
GLUCOSE BLD-MCNC: 118 MG/DL (ref 70–99)
GLUCOSE BLD-MCNC: 142 MG/DL (ref 70–99)
GLUCOSE BLD-MCNC: 142 MG/DL (ref 70–99)
GLUCOSE BLD-MCNC: 157 MG/DL (ref 70–99)
OSMOLALITY SERPL CALC.SUM OF ELEC: 296 MOSM/KG (ref 275–295)
POTASSIUM SERPL-SCNC: 3.9 MMOL/L (ref 3.5–5.1)
SODIUM SERPL-SCNC: 140 MMOL/L (ref 136–145)

## 2022-10-25 PROCEDURE — 95819 EEG AWAKE AND ASLEEP: CPT | Performed by: OTHER

## 2022-10-25 PROCEDURE — 99225 SUBSEQUENT OBSERVATION CARE: CPT | Performed by: HOSPITALIST

## 2022-10-25 PROCEDURE — 93306 TTE W/DOPPLER COMPLETE: CPT | Performed by: HOSPITALIST

## 2022-10-25 PROCEDURE — 70551 MRI BRAIN STEM W/O DYE: CPT | Performed by: NURSE PRACTITIONER

## 2022-10-25 PROCEDURE — 74018 RADEX ABDOMEN 1 VIEW: CPT | Performed by: HOSPITALIST

## 2022-10-25 PROCEDURE — 99213 OFFICE O/P EST LOW 20 MIN: CPT | Performed by: OTHER

## 2022-10-25 RX ORDER — CLOPIDOGREL BISULFATE 75 MG/1
75 TABLET ORAL DAILY
Status: DISCONTINUED | OUTPATIENT
Start: 2022-10-25 | End: 2022-10-27

## 2022-10-25 RX ORDER — ASPIRIN 81 MG/1
81 TABLET ORAL DAILY
Status: DISCONTINUED | OUTPATIENT
Start: 2022-10-25 | End: 2022-10-27

## 2022-10-25 RX ORDER — ALPRAZOLAM 0.5 MG/1
0.5 TABLET ORAL
Status: COMPLETED | OUTPATIENT
Start: 2022-10-25 | End: 2022-10-25

## 2022-10-25 NOTE — PLAN OF CARE
Pt A&O 2-3, sometimes is not sure of date. C/O some abd overnight that was relieved with tylenol. On tele, SR. Room air, no SOB. Pt is a fall risk, fall precautions in place. Neuro checks Q4 now. Accu checks Q6hrs. Iv fluids. Echo for am. Pt updated on plan of care, all questions answered. Problem: SAFETY ADULT - FALL  Goal: Free from fall injury  Description: INTERVENTIONS:  - Assess pt frequently for physical needs  - Identify cognitive and physical deficits and behaviors that affect risk of falls.   - [place] fall precautions as indicated by assessment.  - Educate pt/family on patient safety including physical limitations  - Instruct pt to call for assistance with activity based on assessment  - Modify environment to reduce risk of injury  - Provide assistive devices as appropriate  - Consider OT/PT consult to assist with strengthening/mobility  - Encourage toileting schedule  Outcome: Progressing     Problem: PAIN - ADULT  Goal: Verbalizes/displays adequate comfort level or patient's stated pain goal  Description: INTERVENTIONS:  - Encourage pt to monitor pain and request assistance  - Assess pain using appropriate pain scale  - Administer analgesics based on type and severity of pain and evaluate response  - Implement non-pharmacological measures as appropriate and evaluate response  - Consider cultural and social influences on pain and pain management  - Manage/alleviate anxiety  - Utilize distraction and/or relaxation techniques  - Monitor for opioid side effects  - Notify MD/LIP if interventions unsuccessful or patient reports new pain  - Anticipate increased pain with activity and pre-medicate as appropriate  Outcome: Progressing

## 2022-10-25 NOTE — PLAN OF CARE
Resumed care at 0730. Aox3, disoriented to time. Strong extremities, no aphasia. NIH 1. Swallowing ok, PT/OT to see. MRI, echo, EEG done. C/o abdominal pain that comes and goes, notified MD and x-ray ordered.

## 2022-10-25 NOTE — PLAN OF CARE
Patient unsure of her home medications, this writer called [facility] spoke w/ the answering service left message that we need her medication list. Per  they are open 9am-5pm, she will send the message but is not sure there is anyone there unit tomorrow am.

## 2022-10-26 VITALS
TEMPERATURE: 98 F | SYSTOLIC BLOOD PRESSURE: 144 MMHG | HEART RATE: 70 BPM | DIASTOLIC BLOOD PRESSURE: 56 MMHG | WEIGHT: 220 LBS | OXYGEN SATURATION: 96 % | RESPIRATION RATE: 18 BRPM | BODY MASS INDEX: 38 KG/M2

## 2022-10-26 PROBLEM — I10 PRIMARY HYPERTENSION: Status: ACTIVE | Noted: 2017-05-28

## 2022-10-26 LAB
GLUCOSE BLD-MCNC: 129 MG/DL (ref 70–99)
GLUCOSE BLD-MCNC: 136 MG/DL (ref 70–99)
GLUCOSE BLD-MCNC: 178 MG/DL (ref 70–99)
GLUCOSE BLD-MCNC: 180 MG/DL (ref 70–99)

## 2022-10-26 PROCEDURE — 99213 OFFICE O/P EST LOW 20 MIN: CPT | Performed by: NURSE PRACTITIONER

## 2022-10-26 PROCEDURE — 99226 SUBSEQUENT OBSERVATION CARE: CPT | Performed by: STUDENT IN AN ORGANIZED HEALTH CARE EDUCATION/TRAINING PROGRAM

## 2022-10-26 RX ORDER — HALOPERIDOL 5 MG/ML
2 INJECTION INTRAMUSCULAR ONCE
Status: COMPLETED | OUTPATIENT
Start: 2022-10-26 | End: 2022-10-26

## 2022-10-26 RX ORDER — BUTALBITAL, ACETAMINOPHEN AND CAFFEINE 50; 325; 40 MG/1; MG/1; MG/1
1 TABLET ORAL EVERY 4 HOURS PRN
Status: DISCONTINUED | OUTPATIENT
Start: 2022-10-26 | End: 2022-10-27

## 2022-10-26 RX ORDER — LOSARTAN POTASSIUM 50 MG/1
50 TABLET ORAL DAILY
Status: DISCONTINUED | OUTPATIENT
Start: 2022-10-26 | End: 2022-10-27

## 2022-10-26 RX ORDER — METOPROLOL SUCCINATE 50 MG/1
100 TABLET, EXTENDED RELEASE ORAL
Status: DISCONTINUED | OUTPATIENT
Start: 2022-10-26 | End: 2022-10-27

## 2022-10-26 RX ORDER — CLOPIDOGREL BISULFATE 75 MG/1
75 TABLET ORAL DAILY
Qty: 30 TABLET | Refills: 2 | Status: SHIPPED | OUTPATIENT
Start: 2022-10-27

## 2022-10-26 RX ORDER — FAMOTIDINE 20 MG/1
20 TABLET, FILM COATED ORAL DAILY
Qty: 30 TABLET | Refills: 2 | Status: SHIPPED | OUTPATIENT
Start: 2022-10-26

## 2022-10-26 RX ORDER — HALOPERIDOL 5 MG/ML
2 INJECTION INTRAMUSCULAR EVERY 6 HOURS PRN
Status: DISCONTINUED | OUTPATIENT
Start: 2022-10-26 | End: 2022-10-26

## 2022-10-26 NOTE — DISCHARGE INSTRUCTIONS
Detail Level: Detailed
Sometimes managing your health at home requires assistance. The [facility] team has recognized your preference to use [facility], their phone number is [phone]. A representative from the home health agency will contact you or your family to schedule your first visit.
Detail Level: Zone

## 2022-10-26 NOTE — PLAN OF CARE
Patient A&O slightly confused at times. She decided that she was feeling better and wanted to walk to the bathroom and was not taking no for an answer. She made it to the bathroom without issue but wanted a female staff to help her after being in the bathroom. Patient is doing well, sleeping through the night, but is more confused just after waking up.

## 2022-10-26 NOTE — OCCUPATIONAL THERAPY NOTE
OT reviewed Chart. Patient denied services at this time due to a headache. OT will follow and reattempt as schedule permits and patient appropriate.

## 2022-10-26 NOTE — CM/SW NOTE
Patient is current with [address] [place] [address], CM sent clinical updates via [employer] system and called agency with discharge plan update.   Agency contact information placed in AVS.      Iraj Gonsalez RN Case Manager [phone]

## 2022-10-26 NOTE — PLAN OF CARE
Pt A&OX4  RA;VSS  Tele- NSR  Q shift neuro   0.9 @ 75   BP parameters [id number] systolic  Denies pain  SB with walker  Tolerating diet   Staff will continue to monitor     Problem: Patient/Family Goals  Goal: Patient/Family Long Term Goal  Description: Patient's Long Term Goal: To go home    Interventions:  - PT/OT  - Echo/MRI  - OOB  - See additional Care Plan goals for specific interventions  Outcome: Progressing  Goal: Patient/Family Short Term Goal  Description: Patient's Short Term Goal: To get out of bed    Interventions:   - PT/OT  - OOB for meals  - See additional Care Plan goals for specific interventions  Outcome: Progressing     Problem: SAFETY ADULT - FALL  Goal: Free from fall injury  Description: INTERVENTIONS:  - Assess pt frequently for physical needs  - Identify cognitive and physical deficits and behaviors that affect risk of falls.   - [place] fall precautions as indicated by assessment.  - Educate pt/family on patient safety including physical limitations  - Instruct pt to call for assistance with activity based on assessment  - Modify environment to reduce risk of injury  - Provide assistive devices as appropriate  - Consider OT/PT consult to assist with strengthening/mobility  - Encourage toileting schedule  Outcome: Progressing     Problem: PAIN - ADULT  Goal: Verbalizes/displays adequate comfort level or patient's stated pain goal  Description: INTERVENTIONS:  - Encourage pt to monitor pain and request assistance  - Assess pain using appropriate pain scale  - Administer analgesics based on type and severity of pain and evaluate response  - Implement non-pharmacological measures as appropriate and evaluate response  - Consider cultural and social influences on pain and pain management  - Manage/alleviate anxiety  - Utilize distraction and/or relaxation techniques  - Monitor for opioid side effects  - Notify MD/LIP if interventions unsuccessful or patient reports new pain  - Anticipate increased pain with activity and pre-medicate as appropriate  Outcome: Progressing

## 2022-10-27 LAB — GLUCOSE BLD-MCNC: 121 MG/DL (ref 70–99)

## 2022-10-27 PROCEDURE — 99217 OBSERVATION CARE DISCHARGE: CPT | Performed by: STUDENT IN AN ORGANIZED HEALTH CARE EDUCATION/TRAINING PROGRAM

## 2022-10-27 NOTE — PROGRESS NOTES
NURSING DISCHARGE NOTE    Discharged Home via Wheelchair. Accompanied by RN  Belongings Taken by patient/family. AVS discussed and reviewed with patient.   Answered all questions  Printed prescriptions with patient  Patient medically stable to discharge  Family updated on plan

## 2022-10-27 NOTE — PROGRESS NOTES
Patient chart reviewed for discharge: Medication Reconciliation completed, Specialist/PCP follow up listed, and disease specific Instructions/Education included in After Visit Summary. Discharge RN notified patient's RN that AVS details have been added. Patient's RN to notify DC RN if discharge status changes.

## 2022-10-27 NOTE — PLAN OF CARE
Assumed care at 0730   A&Ox2  RA  Denies pain  IV in right hand and right AC - saline locked  Accucheck QID  Refused neuro checks  Walks with walker  Seen resting for most of morning    Safety precautions in place  All needs met at this time  Awaiting discharge    Problem: Patient/Family Goals  Goal: Patient/Family Long Term Goal  Description: Patient's Long Term Goal: To go home    Interventions:  - PT/OT  - Echo/MRI  - OOB  - See additional Care Plan goals for specific interventions  Outcome: Progressing  Goal: Patient/Family Short Term Goal  Description: Patient's Short Term Goal: To get out of bed    Interventions:   - PT/OT  - OOB for meals  - See additional Care Plan goals for specific interventions  Outcome: Progressing     Problem: SAFETY ADULT - FALL  Goal: Free from fall injury  Description: INTERVENTIONS:  - Assess pt frequently for physical needs  - Identify cognitive and physical deficits and behaviors that affect risk of falls.   - [place] fall precautions as indicated by assessment.  - Educate pt/family on patient safety including physical limitations  - Instruct pt to call for assistance with activity based on assessment  - Modify environment to reduce risk of injury  - Provide assistive devices as appropriate  - Consider OT/PT consult to assist with strengthening/mobility  - Encourage toileting schedule  Outcome: Progressing     Problem: PAIN - ADULT  Goal: Verbalizes/displays adequate comfort level or patient's stated pain goal  Description: INTERVENTIONS:  - Encourage pt to monitor pain and request assistance  - Assess pain using appropriate pain scale  - Administer analgesics based on type and severity of pain and evaluate response  - Implement non-pharmacological measures as appropriate and evaluate response  - Consider cultural and social influences on pain and pain management  - Manage/alleviate anxiety  - Utilize distraction and/or relaxation techniques  - Monitor for opioid side effects  - Notify MD/LIP if interventions unsuccessful or patient reports new pain  - Anticipate increased pain with activity and pre-medicate as appropriate  Outcome: Progressing

## 2022-10-27 NOTE — PROGRESS NOTES
Pt A&Ox2-4. Neuro checks Q shift. Cardiac & carb controlled diet. Accuchecks. Patient said she had generalized pain, but declined pain medication. Last evening, patient was confused and believed she heard and saw her family. Pt was heard calling her daughter's name. Per PCT, pt walked out into the cardozo, so she was redirected back to bed. Pt became agitated and continued to believe her family had been at the hospital. Pt refused medications, and wanted to call the police. RN attempted to contact family with no answer. MD paged d/t agitation. IM Haldol ordered and given. Pt's daughter then called RN. Daughter said that no one was able to pick her up and bring her home at that time but that the patient's grandson could pick her up in the morning. Daughter did confirm that patient lived alone.

## 2022-11-26 ENCOUNTER — HOSPITAL ENCOUNTER (EMERGENCY)
Facility: HOSPITAL | Age: 77
Discharge: HOME | End: 2022-11-26
Attending: EMERGENCY MEDICINE
Payer: MEDICARE

## 2022-11-26 ENCOUNTER — APPOINTMENT (OUTPATIENT)
Dept: GENERAL RADIOLOGY | Facility: HOSPITAL | Age: 77
End: 2022-11-26
Payer: MEDICARE

## 2022-11-26 VITALS
TEMPERATURE: 97 F | SYSTOLIC BLOOD PRESSURE: 133 MMHG | WEIGHT: 218.25 LBS | DIASTOLIC BLOOD PRESSURE: 57 MMHG | BODY MASS INDEX: 36.36 KG/M2 | RESPIRATION RATE: 17 BRPM | HEIGHT: 65 IN | HEART RATE: 87 BPM | OXYGEN SATURATION: 100 %

## 2022-11-26 DIAGNOSIS — R53.1 WEAKNESS: ICD-10-CM

## 2022-11-26 DIAGNOSIS — J18.9 COMMUNITY ACQUIRED PNEUMONIA, UNSPECIFIED LATERALITY: Primary | ICD-10-CM

## 2022-11-26 LAB
ALBUMIN SERPL-MCNC: 3.7 G/DL (ref 3.4–5)
ALBUMIN/GLOB SERPL: 0.8 {RATIO} (ref 1–2)
ALP LIVER SERPL-CCNC: 93 U/L
ALT SERPL-CCNC: 17 U/L
ANION GAP SERPL CALC-SCNC: 7 MMOL/L (ref 0–18)
AST SERPL-CCNC: 15 U/L (ref 15–37)
ATRIAL RATE: 89 BPM
BASOPHILS # BLD AUTO: 0.02 X10(3) UL (ref 0–0.2)
BASOPHILS NFR BLD AUTO: 0.3 %
BILIRUB SERPL-MCNC: 0.3 MG/DL (ref 0.1–2)
BILIRUB UR QL STRIP.AUTO: NEGATIVE
BUN BLD-MCNC: 24 MG/DL (ref 7–18)
CALCIUM BLD-MCNC: 9.5 MG/DL (ref 8.5–10.1)
CHLORIDE SERPL-SCNC: 110 MMOL/L (ref 98–112)
CLARITY UR REFRACT.AUTO: CLEAR
CO2 SERPL-SCNC: 24 MMOL/L (ref 21–32)
COLOR UR AUTO: YELLOW
CREAT BLD-MCNC: 2.31 MG/DL
EOSINOPHIL # BLD AUTO: 0 X10(3) UL (ref 0–0.7)
EOSINOPHIL NFR BLD AUTO: 0 %
ERYTHROCYTE [DISTWIDTH] IN BLOOD BY AUTOMATED COUNT: 13.4 %
FLUAV + FLUBV RNA SPEC NAA+PROBE: NEGATIVE
FLUAV + FLUBV RNA SPEC NAA+PROBE: NEGATIVE
GFR SERPLBLD BASED ON 1.73 SQ M-ARVRAT: 21 ML/MIN/1.73M2 (ref 60–?)
GLOBULIN PLAS-MCNC: 4.8 G/DL (ref 2.8–4.4)
GLUCOSE BLD-MCNC: 160 MG/DL (ref 70–99)
GLUCOSE UR STRIP.AUTO-MCNC: 500 MG/DL
HCT VFR BLD AUTO: 34.5 %
HGB BLD-MCNC: 11.2 G/DL
IMM GRANULOCYTES # BLD AUTO: 0.02 X10(3) UL (ref 0–1)
IMM GRANULOCYTES NFR BLD: 0.3 %
KETONES UR STRIP.AUTO-MCNC: NEGATIVE MG/DL
LEUKOCYTE ESTERASE UR QL STRIP.AUTO: NEGATIVE
LYMPHOCYTES # BLD AUTO: 2.53 X10(3) UL (ref 1–4)
LYMPHOCYTES NFR BLD AUTO: 41.3 %
MCH RBC QN AUTO: 31.5 PG (ref 26–34)
MCHC RBC AUTO-ENTMCNC: 32.5 G/DL (ref 31–37)
MCV RBC AUTO: 96.9 FL
MONOCYTES # BLD AUTO: 0.57 X10(3) UL (ref 0.1–1)
MONOCYTES NFR BLD AUTO: 9.3 %
NEUTROPHILS # BLD AUTO: 2.98 X10 (3) UL (ref 1.5–7.7)
NEUTROPHILS # BLD AUTO: 2.98 X10(3) UL (ref 1.5–7.7)
NEUTROPHILS NFR BLD AUTO: 48.8 %
NITRITE UR QL STRIP.AUTO: NEGATIVE
OSMOLALITY SERPL CALC.SUM OF ELEC: 299 MOSM/KG (ref 275–295)
P AXIS: 97 DEGREES
P-R INTERVAL: 182 MS
PH UR STRIP.AUTO: 7 [PH] (ref 5–8)
PLATELET # BLD AUTO: 233 10(3)UL (ref 150–450)
POTASSIUM SERPL-SCNC: 3.8 MMOL/L (ref 3.5–5.1)
PROT SERPL-MCNC: 8.5 G/DL (ref 6.4–8.2)
Q-T INTERVAL: 394 MS
QRS DURATION: 104 MS
QTC CALCULATION (BEZET): 479 MS
R AXIS: -24 DEGREES
RBC # BLD AUTO: 3.56 X10(6)UL
RSV RNA SPEC NAA+PROBE: NEGATIVE
SARS-COV-2 RNA RESP QL NAA+PROBE: NOT DETECTED
SARS-COV-2 RNA RESP QL NAA+PROBE: NOT DETECTED
SODIUM SERPL-SCNC: 141 MMOL/L (ref 136–145)
SP GR UR STRIP.AUTO: 1.02 (ref 1–1.03)
T AXIS: 138 DEGREES
TROPONIN I HIGH SENSITIVITY: 38 NG/L
UROBILINOGEN UR STRIP.AUTO-MCNC: 0.2 MG/DL
VENTRICULAR RATE: 89 BPM
WBC # BLD AUTO: 6.1 X10(3) UL (ref 4–11)

## 2022-11-26 PROCEDURE — 81015 MICROSCOPIC EXAM OF URINE: CPT | Performed by: EMERGENCY MEDICINE

## 2022-11-26 PROCEDURE — 80053 COMPREHEN METABOLIC PANEL: CPT | Performed by: EMERGENCY MEDICINE

## 2022-11-26 PROCEDURE — 96361 HYDRATE IV INFUSION ADD-ON: CPT

## 2022-11-26 PROCEDURE — 96365 THER/PROPH/DIAG IV INF INIT: CPT

## 2022-11-26 PROCEDURE — 81001 URINALYSIS AUTO W/SCOPE: CPT | Performed by: EMERGENCY MEDICINE

## 2022-11-26 PROCEDURE — 99284 EMERGENCY DEPT VISIT MOD MDM: CPT

## 2022-11-26 PROCEDURE — 0241U SARS-COV-2/FLU A AND B/RSV BY PCR (GENEXPERT): CPT | Performed by: EMERGENCY MEDICINE

## 2022-11-26 PROCEDURE — 71045 X-RAY EXAM CHEST 1 VIEW: CPT | Performed by: EMERGENCY MEDICINE

## 2022-11-26 PROCEDURE — 93005 ELECTROCARDIOGRAM TRACING: CPT

## 2022-11-26 PROCEDURE — 85025 COMPLETE CBC W/AUTO DIFF WBC: CPT | Performed by: EMERGENCY MEDICINE

## 2022-11-26 PROCEDURE — 99285 EMERGENCY DEPT VISIT HI MDM: CPT

## 2022-11-26 PROCEDURE — 84484 ASSAY OF TROPONIN QUANT: CPT | Performed by: EMERGENCY MEDICINE

## 2022-11-26 PROCEDURE — 93010 ELECTROCARDIOGRAM REPORT: CPT

## 2022-11-26 RX ORDER — SODIUM CHLORIDE 9 MG/ML
INJECTION, SOLUTION INTRAVENOUS CONTINUOUS
Status: DISCONTINUED | OUTPATIENT
Start: 2022-11-26 | End: 2022-11-26

## 2022-11-26 RX ORDER — AMOXICILLIN AND CLAVULANATE POTASSIUM 875; 125 MG/1; MG/1
1 TABLET, FILM COATED ORAL 2 TIMES DAILY
Qty: 20 TABLET | Refills: 0 | Status: ON HOLD | OUTPATIENT
Start: 2022-11-26 | End: 2022-12-06

## 2022-11-26 NOTE — DISCHARGE INSTRUCTIONS
Start the Augmentin tomorrow. Over-the-counter Tylenol or Advil as needed drink plenty fluids follow-up with Dr. Luis Sue next 2 days return if worse.   You can start the antibiotics tomorrow

## 2022-12-01 ENCOUNTER — APPOINTMENT (OUTPATIENT)
Dept: CT IMAGING | Facility: HOSPITAL | Age: 77
End: 2022-12-01
Attending: EMERGENCY MEDICINE
Payer: MEDICARE

## 2022-12-01 ENCOUNTER — HOSPITAL ENCOUNTER (INPATIENT)
Facility: HOSPITAL | Age: 77
LOS: 5 days | Discharge: HOME HEALTH | DRG: 177 | End: 2022-12-07
Attending: EMERGENCY MEDICINE | Admitting: HOSPITALIST
Payer: MEDICARE

## 2022-12-01 ENCOUNTER — HOSPITAL ENCOUNTER (INPATIENT)
Facility: HOSPITAL | Age: 77
LOS: 5 days | Discharge: HOME | End: 2022-12-07
Attending: EMERGENCY MEDICINE | Admitting: HOSPITALIST
Payer: MEDICARE

## 2022-12-01 ENCOUNTER — APPOINTMENT (OUTPATIENT)
Dept: CT IMAGING | Facility: HOSPITAL | Age: 77
DRG: 177 | End: 2022-12-01
Attending: EMERGENCY MEDICINE
Payer: MEDICARE

## 2022-12-01 ENCOUNTER — APPOINTMENT (OUTPATIENT)
Dept: GENERAL RADIOLOGY | Facility: HOSPITAL | Age: 77
DRG: 177 | End: 2022-12-01
Payer: MEDICARE

## 2022-12-01 ENCOUNTER — HOSPITAL ENCOUNTER (INPATIENT)
Facility: HOSPITAL | Age: 77
End: 2022-12-01
Attending: EMERGENCY MEDICINE | Admitting: HOSPITALIST
Payer: MEDICARE

## 2022-12-01 ENCOUNTER — APPOINTMENT (OUTPATIENT)
Dept: GENERAL RADIOLOGY | Facility: HOSPITAL | Age: 77
End: 2022-12-01
Payer: MEDICARE

## 2022-12-01 DIAGNOSIS — G93.40 ENCEPHALOPATHY: ICD-10-CM

## 2022-12-01 DIAGNOSIS — J18.9 HAP (HOSPITAL-ACQUIRED PNEUMONIA): ICD-10-CM

## 2022-12-01 DIAGNOSIS — U07.1 COVID-19: Primary | ICD-10-CM
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
## 2022-12-01 LAB
ALBUMIN SERPL-MCNC: 3.9 G/DL (ref 3.4–5)
ALBUMIN/GLOB SERPL: 0.7 {RATIO} (ref 1–2)
ALP LIVER SERPL-CCNC: 103 U/L
ALT SERPL-CCNC: 21 U/L
ANION GAP SERPL CALC-SCNC: 7 MMOL/L (ref 0–18)
AST SERPL-CCNC: 16 U/L (ref 15–37)
BASOPHILS # BLD AUTO: 0.01 X10(3) UL (ref 0–0.2)
BASOPHILS NFR BLD AUTO: 0.2 %
BILIRUB SERPL-MCNC: 0.2 MG/DL (ref 0.1–2)
BILIRUB UR QL STRIP.AUTO: NEGATIVE
BUN BLD-MCNC: 27 MG/DL (ref 7–18)
CALCIUM BLD-MCNC: 10.2 MG/DL (ref 8.5–10.1)
CHLORIDE SERPL-SCNC: 104 MMOL/L (ref 98–112)
CO2 SERPL-SCNC: 25 MMOL/L (ref 21–32)
COLOR UR AUTO: YELLOW
CREAT BLD-MCNC: 1.96 MG/DL
EOSINOPHIL # BLD AUTO: 0 X10(3) UL (ref 0–0.7)
EOSINOPHIL NFR BLD AUTO: 0 %
ERYTHROCYTE [DISTWIDTH] IN BLOOD BY AUTOMATED COUNT: 13.4 %
GFR SERPLBLD BASED ON 1.73 SQ M-ARVRAT: 26 ML/MIN/1.73M2 (ref 60–?)
GLOBULIN PLAS-MCNC: 5.8 G/DL (ref 2.8–4.4)
GLUCOSE BLD-MCNC: 122 MG/DL (ref 70–99)
GLUCOSE UR STRIP.AUTO-MCNC: 500 MG/DL
HCT VFR BLD AUTO: 37.5 %
HGB BLD-MCNC: 12.4 G/DL
IMM GRANULOCYTES # BLD AUTO: 0.01 X10(3) UL (ref 0–1)
IMM GRANULOCYTES NFR BLD: 0.2 %
KETONES UR STRIP.AUTO-MCNC: NEGATIVE MG/DL
LACTATE SERPL-SCNC: 1.4 MMOL/L (ref 0.4–2)
LEUKOCYTE ESTERASE UR QL STRIP.AUTO: NEGATIVE
LYMPHOCYTES # BLD AUTO: 1.03 X10(3) UL (ref 1–4)
LYMPHOCYTES NFR BLD AUTO: 20.5 %
MCH RBC QN AUTO: 31.6 PG (ref 26–34)
MCHC RBC AUTO-ENTMCNC: 33.1 G/DL (ref 31–37)
MCV RBC AUTO: 95.7 FL
MONOCYTES # BLD AUTO: 0.64 X10(3) UL (ref 0.1–1)
MONOCYTES NFR BLD AUTO: 12.7 %
NEUTROPHILS # BLD AUTO: 3.33 X10 (3) UL (ref 1.5–7.7)
NEUTROPHILS # BLD AUTO: 3.33 X10(3) UL (ref 1.5–7.7)
NEUTROPHILS NFR BLD AUTO: 66.4 %
NITRITE UR QL STRIP.AUTO: NEGATIVE
OSMOLALITY SERPL CALC.SUM OF ELEC: 288 MOSM/KG (ref 275–295)
PH UR STRIP.AUTO: 6 [PH] (ref 5–8)
PLATELET # BLD AUTO: 202 10(3)UL (ref 150–450)
POTASSIUM SERPL-SCNC: 3.6 MMOL/L (ref 3.5–5.1)
PROT SERPL-MCNC: 9.7 G/DL (ref 6.4–8.2)
PROT UR STRIP.AUTO-MCNC: >=300 MG/DL
RBC # BLD AUTO: 3.92 X10(6)UL
SARS-COV-2 RNA RESP QL NAA+PROBE: DETECTED
SODIUM SERPL-SCNC: 136 MMOL/L (ref 136–145)
SP GR UR STRIP.AUTO: 1.02 (ref 1–1.03)
UROBILINOGEN UR STRIP.AUTO-MCNC: 0.2 MG/DL
WBC # BLD AUTO: 5 X10(3) UL (ref 4–11)

## 2022-12-01 PROCEDURE — 70450 CT HEAD/BRAIN W/O DYE: CPT | Performed by: EMERGENCY MEDICINE

## 2022-12-01 PROCEDURE — 71045 X-RAY EXAM CHEST 1 VIEW: CPT | Performed by: EMERGENCY MEDICINE

## 2022-12-01 RX ORDER — HYDRALAZINE HYDROCHLORIDE 20 MG/ML
5 INJECTION INTRAMUSCULAR; INTRAVENOUS ONCE
Status: COMPLETED | OUTPATIENT
Start: 2022-12-01 | End: 2022-12-01

## 2022-12-01 RX ORDER — SODIUM CHLORIDE 9 MG/ML
1000 INJECTION, SOLUTION INTRAVENOUS ONCE
Status: COMPLETED | OUTPATIENT
Start: 2022-12-01 | End: 2022-12-01
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
## 2022-12-02 PROBLEM — J18.9 HAP (HOSPITAL-ACQUIRED PNEUMONIA): Status: ACTIVE | Noted: 2022-12-02

## 2022-12-02 PROBLEM — G93.40 ENCEPHALOPATHY: Status: ACTIVE | Noted: 2022-12-02

## 2022-12-02 PROBLEM — U07.1 COVID-19: Status: ACTIVE | Noted: 2022-12-02

## 2022-12-02 LAB
ATRIAL RATE: 95 BPM
CK SERPL-CCNC: 182 U/L
GLUCOSE BLD-MCNC: 111 MG/DL (ref 70–99)
GLUCOSE BLD-MCNC: 77 MG/DL (ref 70–99)
GLUCOSE BLD-MCNC: 89 MG/DL (ref 70–99)
GLUCOSE BLD-MCNC: 90 MG/DL (ref 70–99)
GLUCOSE BLD-MCNC: 98 MG/DL (ref 70–99)
NT-PROBNP SERPL-MCNC: 7447 PG/ML (ref ?–450)
P AXIS: 84 DEGREES
P-R INTERVAL: 208 MS
PROCALCITONIN SERPL-MCNC: <0.05 NG/ML (ref ?–0.16)
Q-T INTERVAL: 374 MS
QRS DURATION: 100 MS
QTC CALCULATION (BEZET): 469 MS
R AXIS: -30 DEGREES
T AXIS: 123 DEGREES
VENTRICULAR RATE: 95 BPM

## 2022-12-02 PROCEDURE — XW033E5 INTRODUCTION OF REMDESIVIR ANTI-INFECTIVE INTO PERIPHERAL VEIN, PERCUTANEOUS APPROACH, NEW TECHNOLOGY GROUP 5: ICD-10-PCS | Performed by: HOSPITALIST

## 2022-12-02 PROCEDURE — 99223 1ST HOSP IP/OBS HIGH 75: CPT | Performed by: HOSPITALIST

## 2022-12-02 RX ORDER — GALANTAMINE HYDROBROMIDE 4 MG/1
4 TABLET, FILM COATED ORAL 2 TIMES DAILY
Status: DISCONTINUED | OUTPATIENT
Start: 2022-12-02 | End: 2022-12-03

## 2022-12-02 RX ORDER — HEPARIN SODIUM 5000 [USP'U]/ML
5000 INJECTION, SOLUTION INTRAVENOUS; SUBCUTANEOUS EVERY 8 HOURS SCHEDULED
Status: DISCONTINUED | OUTPATIENT
Start: 2022-12-02 | End: 2022-12-03

## 2022-12-02 RX ORDER — GUAIFENESIN 600 MG/1
600 TABLET, EXTENDED RELEASE ORAL 2 TIMES DAILY
Status: DISCONTINUED | OUTPATIENT
Start: 2022-12-02 | End: 2022-12-07

## 2022-12-02 RX ORDER — NICOTINE POLACRILEX 4 MG
15 LOZENGE BUCCAL
Status: DISCONTINUED | OUTPATIENT
Start: 2022-12-02 | End: 2022-12-07

## 2022-12-02 RX ORDER — SENNOSIDES 8.6 MG
17.2 TABLET ORAL NIGHTLY PRN
Status: DISCONTINUED | OUTPATIENT
Start: 2022-12-02 | End: 2022-12-07

## 2022-12-02 RX ORDER — DEXTROSE MONOHYDRATE 25 G/50ML
50 INJECTION, SOLUTION INTRAVENOUS
Status: DISCONTINUED | OUTPATIENT
Start: 2022-12-02 | End: 2022-12-07

## 2022-12-02 RX ORDER — ONDANSETRON 2 MG/ML
4 INJECTION INTRAMUSCULAR; INTRAVENOUS EVERY 6 HOURS PRN
Status: DISCONTINUED | OUTPATIENT
Start: 2022-12-02 | End: 2022-12-07

## 2022-12-02 RX ORDER — NICOTINE POLACRILEX 4 MG
30 LOZENGE BUCCAL
Status: DISCONTINUED | OUTPATIENT
Start: 2022-12-02 | End: 2022-12-07

## 2022-12-02 RX ORDER — SODIUM CHLORIDE 9 MG/ML
INJECTION, SOLUTION INTRAVENOUS CONTINUOUS
Status: DISCONTINUED | OUTPATIENT
Start: 2022-12-02 | End: 2022-12-07

## 2022-12-02 RX ORDER — ACETAMINOPHEN 500 MG
500 TABLET ORAL EVERY 4 HOURS PRN
Status: DISCONTINUED | OUTPATIENT
Start: 2022-12-02 | End: 2022-12-07

## 2022-12-02 RX ORDER — GABAPENTIN 300 MG/1
600 CAPSULE ORAL NIGHTLY
Status: DISCONTINUED | OUTPATIENT
Start: 2022-12-02 | End: 2022-12-03

## 2022-12-02 RX ORDER — LEVETIRACETAM 500 MG/5ML
1000 INJECTION, SOLUTION, CONCENTRATE INTRAVENOUS EVERY 12 HOURS SCHEDULED
Status: DISCONTINUED | OUTPATIENT
Start: 2022-12-02 | End: 2022-12-07

## 2022-12-02 RX ORDER — ALBUTEROL SULFATE 90 UG/1
4 AEROSOL, METERED RESPIRATORY (INHALATION) EVERY 4 HOURS PRN
Status: DISCONTINUED | OUTPATIENT
Start: 2022-12-02 | End: 2022-12-07

## 2022-12-02 RX ORDER — HYDRALAZINE HYDROCHLORIDE 20 MG/ML
10 INJECTION INTRAMUSCULAR; INTRAVENOUS EVERY 6 HOURS PRN
Status: DISCONTINUED | OUTPATIENT
Start: 2022-12-02 | End: 2022-12-07

## 2022-12-02 RX ORDER — LOSARTAN POTASSIUM 50 MG/1
50 TABLET ORAL DAILY
Status: DISCONTINUED | OUTPATIENT
Start: 2022-12-02 | End: 2022-12-03

## 2022-12-02 RX ORDER — ACETAMINOPHEN 650 MG/1
650 SUPPOSITORY RECTAL EVERY 6 HOURS PRN
Status: DISCONTINUED | OUTPATIENT
Start: 2022-12-02 | End: 2022-12-07

## 2022-12-02 RX ORDER — METOCLOPRAMIDE HYDROCHLORIDE 5 MG/ML
5 INJECTION INTRAMUSCULAR; INTRAVENOUS EVERY 8 HOURS PRN
Status: DISCONTINUED | OUTPATIENT
Start: 2022-12-02 | End: 2022-12-07

## 2022-12-02 RX ORDER — MIRTAZAPINE 15 MG/1
15 TABLET, FILM COATED ORAL NIGHTLY
Status: DISCONTINUED | OUTPATIENT
Start: 2022-12-02 | End: 2022-12-03

## 2022-12-02 RX ORDER — DULOXETIN HYDROCHLORIDE 30 MG/1
30 CAPSULE, DELAYED RELEASE ORAL 2 TIMES DAILY
Status: DISCONTINUED | OUTPATIENT
Start: 2022-12-02 | End: 2022-12-03

## 2022-12-02 RX ORDER — CLOPIDOGREL BISULFATE 75 MG/1
75 TABLET ORAL DAILY
Status: DISCONTINUED | OUTPATIENT
Start: 2022-12-02 | End: 2022-12-03

## 2022-12-02 RX ORDER — DILTIAZEM HYDROCHLORIDE 240 MG/1
240 CAPSULE, EXTENDED RELEASE ORAL DAILY
COMMUNITY
End: 2023-01-26

## 2022-12-02 RX ORDER — BISACODYL 10 MG
10 SUPPOSITORY, RECTAL RECTAL
Status: DISCONTINUED | OUTPATIENT
Start: 2022-12-02 | End: 2022-12-07

## 2022-12-02 RX ORDER — METOPROLOL SUCCINATE 100 MG/1
100 TABLET, EXTENDED RELEASE ORAL
Status: DISCONTINUED | OUTPATIENT
Start: 2022-12-02 | End: 2022-12-03

## 2022-12-02 RX ORDER — POLYETHYLENE GLYCOL 3350 17 G/17G
17 POWDER, FOR SOLUTION ORAL DAILY PRN
Status: DISCONTINUED | OUTPATIENT
Start: 2022-12-02 | End: 2022-12-07

## 2022-12-02 RX ORDER — ASPIRIN 81 MG/1
81 TABLET ORAL DAILY
Status: DISCONTINUED | OUTPATIENT
Start: 2022-12-02 | End: 2022-12-03

## 2022-12-02 RX ORDER — LEVETIRACETAM 500 MG/1
1000 TABLET ORAL 2 TIMES DAILY
Status: DISCONTINUED | OUTPATIENT
Start: 2022-12-02 | End: 2022-12-02

## 2022-12-02 NOTE — ED INITIAL ASSESSMENT (HPI)
Arrives via EMS from home with AMS, family came to check on her today and patietn was less responsive, Seen in ED on 11/26/22 started on Antibiotics for pneumonia.  Pt is lethargic , accu check for EMS was 154

## 2022-12-02 NOTE — SLP NOTE
SPEECH DAILY NOTE - INPATIENT    ASSESSMENT & PLAN   ASSESSMENT  Order received for BSE; chart reviewed. Patient presented from home with AMS, lethargy, and recent PNA. She lives alone but when family went to check on her, noted decreased responsiveness. Head CT negative for acute process. CXR slightly worse compared to recent prior CXR. Medical history includes recent admit here for metabolic encephalopathy, esophageal reflux, CVA, communication difficulty, diverticulitis, neuropathy, seizure disorder. Patient was last seen by SLP here at [facility] for BSE 10/25/22 with recommendation for regular diet with thin liquids. Patient was able to communicate appropriately at that time. Patient currently on isolation for COVID-19. Patient currently NPO. Patient received awake, alert, and made eye contact to SLP upon entry. Patient responded to name, however did not follow commands or participate in conversation. Patient noted with decreased engagement/attention to task and required multiple, frequent verbal stimuli. Patient remained nonverbal throughout, except for occasional single word response \"yes. \"  Patient unable to self administer PO trials therefore SLP provided 1:1 feeding assistance. Patient exhibited anterior spillage, decreased bolus formation, lingual pumping and decreased A-P transit, bolus holding, and suspected decreased pharyngeal swallow response. Patient required max verbal and tactile cues to swallow. Patient unable to demonstrate functional mental status for further PO trials therefore evaluation ceased. Recommend con't NPO with non-oral means for medication. SLP to re-assess tomorrow, or as patient able to participate. Anticipate diet advancement as underlying mental status improves, however await further workup. D/W RN.      Diet Recommendations - Solids: NPO  Diet Recommendations - Liquids: NPO  Medication Administration Recommendations: Non-oral  Treatment Plan  Treatment Plan/Recommendations: SLP to reassess    Interdisciplinary Communication: Discussed with RN            GOALS  Goal #1 SLP to re-assess swallow function as mental status allows  In Progress     FOLLOW UP  Follow Up Needed (Documentation Required): Yes  SLP Follow-up Date: 12/03/22  Number of Visits to Meet Established Goals: 3    Session: 1    If you have any questions, please contact RONY Daly Luite Tee [address] CCC-SLP/L, pager [phone]  Speech-LanguagePathologist

## 2022-12-02 NOTE — PLAN OF CARE
12/2 awake,  open eyes to name, unable to follow command, able to say yes/no , no other words spoken. Able to nod head when asked sometimes. On RA,sat=95%-97%. SR/ST on tele, INCONTINENT. Febrile, tylenol supp. Given. Seen by SLP, failed swallow eval. Kept NPO, on keppra iv, zosyn iv. Ivf infusing. Blood sugar monitoring as ordered. Daughter Elvi Etsrada updated with condition,daughter will  verify dose of diltiazem at home. On droplet/contact isolation for covid. Remdesivir started. Bed alarm on .call light within reach. Problem: PAIN - ADULT  Goal: Verbalizes/displays adequate comfort level or patient's stated pain goal  Description: INTERVENTIONS:  - Encourage pt to monitor pain and request assistance  - Assess pain using appropriate pain scale  - Administer analgesics based on type and severity of pain and evaluate response  - Implement non-pharmacological measures as appropriate and evaluate response  - Consider cultural and social influences on pain and pain management  - Manage/alleviate anxiety  - Utilize distraction and/or relaxation techniques  - Monitor for opioid side effects  - Notify MD/LIP if interventions unsuccessful or patient reports new pain  - Anticipate increased pain with activity and pre-medicate as appropriate  Outcome: Progressing     Problem: RISK FOR INFECTION - ADULT  Goal: Absence of fever/infection during anticipated neutropenic period  Description: INTERVENTIONS  - Monitor WBC  - Administer growth factors as ordered  - Implement neutropenic guidelines  Outcome: Progressing     Problem: SAFETY ADULT - FALL  Goal: Free from fall injury  Description: INTERVENTIONS:  - Assess pt frequently for physical needs  - Identify cognitive and physical deficits and behaviors that affect risk of falls.   - [place] fall precautions as indicated by assessment.  - Educate pt/family on patient safety including physical limitations  - Instruct pt to call for assistance with activity based on assessment  - Modify environment to reduce risk of injury  - Provide assistive devices as appropriate  - Consider OT/PT consult to assist with strengthening/mobility  - Encourage toileting schedule  Outcome: Progressing     Problem: DISCHARGE PLANNING  Goal: Discharge to home or other facility with appropriate resources  Description: INTERVENTIONS:  - Identify barriers to discharge w/pt and caregiver  - Include patient/family/discharge partner in discharge planning  - Arrange for needed discharge resources and transportation as appropriate  - Identify discharge learning needs (meds, wound care, etc)  - Arrange for interpreters to assist at discharge as needed  - Consider post-discharge preferences of patient/family/discharge partner  - Complete POLST form as appropriate  - Assess patient's ability to be responsible for managing their own health  - Refer to Case Management Department for coordinating discharge planning if the patient needs post-hospital services based on physician/LIP order or complex needs related to functional status, cognitive ability or social support system  Outcome: Progressing     Problem: Patient/Family Goals  Goal: Patient/Family Long Term Goal  Description: Patient's Long Term Goal: to be able to go home with proper resources    Interventions:  - ivf, iv antibiotics, tele , blood sugar monitoring, vitals.   - See additional Care Plan goals for specific interventions  Outcome: Progressing  Goal: Patient/Family Short Term Goal  Description: Patient's Short Term Goal:     Interventions:   -   - See additional Care Plan goals for specific interventions  Outcome: Progressing     Problem: Delirium  Goal: Minimize duration of delirium  Description: Interventions:  - Encourage use of hearing aids, eye glasses  - Promote highest level of mobility daily  - Provide frequent reorientation  - Promote wakefulness i.e. lights on, blinds open  - Promote sleep, encourage patient's normal rest cycle i.e. lights off, TV off, minimize noise and interruptions  - Encourage family to assist in orientation and promotion of home routines  Outcome: Progressing     Problem: Diabetes/Glucose Control  Goal: Glucose maintained within prescribed range  Description: INTERVENTIONS:  - Monitor Blood Glucose as ordered  - Assess for signs and symptoms of hyperglycemia and hypoglycemia  - Administer ordered medications to maintain glucose within target range  - Assess barriers to adequate nutritional intake and initiate nutrition consult as needed  - Instruct patient on self management of diabetes  Outcome: Progressing

## 2022-12-02 NOTE — PROGRESS NOTES
Chart reviewed, patient COVID positive, febrile, unknown vaccine status. At high risk for progression given age, comorbidities. Will start IV remdesivir while in house. PT eval.  Dr. Morales [place] to see tomorrow.     Yovani Tay MD  [facility]

## 2022-12-02 NOTE — PROGRESS NOTES
NURSING ADMISSION NOTE      Patient admitted via Cart  Oriented to room 527. Safety precautions initiated. Bed in low position. Call light in reach. Received patient 0130. Unable to assess orientation. VSS. Room air. Patient non-verbal. Failed dysphagia screening. Strict NPO. No further needs at this time.

## 2022-12-02 NOTE — PROGRESS NOTES
12/02/22 0553   Provider Notification   Reason for Communication Review case   Provider Name Sidney Riddle MD   Method of Communication Page   Response Waiting for response     Temp 100.9. Strict NPO.

## 2022-12-03 ENCOUNTER — APPOINTMENT (OUTPATIENT)
Dept: ULTRASOUND IMAGING | Facility: HOSPITAL | Age: 77
DRG: 177 | End: 2022-12-03
Attending: HOSPITALIST
Payer: MEDICARE

## 2022-12-03 ENCOUNTER — APPOINTMENT (OUTPATIENT)
Dept: ULTRASOUND IMAGING | Facility: HOSPITAL | Age: 77
End: 2022-12-03
Attending: HOSPITALIST
Payer: MEDICARE

## 2022-12-03 LAB
ALBUMIN SERPL-MCNC: 2.9 G/DL (ref 3.4–5)
ALBUMIN/GLOB SERPL: 0.7 {RATIO} (ref 1–2)
ALP LIVER SERPL-CCNC: 77 U/L
ALT SERPL-CCNC: 16 U/L
ANION GAP SERPL CALC-SCNC: 7 MMOL/L (ref 0–18)
ANION GAP SERPL CALC-SCNC: 9 MMOL/L (ref 0–18)
APTT PPP: 35.1 SECONDS (ref 23.3–35.6)
APTT PPP: >240 SECONDS (ref 23.3–35.6)
AST SERPL-CCNC: 19 U/L (ref 15–37)
BASOPHILS # BLD AUTO: 0.02 X10(3) UL (ref 0–0.2)
BASOPHILS NFR BLD AUTO: 0.5 %
BILIRUB SERPL-MCNC: 0.3 MG/DL (ref 0.1–2)
BUN BLD-MCNC: 27 MG/DL (ref 7–18)
BUN BLD-MCNC: 28 MG/DL (ref 7–18)
CALCIUM BLD-MCNC: 9.1 MG/DL (ref 8.5–10.1)
CALCIUM BLD-MCNC: 9.1 MG/DL (ref 8.5–10.1)
CHLORIDE SERPL-SCNC: 111 MMOL/L (ref 98–112)
CHLORIDE SERPL-SCNC: 112 MMOL/L (ref 98–112)
CO2 SERPL-SCNC: 19 MMOL/L (ref 21–32)
CO2 SERPL-SCNC: 20 MMOL/L (ref 21–32)
CREAT BLD-MCNC: 1.87 MG/DL
CREAT BLD-MCNC: 1.91 MG/DL
CRP SERPL-MCNC: 8.94 MG/DL (ref ?–0.3)
D DIMER PPP FEU-MCNC: 13.59 UG/ML FEU (ref ?–0.77)
DEPRECATED HBV CORE AB SER IA-ACNC: 341.4 NG/ML
EOSINOPHIL # BLD AUTO: 0 X10(3) UL (ref 0–0.7)
EOSINOPHIL NFR BLD AUTO: 0 %
ERYTHROCYTE [DISTWIDTH] IN BLOOD BY AUTOMATED COUNT: 13.5 %
ERYTHROCYTE [DISTWIDTH] IN BLOOD BY AUTOMATED COUNT: 13.7 %
GFR SERPLBLD BASED ON 1.73 SQ M-ARVRAT: 27 ML/MIN/1.73M2 (ref 60–?)
GFR SERPLBLD BASED ON 1.73 SQ M-ARVRAT: 27 ML/MIN/1.73M2 (ref 60–?)
GLOBULIN PLAS-MCNC: 4.4 G/DL (ref 2.8–4.4)
GLUCOSE BLD-MCNC: 115 MG/DL (ref 70–99)
GLUCOSE BLD-MCNC: 81 MG/DL (ref 70–99)
GLUCOSE BLD-MCNC: 85 MG/DL (ref 70–99)
GLUCOSE BLD-MCNC: 89 MG/DL (ref 70–99)
GLUCOSE BLD-MCNC: 91 MG/DL (ref 70–99)
GLUCOSE BLD-MCNC: 95 MG/DL (ref 70–99)
GLUCOSE BLD-MCNC: 96 MG/DL (ref 70–99)
HCT VFR BLD AUTO: 37.1 %
HCT VFR BLD AUTO: 39.8 %
HGB BLD-MCNC: 12.6 G/DL
HGB BLD-MCNC: 13.1 G/DL
IMM GRANULOCYTES # BLD AUTO: 0.01 X10(3) UL (ref 0–1)
IMM GRANULOCYTES NFR BLD: 0.2 %
LDH SERPL L TO P-CCNC: 208 U/L
LYMPHOCYTES # BLD AUTO: 1.86 X10(3) UL (ref 1–4)
LYMPHOCYTES NFR BLD AUTO: 43.3 %
MCH RBC QN AUTO: 31.9 PG (ref 26–34)
MCH RBC QN AUTO: 32.1 PG (ref 26–34)
MCHC RBC AUTO-ENTMCNC: 32.9 G/DL (ref 31–37)
MCHC RBC AUTO-ENTMCNC: 34 G/DL (ref 31–37)
MCV RBC AUTO: 94.4 FL
MCV RBC AUTO: 96.8 FL
MONOCYTES # BLD AUTO: 0.66 X10(3) UL (ref 0.1–1)
MONOCYTES NFR BLD AUTO: 15.3 %
NEUTROPHILS # BLD AUTO: 1.75 X10 (3) UL (ref 1.5–7.7)
NEUTROPHILS # BLD AUTO: 1.75 X10(3) UL (ref 1.5–7.7)
NEUTROPHILS NFR BLD AUTO: 40.7 %
OSMOLALITY SERPL CALC.SUM OF ELEC: 291 MOSM/KG (ref 275–295)
OSMOLALITY SERPL CALC.SUM OF ELEC: 295 MOSM/KG (ref 275–295)
PLATELET # BLD AUTO: 181 10(3)UL (ref 150–450)
PLATELET # BLD AUTO: 200 10(3)UL (ref 150–450)
POTASSIUM SERPL-SCNC: 3.8 MMOL/L (ref 3.5–5.1)
POTASSIUM SERPL-SCNC: 3.9 MMOL/L (ref 3.5–5.1)
POTASSIUM SERPL-SCNC: 3.9 MMOL/L (ref 3.5–5.1)
PROT SERPL-MCNC: 7.3 G/DL (ref 6.4–8.2)
RBC # BLD AUTO: 3.93 X10(6)UL
RBC # BLD AUTO: 4.11 X10(6)UL
SODIUM SERPL-SCNC: 138 MMOL/L (ref 136–145)
SODIUM SERPL-SCNC: 140 MMOL/L (ref 136–145)
WBC # BLD AUTO: 3.9 X10(3) UL (ref 4–11)
WBC # BLD AUTO: 4.3 X10(3) UL (ref 4–11)

## 2022-12-03 PROCEDURE — 99233 SBSQ HOSP IP/OBS HIGH 50: CPT | Performed by: HOSPITALIST

## 2022-12-03 PROCEDURE — 93970 EXTREMITY STUDY: CPT | Performed by: HOSPITALIST

## 2022-12-03 RX ORDER — HEPARIN SODIUM 1000 [USP'U]/ML
80 INJECTION, SOLUTION INTRAVENOUS; SUBCUTANEOUS ONCE
Status: COMPLETED | OUTPATIENT
Start: 2022-12-03 | End: 2022-12-03

## 2022-12-03 RX ORDER — METOPROLOL TARTRATE 5 MG/5ML
5 INJECTION INTRAVENOUS EVERY 6 HOURS
Status: DISCONTINUED | OUTPATIENT
Start: 2022-12-03 | End: 2022-12-07

## 2022-12-03 RX ORDER — HEPARIN SODIUM AND DEXTROSE 10000; 5 [USP'U]/100ML; G/100ML
INJECTION INTRAVENOUS CONTINUOUS
Status: DISCONTINUED | OUTPATIENT
Start: 2022-12-03 | End: 2022-12-05

## 2022-12-03 RX ORDER — HEPARIN SODIUM AND DEXTROSE 10000; 5 [USP'U]/100ML; G/100ML
18 INJECTION INTRAVENOUS ONCE
Status: COMPLETED | OUTPATIENT
Start: 2022-12-03 | End: 2022-12-03

## 2022-12-03 NOTE — PLAN OF CARE
Problem: PAIN - ADULT  Goal: Verbalizes/displays adequate comfort level or patient's stated pain goal  Description: INTERVENTIONS:  - Encourage pt to monitor pain and request assistance  - Assess pain using appropriate pain scale  - Administer analgesics based on type and severity of pain and evaluate response  - Implement non-pharmacological measures as appropriate and evaluate response  - Consider cultural and social influences on pain and pain management  - Manage/alleviate anxiety  - Utilize distraction and/or relaxation techniques  - Monitor for opioid side effects  - Notify MD/LIP if interventions unsuccessful or patient reports new pain  - Anticipate increased pain with activity and pre-medicate as appropriate  Outcome: Progressing     Problem: RISK FOR INFECTION - ADULT  Goal: Absence of fever/infection during anticipated neutropenic period  Description: INTERVENTIONS  - Monitor WBC  - Administer growth factors as ordered  - Implement neutropenic guidelines  Outcome: Progressing     Problem: SAFETY ADULT - FALL  Goal: Free from fall injury  Description: INTERVENTIONS:  - Assess pt frequently for physical needs  - Identify cognitive and physical deficits and behaviors that affect risk of falls.   - [place] fall precautions as indicated by assessment.  - Educate pt/family on patient safety including physical limitations  - Instruct pt to call for assistance with activity based on assessment  - Modify environment to reduce risk of injury  - Provide assistive devices as appropriate  - Consider OT/PT consult to assist with strengthening/mobility  - Encourage toileting schedule  Outcome: Progressing     Problem: DISCHARGE PLANNING  Goal: Discharge to home or other facility with appropriate resources  Description: INTERVENTIONS:  - Identify barriers to discharge w/pt and caregiver  - Include patient/family/discharge partner in discharge planning  - Arrange for needed discharge resources and transportation as appropriate  - Identify discharge learning needs (meds, wound care, etc)  - Arrange for interpreters to assist at discharge as needed  - Consider post-discharge preferences of patient/family/discharge partner  - Complete POLST form as appropriate  - Assess patient's ability to be responsible for managing their own health  - Refer to Case Management Department for coordinating discharge planning if the patient needs post-hospital services based on physician/LIP order or complex needs related to functional status, cognitive ability or social support system  Outcome: Progressing     Problem: Patient/Family Goals  Goal: Patient/Family Long Term Goal  Description: Patient's Long Term Goal: discharge    Interventions:  - MD rounding  -Tele and  monitoring  -SLP eval  -PT/OT eval  - See additional Care Plan goals for specific interventions  Outcome: Progressing  Goal: Patient/Family Short Term Goal  Description: Patient's Short Term Goal:   12/2 noc: rest, safety  Interventions:   - Safety precautions  -seizure precaution  -Quiet/private room  - monitoring  - See additional Care Plan goals for specific interventions  Outcome: Progressing     Problem: Delirium  Goal: Minimize duration of delirium  Description: Interventions:  - Encourage use of hearing aids, eye glasses  - Promote highest level of mobility daily  - Provide frequent reorientation  - Promote wakefulness i.e. lights on, blinds open  - Promote sleep, encourage patient's normal rest cycle i.e. lights off, TV off, minimize noise and interruptions  - Encourage family to assist in orientation and promotion of home routines  Outcome: Progressing     Problem: Diabetes/Glucose Control  Goal: Glucose maintained within prescribed range  Description: INTERVENTIONS:  - Monitor Blood Glucose as ordered  - Assess for signs and symptoms of hyperglycemia and hypoglycemia  - Administer ordered medications to maintain glucose within target range  - Assess barriers to adequate nutritional intake and initiate nutrition consult as needed  - Instruct patient on self management of diabetes  Outcome: Progressing

## 2022-12-03 NOTE — PLAN OF CARE
Pt is alert- unable to assess orientation, confused. Seizure precautions. DVT findings in L popliteal. RA sating >95%. Tele ST in 120's sustaining- MD aware. Started on Heparin drip. EP (cardiac). IV abx. Remdesivir day2/3. Briefed/incontinent-utilizing purewick . BR c Q2 turns. Strict NPO/QID accuchecks. Awaiting SLP eval.  ID consulted. Pt and family updated on poc. No further needs at this time.     Problem: PAIN - ADULT  Goal: Verbalizes/displays adequate comfort level or patient's stated pain goal  Description: INTERVENTIONS:  - Encourage pt to monitor pain and request assistance  - Assess pain using appropriate pain scale  - Administer analgesics based on type and severity of pain and evaluate response  - Implement non-pharmacological measures as appropriate and evaluate response  - Consider cultural and social influences on pain and pain management  - Manage/alleviate anxiety  - Utilize distraction and/or relaxation techniques  - Monitor for opioid side effects  - Notify MD/LIP if interventions unsuccessful or patient reports new pain  - Anticipate increased pain with activity and pre-medicate as appropriate  Outcome: Progressing

## 2022-12-04 LAB
ALBUMIN SERPL-MCNC: 2.8 G/DL (ref 3.4–5)
ALBUMIN/GLOB SERPL: 0.5 {RATIO} (ref 1–2)
ALP LIVER SERPL-CCNC: 72 U/L
ALT SERPL-CCNC: 20 U/L
ANION GAP SERPL CALC-SCNC: 6 MMOL/L (ref 0–18)
APTT PPP: >240 SECONDS (ref 23.3–35.6)
AST SERPL-CCNC: 24 U/L (ref 15–37)
BASOPHILS # BLD AUTO: 0.03 X10(3) UL (ref 0–0.2)
BASOPHILS NFR BLD AUTO: 0.6 %
BILIRUB SERPL-MCNC: 0.2 MG/DL (ref 0.1–2)
BILIRUB UR QL STRIP.AUTO: NEGATIVE
BUN BLD-MCNC: 28 MG/DL (ref 7–18)
CALCIUM BLD-MCNC: 9 MG/DL (ref 8.5–10.1)
CHLORIDE SERPL-SCNC: 114 MMOL/L (ref 98–112)
CO2 SERPL-SCNC: 20 MMOL/L (ref 21–32)
COLOR UR AUTO: YELLOW
CREAT BLD-MCNC: 1.73 MG/DL
CRP SERPL-MCNC: 7.13 MG/DL (ref ?–0.3)
D DIMER PPP FEU-MCNC: 8.6 UG/ML FEU (ref ?–0.77)
DEPRECATED HBV CORE AB SER IA-ACNC: 479 NG/ML
EOSINOPHIL # BLD AUTO: 0 X10(3) UL (ref 0–0.7)
EOSINOPHIL NFR BLD AUTO: 0 %
ERYTHROCYTE [DISTWIDTH] IN BLOOD BY AUTOMATED COUNT: 13.7 %
GFR SERPLBLD BASED ON 1.73 SQ M-ARVRAT: 30 ML/MIN/1.73M2 (ref 60–?)
GLOBULIN PLAS-MCNC: 5.3 G/DL (ref 2.8–4.4)
GLUCOSE BLD-MCNC: 104 MG/DL (ref 70–99)
GLUCOSE BLD-MCNC: 116 MG/DL (ref 70–99)
GLUCOSE BLD-MCNC: 84 MG/DL (ref 70–99)
GLUCOSE BLD-MCNC: 85 MG/DL (ref 70–99)
GLUCOSE BLD-MCNC: 91 MG/DL (ref 70–99)
GLUCOSE UR STRIP.AUTO-MCNC: >=500 MG/DL
HCT VFR BLD AUTO: 41.4 %
HGB BLD-MCNC: 13.2 G/DL
IMM GRANULOCYTES # BLD AUTO: 0.01 X10(3) UL (ref 0–1)
IMM GRANULOCYTES NFR BLD: 0.2 %
LDH SERPL L TO P-CCNC: 224 U/L
LEUKOCYTE ESTERASE UR QL STRIP.AUTO: NEGATIVE
LYMPHOCYTES # BLD AUTO: 2.54 X10(3) UL (ref 1–4)
LYMPHOCYTES NFR BLD AUTO: 54.4 %
MCH RBC QN AUTO: 31.3 PG (ref 26–34)
MCHC RBC AUTO-ENTMCNC: 31.9 G/DL (ref 31–37)
MCV RBC AUTO: 98.1 FL
MONOCYTES # BLD AUTO: 0.4 X10(3) UL (ref 0.1–1)
MONOCYTES NFR BLD AUTO: 8.6 %
NEUTROPHILS # BLD AUTO: 1.69 X10 (3) UL (ref 1.5–7.7)
NEUTROPHILS # BLD AUTO: 1.69 X10(3) UL (ref 1.5–7.7)
NEUTROPHILS NFR BLD AUTO: 36.2 %
NITRITE UR QL STRIP.AUTO: NEGATIVE
OSMOLALITY SERPL CALC.SUM OF ELEC: 295 MOSM/KG (ref 275–295)
PH UR STRIP.AUTO: 5 [PH] (ref 5–8)
PLATELET # BLD AUTO: 211 10(3)UL (ref 150–450)
POTASSIUM SERPL-SCNC: 3.8 MMOL/L (ref 3.5–5.1)
PROT SERPL-MCNC: 8.1 G/DL (ref 6.4–8.2)
PROT UR STRIP.AUTO-MCNC: >=500 MG/DL
RBC # BLD AUTO: 4.22 X10(6)UL
SODIUM SERPL-SCNC: 140 MMOL/L (ref 136–145)
SP GR UR STRIP.AUTO: 1.01 (ref 1–1.03)
UROBILINOGEN UR STRIP.AUTO-MCNC: <2 MG/DL
WBC # BLD AUTO: 4.7 X10(3) UL (ref 4–11)
YEAST UR QL: PRESENT /HPF

## 2022-12-04 PROCEDURE — 99232 SBSQ HOSP IP/OBS MODERATE 35: CPT | Performed by: HOSPITALIST

## 2022-12-04 RX ORDER — POTASSIUM CHLORIDE 14.9 MG/ML
20 INJECTION INTRAVENOUS ONCE
Status: COMPLETED | OUTPATIENT
Start: 2022-12-04 | End: 2022-12-04

## 2022-12-04 NOTE — PLAN OF CARE
Problem: PAIN - ADULT  Goal: Verbalizes/displays adequate comfort level or patient's stated pain goal  Description: INTERVENTIONS:  - Encourage pt to monitor pain and request assistance  - Assess pain using appropriate pain scale  - Administer analgesics based on type and severity of pain and evaluate response  - Implement non-pharmacological measures as appropriate and evaluate response  - Consider cultural and social influences on pain and pain management  - Manage/alleviate anxiety  - Utilize distraction and/or relaxation techniques  - Monitor for opioid side effects  - Notify MD/LIP if interventions unsuccessful or patient reports new pain  - Anticipate increased pain with activity and pre-medicate as appropriate  Outcome: Progressing     Problem: RISK FOR INFECTION - ADULT  Goal: Absence of fever/infection during anticipated neutropenic period  Description: INTERVENTIONS  - Monitor WBC  - Administer growth factors as ordered  - Implement neutropenic guidelines  Outcome: Progressing     Problem: SAFETY ADULT - FALL  Goal: Free from fall injury  Description: INTERVENTIONS:  - Assess pt frequently for physical needs  - Identify cognitive and physical deficits and behaviors that affect risk of falls.   - [place] fall precautions as indicated by assessment.  - Educate pt/family on patient safety including physical limitations  - Instruct pt to call for assistance with activity based on assessment  - Modify environment to reduce risk of injury  - Provide assistive devices as appropriate  - Consider OT/PT consult to assist with strengthening/mobility  - Encourage toileting schedule  Outcome: Progressing     Problem: DISCHARGE PLANNING  Goal: Discharge to home or other facility with appropriate resources  Description: INTERVENTIONS:  - Identify barriers to discharge w/pt and caregiver  - Include patient/family/discharge partner in discharge planning  - Arrange for needed discharge resources and transportation as appropriate  - Identify discharge learning needs (meds, wound care, etc)  - Arrange for interpreters to assist at discharge as needed  - Consider post-discharge preferences of patient/family/discharge partner  - Complete POLST form as appropriate  - Assess patient's ability to be responsible for managing their own health  - Refer to Case Management Department for coordinating discharge planning if the patient needs post-hospital services based on physician/LIP order or complex needs related to functional status, cognitive ability or social support system  Outcome: Progressing     Problem: Patient/Family Goals  Goal: Patient/Family Long Term Goal  Description: Patient's Long Term Goal: discharge    Interventions:  - MD rounding  -Tele and  monitoring  -SLP eval  -PT/OT eval  - See additional Care Plan goals for specific interventions  Outcome: Progressing  Goal: Patient/Family Short Term Goal  Description: Patient's Short Term Goal:   12/2 noc: rest, safety  12/03(noc): heparin gtt, plan VQ scan for Monday    Interventions:   - Safety precautions  -seizure precaution  -Quiet/private room  - monitoring  - See additional Care Plan goals for specific interventions  Outcome: Progressing     Problem: Delirium  Goal: Minimize duration of delirium  Description: Interventions:  - Encourage use of hearing aids, eye glasses  - Promote highest level of mobility daily  - Provide frequent reorientation  - Promote wakefulness i.e. lights on, blinds open  - Promote sleep, encourage patient's normal rest cycle i.e. lights off, TV off, minimize noise and interruptions  - Encourage family to assist in orientation and promotion of home routines  Outcome: Progressing     Problem: Diabetes/Glucose Control  Goal: Glucose maintained within prescribed range  Description: INTERVENTIONS:  - Monitor Blood Glucose as ordered  - Assess for signs and symptoms of hyperglycemia and hypoglycemia  - Administer ordered medications to maintain glucose within target range  - Assess barriers to adequate nutritional intake and initiate nutrition consult as needed  - Instruct patient on self management of diabetes  Outcome: Progressing     Problem: HEMATOLOGIC - ADULT  Goal: Maintains hematologic stability  Description: INTERVENTIONS  - Assess for signs and symptoms of bleeding or hemorrhage  - Monitor labs and vital signs for trends  - Administer supportive blood products/factors, fluids and medications as ordered and appropriate  - Administer supportive blood products/factors as ordered and appropriate  Outcome: Progressing  Goal: Free from bleeding injury  Description: (Example usage: patient with low platelets)  INTERVENTIONS:  - Avoid intramuscular injections, enemas and rectal medication administration  - Ensure safe mobilization of patient  - Hold pressure on venipuncture sites to achieve adequate hemostasis  - Assess for signs and symptoms of internal bleeding  - Monitor lab trends  - Patient is to report abnormal signs of bleeding to staff  - Avoid use of toothpicks and dental floss  - Use electric shaver for shaving  - Use soft bristle tooth brush  - Limit straining and forceful nose blowing  Outcome: Progressing

## 2022-12-04 NOTE — PLAN OF CARE
Problem: PAIN - ADULT  Goal: Verbalizes/displays adequate comfort level or patient's stated pain goal  Description: INTERVENTIONS:  - Encourage pt to monitor pain and request assistance  - Assess pain using appropriate pain scale  - Administer analgesics based on type and severity of pain and evaluate response  - Implement non-pharmacological measures as appropriate and evaluate response  - Consider cultural and social influences on pain and pain management  - Manage/alleviate anxiety  - Utilize distraction and/or relaxation techniques  - Monitor for opioid side effects  - Notify MD/LIP if interventions unsuccessful or patient reports new pain  - Anticipate increased pain with activity and pre-medicate as appropriate  Outcome: Progressing     Problem: RISK FOR INFECTION - ADULT  Goal: Absence of fever/infection during anticipated neutropenic period  Description: INTERVENTIONS  - Monitor WBC  - Administer growth factors as ordered  - Implement neutropenic guidelines  Outcome: Progressing     Problem: SAFETY ADULT - FALL  Goal: Free from fall injury  Description: INTERVENTIONS:  - Assess pt frequently for physical needs  - Identify cognitive and physical deficits and behaviors that affect risk of falls.   - [place] fall precautions as indicated by assessment.  - Educate pt/family on patient safety including physical limitations  - Instruct pt to call for assistance with activity based on assessment  - Modify environment to reduce risk of injury  - Provide assistive devices as appropriate  - Consider OT/PT consult to assist with strengthening/mobility  - Encourage toileting schedule  Outcome: Progressing     Problem: DISCHARGE PLANNING  Goal: Discharge to home or other facility with appropriate resources  Description: INTERVENTIONS:  - Identify barriers to discharge w/pt and caregiver  - Include patient/family/discharge partner in discharge planning  - Arrange for needed discharge resources and transportation as appropriate  - Identify discharge learning needs (meds, wound care, etc)  - Arrange for interpreters to assist at discharge as needed  - Consider post-discharge preferences of patient/family/discharge partner  - Complete POLST form as appropriate  - Assess patient's ability to be responsible for managing their own health  - Refer to Case Management Department for coordinating discharge planning if the patient needs post-hospital services based on physician/LIP order or complex needs related to functional status, cognitive ability or social support system  Outcome: Progressing     Problem: Patient/Family Goals  Goal: Patient/Family Long Term Goal  Description: Patient's Long Term Goal: discharge    Interventions:  - MD rounding  -Tele and  monitoring  -SLP eval  -PT/OT eval  - See additional Care Plan goals for specific interventions  Outcome: Progressing  Goal: Patient/Family Short Term Goal  Description: Patient's Short Term Goal:   12/2 noc: rest, safety  12/03(noc): heparin gtt, plan VQ scan for Monday  12/4am: monitor heparin gtt     Interventions:   - Safety precautions  -seizure precaution  -Quiet/private room  - monitoring  - See additional Care Plan goals for specific interventions  Outcome: Progressing     Problem: Delirium  Goal: Minimize duration of delirium  Description: Interventions:  - Encourage use of hearing aids, eye glasses  - Promote highest level of mobility daily  - Provide frequent reorientation  - Promote wakefulness i.e. lights on, blinds open  - Promote sleep, encourage patient's normal rest cycle i.e. lights off, TV off, minimize noise and interruptions  - Encourage family to assist in orientation and promotion of home routines  Outcome: Progressing     Problem: Diabetes/Glucose Control  Goal: Glucose maintained within prescribed range  Description: INTERVENTIONS:  - Monitor Blood Glucose as ordered  - Assess for signs and symptoms of hyperglycemia and hypoglycemia  - Administer ordered medications to maintain glucose within target range  - Assess barriers to adequate nutritional intake and initiate nutrition consult as needed  - Instruct patient on self management of diabetes  Outcome: Progressing     Problem: HEMATOLOGIC - ADULT  Goal: Maintains hematologic stability  Description: INTERVENTIONS  - Assess for signs and symptoms of bleeding or hemorrhage  - Monitor labs and vital signs for trends  - Administer supportive blood products/factors, fluids and medications as ordered and appropriate  - Administer supportive blood products/factors as ordered and appropriate  Outcome: Progressing  Goal: Free from bleeding injury  Description: (Example usage: patient with low platelets)  INTERVENTIONS:  - Avoid intramuscular injections, enemas and rectal medication administration  - Ensure safe mobilization of patient  - Hold pressure on venipuncture sites to achieve adequate hemostasis  - Assess for signs and symptoms of internal bleeding  - Monitor lab trends  - Patient is to report abnormal signs of bleeding to staff  - Avoid use of toothpicks and dental floss  - Use electric shaver for shaving  - Use soft bristle tooth brush  - Limit straining and forceful nose blowing  Outcome: Progressing

## 2022-12-04 NOTE — PROGRESS NOTES
12/03/22 2206   Provider Notification   Reason for Communication Critical value   Provider Name Bethany Escalona MD   Method of Communication Page   Response Waiting for response   Notification Time 2207 FYI - Critial results - PTT >240 - pt was started on IV heparin gtt for DVT, possible PE - redraw PTT >240 - per protocol - hold for 60 mins, restart lower dose - recheck PTT in am.

## 2022-12-05 ENCOUNTER — APPOINTMENT (OUTPATIENT)
Dept: GENERAL RADIOLOGY | Facility: HOSPITAL | Age: 77
DRG: 177 | End: 2022-12-05
Attending: HOSPITALIST
Payer: MEDICARE

## 2022-12-05 ENCOUNTER — APPOINTMENT (OUTPATIENT)
Dept: NUCLEAR MEDICINE | Facility: HOSPITAL | Age: 77
End: 2022-12-05
Attending: HOSPITALIST
Payer: MEDICARE

## 2022-12-05 ENCOUNTER — APPOINTMENT (OUTPATIENT)
Dept: NUCLEAR MEDICINE | Facility: HOSPITAL | Age: 77
DRG: 177 | End: 2022-12-05
Attending: HOSPITALIST
Payer: MEDICARE

## 2022-12-05 ENCOUNTER — APPOINTMENT (OUTPATIENT)
Dept: GENERAL RADIOLOGY | Facility: HOSPITAL | Age: 77
End: 2022-12-05
Attending: HOSPITALIST
Payer: MEDICARE

## 2022-12-05 LAB
ALBUMIN SERPL-MCNC: 2.7 G/DL (ref 3.4–5)
ALBUMIN/GLOB SERPL: 0.6 {RATIO} (ref 1–2)
ALP LIVER SERPL-CCNC: 68 U/L
ALT SERPL-CCNC: 16 U/L
ANION GAP SERPL CALC-SCNC: 4 MMOL/L (ref 0–18)
APTT PPP: 117.8 SECONDS (ref 23.3–35.6)
APTT PPP: 181.4 SECONDS (ref 23.3–35.6)
APTT PPP: 50.8 SECONDS (ref 23.3–35.6)
AST SERPL-CCNC: 20 U/L (ref 15–37)
BASOPHILS # BLD AUTO: 0.01 X10(3) UL (ref 0–0.2)
BASOPHILS NFR BLD AUTO: 0.2 %
BILIRUB SERPL-MCNC: 0.2 MG/DL (ref 0.1–2)
BUN BLD-MCNC: 32 MG/DL (ref 7–18)
CALCIUM BLD-MCNC: 8.5 MG/DL (ref 8.5–10.1)
CHLORIDE SERPL-SCNC: 117 MMOL/L (ref 98–112)
CO2 SERPL-SCNC: 22 MMOL/L (ref 21–32)
CREAT BLD-MCNC: 1.67 MG/DL
CRP SERPL-MCNC: 6.72 MG/DL (ref ?–0.3)
D DIMER PPP FEU-MCNC: 3.36 UG/ML FEU (ref ?–0.77)
DEPRECATED HBV CORE AB SER IA-ACNC: 460.3 NG/ML
EOSINOPHIL # BLD AUTO: 0 X10(3) UL (ref 0–0.7)
EOSINOPHIL NFR BLD AUTO: 0 %
ERYTHROCYTE [DISTWIDTH] IN BLOOD BY AUTOMATED COUNT: 13.6 %
GFR SERPLBLD BASED ON 1.73 SQ M-ARVRAT: 31 ML/MIN/1.73M2 (ref 60–?)
GLOBULIN PLAS-MCNC: 4.6 G/DL (ref 2.8–4.4)
GLUCOSE BLD-MCNC: 106 MG/DL (ref 70–99)
GLUCOSE BLD-MCNC: 107 MG/DL (ref 70–99)
GLUCOSE BLD-MCNC: 128 MG/DL (ref 70–99)
GLUCOSE BLD-MCNC: 170 MG/DL (ref 70–99)
GLUCOSE BLD-MCNC: 191 MG/DL (ref 70–99)
HCT VFR BLD AUTO: 35.4 %
HGB BLD-MCNC: 11.7 G/DL
IMM GRANULOCYTES # BLD AUTO: 0.01 X10(3) UL (ref 0–1)
IMM GRANULOCYTES NFR BLD: 0.2 %
LDH SERPL L TO P-CCNC: 209 U/L
LYMPHOCYTES # BLD AUTO: 2.18 X10(3) UL (ref 1–4)
LYMPHOCYTES NFR BLD AUTO: 52 %
MCH RBC QN AUTO: 31.6 PG (ref 26–34)
MCHC RBC AUTO-ENTMCNC: 33.1 G/DL (ref 31–37)
MCV RBC AUTO: 95.7 FL
MONOCYTES # BLD AUTO: 0.35 X10(3) UL (ref 0.1–1)
MONOCYTES NFR BLD AUTO: 8.4 %
NEUTROPHILS # BLD AUTO: 1.64 X10 (3) UL (ref 1.5–7.7)
NEUTROPHILS # BLD AUTO: 1.64 X10(3) UL (ref 1.5–7.7)
NEUTROPHILS NFR BLD AUTO: 39.2 %
OSMOLALITY SERPL CALC.SUM OF ELEC: 303 MOSM/KG (ref 275–295)
PLATELET # BLD AUTO: 214 10(3)UL (ref 150–450)
POTASSIUM SERPL-SCNC: 3.9 MMOL/L (ref 3.5–5.1)
POTASSIUM SERPL-SCNC: 3.9 MMOL/L (ref 3.5–5.1)
PROT SERPL-MCNC: 7.3 G/DL (ref 6.4–8.2)
RBC # BLD AUTO: 3.7 X10(6)UL
SODIUM SERPL-SCNC: 143 MMOL/L (ref 136–145)
WBC # BLD AUTO: 4.2 X10(3) UL (ref 4–11)

## 2022-12-05 PROCEDURE — 78580 LUNG PERFUSION IMAGING: CPT | Performed by: HOSPITALIST

## 2022-12-05 PROCEDURE — 99232 SBSQ HOSP IP/OBS MODERATE 35: CPT | Performed by: HOSPITALIST

## 2022-12-05 PROCEDURE — 71045 X-RAY EXAM CHEST 1 VIEW: CPT | Performed by: HOSPITALIST

## 2022-12-05 NOTE — PLAN OF CARE
Problem: PAIN - ADULT  Goal: Verbalizes/displays adequate comfort level or patient's stated pain goal  Description: INTERVENTIONS:  - Encourage pt to monitor pain and request assistance  - Assess pain using appropriate pain scale  - Administer analgesics based on type and severity of pain and evaluate response  - Implement non-pharmacological measures as appropriate and evaluate response  - Consider cultural and social influences on pain and pain management  - Manage/alleviate anxiety  - Utilize distraction and/or relaxation techniques  - Monitor for opioid side effects  - Notify MD/LIP if interventions unsuccessful or patient reports new pain  - Anticipate increased pain with activity and pre-medicate as appropriate  Outcome: Progressing     Problem: RISK FOR INFECTION - ADULT  Goal: Absence of fever/infection during anticipated neutropenic period  Description: INTERVENTIONS  - Monitor WBC  - Administer growth factors as ordered  - Implement neutropenic guidelines  Outcome: Progressing     Problem: SAFETY ADULT - FALL  Goal: Free from fall injury  Description: INTERVENTIONS:  - Assess pt frequently for physical needs  - Identify cognitive and physical deficits and behaviors that affect risk of falls.   - [place] fall precautions as indicated by assessment.  - Educate pt/family on patient safety including physical limitations  - Instruct pt to call for assistance with activity based on assessment  - Modify environment to reduce risk of injury  - Provide assistive devices as appropriate  - Consider OT/PT consult to assist with strengthening/mobility  - Encourage toileting schedule  Outcome: Progressing     Problem: DISCHARGE PLANNING  Goal: Discharge to home or other facility with appropriate resources  Description: INTERVENTIONS:  - Identify barriers to discharge w/pt and caregiver  - Include patient/family/discharge partner in discharge planning  - Arrange for needed discharge resources and transportation as appropriate  - Identify discharge learning needs (meds, wound care, etc)  - Arrange for interpreters to assist at discharge as needed  - Consider post-discharge preferences of patient/family/discharge partner  - Complete POLST form as appropriate  - Assess patient's ability to be responsible for managing their own health  - Refer to Case Management Department for coordinating discharge planning if the patient needs post-hospital services based on physician/LIP order or complex needs related to functional status, cognitive ability or social support system  Outcome: Progressing     Problem: Patient/Family Goals  Goal: Patient/Family Long Term Goal  Description: Patient's Long Term Goal: discharge    Interventions:  - MD rounding  -Tele and  monitoring  -SLP eval  -PT/OT eval  - See additional Care Plan goals for specific interventions  Outcome: Progressing  Goal: Patient/Family Short Term Goal  Description: Patient's Short Term Goal:   12/2 noc: rest, safety  12/03(noc): heparin gtt, plan VQ scan for Monday  12/4am: monitor heparin gtt   12/4 noc: manage heparin gtt  12/5am: pass speech eval     Interventions:   - Safety precautions  -seizure precaution  -Quiet/private room  - monitoring  - See additional Care Plan goals for specific interventions  Outcome: Progressing     Problem: Delirium  Goal: Minimize duration of delirium  Description: Interventions:  - Encourage use of hearing aids, eye glasses  - Promote highest level of mobility daily  - Provide frequent reorientation  - Promote wakefulness i.e. lights on, blinds open  - Promote sleep, encourage patient's normal rest cycle i.e. lights off, TV off, minimize noise and interruptions  - Encourage family to assist in orientation and promotion of home routines  Outcome: Progressing     Problem: Diabetes/Glucose Control  Goal: Glucose maintained within prescribed range  Description: INTERVENTIONS:  - Monitor Blood Glucose as ordered  - Assess for signs and symptoms of hyperglycemia and hypoglycemia  - Administer ordered medications to maintain glucose within target range  - Assess barriers to adequate nutritional intake and initiate nutrition consult as needed  - Instruct patient on self management of diabetes  Outcome: Progressing     Problem: HEMATOLOGIC - ADULT  Goal: Maintains hematologic stability  Description: INTERVENTIONS  - Assess for signs and symptoms of bleeding or hemorrhage  - Monitor labs and vital signs for trends  - Administer supportive blood products/factors, fluids and medications as ordered and appropriate  - Administer supportive blood products/factors as ordered and appropriate  Outcome: Progressing  Goal: Free from bleeding injury  Description: (Example usage: patient with low platelets)  INTERVENTIONS:  - Avoid intramuscular injections, enemas and rectal medication administration  - Ensure safe mobilization of patient  - Hold pressure on venipuncture sites to achieve adequate hemostasis  - Assess for signs and symptoms of internal bleeding  - Monitor lab trends  - Patient is to report abnormal signs of bleeding to staff  - Avoid use of toothpicks and dental floss  - Use electric shaver for shaving  - Use soft bristle tooth brush  - Limit straining and forceful nose blowing  Outcome: Progressing

## 2022-12-05 NOTE — SLP NOTE
SPEECH DAILY NOTE - INPATIENT    ASSESSMENT & PLAN   ASSESSMENT  Patient seen today for ongoing assessment of oropharyngeal swallow. Patient more alert today, but remains oriented to name only. Patient agreeable to PO trials today and HOB elevated. PO trials of thin liquid, puree, and minced solids provided. Bolus acceptance was adequate without evidence of anterior bolus loss. Mastication of soft solids was prolonged and patient required cues to continue. Patient is edentulous and without dentures during my visit. She was unable to report whether or not she typically wears them. No overt s/s of aspiration observed throughout. Recommend patient initiate a pureed diet and thin liquids. Monitor patient's mental status for appropriateness for PO intake as she seems to wax/wane throughout the day. Patient should be in an upright seated position for all PO intake. SLP will continue to follow to monitor diet tolerance and adjust as appropriate. Diet Recommendations - Solids: Puree  Diet Recommendations - Liquids: Thin Liquids    Compensatory Strategies Recommended: Small bites and sips  Aspiration Precautions: Upright position  Medication Administration Recommendations: One pill at a time                     Discharge Recommendations       Treatment Plan  Treatment Plan/Recommendations: Dysphagia therapy    Interdisciplinary Communication: Discussed with RN            GOALS  Goal #1 SLP to re-assess swallow function as mental status allows  Met   Goal #2 The patient will tolerate puree consistency and thin liquids without overt signs or symptoms of aspiration with 90 % accuracy over 1-2 session(s). In Progress   Goal #3 The patient will tolerate trial upgrade of regular consistency and thin liquids without overt signs or symptoms of aspiration with 90 % accuracy over 1-2 session(s).   Not Addressed                              FOLLOW UP  Follow Up Needed (Documentation Required): Yes  SLP Follow-up Date: 12/07/22  Number of Visits to Meet Established Goals: 3    Session: 3; additional 2-3 visits for diet advancement    If you have any questions, please contact RONY Delgadillo

## 2022-12-06 LAB
ALBUMIN SERPL-MCNC: 2.6 G/DL (ref 3.4–5)
ALBUMIN/GLOB SERPL: 0.7 {RATIO} (ref 1–2)
ALP LIVER SERPL-CCNC: 70 U/L
ALT SERPL-CCNC: 17 U/L
ANION GAP SERPL CALC-SCNC: 8 MMOL/L (ref 0–18)
ANION GAP SERPL CALC-SCNC: 8 MMOL/L (ref 0–18)
AST SERPL-CCNC: 22 U/L (ref 15–37)
BILIRUB SERPL-MCNC: 0.2 MG/DL (ref 0.1–2)
BUN BLD-MCNC: 39 MG/DL (ref 7–18)
BUN BLD-MCNC: 39 MG/DL (ref 7–18)
CALCIUM BLD-MCNC: 8.3 MG/DL (ref 8.5–10.1)
CALCIUM BLD-MCNC: 8.3 MG/DL (ref 8.5–10.1)
CHLORIDE SERPL-SCNC: 119 MMOL/L (ref 98–112)
CHLORIDE SERPL-SCNC: 120 MMOL/L (ref 98–112)
CO2 SERPL-SCNC: 17 MMOL/L (ref 21–32)
CO2 SERPL-SCNC: 18 MMOL/L (ref 21–32)
CREAT BLD-MCNC: 1.78 MG/DL
CREAT BLD-MCNC: 1.78 MG/DL
ERYTHROCYTE [DISTWIDTH] IN BLOOD BY AUTOMATED COUNT: 13.7 %
GFR SERPLBLD BASED ON 1.73 SQ M-ARVRAT: 29 ML/MIN/1.73M2 (ref 60–?)
GFR SERPLBLD BASED ON 1.73 SQ M-ARVRAT: 29 ML/MIN/1.73M2 (ref 60–?)
GLOBULIN PLAS-MCNC: 4 G/DL (ref 2.8–4.4)
GLUCOSE BLD-MCNC: 105 MG/DL (ref 70–99)
GLUCOSE BLD-MCNC: 115 MG/DL (ref 70–99)
GLUCOSE BLD-MCNC: 117 MG/DL (ref 70–99)
GLUCOSE BLD-MCNC: 123 MG/DL (ref 70–99)
GLUCOSE BLD-MCNC: 125 MG/DL (ref 70–99)
GLUCOSE BLD-MCNC: 160 MG/DL (ref 70–99)
HCT VFR BLD AUTO: 30.9 %
HGB BLD-MCNC: 9.6 G/DL
MCH RBC QN AUTO: 31.7 PG (ref 26–34)
MCHC RBC AUTO-ENTMCNC: 31.1 G/DL (ref 31–37)
MCV RBC AUTO: 102 FL
OSMOLALITY SERPL CALC.SUM OF ELEC: 311 MOSM/KG (ref 275–295)
OSMOLALITY SERPL CALC.SUM OF ELEC: 311 MOSM/KG (ref 275–295)
PLATELET # BLD AUTO: 170 10(3)UL (ref 150–450)
PLATELET # BLD AUTO: 170 10(3)UL (ref 150–450)
POTASSIUM SERPL-SCNC: 4.1 MMOL/L (ref 3.5–5.1)
POTASSIUM SERPL-SCNC: 4.1 MMOL/L (ref 3.5–5.1)
PROT SERPL-MCNC: 6.6 G/DL (ref 6.4–8.2)
RBC # BLD AUTO: 3.03 X10(6)UL
SODIUM SERPL-SCNC: 145 MMOL/L (ref 136–145)
SODIUM SERPL-SCNC: 145 MMOL/L (ref 136–145)
WBC # BLD AUTO: 5.4 X10(3) UL (ref 4–11)

## 2022-12-06 PROCEDURE — 99232 SBSQ HOSP IP/OBS MODERATE 35: CPT | Performed by: HOSPITALIST

## 2022-12-06 NOTE — PLAN OF CARE
Problem: PAIN - ADULT  Goal: Verbalizes/displays adequate comfort level or patient's stated pain goal  Description: INTERVENTIONS:  - Encourage pt to monitor pain and request assistance  - Assess pain using appropriate pain scale  - Administer analgesics based on type and severity of pain and evaluate response  - Implement non-pharmacological measures as appropriate and evaluate response  - Consider cultural and social influences on pain and pain management  - Manage/alleviate anxiety  - Utilize distraction and/or relaxation techniques  - Monitor for opioid side effects  - Notify MD/LIP if interventions unsuccessful or patient reports new pain  - Anticipate increased pain with activity and pre-medicate as appropriate  Outcome: Progressing     Problem: RISK FOR INFECTION - ADULT  Goal: Absence of fever/infection during anticipated neutropenic period  Description: INTERVENTIONS  - Monitor WBC  - Administer growth factors as ordered  - Implement neutropenic guidelines  Outcome: Progressing     Problem: SAFETY ADULT - FALL  Goal: Free from fall injury  Description: INTERVENTIONS:  - Assess pt frequently for physical needs  - Identify cognitive and physical deficits and behaviors that affect risk of falls.   - [place] fall precautions as indicated by assessment.  - Educate pt/family on patient safety including physical limitations  - Instruct pt to call for assistance with activity based on assessment  - Modify environment to reduce risk of injury  - Provide assistive devices as appropriate  - Consider OT/PT consult to assist with strengthening/mobility  - Encourage toileting schedule  Outcome: Progressing     Problem: DISCHARGE PLANNING  Goal: Discharge to home or other facility with appropriate resources  Description: INTERVENTIONS:  - Identify barriers to discharge w/pt and caregiver  - Include patient/family/discharge partner in discharge planning  - Arrange for needed discharge resources and transportation as appropriate  - Identify discharge learning needs (meds, wound care, etc)  - Arrange for interpreters to assist at discharge as needed  - Consider post-discharge preferences of patient/family/discharge partner  - Complete POLST form as appropriate  - Assess patient's ability to be responsible for managing their own health  - Refer to Case Management Department for coordinating discharge planning if the patient needs post-hospital services based on physician/LIP order or complex needs related to functional status, cognitive ability or social support system  Outcome: Progressing     Problem: Patient/Family Goals  Goal: Patient/Family Long Term Goal  Description: Patient's Long Term Goal: discharge    Interventions:  - MD rounding  -Tele and  monitoring  -SLP eval  -PT/OT eval  - See additional Care Plan goals for specific interventions  Outcome: Progressing  Goal: Patient/Family Short Term Goal  Description: Patient's Short Term Goal:   12/2 noc: rest, safety  12/03(noc): heparin gtt, plan VQ scan for Monday  12/4am: monitor heparin gtt   12/4 noc: manage heparin gtt  12/5am: pass speech eval   12/5 NOC: sleep  12/6am: remain comfortable     Interventions:   - Safety precautions  -seizure precaution  -Quiet/private room  - monitoring  - See additional Care Plan goals for specific interventions  Outcome: Progressing     Problem: Delirium  Goal: Minimize duration of delirium  Description: Interventions:  - Encourage use of hearing aids, eye glasses  - Promote highest level of mobility daily  - Provide frequent reorientation  - Promote wakefulness i.e. lights on, blinds open  - Promote sleep, encourage patient's normal rest cycle i.e. lights off, TV off, minimize noise and interruptions  - Encourage family to assist in orientation and promotion of home routines  Outcome: Progressing     Problem: Diabetes/Glucose Control  Goal: Glucose maintained within prescribed range  Description: INTERVENTIONS:  - Monitor Blood Glucose as ordered  - Assess for signs and symptoms of hyperglycemia and hypoglycemia  - Administer ordered medications to maintain glucose within target range  - Assess barriers to adequate nutritional intake and initiate nutrition consult as needed  - Instruct patient on self management of diabetes  Outcome: Progressing     Problem: HEMATOLOGIC - ADULT  Goal: Maintains hematologic stability  Description: INTERVENTIONS  - Assess for signs and symptoms of bleeding or hemorrhage  - Monitor labs and vital signs for trends  - Administer supportive blood products/factors, fluids and medications as ordered and appropriate  - Administer supportive blood products/factors as ordered and appropriate  Outcome: Progressing  Goal: Free from bleeding injury  Description: (Example usage: patient with low platelets)  INTERVENTIONS:  - Avoid intramuscular injections, enemas and rectal medication administration  - Ensure safe mobilization of patient  - Hold pressure on venipuncture sites to achieve adequate hemostasis  - Assess for signs and symptoms of internal bleeding  - Monitor lab trends  - Patient is to report abnormal signs of bleeding to staff  - Avoid use of toothpicks and dental floss  - Use electric shaver for shaving  - Use soft bristle tooth brush  - Limit straining and forceful nose blowing  Outcome: Progressing

## 2022-12-06 NOTE — CM/SW NOTE
Department  notified of request for [facility] referrals started. Assigned CM/SW to follow up with pt/family on further discharge planning.      Arti Albert  [facility]

## 2022-12-06 NOTE — DISCHARGE INSTRUCTIONS
[facility] [phone]    To establish care with a primary care physician or specialist, please call the [facility] Physician Referral line at [phone] or visit [email or web address]

## 2022-12-06 NOTE — PLAN OF CARE
Pt is A&Ox1. Expressive aphasia. VSS, afebrile. Maintaining O2 sats WDL on RA. JHONY- no cpap. Encouraged IS. Tele, NSR/ST. No. HERMELINDA. EP. Last BM 12/5. Carb controlled diet, QID accuchek, pureed. Incontinent, briefed, Vinetta Like in place. Total lift, Q2 turn. Denies pain. PIV, IVF. No further needs at this time, continue POC. Safety precautions in place: seizure precations    Problem: PAIN - ADULT  Goal: Verbalizes/displays adequate comfort level or patient's stated pain goal  Description: INTERVENTIONS:  - Encourage pt to monitor pain and request assistance  - Assess pain using appropriate pain scale  - Administer analgesics based on type and severity of pain and evaluate response  - Implement non-pharmacological measures as appropriate and evaluate response  - Consider cultural and social influences on pain and pain management  - Manage/alleviate anxiety  - Utilize distraction and/or relaxation techniques  - Monitor for opioid side effects  - Notify MD/LIP if interventions unsuccessful or patient reports new pain  - Anticipate increased pain with activity and pre-medicate as appropriate  Outcome: Progressing     Problem: RISK FOR INFECTION - ADULT  Goal: Absence of fever/infection during anticipated neutropenic period  Description: INTERVENTIONS  - Monitor WBC  - Administer growth factors as ordered  - Implement neutropenic guidelines  Outcome: Progressing     Problem: SAFETY ADULT - FALL  Goal: Free from fall injury  Description: INTERVENTIONS:  - Assess pt frequently for physical needs  - Identify cognitive and physical deficits and behaviors that affect risk of falls.   - [place] fall precautions as indicated by assessment.  - Educate pt/family on patient safety including physical limitations  - Instruct pt to call for assistance with activity based on assessment  - Modify environment to reduce risk of injury  - Provide assistive devices as appropriate  - Consider OT/PT consult to assist with strengthening/mobility  - Encourage toileting schedule  Outcome: Progressing     Problem: DISCHARGE PLANNING  Goal: Discharge to home or other facility with appropriate resources  Description: INTERVENTIONS:  - Identify barriers to discharge w/pt and caregiver  - Include patient/family/discharge partner in discharge planning  - Arrange for needed discharge resources and transportation as appropriate  - Identify discharge learning needs (meds, wound care, etc)  - Arrange for interpreters to assist at discharge as needed  - Consider post-discharge preferences of patient/family/discharge partner  - Complete POLST form as appropriate  - Assess patient's ability to be responsible for managing their own health  - Refer to Case Management Department for coordinating discharge planning if the patient needs post-hospital services based on physician/LIP order or complex needs related to functional status, cognitive ability or social support system  Outcome: Progressing     Problem: Patient/Family Goals  Goal: Patient/Family Long Term Goal  Description: Patient's Long Term Goal: discharge    Interventions:  - MD diehl  -Tele and  monitoring  -SLP eval  -PT/OT eval  - See additional Care Plan goals for specific interventions  Outcome: Progressing  Goal: Patient/Family Short Term Goal  Description: Patient's Short Term Goal:   12/2 noc: rest, safety  12/03(noc): heparin gtt, plan VQ scan for Monday 12/4am: monitor heparin gtt   12/4 noc: manage heparin gtt  12/5am: pass speech eval   12/5 NOC: sleep    Interventions:   - Safety precautions  -seizure precaution  -Quiet/private room  - monitoring  - See additional Care Plan goals for specific interventions  Outcome: Progressing     Problem: Delirium  Goal: Minimize duration of delirium  Description: Interventions:  - Encourage use of hearing aids, eye glasses  - Promote highest level of mobility daily  - Provide frequent reorientation  - Promote wakefulness i.e. lights on, blinds open  - Promote sleep, encourage patient's normal rest cycle i.e. lights off, TV off, minimize noise and interruptions  - Encourage family to assist in orientation and promotion of home routines  Outcome: Progressing     Problem: Diabetes/Glucose Control  Goal: Glucose maintained within prescribed range  Description: INTERVENTIONS:  - Monitor Blood Glucose as ordered  - Assess for signs and symptoms of hyperglycemia and hypoglycemia  - Administer ordered medications to maintain glucose within target range  - Assess barriers to adequate nutritional intake and initiate nutrition consult as needed  - Instruct patient on self management of diabetes  Outcome: Progressing     Problem: HEMATOLOGIC - ADULT  Goal: Maintains hematologic stability  Description: INTERVENTIONS  - Assess for signs and symptoms of bleeding or hemorrhage  - Monitor labs and vital signs for trends  - Administer supportive blood products/factors, fluids and medications as ordered and appropriate  - Administer supportive blood products/factors as ordered and appropriate  Outcome: Progressing  Goal: Free from bleeding injury  Description: (Example usage: patient with low platelets)  INTERVENTIONS:  - Avoid intramuscular injections, enemas and rectal medication administration  - Ensure safe mobilization of patient  - Hold pressure on venipuncture sites to achieve adequate hemostasis  - Assess for signs and symptoms of internal bleeding  - Monitor lab trends  - Patient is to report abnormal signs of bleeding to staff  - Avoid use of toothpicks and dental floss  - Use electric shaver for shaving  - Use soft bristle tooth brush  - Limit straining and forceful nose blowing  Outcome: Progressing

## 2022-12-07 VITALS
RESPIRATION RATE: 16 BRPM | DIASTOLIC BLOOD PRESSURE: 57 MMHG | HEART RATE: 87 BPM | SYSTOLIC BLOOD PRESSURE: 117 MMHG | TEMPERATURE: 99 F | BODY MASS INDEX: 36 KG/M2 | OXYGEN SATURATION: 100 % | WEIGHT: 217.63 LBS

## 2022-12-07 LAB
GLUCOSE BLD-MCNC: 120 MG/DL (ref 70–99)
GLUCOSE BLD-MCNC: 143 MG/DL (ref 70–99)

## 2022-12-07 PROCEDURE — 99239 HOSP IP/OBS DSCHRG MGMT >30: CPT | Performed by: HOSPITALIST

## 2022-12-07 NOTE — PLAN OF CARE
Pt is A&Ox1. Expressive aphasia. More alert this shift. VSS, afebrile. Maintaining O2 sats WDL on RA. JHONY- no cpap. Encouraged IS. Tele, WILBUR/ST. Eliquis. CELSO ROMERO. Last BM 12/5. Carb controlled diet, QID accuchek, pureed. Incontinent, briefed, Donnamaria Sans in place. Total lift, Q2 turn. Denies pain. PIV, IVF. No further needs at this time, continue POC. Safety precautions in place: seizure precautions    Problem: PAIN - ADULT  Goal: Verbalizes/displays adequate comfort level or patient's stated pain goal  Description: INTERVENTIONS:  - Encourage pt to monitor pain and request assistance  - Assess pain using appropriate pain scale  - Administer analgesics based on type and severity of pain and evaluate response  - Implement non-pharmacological measures as appropriate and evaluate response  - Consider cultural and social influences on pain and pain management  - Manage/alleviate anxiety  - Utilize distraction and/or relaxation techniques  - Monitor for opioid side effects  - Notify MD/LIP if interventions unsuccessful or patient reports new pain  - Anticipate increased pain with activity and pre-medicate as appropriate  Outcome: Progressing     Problem: RISK FOR INFECTION - ADULT  Goal: Absence of fever/infection during anticipated neutropenic period  Description: INTERVENTIONS  - Monitor WBC  - Administer growth factors as ordered  - Implement neutropenic guidelines  Outcome: Progressing     Problem: SAFETY ADULT - FALL  Goal: Free from fall injury  Description: INTERVENTIONS:  - Assess pt frequently for physical needs  - Identify cognitive and physical deficits and behaviors that affect risk of falls.   - [place] fall precautions as indicated by assessment.  - Educate pt/family on patient safety including physical limitations  - Instruct pt to call for assistance with activity based on assessment  - Modify environment to reduce risk of injury  - Provide assistive devices as appropriate  - Consider OT/PT consult to assist with strengthening/mobility  - Encourage toileting schedule  Outcome: Progressing     Problem: DISCHARGE PLANNING  Goal: Discharge to home or other facility with appropriate resources  Description: INTERVENTIONS:  - Identify barriers to discharge w/pt and caregiver  - Include patient/family/discharge partner in discharge planning  - Arrange for needed discharge resources and transportation as appropriate  - Identify discharge learning needs (meds, wound care, etc)  - Arrange for interpreters to assist at discharge as needed  - Consider post-discharge preferences of patient/family/discharge partner  - Complete POLST form as appropriate  - Assess patient's ability to be responsible for managing their own health  - Refer to Case Management Department for coordinating discharge planning if the patient needs post-hospital services based on physician/LIP order or complex needs related to functional status, cognitive ability or social support system  Outcome: Progressing     Problem: Patient/Family Goals  Goal: Patient/Family Long Term Goal  Description: Patient's Long Term Goal: discharge    Interventions:  - MD rounding  -Tele and  monitoring  -SLP eval  -PT/OT eval  - See additional Care Plan goals for specific interventions  Outcome: Progressing  Goal: Patient/Family Short Term Goal  Description: Patient's Short Term Goal:   12/2 noc: rest, safety  12/03(noc): heparin gtt, plan VQ scan for Monday 12/4am: monitor heparin gtt   12/4 noc: manage heparin gtt  12/5am: pass speech eval   12/5 NOC: sleep  12/6am: remain comfortable   12/6 NOC: sleep    Interventions:   - Safety precautions  -seizure precaution  -Quiet/private room  - monitoring  - See additional Care Plan goals for specific interventions  Outcome: Progressing     Problem: Delirium  Goal: Minimize duration of delirium  Description: Interventions:  - Encourage use of hearing aids, eye glasses  - Promote highest level of mobility daily  - Provide frequent reorientation  - Promote wakefulness i.e. lights on, blinds open  - Promote sleep, encourage patient's normal rest cycle i.e. lights off, TV off, minimize noise and interruptions  - Encourage family to assist in orientation and promotion of home routines  Outcome: Progressing     Problem: Diabetes/Glucose Control  Goal: Glucose maintained within prescribed range  Description: INTERVENTIONS:  - Monitor Blood Glucose as ordered  - Assess for signs and symptoms of hyperglycemia and hypoglycemia  - Administer ordered medications to maintain glucose within target range  - Assess barriers to adequate nutritional intake and initiate nutrition consult as needed  - Instruct patient on self management of diabetes  Outcome: Progressing     Problem: HEMATOLOGIC - ADULT  Goal: Maintains hematologic stability  Description: INTERVENTIONS  - Assess for signs and symptoms of bleeding or hemorrhage  - Monitor labs and vital signs for trends  - Administer supportive blood products/factors, fluids and medications as ordered and appropriate  - Administer supportive blood products/factors as ordered and appropriate  Outcome: Progressing  Goal: Free from bleeding injury  Description: (Example usage: patient with low platelets)  INTERVENTIONS:  - Avoid intramuscular injections, enemas and rectal medication administration  - Ensure safe mobilization of patient  - Hold pressure on venipuncture sites to achieve adequate hemostasis  - Assess for signs and symptoms of internal bleeding  - Monitor lab trends  - Patient is to report abnormal signs of bleeding to staff  - Avoid use of toothpicks and dental floss  - Use electric shaver for shaving  - Use soft bristle tooth brush  - Limit straining and forceful nose blowing  Outcome: Progressing

## 2022-12-07 NOTE — CM/SW NOTE
Pt is ready for dc today. Pt will go to her dtr Alla's home:  [address] [facility] to let them know pt will be d/c'd to dtrs home today - dtr's address provided. They will see pt at dtr's home for [facility]. Dtr requested transportation home. THE [facility] is scheduled to  pt at 5:30pm today. PCS from completed. Dtr aware of pt's dc time.

## 2022-12-08 ENCOUNTER — PATIENT OUTREACH (OUTPATIENT)
Dept: CASE MANAGEMENT | Age: 77
End: 2022-12-08

## 2022-12-08 NOTE — PROGRESS NOTES
NURSING DISCHARGE NOTE    Discharged Home via Ambulance. Accompanied by Support staff  Belongings Taken by patient/family.     Patient IV removed, taken off telemetry, went over the after visit summary and included pertinent information for their home medications, side effects, and any follow up visits,

## 2022-12-08 NOTE — PROGRESS NOTES
NCM attempted to call pt for TCM/HFU condition update. No answer, MB not setup, unable to leave message on VM. NCM will try calling back at a later time.

## 2022-12-08 NOTE — PROGRESS NOTES
GIRMA spoke with patient's daughter Tomer briefly, states pt is readmitted to [facility] this morning at 3am. NCM closing encounter.

## 2022-12-22 NOTE — CDS QUERY
Confusion/Delirium Without History of Injury  LALA Ruvalcaba  Dear Doctor:  Clinical information (provided below) indicates confusion and/or delirium without history of injury. For accurate ICD-10-CM code assignment to reflect severity of illness and risk of mortality,   PLEASE (X) ALL DIAGNOSES THAT APPLY. SELECTION BY PROVIDER ONLY          ( x ) Metabolic Encephalopathy  (  ) Drug Induced Encephalopathy (please specify drug):   (  ) Hypertensive Encephalopathy  (  ) Hypoxic Encephalopathy   (  ) Toxic Encephalopathy (please specify toxic substance):   (  ) Encephalopathy - unknown etiology  (  ) Other (please specify):   (  ) Encephalopathy ruled out   (  ) Unable to Determine (please comment):     _______________________________________________________________________________    ED initial assessment: Arrives via EMS from home with AMS, family came to check on her today and patient was less responsive, Seen in ED on 11/26/22 started on Antibiotics for pneumonia. Pt is lethargic     ED note: AMS, lethargic, recent pneumonia;   lives at home and apparently family came to check on her today and found her with decreased responsiveness. H&P: female with recent admission to the hospital in October for metabolic encephalopathy. She was brought to emergency room by her family and patient seems hemodynamically stable  at present time but she is a poor historian. She was discharged to home on October 27 with family support and home health services. She is now confused and she is not sure why she is in the hospital again. 12/3 plan of care: Pt is alert- unable to assess orientation, confused. 12/5 plan of care/pn: More alert this shift      If you have any questions, please contact Clinical : Orlando Sierra RN CCDS at #[phone]; Cell: [phone] . Thank You!       THIS FORM IS A PERMANENT PART OF THE MEDICAL RECORD

## 2023-01-26 ENCOUNTER — APPOINTMENT (OUTPATIENT)
Dept: CV DIAGNOSTICS | Facility: HOSPITAL | Age: 78
End: 2023-01-26
Attending: EMERGENCY MEDICINE
Payer: MEDICARE

## 2023-01-26 ENCOUNTER — HOSPITAL ENCOUNTER (INPATIENT)
Facility: HOSPITAL | Age: 78
LOS: 1 days | Discharge: HOME HEALTH | End: 2023-01-28
Attending: EMERGENCY MEDICINE | Admitting: HOSPITALIST
Payer: MEDICARE

## 2023-01-26 ENCOUNTER — APPOINTMENT (OUTPATIENT)
Dept: GENERAL RADIOLOGY | Facility: HOSPITAL | Age: 78
End: 2023-01-26
Attending: EMERGENCY MEDICINE
Payer: MEDICARE

## 2023-01-26 DIAGNOSIS — R06.02 SHORTNESS OF BREATH: ICD-10-CM

## 2023-01-26 DIAGNOSIS — I50.9 ACUTE CONGESTIVE HEART FAILURE, UNSPECIFIED HEART FAILURE TYPE (HCC): ICD-10-CM

## 2023-01-26 DIAGNOSIS — R77.8 ELEVATED TROPONIN: ICD-10-CM

## 2023-01-26 DIAGNOSIS — R07.9 CHEST PAIN WITH HIGH RISK FOR CARDIAC ETIOLOGY: Primary | ICD-10-CM

## 2023-01-26 DIAGNOSIS — D64.9 ANEMIA, UNSPECIFIED TYPE: ICD-10-CM

## 2023-01-26 LAB
ALBUMIN SERPL-MCNC: 3.1 G/DL (ref 3.4–5)
ALBUMIN/GLOB SERPL: 0.7 {RATIO} (ref 1–2)
ALP LIVER SERPL-CCNC: 64 U/L
ALT SERPL-CCNC: 12 U/L
ANION GAP SERPL CALC-SCNC: 5 MMOL/L (ref 0–18)
AST SERPL-CCNC: 20 U/L (ref 15–37)
ATRIAL RATE: 85 BPM
ATRIAL RATE: 90 BPM
BASOPHILS # BLD AUTO: 0.02 X10(3) UL (ref 0–0.2)
BASOPHILS NFR BLD AUTO: 0.3 %
BILIRUB SERPL-MCNC: 0.3 MG/DL (ref 0.1–2)
BUN BLD-MCNC: 14 MG/DL (ref 7–18)
CALCIUM BLD-MCNC: 8.9 MG/DL (ref 8.5–10.1)
CHLORIDE SERPL-SCNC: 112 MMOL/L (ref 98–112)
CHOLEST SERPL-MCNC: 102 MG/DL (ref ?–200)
CO2 SERPL-SCNC: 23 MMOL/L (ref 21–32)
CREAT BLD-MCNC: 1.55 MG/DL
D DIMER PPP FEU-MCNC: 0.7 UG/ML FEU (ref ?–0.77)
EOSINOPHIL # BLD AUTO: 0 X10(3) UL (ref 0–0.7)
EOSINOPHIL NFR BLD AUTO: 0 %
ERYTHROCYTE [DISTWIDTH] IN BLOOD BY AUTOMATED COUNT: 16.1 %
GFR SERPLBLD BASED ON 1.73 SQ M-ARVRAT: 34 ML/MIN/1.73M2 (ref 60–?)
GLOBULIN PLAS-MCNC: 4.3 G/DL (ref 2.8–4.4)
GLUCOSE BLD-MCNC: 138 MG/DL (ref 70–99)
GLUCOSE BLD-MCNC: 145 MG/DL (ref 70–99)
GLUCOSE BLD-MCNC: 155 MG/DL (ref 70–99)
GLUCOSE BLD-MCNC: 164 MG/DL (ref 70–99)
HCT VFR BLD AUTO: 27.7 %
HDLC SERPL-MCNC: 50 MG/DL (ref 40–59)
HGB BLD-MCNC: 9 G/DL
IMM GRANULOCYTES # BLD AUTO: 0.03 X10(3) UL (ref 0–1)
IMM GRANULOCYTES NFR BLD: 0.5 %
LDLC SERPL CALC-MCNC: 29 MG/DL (ref ?–100)
LYMPHOCYTES # BLD AUTO: 2.14 X10(3) UL (ref 1–4)
LYMPHOCYTES NFR BLD AUTO: 35.2 %
MCH RBC QN AUTO: 33.1 PG (ref 26–34)
MCHC RBC AUTO-ENTMCNC: 32.5 G/DL (ref 31–37)
MCV RBC AUTO: 101.8 FL
MONOCYTES # BLD AUTO: 0.68 X10(3) UL (ref 0.1–1)
MONOCYTES NFR BLD AUTO: 11.2 %
NEUTROPHILS # BLD AUTO: 3.21 X10 (3) UL (ref 1.5–7.7)
NEUTROPHILS # BLD AUTO: 3.21 X10(3) UL (ref 1.5–7.7)
NEUTROPHILS NFR BLD AUTO: 52.8 %
NONHDLC SERPL-MCNC: 52 MG/DL (ref ?–130)
NT-PROBNP SERPL-MCNC: 5170 PG/ML (ref ?–450)
OSMOLALITY SERPL CALC.SUM OF ELEC: 294 MOSM/KG (ref 275–295)
P AXIS: 70 DEGREES
P AXIS: 85 DEGREES
P-R INTERVAL: 166 MS
P-R INTERVAL: 168 MS
PLATELET # BLD AUTO: 292 10(3)UL (ref 150–450)
POTASSIUM SERPL-SCNC: 3.9 MMOL/L (ref 3.5–5.1)
PROT SERPL-MCNC: 7.4 G/DL (ref 6.4–8.2)
Q-T INTERVAL: 382 MS
Q-T INTERVAL: 432 MS
QRS DURATION: 94 MS
QRS DURATION: 96 MS
QTC CALCULATION (BEZET): 467 MS
QTC CALCULATION (BEZET): 514 MS
R AXIS: -20 DEGREES
R AXIS: -23 DEGREES
RBC # BLD AUTO: 2.72 X10(6)UL
SARS-COV-2 RNA RESP QL NAA+PROBE: NOT DETECTED
SODIUM SERPL-SCNC: 140 MMOL/L (ref 136–145)
T AXIS: 80 DEGREES
T AXIS: 99 DEGREES
TRIGL SERPL-MCNC: 136 MG/DL (ref 30–149)
TROPONIN I HIGH SENSITIVITY: 73 NG/L
TROPONIN I HIGH SENSITIVITY: 74 NG/L
TROPONIN I HIGH SENSITIVITY: 79 NG/L
VENTRICULAR RATE: 85 BPM
VENTRICULAR RATE: 90 BPM
VLDLC SERPL CALC-MCNC: 18 MG/DL (ref 0–30)
WBC # BLD AUTO: 6.1 X10(3) UL (ref 4–11)

## 2023-01-26 PROCEDURE — 93306 TTE W/DOPPLER COMPLETE: CPT | Performed by: EMERGENCY MEDICINE

## 2023-01-26 PROCEDURE — 71045 X-RAY EXAM CHEST 1 VIEW: CPT | Performed by: EMERGENCY MEDICINE

## 2023-01-26 PROCEDURE — 99223 1ST HOSP IP/OBS HIGH 75: CPT | Performed by: HOSPITALIST

## 2023-01-26 RX ORDER — MONTELUKAST SODIUM 10 MG/1
10 TABLET ORAL DAILY
Status: DISCONTINUED | OUTPATIENT
Start: 2023-01-26 | End: 2023-01-28

## 2023-01-26 RX ORDER — NICOTINE POLACRILEX 4 MG
30 LOZENGE BUCCAL
Status: DISCONTINUED | OUTPATIENT
Start: 2023-01-26 | End: 2023-01-28

## 2023-01-26 RX ORDER — METOCLOPRAMIDE HYDROCHLORIDE 5 MG/ML
5 INJECTION INTRAMUSCULAR; INTRAVENOUS EVERY 8 HOURS PRN
Status: DISCONTINUED | OUTPATIENT
Start: 2023-01-26 | End: 2023-01-28

## 2023-01-26 RX ORDER — FUROSEMIDE 10 MG/ML
40 INJECTION INTRAMUSCULAR; INTRAVENOUS
Status: DISCONTINUED | OUTPATIENT
Start: 2023-01-26 | End: 2023-01-28

## 2023-01-26 RX ORDER — LEVETIRACETAM 1000 MG/1
1000 TABLET ORAL EVERY 12 HOURS
COMMUNITY

## 2023-01-26 RX ORDER — CHOLECALCIFEROL (VITAMIN D3) 125 MCG
5 CAPSULE ORAL NIGHTLY
COMMUNITY

## 2023-01-26 RX ORDER — BENZONATATE 100 MG/1
200 CAPSULE ORAL 3 TIMES DAILY PRN
Status: DISCONTINUED | OUTPATIENT
Start: 2023-01-26 | End: 2023-01-28

## 2023-01-26 RX ORDER — BISMUTH SUBSALICYLATE 262MG/15ML
30 SUSPENSION, ORAL (FINAL DOSE FORM) ORAL EVERY 6 HOURS PRN
COMMUNITY
Start: 2023-01-06

## 2023-01-26 RX ORDER — ALBUTEROL SULFATE 2.5 MG/3ML
3 SOLUTION RESPIRATORY (INHALATION) 3 TIMES DAILY PRN
Status: DISCONTINUED | OUTPATIENT
Start: 2023-01-26 | End: 2023-01-28

## 2023-01-26 RX ORDER — SODIUM BICARBONATE 650 MG/1
650 TABLET ORAL DAILY
COMMUNITY
Start: 2023-01-06 | End: 2023-01-28

## 2023-01-26 RX ORDER — ALBUTEROL SULFATE 2.5 MG/3ML
3 SOLUTION RESPIRATORY (INHALATION) 3 TIMES DAILY PRN
COMMUNITY
Start: 2023-01-06

## 2023-01-26 RX ORDER — ACETAMINOPHEN 325 MG/1
650 TABLET ORAL EVERY 4 HOURS PRN
Status: DISCONTINUED | OUTPATIENT
Start: 2023-01-26 | End: 2023-01-28

## 2023-01-26 RX ORDER — DILTIAZEM HYDROCHLORIDE 240 MG/1
240 CAPSULE, COATED, EXTENDED RELEASE ORAL
Status: DISCONTINUED | OUTPATIENT
Start: 2023-01-26 | End: 2023-01-28

## 2023-01-26 RX ORDER — NICOTINE POLACRILEX 4 MG
15 LOZENGE BUCCAL
Status: DISCONTINUED | OUTPATIENT
Start: 2023-01-26 | End: 2023-01-28

## 2023-01-26 RX ORDER — DEXTROSE MONOHYDRATE 25 G/50ML
50 INJECTION, SOLUTION INTRAVENOUS
Status: DISCONTINUED | OUTPATIENT
Start: 2023-01-26 | End: 2023-01-28

## 2023-01-26 RX ORDER — ENOXAPARIN SODIUM 100 MG/ML
30 INJECTION SUBCUTANEOUS DAILY
Status: DISCONTINUED | OUTPATIENT
Start: 2023-01-26 | End: 2023-01-27

## 2023-01-26 RX ORDER — GABAPENTIN 300 MG/1
600 CAPSULE ORAL NIGHTLY
Status: DISCONTINUED | OUTPATIENT
Start: 2023-01-26 | End: 2023-01-28

## 2023-01-26 RX ORDER — NITROGLYCERIN 0.4 MG/1
TABLET SUBLINGUAL
Status: COMPLETED
Start: 2023-01-26 | End: 2023-01-26

## 2023-01-26 RX ORDER — LEVETIRACETAM 500 MG/1
1000 TABLET ORAL EVERY 12 HOURS
Status: DISCONTINUED | OUTPATIENT
Start: 2023-01-26 | End: 2023-01-28

## 2023-01-26 RX ORDER — ESCITALOPRAM OXALATE 10 MG/1
10 TABLET ORAL DAILY
Status: ON HOLD | COMMUNITY
End: 2023-01-26

## 2023-01-26 RX ORDER — DILTIAZEM HYDROCHLORIDE 240 MG/1
240 CAPSULE, COATED, EXTENDED RELEASE ORAL
COMMUNITY
Start: 2022-12-29

## 2023-01-26 RX ORDER — ASPIRIN 325 MG
325 TABLET ORAL DAILY
Status: DISCONTINUED | OUTPATIENT
Start: 2023-01-27 | End: 2023-01-28

## 2023-01-26 RX ORDER — ACETAMINOPHEN 500 MG
1000 TABLET ORAL
COMMUNITY

## 2023-01-26 RX ORDER — NITROGLYCERIN 0.4 MG/1
0.4 TABLET SUBLINGUAL EVERY 5 MIN PRN
Status: DISCONTINUED | OUTPATIENT
Start: 2023-01-26 | End: 2023-01-28

## 2023-01-26 RX ORDER — OMEPRAZOLE 40 MG/1
40 CAPSULE, DELAYED RELEASE ORAL DAILY
COMMUNITY

## 2023-01-26 RX ORDER — FUROSEMIDE 10 MG/ML
40 INJECTION INTRAMUSCULAR; INTRAVENOUS ONCE
Status: COMPLETED | OUTPATIENT
Start: 2023-01-26 | End: 2023-01-26

## 2023-01-26 RX ORDER — FOLIC ACID/VIT B COMPLEX AND C 0.8 MG
0.8 TABLET ORAL DAILY
COMMUNITY

## 2023-01-26 RX ORDER — ALLOPURINOL 100 MG/1
100 TABLET ORAL EVERY MORNING
Status: DISCONTINUED | OUTPATIENT
Start: 2023-01-26 | End: 2023-01-28

## 2023-01-26 RX ORDER — ASCORBIC ACID, THIAMINE, RIBOFLAVIN, NIACINAMIDE, PYRIDOXINE, FOLIC ACID, COBALAMIN, BIOTIN, PANTOTHENIC ACID 100; 1.5; 1.7; 20; 10; 1; 6; 300; 1 MG/1; MG/1; MG/1; MG/1; MG/1; MG/1; UG/1; UG/1; MG/1
1 TABLET, COATED ORAL DAILY
Status: DISCONTINUED | OUTPATIENT
Start: 2023-01-26 | End: 2023-01-28

## 2023-01-26 RX ORDER — ROSUVASTATIN CALCIUM 20 MG/1
20 TABLET, COATED ORAL DAILY
Status: DISCONTINUED | OUTPATIENT
Start: 2023-01-26 | End: 2023-01-28

## 2023-01-26 RX ORDER — LIDOCAINE 50 MG/G
1 PATCH TOPICAL
COMMUNITY

## 2023-01-26 RX ORDER — VALSARTAN 320 MG/1
320 TABLET ORAL DAILY
Status: DISCONTINUED | OUTPATIENT
Start: 2023-01-26 | End: 2023-01-28

## 2023-01-26 RX ORDER — MELATONIN
325 2 TIMES DAILY
COMMUNITY
Start: 2023-01-10 | End: 2023-01-28

## 2023-01-26 RX ORDER — HYDROCODONE BITARTRATE AND ACETAMINOPHEN 5; 325 MG/1; MG/1
2 TABLET ORAL EVERY 4 HOURS PRN
Status: DISCONTINUED | OUTPATIENT
Start: 2023-01-26 | End: 2023-01-28

## 2023-01-26 RX ORDER — SENNOSIDES 8.6 MG
17.2 TABLET ORAL NIGHTLY PRN
Status: DISCONTINUED | OUTPATIENT
Start: 2023-01-26 | End: 2023-01-28

## 2023-01-26 RX ORDER — ONDANSETRON 2 MG/ML
4 INJECTION INTRAMUSCULAR; INTRAVENOUS EVERY 6 HOURS PRN
Status: DISCONTINUED | OUTPATIENT
Start: 2023-01-26 | End: 2023-01-28

## 2023-01-26 RX ORDER — CALCIUM CARBONATE/VITAMIN D3 500-15 MCG
1 TABLET ORAL 2 TIMES DAILY
COMMUNITY
End: 2023-01-26

## 2023-01-26 RX ORDER — BISMUTH SUBSALICYLATE 262MG/15ML
30 SUSPENSION, ORAL (FINAL DOSE FORM) ORAL EVERY 6 HOURS PRN
COMMUNITY
End: 2023-01-26

## 2023-01-26 RX ORDER — BISACODYL 10 MG
10 SUPPOSITORY, RECTAL RECTAL
Status: DISCONTINUED | OUTPATIENT
Start: 2023-01-26 | End: 2023-01-28

## 2023-01-26 RX ORDER — MELATONIN
3 NIGHTLY PRN
Status: DISCONTINUED | OUTPATIENT
Start: 2023-01-26 | End: 2023-01-26

## 2023-01-26 RX ORDER — PANTOPRAZOLE SODIUM 40 MG/1
40 TABLET, DELAYED RELEASE ORAL
Status: DISCONTINUED | OUTPATIENT
Start: 2023-01-27 | End: 2023-01-28

## 2023-01-26 RX ORDER — ASPIRIN 81 MG/1
81 TABLET ORAL DAILY
Status: DISCONTINUED | OUTPATIENT
Start: 2023-01-26 | End: 2023-01-26

## 2023-01-26 RX ORDER — HYDROCODONE BITARTRATE AND ACETAMINOPHEN 5; 325 MG/1; MG/1
1 TABLET ORAL EVERY 4 HOURS PRN
Status: DISCONTINUED | OUTPATIENT
Start: 2023-01-26 | End: 2023-01-28

## 2023-01-26 RX ORDER — VALSARTAN 320 MG/1
320 TABLET ORAL DAILY
COMMUNITY

## 2023-01-26 RX ORDER — MIRTAZAPINE 15 MG/1
15 TABLET, FILM COATED ORAL NIGHTLY
Status: DISCONTINUED | OUTPATIENT
Start: 2023-01-26 | End: 2023-01-28

## 2023-01-26 RX ORDER — POLYETHYLENE GLYCOL 3350 17 G/17G
17 POWDER, FOR SOLUTION ORAL DAILY PRN
Status: DISCONTINUED | OUTPATIENT
Start: 2023-01-26 | End: 2023-01-28

## 2023-01-26 RX ORDER — MORPHINE SULFATE 4 MG/ML
4 INJECTION, SOLUTION INTRAMUSCULAR; INTRAVENOUS ONCE
Status: COMPLETED | OUTPATIENT
Start: 2023-01-26 | End: 2023-01-26

## 2023-01-26 NOTE — ED QUICK NOTES
Orders for admission, patient is aware of plan and ready to go upstairs.  Any questions, please call ED BERKLEY Mcdaniel at extension [phone]     Patient Covid vaccination status: Unvaccinated     COVID Test Ordered in ED: Rapid SARS-CoV-2 by PCR    COVID Suspicion at Admission: N/A    Running Infusions:  None    Mental Status/LOC at time of transport: A&Ox4    Other pertinent information:   CIWA score: N/A   NIH score:  N/A

## 2023-01-26 NOTE — PROGRESS NOTES
[facility] Pharmacy Note:  Renal Dose Adjustment for enoxaparin (LOVENOX)    Toribio Meliton has been prescribed enoxaparin 40 mg subcutaneously every 24 hours. Estimated Creatinine Clearance: 25.1 mL/min (A) (based on SCr of 1.55 mg/dL (H)). Calculated CrCl 20 to 30 mL/min so the dose of Enoxaparin (LOVENOX) has been changed to enoxaparin 30 mg every 24 hours per P&T approved protocol. Pharmacy will continue to follow, and make additional adjustments if needed.       Thank you,  Scar Gillette, PharmD  1/26/2023 5:27 PM

## 2023-01-26 NOTE — ED INITIAL ASSESSMENT (HPI)
Pt arrives to ED via EMS c/o chronic R sided chest pain that radiates to L flank, pt reports she has had the chest pain \"for years\" but the flank pain started two weeks ago and has worsened. Pt reports DRAKE on exertion that is new. Denies N/V. A&Ox4. Received aspirin and SL nitroglycerin en route by medics.

## 2023-01-26 NOTE — PROGRESS NOTES
[facility] Pharmacy Note:  Renal Dose Adjustment for Metoclopramide (REGLAN)    Mango Bhatt has been prescribed Metoclopramide (REGLAN) 10 mg every 8 hours as needed for nausea/vomiting,. Estimated Creatinine Clearance: 25.1 mL/min (A) (based on SCr of 1.55 mg/dL (H)). Calculated creatinine clearance is < 40 ml/min, therefore, the dose of Metoclopramide (REGLAN) has been changed to 5 mg every 8 hours as needed for nausea/vomiting per P&T approved protocol. Pharmacy will continue to follow, and if renal function improves, will resume the original order.        Thank you,  Yokasta Veloz, PharmD  1/26/2023 4:34 PM

## 2023-01-26 NOTE — ED QUICK NOTES
Spoke to MOOK Nicholson on the floor. Updated on pt care. Transport en route to transport pt upstairs.

## 2023-01-27 ENCOUNTER — APPOINTMENT (OUTPATIENT)
Dept: ULTRASOUND IMAGING | Facility: HOSPITAL | Age: 78
End: 2023-01-27
Attending: INTERNAL MEDICINE
Payer: MEDICARE

## 2023-01-27 LAB
ANION GAP SERPL CALC-SCNC: 6 MMOL/L (ref 0–18)
BASOPHILS # BLD AUTO: 0.02 X10(3) UL (ref 0–0.2)
BASOPHILS NFR BLD AUTO: 0.4 %
BUN BLD-MCNC: 19 MG/DL (ref 7–18)
CALCIUM BLD-MCNC: 9 MG/DL (ref 8.5–10.1)
CHLORIDE SERPL-SCNC: 108 MMOL/L (ref 98–112)
CO2 SERPL-SCNC: 27 MMOL/L (ref 21–32)
CREAT BLD-MCNC: 1.78 MG/DL
DEPRECATED HBV CORE AB SER IA-ACNC: 215.8 NG/ML
EOSINOPHIL # BLD AUTO: 0 X10(3) UL (ref 0–0.7)
EOSINOPHIL NFR BLD AUTO: 0 %
ERYTHROCYTE [DISTWIDTH] IN BLOOD BY AUTOMATED COUNT: 15.9 %
EST. AVERAGE GLUCOSE BLD GHB EST-MCNC: 120 MG/DL (ref 68–126)
GFR SERPLBLD BASED ON 1.73 SQ M-ARVRAT: 29 ML/MIN/1.73M2 (ref 60–?)
GLUCOSE BLD-MCNC: 114 MG/DL (ref 70–99)
GLUCOSE BLD-MCNC: 122 MG/DL (ref 70–99)
GLUCOSE BLD-MCNC: 147 MG/DL (ref 70–99)
GLUCOSE BLD-MCNC: 156 MG/DL (ref 70–99)
GLUCOSE BLD-MCNC: 224 MG/DL (ref 70–99)
HBA1C MFR BLD: 5.8 % (ref ?–5.7)
HCT VFR BLD AUTO: 27.7 %
HGB BLD-MCNC: 8.6 G/DL
IMM GRANULOCYTES # BLD AUTO: 0.01 X10(3) UL (ref 0–1)
IMM GRANULOCYTES NFR BLD: 0.2 %
IRON SATN MFR SERPL: 18 %
IRON SERPL-MCNC: 47 UG/DL
LYMPHOCYTES # BLD AUTO: 2.73 X10(3) UL (ref 1–4)
LYMPHOCYTES NFR BLD AUTO: 50.7 %
MAGNESIUM SERPL-MCNC: 1.9 MG/DL (ref 1.6–2.6)
MCH RBC QN AUTO: 32.8 PG (ref 26–34)
MCHC RBC AUTO-ENTMCNC: 31 G/DL (ref 31–37)
MCV RBC AUTO: 105.7 FL
MONOCYTES # BLD AUTO: 0.62 X10(3) UL (ref 0.1–1)
MONOCYTES NFR BLD AUTO: 11.5 %
NEUTROPHILS # BLD AUTO: 2 X10 (3) UL (ref 1.5–7.7)
NEUTROPHILS # BLD AUTO: 2 X10(3) UL (ref 1.5–7.7)
NEUTROPHILS NFR BLD AUTO: 37.2 %
OSMOLALITY SERPL CALC.SUM OF ELEC: 296 MOSM/KG (ref 275–295)
PLATELET # BLD AUTO: 282 10(3)UL (ref 150–450)
POTASSIUM SERPL-SCNC: 3.7 MMOL/L (ref 3.5–5.1)
RBC # BLD AUTO: 2.62 X10(6)UL
SODIUM SERPL-SCNC: 141 MMOL/L (ref 136–145)
TIBC SERPL-MCNC: 267 UG/DL (ref 240–450)
TRANSFERRIN SERPL-MCNC: 179 MG/DL (ref 200–360)
WBC # BLD AUTO: 5.4 X10(3) UL (ref 4–11)

## 2023-01-27 PROCEDURE — 93970 EXTREMITY STUDY: CPT | Performed by: INTERNAL MEDICINE

## 2023-01-27 PROCEDURE — 99232 SBSQ HOSP IP/OBS MODERATE 35: CPT | Performed by: HOSPITALIST

## 2023-01-27 NOTE — PLAN OF CARE
Patient received to room 8623 from ER. She states that she has been increasingly short of breath lately. Also, she has had pain, but that it got worse today which prompted her to come to the ER. She is sinus rhythm on monitor. 12 lead EKG obtained as patient says that her pain is worse again. Carolina placing the EKG leads, she had pain with mild palpation left chest. She states that this has been hurting her for \"a long time\"  Ntg given with no change in level of pain. [id number] Dr. Douglass Los in to see and noted patient's EKG.

## 2023-01-27 NOTE — CM/SW NOTE
01/27/23 1200   CM/SW Referral Data   Referral Source    Reason for Referral Discharge planning   Informant Patient   Pertinent Medical Hx   Does patient have an established PCP? Yes  (Dr Rakel Landin)   Patient Info   Patient's Current Mental Status at Time of Assessment Alert;Oriented   Patient's Home Environment Condo/Apt with elevator   Patient lives with Daughter   Patient Status Prior to Admission   Independent with ADLs and Mobility Yes   Services in place prior to admission [address] [facility] [address]   Discharge Needs   Anticipated D/C needs Home health care     Pt discussed during nursing rounds. Dx chest pain. From home w/daughter, independent at baseline. PT recommending HH w/PT and OT at [facility]. Pt is already current /[facility] for RN and PT per patient. New F2F entered to include OT, SW/CM will need to send in [facility] once signed. Plan: [facility] w/[facility] pending medical clearance. / to remain available for support and/or discharge planning.      CLARY Benitez    [phone]

## 2023-01-27 NOTE — PLAN OF CARE
Assumed care at [address]. Pt is A&Ox4, seizure precautions in place. Pt is on RA, sats maintaining >90%. NSR on tele, VSS. No complaints of cardiac symptoms. Incontinent of bladder, purewick in place. C/o pain in R chest, reproducible, relived with current PRN medications. Up x2 stand and pivot, tolerating well. Plan of care reviewed with patient, verbalizes understanding, all needs addressed at this time, bed alarm in place, call light within reach, pt seems to be resting comfortably.      Problem: Diabetes/Glucose Control  Goal: Glucose maintained within prescribed range  Description: INTERVENTIONS:  - Monitor Blood Glucose as ordered  - Assess for signs and symptoms of hyperglycemia and hypoglycemia  - Administer ordered medications to maintain glucose within target range  - Assess barriers to adequate nutritional intake and initiate nutrition consult as needed  - Instruct patient on self management of diabetes  Outcome: Progressing     Problem: Patient/Family Goals  Goal: Patient/Family Long Term Goal  Description: Patient's Long Term Goal: to \"go home\"    Interventions:  - comply to care plan   - complete all tests  - Mds to see  - See additional Care Plan goals for specific interventions  Outcome: Progressing  Goal: Patient/Family Short Term Goal  Description: Patient's Short Term Goal: pain management    Interventions:   - request PRN medications for pain  - See additional Care Plan goals for specific interventions  Outcome: Progressing

## 2023-01-27 NOTE — DISCHARGE INSTRUCTIONS
Sometimes managing your health at home requires assistance. The [facility] team has recognized your preference to use [facility], Phone: [phone]. A representative from the home health agency will contact you or your family to schedule your first visit. The home medication list may not reflect the most current medications or doses. Please clarify with primary care physician at discharge.

## 2023-01-28 VITALS
RESPIRATION RATE: 16 BRPM | WEIGHT: 208.56 LBS | DIASTOLIC BLOOD PRESSURE: 69 MMHG | TEMPERATURE: 99 F | BODY MASS INDEX: 36.95 KG/M2 | SYSTOLIC BLOOD PRESSURE: 142 MMHG | HEART RATE: 97 BPM | HEIGHT: 63 IN | OXYGEN SATURATION: 99 %

## 2023-01-28 LAB
ANION GAP SERPL CALC-SCNC: 6 MMOL/L (ref 0–18)
BASOPHILS # BLD AUTO: 0.02 X10(3) UL (ref 0–0.2)
BASOPHILS NFR BLD AUTO: 0.4 %
BUN BLD-MCNC: 27 MG/DL (ref 7–18)
CALCIUM BLD-MCNC: 9.3 MG/DL (ref 8.5–10.1)
CHLORIDE SERPL-SCNC: 106 MMOL/L (ref 98–112)
CO2 SERPL-SCNC: 27 MMOL/L (ref 21–32)
CREAT BLD-MCNC: 2.07 MG/DL
EOSINOPHIL # BLD AUTO: 0 X10(3) UL (ref 0–0.7)
EOSINOPHIL NFR BLD AUTO: 0 %
ERYTHROCYTE [DISTWIDTH] IN BLOOD BY AUTOMATED COUNT: 15.8 %
GFR SERPLBLD BASED ON 1.73 SQ M-ARVRAT: 24 ML/MIN/1.73M2 (ref 60–?)
GLUCOSE BLD-MCNC: 147 MG/DL (ref 70–99)
GLUCOSE BLD-MCNC: 158 MG/DL (ref 70–99)
GLUCOSE BLD-MCNC: 196 MG/DL (ref 70–99)
HCT VFR BLD AUTO: 29.6 %
HGB BLD-MCNC: 9.5 G/DL
IMM GRANULOCYTES # BLD AUTO: 0.01 X10(3) UL (ref 0–1)
IMM GRANULOCYTES NFR BLD: 0.2 %
LYMPHOCYTES # BLD AUTO: 2.35 X10(3) UL (ref 1–4)
LYMPHOCYTES NFR BLD AUTO: 50.6 %
MCH RBC QN AUTO: 33 PG (ref 26–34)
MCHC RBC AUTO-ENTMCNC: 32.1 G/DL (ref 31–37)
MCV RBC AUTO: 102.8 FL
MONOCYTES # BLD AUTO: 0.55 X10(3) UL (ref 0.1–1)
MONOCYTES NFR BLD AUTO: 11.9 %
NEUTROPHILS # BLD AUTO: 1.71 X10 (3) UL (ref 1.5–7.7)
NEUTROPHILS # BLD AUTO: 1.71 X10(3) UL (ref 1.5–7.7)
NEUTROPHILS NFR BLD AUTO: 36.9 %
OSMOLALITY SERPL CALC.SUM OF ELEC: 296 MOSM/KG (ref 275–295)
PLATELET # BLD AUTO: 291 10(3)UL (ref 150–450)
POTASSIUM SERPL-SCNC: 3.7 MMOL/L (ref 3.5–5.1)
RBC # BLD AUTO: 2.88 X10(6)UL
SODIUM SERPL-SCNC: 139 MMOL/L (ref 136–145)
WBC # BLD AUTO: 4.6 X10(3) UL (ref 4–11)

## 2023-01-28 PROCEDURE — 99239 HOSP IP/OBS DSCHRG MGMT >30: CPT | Performed by: HOSPITALIST

## 2023-01-28 RX ORDER — FUROSEMIDE 20 MG/1
20 TABLET ORAL DAILY
Status: DISCONTINUED | OUTPATIENT
Start: 2023-01-29 | End: 2023-01-28

## 2023-01-28 RX ORDER — FUROSEMIDE 20 MG/1
20 TABLET ORAL DAILY
Qty: 30 TABLET | Refills: 3 | Status: SHIPPED | OUTPATIENT
Start: 2023-01-29

## 2023-01-28 NOTE — PLAN OF CARE
Pt is a/ox4. On room air, O2 sats wnl. NSR on tele. Vitals stable. Continent. Voiding. Up with sba in the room. Bed alarm on at night. All needs met overnight.       Problem: Diabetes/Glucose Control  Goal: Glucose maintained within prescribed range  Description: INTERVENTIONS:  - Monitor Blood Glucose as ordered  - Assess for signs and symptoms of hyperglycemia and hypoglycemia  - Administer ordered medications to maintain glucose within target range  - Assess barriers to adequate nutritional intake and initiate nutrition consult as needed  - Instruct patient on self management of diabetes  Outcome: Progressing     Problem: Patient/Family Goals  Goal: Patient/Family Long Term Goal  Description: Patient's Long Term Goal: to \"go home\"    Interventions:  - comply to care plan   - complete all tests  - Mds to see  - See additional Care Plan goals for specific interventions  Outcome: Progressing  Goal: Patient/Family Short Term Goal  Description: Patient's Short Term Goal: pain management    Interventions:   - request PRN medications for pain  - See additional Care Plan goals for specific interventions  Outcome: Progressing

## 2023-01-29 NOTE — PLAN OF CARE
Pt discharged to home. Being driven by family member. Reviewed all meds with pt, instructed to follow up with providers. Discharge education for diuretics reviewed. States understanding of all dc teaching. PIV removed. Off floor via wheelchair accompanied by PCT.

## 2023-11-07 ENCOUNTER — HOSPITAL ENCOUNTER (EMERGENCY)
Facility: HOSPITAL | Age: 78
Discharge: HOME | End: 2023-11-07
Attending: EMERGENCY MEDICINE
Payer: MEDICARE

## 2023-11-07 ENCOUNTER — APPOINTMENT (OUTPATIENT)
Dept: CT IMAGING | Facility: HOSPITAL | Age: 78
End: 2023-11-07
Attending: EMERGENCY MEDICINE
Payer: MEDICARE

## 2023-11-07 VITALS
HEART RATE: 78 BPM | TEMPERATURE: 98 F | RESPIRATION RATE: 16 BRPM | SYSTOLIC BLOOD PRESSURE: 120 MMHG | OXYGEN SATURATION: 100 % | DIASTOLIC BLOOD PRESSURE: 80 MMHG

## 2023-11-07 DIAGNOSIS — N28.9 RENAL INSUFFICIENCY: ICD-10-CM

## 2023-11-07 DIAGNOSIS — R10.9 ABDOMINAL PAIN OF UNKNOWN ETIOLOGY: Primary | ICD-10-CM

## 2023-11-07 DIAGNOSIS — N28.1 RENAL CYST: ICD-10-CM

## 2023-11-07 LAB
ALBUMIN SERPL-MCNC: 3.7 G/DL (ref 3.4–5)
ALBUMIN/GLOB SERPL: 0.7 {RATIO} (ref 1–2)
ALP LIVER SERPL-CCNC: 77 U/L
ALT SERPL-CCNC: 23 U/L
ANION GAP SERPL CALC-SCNC: 7 MMOL/L (ref 0–18)
AST SERPL-CCNC: 20 U/L (ref 15–37)
BASOPHILS # BLD AUTO: 0.02 X10(3) UL (ref 0–0.2)
BASOPHILS NFR BLD AUTO: 0.3 %
BILIRUB SERPL-MCNC: 0.3 MG/DL (ref 0.1–2)
BUN BLD-MCNC: 45 MG/DL (ref 9–23)
CALCIUM BLD-MCNC: 9.8 MG/DL (ref 8.5–10.1)
CHLORIDE SERPL-SCNC: 109 MMOL/L (ref 98–112)
CO2 SERPL-SCNC: 23 MMOL/L (ref 21–32)
CREAT BLD-MCNC: 3.4 MG/DL
EGFRCR SERPLBLD CKD-EPI 2021: 13 ML/MIN/1.73M2 (ref 60–?)
EOSINOPHIL # BLD AUTO: 0 X10(3) UL (ref 0–0.7)
EOSINOPHIL NFR BLD AUTO: 0 %
ERYTHROCYTE [DISTWIDTH] IN BLOOD BY AUTOMATED COUNT: 14.5 %
GLOBULIN PLAS-MCNC: 5 G/DL (ref 2.8–4.4)
GLUCOSE BLD-MCNC: 89 MG/DL (ref 70–99)
HCT VFR BLD AUTO: 27.7 %
HGB BLD-MCNC: 9.1 G/DL
IMM GRANULOCYTES # BLD AUTO: 0.03 X10(3) UL (ref 0–1)
IMM GRANULOCYTES NFR BLD: 0.4 %
LIPASE SERPL-CCNC: 69 U/L (ref 13–75)
LYMPHOCYTES # BLD AUTO: 3.55 X10(3) UL (ref 1–4)
LYMPHOCYTES NFR BLD AUTO: 47.5 %
MCH RBC QN AUTO: 32 PG (ref 26–34)
MCHC RBC AUTO-ENTMCNC: 32.9 G/DL (ref 31–37)
MCV RBC AUTO: 97.5 FL
MONOCYTES # BLD AUTO: 0.54 X10(3) UL (ref 0.1–1)
MONOCYTES NFR BLD AUTO: 7.2 %
NEUTROPHILS # BLD AUTO: 3.33 X10 (3) UL (ref 1.5–7.7)
NEUTROPHILS # BLD AUTO: 3.33 X10(3) UL (ref 1.5–7.7)
NEUTROPHILS NFR BLD AUTO: 44.6 %
OSMOLALITY SERPL CALC.SUM OF ELEC: 299 MOSM/KG (ref 275–295)
PLATELET # BLD AUTO: 238 10(3)UL (ref 150–450)
POTASSIUM SERPL-SCNC: 4 MMOL/L (ref 3.5–5.1)
PROT SERPL-MCNC: 8.7 G/DL (ref 6.4–8.2)
RBC # BLD AUTO: 2.84 X10(6)UL
SODIUM SERPL-SCNC: 139 MMOL/L (ref 136–145)
WBC # BLD AUTO: 7.5 X10(3) UL (ref 4–11)

## 2023-11-07 PROCEDURE — 96361 HYDRATE IV INFUSION ADD-ON: CPT

## 2023-11-07 PROCEDURE — 99285 EMERGENCY DEPT VISIT HI MDM: CPT

## 2023-11-07 PROCEDURE — 83690 ASSAY OF LIPASE: CPT | Performed by: EMERGENCY MEDICINE

## 2023-11-07 PROCEDURE — 96375 TX/PRO/DX INJ NEW DRUG ADDON: CPT

## 2023-11-07 PROCEDURE — 74176 CT ABD & PELVIS W/O CONTRAST: CPT | Performed by: EMERGENCY MEDICINE

## 2023-11-07 PROCEDURE — 93005 ELECTROCARDIOGRAM TRACING: CPT

## 2023-11-07 PROCEDURE — 93010 ELECTROCARDIOGRAM REPORT: CPT

## 2023-11-07 PROCEDURE — 96374 THER/PROPH/DIAG INJ IV PUSH: CPT

## 2023-11-07 PROCEDURE — 80053 COMPREHEN METABOLIC PANEL: CPT | Performed by: EMERGENCY MEDICINE

## 2023-11-07 PROCEDURE — 85025 COMPLETE CBC W/AUTO DIFF WBC: CPT | Performed by: EMERGENCY MEDICINE

## 2023-11-07 RX ORDER — ONDANSETRON 2 MG/ML
4 INJECTION INTRAMUSCULAR; INTRAVENOUS ONCE
Status: COMPLETED | OUTPATIENT
Start: 2023-11-07 | End: 2023-11-07

## 2023-11-07 RX ORDER — MORPHINE SULFATE 4 MG/ML
4 INJECTION, SOLUTION INTRAMUSCULAR; INTRAVENOUS ONCE
Status: COMPLETED | OUTPATIENT
Start: 2023-11-07 | End: 2023-11-07

## 2023-11-07 NOTE — ED INITIAL ASSESSMENT (HPI)
A&Ox3 patient bib ems from home for c/o abdominal pain    Patient endorses pain x months but has been worsening over the past few days    LLQ radiating to RLQ  Denies any n/v/d/cp/sob/c/f/LH/HA/urinary sx    Reports no relief w/ prescribed tramadol    RR even/NL, speaking in full clear sentences  Normally ambulates using a walker

## 2023-11-08 LAB
ATRIAL RATE: 83 BPM
P AXIS: 59 DEGREES
P-R INTERVAL: 176 MS
Q-T INTERVAL: 400 MS
QRS DURATION: 100 MS
QTC CALCULATION (BEZET): 470 MS
R AXIS: -33 DEGREES
T AXIS: 116 DEGREES
VENTRICULAR RATE: 83 BPM

## 2023-11-08 NOTE — DISCHARGE INSTRUCTIONS
Your kidney function is increasing from your baseline. I would follow-up with nephrology. Continue her blood pressure medications. Avoid NSAIDs, aspirin. Stay well-hydrated. Unclear etiology to her abdominal pain. CT scan is fairly unremarkable. Follow-up with your doctor. Consider GI.   Consider repeat colonoscopy

## 2023-12-13 ENCOUNTER — OFFICE VISIT (OUTPATIENT)
Dept: NEPHROLOGY | Facility: CLINIC | Age: 78
End: 2023-12-13
Payer: MEDICARE

## 2023-12-13 VITALS — DIASTOLIC BLOOD PRESSURE: 68 MMHG | BODY MASS INDEX: 35 KG/M2 | WEIGHT: 200 LBS | SYSTOLIC BLOOD PRESSURE: 130 MMHG

## 2023-12-13 DIAGNOSIS — R80.9 PROTEINURIA, UNSPECIFIED TYPE: ICD-10-CM

## 2023-12-13 DIAGNOSIS — N18.4 CKD (CHRONIC KIDNEY DISEASE) STAGE 4, GFR 15-29 ML/MIN (HCC): Primary | ICD-10-CM

## 2023-12-13 DIAGNOSIS — E11.21 DIABETIC NEPHROPATHY ASSOCIATED WITH TYPE 2 DIABETES MELLITUS (HCC): ICD-10-CM

## 2023-12-13 PROCEDURE — 99204 OFFICE O/P NEW MOD 45 MIN: CPT | Performed by: INTERNAL MEDICINE

## 2023-12-13 RX ORDER — MELATONIN
325 2 TIMES DAILY WITH MEALS
COMMUNITY

## 2024-01-01 ENCOUNTER — APPOINTMENT (OUTPATIENT)
Dept: MRI IMAGING | Facility: HOSPITAL | Age: 79
End: 2024-01-01
Attending: NURSE PRACTITIONER
Payer: MEDICARE

## 2024-01-01 ENCOUNTER — APPOINTMENT (OUTPATIENT)
Dept: GENERAL RADIOLOGY | Facility: HOSPITAL | Age: 79
End: 2024-01-01
Attending: INTERNAL MEDICINE
Payer: MEDICARE

## 2024-01-01 ENCOUNTER — HOSPITAL ENCOUNTER (INPATIENT)
Facility: HOSPITAL | Age: 79
LOS: 27 days | Discharge: HOSPICE | End: 2024-01-01
Attending: EMERGENCY MEDICINE | Admitting: INTERNAL MEDICINE
Payer: MEDICARE

## 2024-01-01 ENCOUNTER — APPOINTMENT (OUTPATIENT)
Dept: GENERAL RADIOLOGY | Facility: HOSPITAL | Age: 79
End: 2024-01-01
Attending: STUDENT IN AN ORGANIZED HEALTH CARE EDUCATION/TRAINING PROGRAM
Payer: MEDICARE

## 2024-01-01 ENCOUNTER — NURSE ONLY (OUTPATIENT)
Dept: ELECTROPHYSIOLOGY | Facility: HOSPITAL | Age: 79
End: 2024-01-01
Payer: MEDICARE

## 2024-01-01 ENCOUNTER — APPOINTMENT (OUTPATIENT)
Dept: GENERAL RADIOLOGY | Facility: HOSPITAL | Age: 79
End: 2024-01-01
Attending: HOSPITALIST
Payer: MEDICARE

## 2024-01-01 ENCOUNTER — APPOINTMENT (OUTPATIENT)
Dept: CT IMAGING | Facility: HOSPITAL | Age: 79
End: 2024-01-01
Attending: EMERGENCY MEDICINE
Payer: MEDICARE

## 2024-01-01 ENCOUNTER — APPOINTMENT (OUTPATIENT)
Dept: GENERAL RADIOLOGY | Facility: HOSPITAL | Age: 79
End: 2024-01-01
Attending: EMERGENCY MEDICINE
Payer: MEDICARE

## 2024-01-01 ENCOUNTER — HOSPITAL ENCOUNTER (INPATIENT)
Facility: HOSPITAL | Age: 79
LOS: 2 days | End: 2024-01-01
Attending: STUDENT IN AN ORGANIZED HEALTH CARE EDUCATION/TRAINING PROGRAM | Admitting: STUDENT IN AN ORGANIZED HEALTH CARE EDUCATION/TRAINING PROGRAM
Payer: OTHER MISCELLANEOUS

## 2024-01-01 ENCOUNTER — APPOINTMENT (OUTPATIENT)
Dept: MRI IMAGING | Facility: HOSPITAL | Age: 79
End: 2024-01-01
Attending: INTERNAL MEDICINE
Payer: MEDICARE

## 2024-01-01 ENCOUNTER — APPOINTMENT (OUTPATIENT)
Dept: CT IMAGING | Facility: HOSPITAL | Age: 79
End: 2024-01-01
Attending: HOSPITALIST
Payer: MEDICARE

## 2024-01-01 VITALS
OXYGEN SATURATION: 90 % | SYSTOLIC BLOOD PRESSURE: 80 MMHG | RESPIRATION RATE: 25 BRPM | DIASTOLIC BLOOD PRESSURE: 46 MMHG | TEMPERATURE: 100 F | HEART RATE: 97 BPM

## 2024-01-01 VITALS
OXYGEN SATURATION: 98 % | RESPIRATION RATE: 18 BRPM | TEMPERATURE: 98 F | DIASTOLIC BLOOD PRESSURE: 53 MMHG | SYSTOLIC BLOOD PRESSURE: 130 MMHG | WEIGHT: 189.38 LBS | BODY MASS INDEX: 33 KG/M2 | HEART RATE: 87 BPM

## 2024-01-01 DIAGNOSIS — Z87.898 HISTORY OF SEIZURE: ICD-10-CM

## 2024-01-01 DIAGNOSIS — I50.9 ACUTE ON CHRONIC CONGESTIVE HEART FAILURE, UNSPECIFIED HEART FAILURE TYPE (HCC): ICD-10-CM

## 2024-01-01 DIAGNOSIS — R41.82 ALTERED MENTAL STATUS, UNSPECIFIED ALTERED MENTAL STATUS TYPE: Primary | ICD-10-CM

## 2024-01-01 LAB
ALBUMIN SERPL-MCNC: 2.1 G/DL (ref 3.4–5)
ALBUMIN SERPL-MCNC: 2.1 G/DL (ref 3.4–5)
ALBUMIN SERPL-MCNC: 3.7 G/DL (ref 3.4–5)
ALBUMIN/GLOB SERPL: 0.7 {RATIO} (ref 1–2)
ALP LIVER SERPL-CCNC: 113 U/L
ALP LIVER SERPL-CCNC: 166 U/L
ALP LIVER SERPL-CCNC: 86 U/L
ALT SERPL-CCNC: 22 U/L
ALT SERPL-CCNC: 30 U/L
ALT SERPL-CCNC: 70 U/L
AMMONIA PLAS-MCNC: 26 UMOL/L (ref 11–32)
AMMONIA PLAS-MCNC: 32 UMOL/L (ref 11–32)
AMMONIA PLAS-MCNC: 42 UMOL/L (ref 11–32)
ANION GAP SERPL CALC-SCNC: 3 MMOL/L (ref 0–18)
ANION GAP SERPL CALC-SCNC: 4 MMOL/L (ref 0–18)
ANION GAP SERPL CALC-SCNC: 5 MMOL/L (ref 0–18)
ANION GAP SERPL CALC-SCNC: 6 MMOL/L (ref 0–18)
ANION GAP SERPL CALC-SCNC: 7 MMOL/L (ref 0–18)
ANION GAP SERPL CALC-SCNC: 8 MMOL/L (ref 0–18)
APTT PPP: 123.3 SECONDS (ref 23.3–35.6)
APTT PPP: 126.3 SECONDS (ref 23.3–35.6)
APTT PPP: 126.9 SECONDS (ref 23.3–35.6)
APTT PPP: 139.1 SECONDS (ref 23.3–35.6)
APTT PPP: 139.8 SECONDS (ref 23.3–35.6)
APTT PPP: 28.4 SECONDS (ref 23.3–35.6)
APTT PPP: 30.2 SECONDS (ref 23.3–35.6)
APTT PPP: 45.7 SECONDS (ref 23.3–35.6)
APTT PPP: 49.1 SECONDS (ref 23.3–35.6)
APTT PPP: 51.6 SECONDS (ref 23.3–35.6)
APTT PPP: 53.4 SECONDS (ref 23.3–35.6)
APTT PPP: 58.5 SECONDS (ref 23.3–35.6)
APTT PPP: 66.3 SECONDS (ref 23.3–35.6)
APTT PPP: 69.9 SECONDS (ref 23.3–35.6)
APTT PPP: 70.8 SECONDS (ref 23.3–35.6)
APTT PPP: 80 SECONDS (ref 23.3–35.6)
APTT PPP: 81.3 SECONDS (ref 23.3–35.6)
APTT PPP: 83.3 SECONDS (ref 23.3–35.6)
APTT PPP: 86.7 SECONDS (ref 23.3–35.6)
APTT PPP: 90.6 SECONDS (ref 23.3–35.6)
APTT PPP: 96.6 SECONDS (ref 23.3–35.6)
APTT PPP: 97.1 SECONDS (ref 23.3–35.6)
APTT PPP: >240 SECONDS (ref 23.3–35.6)
ARTERIAL PATENCY WRIST A: POSITIVE
AST SERPL-CCNC: 13 U/L (ref 15–37)
AST SERPL-CCNC: 15 U/L (ref 15–37)
AST SERPL-CCNC: 26 U/L (ref 15–37)
ATRIAL RATE: 90 BPM
BASE EXCESS BLDA CALC-SCNC: -0.1 MMOL/L (ref ?–2)
BASE EXCESS BLDA CALC-SCNC: -1.4 MMOL/L (ref ?–2)
BASE EXCESS BLDA CALC-SCNC: -2.1 MMOL/L (ref ?–2)
BASOPHILS # BLD AUTO: 0.02 X10(3) UL (ref 0–0.2)
BASOPHILS # BLD AUTO: 0.02 X10(3) UL (ref 0–0.2)
BASOPHILS NFR BLD AUTO: 0.3 %
BASOPHILS NFR BLD AUTO: 0.3 %
BILIRUB DIRECT SERPL-MCNC: <0.1 MG/DL (ref 0–0.2)
BILIRUB DIRECT SERPL-MCNC: <0.1 MG/DL (ref 0–0.2)
BILIRUB SERPL-MCNC: 0.2 MG/DL (ref 0.1–2)
BILIRUB SERPL-MCNC: 0.2 MG/DL (ref 0.1–2)
BILIRUB SERPL-MCNC: 0.5 MG/DL (ref 0.1–2)
BILIRUB UR QL STRIP.AUTO: NEGATIVE
BILIRUB UR QL STRIP.AUTO: NEGATIVE
BODY TEMPERATURE: 98.6 F
BUN BLD-MCNC: 35 MG/DL (ref 9–23)
BUN BLD-MCNC: 36 MG/DL (ref 9–23)
BUN BLD-MCNC: 47 MG/DL (ref 9–23)
BUN BLD-MCNC: 51 MG/DL (ref 9–23)
BUN BLD-MCNC: 51 MG/DL (ref 9–23)
BUN BLD-MCNC: 53 MG/DL (ref 9–23)
BUN BLD-MCNC: 54 MG/DL (ref 9–23)
BUN BLD-MCNC: 54 MG/DL (ref 9–23)
BUN BLD-MCNC: 57 MG/DL (ref 9–23)
BUN BLD-MCNC: 57 MG/DL (ref 9–23)
BUN BLD-MCNC: 58 MG/DL (ref 9–23)
BUN BLD-MCNC: 58 MG/DL (ref 9–23)
BUN BLD-MCNC: 60 MG/DL (ref 9–23)
BUN BLD-MCNC: 61 MG/DL (ref 9–23)
BUN BLD-MCNC: 61 MG/DL (ref 9–23)
BUN BLD-MCNC: 65 MG/DL (ref 9–23)
BUN BLD-MCNC: 67 MG/DL (ref 9–23)
BUN BLD-MCNC: 67 MG/DL (ref 9–23)
BUN BLD-MCNC: 70 MG/DL (ref 9–23)
BUN BLD-MCNC: 73 MG/DL (ref 9–23)
BUN BLD-MCNC: 77 MG/DL (ref 9–23)
BUN BLD-MCNC: 80 MG/DL (ref 9–23)
BUN BLD-MCNC: 92 MG/DL (ref 9–23)
BUN BLD-MCNC: 93 MG/DL (ref 9–23)
C DIFF TOX B STL QL: NEGATIVE
CA-I BLD-SCNC: 1.22 MMOL/L (ref 0.95–1.32)
CALCIUM BLD-MCNC: 8.5 MG/DL (ref 8.5–10.1)
CALCIUM BLD-MCNC: 8.7 MG/DL (ref 8.5–10.1)
CALCIUM BLD-MCNC: 8.8 MG/DL (ref 8.5–10.1)
CALCIUM BLD-MCNC: 8.8 MG/DL (ref 8.5–10.1)
CALCIUM BLD-MCNC: 8.9 MG/DL (ref 8.5–10.1)
CALCIUM BLD-MCNC: 8.9 MG/DL (ref 8.5–10.1)
CALCIUM BLD-MCNC: 9 MG/DL (ref 8.5–10.1)
CALCIUM BLD-MCNC: 9.1 MG/DL (ref 8.5–10.1)
CALCIUM BLD-MCNC: 9.2 MG/DL (ref 8.5–10.1)
CALCIUM BLD-MCNC: 9.2 MG/DL (ref 8.5–10.1)
CALCIUM BLD-MCNC: 9.4 MG/DL (ref 8.5–10.1)
CALCIUM BLD-MCNC: 9.7 MG/DL (ref 8.5–10.1)
CALCIUM BLD-MCNC: 9.8 MG/DL (ref 8.5–10.1)
CALCIUM BLD-MCNC: 9.9 MG/DL (ref 8.5–10.1)
CALCIUM BLD-MCNC: 9.9 MG/DL (ref 8.5–10.1)
CHLORIDE SERPL-SCNC: 109 MMOL/L (ref 98–112)
CHLORIDE SERPL-SCNC: 111 MMOL/L (ref 98–112)
CHLORIDE SERPL-SCNC: 112 MMOL/L (ref 98–112)
CHLORIDE SERPL-SCNC: 113 MMOL/L (ref 98–112)
CHLORIDE SERPL-SCNC: 114 MMOL/L (ref 98–112)
CHLORIDE SERPL-SCNC: 115 MMOL/L (ref 98–112)
CHLORIDE SERPL-SCNC: 115 MMOL/L (ref 98–112)
CHLORIDE SERPL-SCNC: 116 MMOL/L (ref 98–112)
CHLORIDE SERPL-SCNC: 116 MMOL/L (ref 98–112)
CHLORIDE SERPL-SCNC: 117 MMOL/L (ref 98–112)
CHLORIDE SERPL-SCNC: 117 MMOL/L (ref 98–112)
CHLORIDE SERPL-SCNC: 118 MMOL/L (ref 98–112)
CHLORIDE SERPL-SCNC: 118 MMOL/L (ref 98–112)
CHLORIDE SERPL-SCNC: 119 MMOL/L (ref 98–112)
CK SERPL-CCNC: 135 U/L
CK SERPL-CCNC: 249 U/L
CLARITY UR REFRACT.AUTO: CLEAR
CLARITY UR REFRACT.AUTO: CLEAR
CO2 SERPL-SCNC: 17 MMOL/L (ref 21–32)
CO2 SERPL-SCNC: 20 MMOL/L (ref 21–32)
CO2 SERPL-SCNC: 21 MMOL/L (ref 21–32)
CO2 SERPL-SCNC: 21 MMOL/L (ref 21–32)
CO2 SERPL-SCNC: 22 MMOL/L (ref 21–32)
CO2 SERPL-SCNC: 22 MMOL/L (ref 21–32)
CO2 SERPL-SCNC: 23 MMOL/L (ref 21–32)
CO2 SERPL-SCNC: 24 MMOL/L (ref 21–32)
CO2 SERPL-SCNC: 25 MMOL/L (ref 21–32)
CO2 SERPL-SCNC: 25 MMOL/L (ref 21–32)
COHGB MFR BLD: 1.6 % SAT (ref 0–3)
COHGB MFR BLD: 2 % SAT (ref 0–3)
COHGB MFR BLD: 2.2 % SAT (ref 0–3)
COLOR UR AUTO: YELLOW
CREAT BLD-MCNC: 1.9 MG/DL
CREAT BLD-MCNC: 1.91 MG/DL
CREAT BLD-MCNC: 1.98 MG/DL
CREAT BLD-MCNC: 2.05 MG/DL
CREAT BLD-MCNC: 2.05 MG/DL
CREAT BLD-MCNC: 2.08 MG/DL
CREAT BLD-MCNC: 2.13 MG/DL
CREAT BLD-MCNC: 2.15 MG/DL
CREAT BLD-MCNC: 2.19 MG/DL
CREAT BLD-MCNC: 2.24 MG/DL
CREAT BLD-MCNC: 2.26 MG/DL
CREAT BLD-MCNC: 2.26 MG/DL
CREAT BLD-MCNC: 2.33 MG/DL
CREAT BLD-MCNC: 2.33 MG/DL
CREAT BLD-MCNC: 2.35 MG/DL
CREAT BLD-MCNC: 2.38 MG/DL
CREAT BLD-MCNC: 2.41 MG/DL
CREAT BLD-MCNC: 2.47 MG/DL
CREAT BLD-MCNC: 2.5 MG/DL
CREAT BLD-MCNC: 2.56 MG/DL
CREAT BLD-MCNC: 2.83 MG/DL
CREAT BLD-MCNC: 3.09 MG/DL
CREAT BLD-MCNC: 4.05 MG/DL
CREAT BLD-MCNC: 4.85 MG/DL
EGFRCR SERPLBLD CKD-EPI 2021: 11 ML/MIN/1.73M2 (ref 60–?)
EGFRCR SERPLBLD CKD-EPI 2021: 15 ML/MIN/1.73M2 (ref 60–?)
EGFRCR SERPLBLD CKD-EPI 2021: 16 ML/MIN/1.73M2 (ref 60–?)
EGFRCR SERPLBLD CKD-EPI 2021: 19 ML/MIN/1.73M2 (ref 60–?)
EGFRCR SERPLBLD CKD-EPI 2021: 20 ML/MIN/1.73M2 (ref 60–?)
EGFRCR SERPLBLD CKD-EPI 2021: 20 ML/MIN/1.73M2 (ref 60–?)
EGFRCR SERPLBLD CKD-EPI 2021: 21 ML/MIN/1.73M2 (ref 60–?)
EGFRCR SERPLBLD CKD-EPI 2021: 22 ML/MIN/1.73M2 (ref 60–?)
EGFRCR SERPLBLD CKD-EPI 2021: 23 ML/MIN/1.73M2 (ref 60–?)
EGFRCR SERPLBLD CKD-EPI 2021: 23 ML/MIN/1.73M2 (ref 60–?)
EGFRCR SERPLBLD CKD-EPI 2021: 24 ML/MIN/1.73M2 (ref 60–?)
EGFRCR SERPLBLD CKD-EPI 2021: 25 ML/MIN/1.73M2 (ref 60–?)
EGFRCR SERPLBLD CKD-EPI 2021: 26 ML/MIN/1.73M2 (ref 60–?)
EGFRCR SERPLBLD CKD-EPI 2021: 27 ML/MIN/1.73M2 (ref 60–?)
EGFRCR SERPLBLD CKD-EPI 2021: 9 ML/MIN/1.73M2 (ref 60–?)
EOSINOPHIL # BLD AUTO: 0 X10(3) UL (ref 0–0.7)
EOSINOPHIL # BLD AUTO: 0 X10(3) UL (ref 0–0.7)
EOSINOPHIL NFR BLD AUTO: 0 %
EOSINOPHIL NFR BLD AUTO: 0 %
ERYTHROCYTE [DISTWIDTH] IN BLOOD BY AUTOMATED COUNT: 13.9 %
ERYTHROCYTE [DISTWIDTH] IN BLOOD BY AUTOMATED COUNT: 14 %
ERYTHROCYTE [DISTWIDTH] IN BLOOD BY AUTOMATED COUNT: 14.2 %
ERYTHROCYTE [DISTWIDTH] IN BLOOD BY AUTOMATED COUNT: 14.4 %
ERYTHROCYTE [DISTWIDTH] IN BLOOD BY AUTOMATED COUNT: 14.5 %
ERYTHROCYTE [DISTWIDTH] IN BLOOD BY AUTOMATED COUNT: 14.5 %
ERYTHROCYTE [DISTWIDTH] IN BLOOD BY AUTOMATED COUNT: 14.6 %
ERYTHROCYTE [DISTWIDTH] IN BLOOD BY AUTOMATED COUNT: 14.7 %
ERYTHROCYTE [DISTWIDTH] IN BLOOD BY AUTOMATED COUNT: 14.9 %
ERYTHROCYTE [DISTWIDTH] IN BLOOD BY AUTOMATED COUNT: 15.5 %
ERYTHROCYTE [DISTWIDTH] IN BLOOD BY AUTOMATED COUNT: 15.6 %
ETHANOL SERPL-MCNC: <3 MG/DL (ref ?–3)
FOLATE SERPL-MCNC: 14.9 NG/ML (ref 8.7–?)
GLOBULIN PLAS-MCNC: 5 G/DL (ref 2.8–4.4)
GLUCOSE BLD-MCNC: 100 MG/DL (ref 70–99)
GLUCOSE BLD-MCNC: 100 MG/DL (ref 70–99)
GLUCOSE BLD-MCNC: 103 MG/DL (ref 70–99)
GLUCOSE BLD-MCNC: 114 MG/DL (ref 70–99)
GLUCOSE BLD-MCNC: 114 MG/DL (ref 70–99)
GLUCOSE BLD-MCNC: 117 MG/DL (ref 70–99)
GLUCOSE BLD-MCNC: 118 MG/DL (ref 70–99)
GLUCOSE BLD-MCNC: 120 MG/DL (ref 70–99)
GLUCOSE BLD-MCNC: 120 MG/DL (ref 70–99)
GLUCOSE BLD-MCNC: 123 MG/DL (ref 70–99)
GLUCOSE BLD-MCNC: 125 MG/DL (ref 70–99)
GLUCOSE BLD-MCNC: 126 MG/DL (ref 70–99)
GLUCOSE BLD-MCNC: 128 MG/DL (ref 70–99)
GLUCOSE BLD-MCNC: 130 MG/DL (ref 70–99)
GLUCOSE BLD-MCNC: 131 MG/DL (ref 70–99)
GLUCOSE BLD-MCNC: 133 MG/DL (ref 70–99)
GLUCOSE BLD-MCNC: 133 MG/DL (ref 70–99)
GLUCOSE BLD-MCNC: 134 MG/DL (ref 70–99)
GLUCOSE BLD-MCNC: 136 MG/DL (ref 70–99)
GLUCOSE BLD-MCNC: 137 MG/DL (ref 70–99)
GLUCOSE BLD-MCNC: 138 MG/DL (ref 70–99)
GLUCOSE BLD-MCNC: 139 MG/DL (ref 70–99)
GLUCOSE BLD-MCNC: 140 MG/DL (ref 70–99)
GLUCOSE BLD-MCNC: 142 MG/DL (ref 70–99)
GLUCOSE BLD-MCNC: 142 MG/DL (ref 70–99)
GLUCOSE BLD-MCNC: 143 MG/DL (ref 70–99)
GLUCOSE BLD-MCNC: 144 MG/DL (ref 70–99)
GLUCOSE BLD-MCNC: 145 MG/DL (ref 70–99)
GLUCOSE BLD-MCNC: 147 MG/DL (ref 70–99)
GLUCOSE BLD-MCNC: 148 MG/DL (ref 70–99)
GLUCOSE BLD-MCNC: 148 MG/DL (ref 70–99)
GLUCOSE BLD-MCNC: 149 MG/DL (ref 70–99)
GLUCOSE BLD-MCNC: 150 MG/DL (ref 70–99)
GLUCOSE BLD-MCNC: 151 MG/DL (ref 70–99)
GLUCOSE BLD-MCNC: 151 MG/DL (ref 70–99)
GLUCOSE BLD-MCNC: 152 MG/DL (ref 70–99)
GLUCOSE BLD-MCNC: 152 MG/DL (ref 70–99)
GLUCOSE BLD-MCNC: 154 MG/DL (ref 70–99)
GLUCOSE BLD-MCNC: 155 MG/DL (ref 70–99)
GLUCOSE BLD-MCNC: 156 MG/DL (ref 70–99)
GLUCOSE BLD-MCNC: 160 MG/DL (ref 70–99)
GLUCOSE BLD-MCNC: 162 MG/DL (ref 70–99)
GLUCOSE BLD-MCNC: 163 MG/DL (ref 70–99)
GLUCOSE BLD-MCNC: 168 MG/DL (ref 70–99)
GLUCOSE BLD-MCNC: 170 MG/DL (ref 70–99)
GLUCOSE BLD-MCNC: 171 MG/DL (ref 70–99)
GLUCOSE BLD-MCNC: 172 MG/DL (ref 70–99)
GLUCOSE BLD-MCNC: 173 MG/DL (ref 70–99)
GLUCOSE BLD-MCNC: 174 MG/DL (ref 70–99)
GLUCOSE BLD-MCNC: 176 MG/DL (ref 70–99)
GLUCOSE BLD-MCNC: 176 MG/DL (ref 70–99)
GLUCOSE BLD-MCNC: 178 MG/DL (ref 70–99)
GLUCOSE BLD-MCNC: 179 MG/DL (ref 70–99)
GLUCOSE BLD-MCNC: 180 MG/DL (ref 70–99)
GLUCOSE BLD-MCNC: 181 MG/DL (ref 70–99)
GLUCOSE BLD-MCNC: 181 MG/DL (ref 70–99)
GLUCOSE BLD-MCNC: 182 MG/DL (ref 70–99)
GLUCOSE BLD-MCNC: 182 MG/DL (ref 70–99)
GLUCOSE BLD-MCNC: 183 MG/DL (ref 70–99)
GLUCOSE BLD-MCNC: 185 MG/DL (ref 70–99)
GLUCOSE BLD-MCNC: 190 MG/DL (ref 70–99)
GLUCOSE BLD-MCNC: 192 MG/DL (ref 70–99)
GLUCOSE BLD-MCNC: 193 MG/DL (ref 70–99)
GLUCOSE BLD-MCNC: 195 MG/DL (ref 70–99)
GLUCOSE BLD-MCNC: 199 MG/DL (ref 70–99)
GLUCOSE BLD-MCNC: 199 MG/DL (ref 70–99)
GLUCOSE BLD-MCNC: 200 MG/DL (ref 70–99)
GLUCOSE BLD-MCNC: 200 MG/DL (ref 70–99)
GLUCOSE BLD-MCNC: 201 MG/DL (ref 70–99)
GLUCOSE BLD-MCNC: 202 MG/DL (ref 70–99)
GLUCOSE BLD-MCNC: 204 MG/DL (ref 70–99)
GLUCOSE BLD-MCNC: 205 MG/DL (ref 70–99)
GLUCOSE BLD-MCNC: 205 MG/DL (ref 70–99)
GLUCOSE BLD-MCNC: 207 MG/DL (ref 70–99)
GLUCOSE BLD-MCNC: 208 MG/DL (ref 70–99)
GLUCOSE BLD-MCNC: 209 MG/DL (ref 70–99)
GLUCOSE BLD-MCNC: 209 MG/DL (ref 70–99)
GLUCOSE BLD-MCNC: 210 MG/DL (ref 70–99)
GLUCOSE BLD-MCNC: 212 MG/DL (ref 70–99)
GLUCOSE BLD-MCNC: 214 MG/DL (ref 70–99)
GLUCOSE BLD-MCNC: 215 MG/DL (ref 70–99)
GLUCOSE BLD-MCNC: 217 MG/DL (ref 70–99)
GLUCOSE BLD-MCNC: 219 MG/DL (ref 70–99)
GLUCOSE BLD-MCNC: 220 MG/DL (ref 70–99)
GLUCOSE BLD-MCNC: 224 MG/DL (ref 70–99)
GLUCOSE BLD-MCNC: 226 MG/DL (ref 70–99)
GLUCOSE BLD-MCNC: 230 MG/DL (ref 70–99)
GLUCOSE BLD-MCNC: 232 MG/DL (ref 70–99)
GLUCOSE BLD-MCNC: 233 MG/DL (ref 70–99)
GLUCOSE BLD-MCNC: 234 MG/DL (ref 70–99)
GLUCOSE BLD-MCNC: 237 MG/DL (ref 70–99)
GLUCOSE BLD-MCNC: 238 MG/DL (ref 70–99)
GLUCOSE BLD-MCNC: 241 MG/DL (ref 70–99)
GLUCOSE BLD-MCNC: 241 MG/DL (ref 70–99)
GLUCOSE BLD-MCNC: 244 MG/DL (ref 70–99)
GLUCOSE BLD-MCNC: 245 MG/DL (ref 70–99)
GLUCOSE BLD-MCNC: 248 MG/DL (ref 70–99)
GLUCOSE BLD-MCNC: 258 MG/DL (ref 70–99)
GLUCOSE BLD-MCNC: 259 MG/DL (ref 70–99)
GLUCOSE BLD-MCNC: 260 MG/DL (ref 70–99)
GLUCOSE BLD-MCNC: 260 MG/DL (ref 70–99)
GLUCOSE BLD-MCNC: 262 MG/DL (ref 70–99)
GLUCOSE BLD-MCNC: 264 MG/DL (ref 70–99)
GLUCOSE BLD-MCNC: 266 MG/DL (ref 70–99)
GLUCOSE BLD-MCNC: 268 MG/DL (ref 70–99)
GLUCOSE BLD-MCNC: 269 MG/DL (ref 70–99)
GLUCOSE BLD-MCNC: 273 MG/DL (ref 70–99)
GLUCOSE BLD-MCNC: 275 MG/DL (ref 70–99)
GLUCOSE BLD-MCNC: 282 MG/DL (ref 70–99)
GLUCOSE BLD-MCNC: 288 MG/DL (ref 70–99)
GLUCOSE BLD-MCNC: 298 MG/DL (ref 70–99)
GLUCOSE BLD-MCNC: 309 MG/DL (ref 70–99)
GLUCOSE BLD-MCNC: 83 MG/DL (ref 70–99)
GLUCOSE BLD-MCNC: 87 MG/DL (ref 70–99)
GLUCOSE BLD-MCNC: 99 MG/DL (ref 70–99)
GLUCOSE UR STRIP.AUTO-MCNC: NORMAL MG/DL
GLUCOSE UR STRIP.AUTO-MCNC: NORMAL MG/DL
HCO3 BLDA-SCNC: 23.3 MEQ/L (ref 21–27)
HCO3 BLDA-SCNC: 23.9 MEQ/L (ref 21–27)
HCO3 BLDA-SCNC: 24.9 MEQ/L (ref 21–27)
HCT VFR BLD AUTO: 22.5 %
HCT VFR BLD AUTO: 23.1 %
HCT VFR BLD AUTO: 24.1 %
HCT VFR BLD AUTO: 24.2 %
HCT VFR BLD AUTO: 25.1 %
HCT VFR BLD AUTO: 26.5 %
HCT VFR BLD AUTO: 26.7 %
HCT VFR BLD AUTO: 27.1 %
HCT VFR BLD AUTO: 27.6 %
HCT VFR BLD AUTO: 28 %
HCT VFR BLD AUTO: 28.1 %
HCT VFR BLD AUTO: 28.3 %
HCT VFR BLD AUTO: 29.1 %
HCT VFR BLD AUTO: 29.4 %
HCT VFR BLD AUTO: 29.7 %
HCT VFR BLD AUTO: 30.8 %
HGB BLD-MCNC: 7.2 G/DL
HGB BLD-MCNC: 7.5 G/DL
HGB BLD-MCNC: 8.2 G/DL
HGB BLD-MCNC: 8.6 G/DL
HGB BLD-MCNC: 8.7 G/DL
HGB BLD-MCNC: 8.7 G/DL
HGB BLD-MCNC: 8.9 G/DL
HGB BLD-MCNC: 9 G/DL
HGB BLD-MCNC: 9.1 G/DL
HGB BLD-MCNC: 9.1 G/DL
HGB BLD-MCNC: 9.2 G/DL
HGB BLD-MCNC: 9.2 G/DL
HGB BLD-MCNC: 9.4 G/DL
HGB BLD-MCNC: 9.4 G/DL
HGB BLD-MCNC: 9.5 G/DL
HGB BLD-MCNC: 9.7 G/DL
HGB BLD-MCNC: 9.7 G/DL
IMM GRANULOCYTES # BLD AUTO: 0.02 X10(3) UL (ref 0–1)
IMM GRANULOCYTES # BLD AUTO: 0.03 X10(3) UL (ref 0–1)
IMM GRANULOCYTES NFR BLD: 0.3 %
IMM GRANULOCYTES NFR BLD: 0.4 %
INR BLD: 1.16 (ref 0.8–1.2)
KETONES UR STRIP.AUTO-MCNC: NEGATIVE MG/DL
KETONES UR STRIP.AUTO-MCNC: NEGATIVE MG/DL
LACTATE BLD-SCNC: 1.9 MMOL/L (ref 0.5–2)
LEUKOCYTE ESTERASE UR QL STRIP.AUTO: NEGATIVE
LEUKOCYTE ESTERASE UR QL STRIP.AUTO: NEGATIVE
LYMPHOCYTES # BLD AUTO: 1.97 X10(3) UL (ref 1–4)
LYMPHOCYTES # BLD AUTO: 2.19 X10(3) UL (ref 1–4)
LYMPHOCYTES NFR BLD AUTO: 29.9 %
LYMPHOCYTES NFR BLD AUTO: 30.6 %
MAGNESIUM SERPL-MCNC: 2.4 MG/DL (ref 1.6–2.6)
MAGNESIUM SERPL-MCNC: 2.5 MG/DL (ref 1.6–2.6)
MAGNESIUM SERPL-MCNC: 2.6 MG/DL (ref 1.6–2.6)
MAGNESIUM SERPL-MCNC: 2.8 MG/DL (ref 1.6–2.6)
MAGNESIUM SERPL-MCNC: 2.9 MG/DL (ref 1.6–2.6)
MCH RBC QN AUTO: 31.4 PG (ref 26–34)
MCH RBC QN AUTO: 31.5 PG (ref 26–34)
MCH RBC QN AUTO: 31.6 PG (ref 26–34)
MCH RBC QN AUTO: 31.7 PG (ref 26–34)
MCH RBC QN AUTO: 31.7 PG (ref 26–34)
MCH RBC QN AUTO: 31.8 PG (ref 26–34)
MCH RBC QN AUTO: 31.9 PG (ref 26–34)
MCH RBC QN AUTO: 32 PG (ref 26–34)
MCH RBC QN AUTO: 32 PG (ref 26–34)
MCH RBC QN AUTO: 32.1 PG (ref 26–34)
MCH RBC QN AUTO: 32.2 PG (ref 26–34)
MCH RBC QN AUTO: 32.2 PG (ref 26–34)
MCH RBC QN AUTO: 32.3 PG (ref 26–34)
MCH RBC QN AUTO: 32.3 PG (ref 26–34)
MCH RBC QN AUTO: 32.5 PG (ref 26–34)
MCH RBC QN AUTO: 32.7 PG (ref 26–34)
MCHC RBC AUTO-ENTMCNC: 31.3 G/DL (ref 31–37)
MCHC RBC AUTO-ENTMCNC: 31.5 G/DL (ref 31–37)
MCHC RBC AUTO-ENTMCNC: 31.6 G/DL (ref 31–37)
MCHC RBC AUTO-ENTMCNC: 31.7 G/DL (ref 31–37)
MCHC RBC AUTO-ENTMCNC: 32 G/DL (ref 31–37)
MCHC RBC AUTO-ENTMCNC: 32 G/DL (ref 31–37)
MCHC RBC AUTO-ENTMCNC: 32.2 G/DL (ref 31–37)
MCHC RBC AUTO-ENTMCNC: 32.5 G/DL (ref 31–37)
MCHC RBC AUTO-ENTMCNC: 32.5 G/DL (ref 31–37)
MCHC RBC AUTO-ENTMCNC: 32.6 G/DL (ref 31–37)
MCHC RBC AUTO-ENTMCNC: 32.7 G/DL (ref 31–37)
MCHC RBC AUTO-ENTMCNC: 33.2 G/DL (ref 31–37)
MCHC RBC AUTO-ENTMCNC: 33.5 G/DL (ref 31–37)
MCHC RBC AUTO-ENTMCNC: 33.9 G/DL (ref 31–37)
MCHC RBC AUTO-ENTMCNC: 34 G/DL (ref 31–37)
MCHC RBC AUTO-ENTMCNC: 34 G/DL (ref 31–37)
MCV RBC AUTO: 100 FL
MCV RBC AUTO: 100.3 FL
MCV RBC AUTO: 101.7 FL
MCV RBC AUTO: 94.5 FL
MCV RBC AUTO: 94.9 FL
MCV RBC AUTO: 96.2 FL
MCV RBC AUTO: 96.4 FL
MCV RBC AUTO: 96.6 FL
MCV RBC AUTO: 97.3 FL
MCV RBC AUTO: 98.2 FL
MCV RBC AUTO: 98.2 FL
MCV RBC AUTO: 98.5 FL
MCV RBC AUTO: 99.6 FL
METHGB MFR BLD: 0 % SAT (ref 0.4–1.5)
METHGB MFR BLD: 0 % SAT (ref 0.4–1.5)
METHGB MFR BLD: 0.3 % SAT (ref 0.4–1.5)
MONOCYTES # BLD AUTO: 0.51 X10(3) UL (ref 0.1–1)
MONOCYTES # BLD AUTO: 0.64 X10(3) UL (ref 0.1–1)
MONOCYTES NFR BLD AUTO: 7.9 %
MONOCYTES NFR BLD AUTO: 8.7 %
MRSA DNA SPEC QL NAA+PROBE: NEGATIVE
NEUTROPHILS # BLD AUTO: 3.92 X10 (3) UL (ref 1.5–7.7)
NEUTROPHILS # BLD AUTO: 3.92 X10(3) UL (ref 1.5–7.7)
NEUTROPHILS # BLD AUTO: 4.45 X10 (3) UL (ref 1.5–7.7)
NEUTROPHILS # BLD AUTO: 4.45 X10(3) UL (ref 1.5–7.7)
NEUTROPHILS NFR BLD AUTO: 60.7 %
NEUTROPHILS NFR BLD AUTO: 60.9 %
NITRITE UR QL STRIP.AUTO: NEGATIVE
NITRITE UR QL STRIP.AUTO: NEGATIVE
NT-PROBNP SERPL-MCNC: ABNORMAL PG/ML (ref ?–450)
OSMOLALITY SERPL CALC.SUM OF ELEC: 302 MOSM/KG (ref 275–295)
OSMOLALITY SERPL CALC.SUM OF ELEC: 304 MOSM/KG (ref 275–295)
OSMOLALITY SERPL CALC.SUM OF ELEC: 304 MOSM/KG (ref 275–295)
OSMOLALITY SERPL CALC.SUM OF ELEC: 306 MOSM/KG (ref 275–295)
OSMOLALITY SERPL CALC.SUM OF ELEC: 306 MOSM/KG (ref 275–295)
OSMOLALITY SERPL CALC.SUM OF ELEC: 308 MOSM/KG (ref 275–295)
OSMOLALITY SERPL CALC.SUM OF ELEC: 310 MOSM/KG (ref 275–295)
OSMOLALITY SERPL CALC.SUM OF ELEC: 311 MOSM/KG (ref 275–295)
OSMOLALITY SERPL CALC.SUM OF ELEC: 315 MOSM/KG (ref 275–295)
OSMOLALITY SERPL CALC.SUM OF ELEC: 316 MOSM/KG (ref 275–295)
OSMOLALITY SERPL CALC.SUM OF ELEC: 319 MOSM/KG (ref 275–295)
OSMOLALITY SERPL CALC.SUM OF ELEC: 320 MOSM/KG (ref 275–295)
OSMOLALITY SERPL CALC.SUM OF ELEC: 321 MOSM/KG (ref 275–295)
OSMOLALITY SERPL CALC.SUM OF ELEC: 322 MOSM/KG (ref 275–295)
OSMOLALITY SERPL CALC.SUM OF ELEC: 323 MOSM/KG (ref 275–295)
OSMOLALITY SERPL CALC.SUM OF ELEC: 326 MOSM/KG (ref 275–295)
OSMOLALITY SERPL CALC.SUM OF ELEC: 328 MOSM/KG (ref 275–295)
OXYHGB MFR BLDA: 95.9 % (ref 92–100)
OXYHGB MFR BLDA: 97.1 % (ref 92–100)
OXYHGB MFR BLDA: 97.7 % (ref 92–100)
P AXIS: 67 DEGREES
P-R INTERVAL: 160 MS
PCO2 BLDA: 34 MM HG (ref 35–45)
PCO2 BLDA: 34 MM HG (ref 35–45)
PCO2 BLDA: 35 MM HG (ref 35–45)
PH BLDA: 7.41 [PH] (ref 7.35–7.45)
PH BLDA: 7.43 [PH] (ref 7.35–7.45)
PH BLDA: 7.45 [PH] (ref 7.35–7.45)
PH UR STRIP.AUTO: 5.5 [PH] (ref 5–8)
PH UR STRIP.AUTO: 6.5 [PH] (ref 5–8)
PHOSPHATE SERPL-MCNC: 2.5 MG/DL (ref 2.5–4.9)
PHOSPHATE SERPL-MCNC: 2.6 MG/DL (ref 2.5–4.9)
PHOSPHATE SERPL-MCNC: 2.8 MG/DL (ref 2.5–4.9)
PHOSPHATE SERPL-MCNC: 2.9 MG/DL (ref 2.5–4.9)
PHOSPHATE SERPL-MCNC: 3.1 MG/DL (ref 2.5–4.9)
PHOSPHATE SERPL-MCNC: 3.2 MG/DL (ref 2.5–4.9)
PHOSPHATE SERPL-MCNC: 3.3 MG/DL (ref 2.5–4.9)
PHOSPHATE SERPL-MCNC: 3.6 MG/DL (ref 2.5–4.9)
PHOSPHATE SERPL-MCNC: 4.4 MG/DL (ref 2.5–4.9)
PHOSPHATE SERPL-MCNC: 5.6 MG/DL (ref 2.5–4.9)
PLATELET # BLD AUTO: 204 10(3)UL (ref 150–450)
PLATELET # BLD AUTO: 223 10(3)UL (ref 150–450)
PLATELET # BLD AUTO: 234 10(3)UL (ref 150–450)
PLATELET # BLD AUTO: 244 10(3)UL (ref 150–450)
PLATELET # BLD AUTO: 245 10(3)UL (ref 150–450)
PLATELET # BLD AUTO: 247 10(3)UL (ref 150–450)
PLATELET # BLD AUTO: 259 10(3)UL (ref 150–450)
PLATELET # BLD AUTO: 270 10(3)UL (ref 150–450)
PLATELET # BLD AUTO: 270 10(3)UL (ref 150–450)
PLATELET # BLD AUTO: 272 10(3)UL (ref 150–450)
PLATELET # BLD AUTO: 289 10(3)UL (ref 150–450)
PLATELET # BLD AUTO: 295 10(3)UL (ref 150–450)
PLATELET # BLD AUTO: 298 10(3)UL (ref 150–450)
PLATELET # BLD AUTO: 298 10(3)UL (ref 150–450)
PLATELET # BLD AUTO: 299 10(3)UL (ref 150–450)
PLATELET # BLD AUTO: 306 10(3)UL (ref 150–450)
PLATELET # BLD AUTO: 315 10(3)UL (ref 150–450)
PLATELET # BLD AUTO: 337 10(3)UL (ref 150–450)
PO2 BLDA: 78 MM HG (ref 80–100)
PO2 BLDA: 82 MM HG (ref 80–100)
PO2 BLDA: 96 MM HG (ref 80–100)
POTASSIUM BLD-SCNC: 5.1 MMOL/L (ref 3.6–5.1)
POTASSIUM SERPL-SCNC: 3.4 MMOL/L (ref 3.5–5.1)
POTASSIUM SERPL-SCNC: 3.5 MMOL/L (ref 3.5–5.1)
POTASSIUM SERPL-SCNC: 3.6 MMOL/L (ref 3.5–5.1)
POTASSIUM SERPL-SCNC: 3.7 MMOL/L (ref 3.5–5.1)
POTASSIUM SERPL-SCNC: 3.9 MMOL/L (ref 3.5–5.1)
POTASSIUM SERPL-SCNC: 4 MMOL/L (ref 3.5–5.1)
POTASSIUM SERPL-SCNC: 4.1 MMOL/L (ref 3.5–5.1)
POTASSIUM SERPL-SCNC: 4.1 MMOL/L (ref 3.5–5.1)
POTASSIUM SERPL-SCNC: 4.2 MMOL/L (ref 3.5–5.1)
POTASSIUM SERPL-SCNC: 4.4 MMOL/L (ref 3.5–5.1)
POTASSIUM SERPL-SCNC: 4.4 MMOL/L (ref 3.5–5.1)
POTASSIUM SERPL-SCNC: 4.6 MMOL/L (ref 3.5–5.1)
POTASSIUM SERPL-SCNC: 4.6 MMOL/L (ref 3.5–5.1)
POTASSIUM SERPL-SCNC: 4.8 MMOL/L (ref 3.5–5.1)
POTASSIUM SERPL-SCNC: 5 MMOL/L (ref 3.5–5.1)
POTASSIUM SERPL-SCNC: 5 MMOL/L (ref 3.5–5.1)
PROT SERPL-MCNC: 6.9 G/DL (ref 6.4–8.2)
PROT SERPL-MCNC: 7.1 G/DL (ref 6.4–8.2)
PROT SERPL-MCNC: 8.7 G/DL (ref 6.4–8.2)
PROT UR STRIP.AUTO-MCNC: 100 MG/DL
PROT UR STRIP.AUTO-MCNC: 300 MG/DL
PROTHROMBIN TIME: 14.9 SECONDS (ref 11.6–14.8)
Q-T INTERVAL: 386 MS
QRS DURATION: 104 MS
QTC CALCULATION (BEZET): 472 MS
R AXIS: -28 DEGREES
RBC # BLD AUTO: 2.25 X10(6)UL
RBC # BLD AUTO: 2.31 X10(6)UL
RBC # BLD AUTO: 2.55 X10(6)UL
RBC # BLD AUTO: 2.55 X10(6)UL
RBC # BLD AUTO: 2.61 X10(6)UL
RBC # BLD AUTO: 2.71 X10(6)UL
RBC # BLD AUTO: 2.72 X10(6)UL
RBC # BLD AUTO: 2.75 X10(6)UL
RBC # BLD AUTO: 2.81 X10(6)UL
RBC # BLD AUTO: 2.85 X10(6)UL
RBC # BLD AUTO: 2.89 X10(6)UL
RBC # BLD AUTO: 2.9 X10(6)UL
RBC # BLD AUTO: 2.91 X10(6)UL
RBC # BLD AUTO: 2.91 X10(6)UL
RBC # BLD AUTO: 2.97 X10(6)UL
RBC # BLD AUTO: 3.08 X10(6)UL
SODIUM BLD-SCNC: 134 MMOL/L (ref 135–145)
SODIUM SERPL-SCNC: 135 MMOL/L (ref 136–145)
SODIUM SERPL-SCNC: 136 MMOL/L (ref 136–145)
SODIUM SERPL-SCNC: 137 MMOL/L (ref 136–145)
SODIUM SERPL-SCNC: 138 MMOL/L (ref 136–145)
SODIUM SERPL-SCNC: 139 MMOL/L (ref 136–145)
SODIUM SERPL-SCNC: 140 MMOL/L (ref 136–145)
SODIUM SERPL-SCNC: 141 MMOL/L (ref 136–145)
SODIUM SERPL-SCNC: 141 MMOL/L (ref 136–145)
SODIUM SERPL-SCNC: 143 MMOL/L (ref 136–145)
SODIUM SERPL-SCNC: 143 MMOL/L (ref 136–145)
SODIUM SERPL-SCNC: 144 MMOL/L (ref 136–145)
SODIUM SERPL-SCNC: 144 MMOL/L (ref 136–145)
SODIUM SERPL-SCNC: 145 MMOL/L (ref 136–145)
SODIUM SERPL-SCNC: 146 MMOL/L (ref 136–145)
SODIUM SERPL-SCNC: 148 MMOL/L (ref 136–145)
SODIUM SERPL-SCNC: 148 MMOL/L (ref 136–145)
SP GR UR STRIP.AUTO: 1.02 (ref 1–1.03)
SP GR UR STRIP.AUTO: 1.02 (ref 1–1.03)
T AXIS: 102 DEGREES
T PALLIDUM AB SER QL IA: NONREACTIVE
TSI SER-ACNC: 1.69 MIU/ML (ref 0.36–3.74)
UROBILINOGEN UR STRIP.AUTO-MCNC: NORMAL MG/DL
UROBILINOGEN UR STRIP.AUTO-MCNC: NORMAL MG/DL
VALPROATE SERPL-MCNC: 105.8 UG/ML (ref 50–100)
VALPROATE SERPL-MCNC: 117.9 UG/ML (ref 50–100)
VALPROATE SERPL-MCNC: 88.7 UG/ML (ref 50–100)
VALPROATE SERPL-MCNC: 95.6 UG/ML (ref 50–100)
VENTRICULAR RATE: 90 BPM
VIT B12 SERPL-MCNC: 1006 PG/ML (ref 193–986)
WBC # BLD AUTO: 10.2 X10(3) UL (ref 4–11)
WBC # BLD AUTO: 10.6 X10(3) UL (ref 4–11)
WBC # BLD AUTO: 13 X10(3) UL (ref 4–11)
WBC # BLD AUTO: 13.3 X10(3) UL (ref 4–11)
WBC # BLD AUTO: 6.4 X10(3) UL (ref 4–11)
WBC # BLD AUTO: 7.2 X10(3) UL (ref 4–11)
WBC # BLD AUTO: 7.3 X10(3) UL (ref 4–11)
WBC # BLD AUTO: 7.4 X10(3) UL (ref 4–11)
WBC # BLD AUTO: 7.7 X10(3) UL (ref 4–11)
WBC # BLD AUTO: 7.9 X10(3) UL (ref 4–11)
WBC # BLD AUTO: 8.2 X10(3) UL (ref 4–11)
WBC # BLD AUTO: 8.4 X10(3) UL (ref 4–11)
WBC # BLD AUTO: 9.1 X10(3) UL (ref 4–11)
WBC # BLD AUTO: 9.1 X10(3) UL (ref 4–11)
WBC # BLD AUTO: 9.2 X10(3) UL (ref 4–11)
WBC # BLD AUTO: 9.7 X10(3) UL (ref 4–11)

## 2024-01-01 PROCEDURE — 99291 CRITICAL CARE FIRST HOUR: CPT | Performed by: OTHER

## 2024-01-01 PROCEDURE — 99232 SBSQ HOSP IP/OBS MODERATE 35: CPT | Performed by: STUDENT IN AN ORGANIZED HEALTH CARE EDUCATION/TRAINING PROGRAM

## 2024-01-01 PROCEDURE — 70450 CT HEAD/BRAIN W/O DYE: CPT | Performed by: HOSPITALIST

## 2024-01-01 PROCEDURE — 99223 1ST HOSP IP/OBS HIGH 75: CPT | Performed by: INTERNAL MEDICINE

## 2024-01-01 PROCEDURE — 99233 SBSQ HOSP IP/OBS HIGH 50: CPT | Performed by: HOSPITALIST

## 2024-01-01 PROCEDURE — 99292 CRITICAL CARE ADDL 30 MIN: CPT | Performed by: OTHER

## 2024-01-01 PROCEDURE — 99233 SBSQ HOSP IP/OBS HIGH 50: CPT | Performed by: OTHER

## 2024-01-01 PROCEDURE — 99232 SBSQ HOSP IP/OBS MODERATE 35: CPT | Performed by: HOSPITALIST

## 2024-01-01 PROCEDURE — 99291 CRITICAL CARE FIRST HOUR: CPT | Performed by: INTERNAL MEDICINE

## 2024-01-01 PROCEDURE — 99233 SBSQ HOSP IP/OBS HIGH 50: CPT | Performed by: INTERNAL MEDICINE

## 2024-01-01 PROCEDURE — 71045 X-RAY EXAM CHEST 1 VIEW: CPT | Performed by: HOSPITALIST

## 2024-01-01 PROCEDURE — 95720 EEG PHY/QHP EA INCR W/VEEG: CPT | Performed by: OTHER

## 2024-01-01 PROCEDURE — 71045 X-RAY EXAM CHEST 1 VIEW: CPT | Performed by: STUDENT IN AN ORGANIZED HEALTH CARE EDUCATION/TRAINING PROGRAM

## 2024-01-01 PROCEDURE — 99291 CRITICAL CARE FIRST HOUR: CPT | Performed by: NURSE PRACTITIONER

## 2024-01-01 PROCEDURE — 99222 1ST HOSP IP/OBS MODERATE 55: CPT | Performed by: NURSE PRACTITIONER

## 2024-01-01 PROCEDURE — 99232 SBSQ HOSP IP/OBS MODERATE 35: CPT

## 2024-01-01 PROCEDURE — 71045 X-RAY EXAM CHEST 1 VIEW: CPT | Performed by: INTERNAL MEDICINE

## 2024-01-01 PROCEDURE — 71045 X-RAY EXAM CHEST 1 VIEW: CPT | Performed by: EMERGENCY MEDICINE

## 2024-01-01 PROCEDURE — 95718 EEG PHYS/QHP 2-12 HR W/VEEG: CPT | Performed by: OTHER

## 2024-01-01 PROCEDURE — 99231 SBSQ HOSP IP/OBS SF/LOW 25: CPT | Performed by: STUDENT IN AN ORGANIZED HEALTH CARE EDUCATION/TRAINING PROGRAM

## 2024-01-01 PROCEDURE — 99291 CRITICAL CARE FIRST HOUR: CPT | Performed by: HOSPITALIST

## 2024-01-01 PROCEDURE — 99232 SBSQ HOSP IP/OBS MODERATE 35: CPT | Performed by: INTERNAL MEDICINE

## 2024-01-01 PROCEDURE — 70551 MRI BRAIN STEM W/O DYE: CPT | Performed by: INTERNAL MEDICINE

## 2024-01-01 PROCEDURE — 99233 SBSQ HOSP IP/OBS HIGH 50: CPT | Performed by: NURSE PRACTITIONER

## 2024-01-01 PROCEDURE — 95719 EEG PHYS/QHP EA INCR W/O VID: CPT | Performed by: OTHER

## 2024-01-01 PROCEDURE — 70450 CT HEAD/BRAIN W/O DYE: CPT | Performed by: EMERGENCY MEDICINE

## 2024-01-01 PROCEDURE — 70553 MRI BRAIN STEM W/O & W/DYE: CPT | Performed by: NURSE PRACTITIONER

## 2024-01-01 PROCEDURE — 99231 SBSQ HOSP IP/OBS SF/LOW 25: CPT | Performed by: NURSE PRACTITIONER

## 2024-01-01 PROCEDURE — 99292 CRITICAL CARE ADDL 30 MIN: CPT | Performed by: INTERNAL MEDICINE

## 2024-01-01 PROCEDURE — 99223 1ST HOSP IP/OBS HIGH 75: CPT | Performed by: OTHER

## 2024-01-01 PROCEDURE — 99232 SBSQ HOSP IP/OBS MODERATE 35: CPT | Performed by: NURSE PRACTITIONER

## 2024-01-01 RX ORDER — PROCHLORPERAZINE 25 MG
25 SUPPOSITORY, RECTAL RECTAL EVERY 6 HOURS PRN
Status: DISCONTINUED | OUTPATIENT
Start: 2024-01-01 | End: 2024-01-01

## 2024-01-01 RX ORDER — LOPERAMIDE HYDROCHLORIDE 2 MG/1
2 CAPSULE ORAL 4 TIMES DAILY PRN
Status: DISCONTINUED | OUTPATIENT
Start: 2024-01-01 | End: 2024-01-01

## 2024-01-01 RX ORDER — CEFAZOLIN SODIUM/WATER 2 G/20 ML
2 SYRINGE (ML) INTRAVENOUS EVERY 12 HOURS
Status: DISCONTINUED | OUTPATIENT
Start: 2024-01-01 | End: 2024-01-01

## 2024-01-01 RX ORDER — HYDRALAZINE HYDROCHLORIDE 20 MG/ML
10 INJECTION INTRAMUSCULAR; INTRAVENOUS EVERY 6 HOURS
Status: DISCONTINUED | OUTPATIENT
Start: 2024-01-01 | End: 2024-01-01

## 2024-01-01 RX ORDER — SODIUM BICARBONATE 325 MG/1
325 TABLET ORAL AS NEEDED
Status: DISCONTINUED | OUTPATIENT
Start: 2024-01-01 | End: 2024-01-01

## 2024-01-01 RX ORDER — LEVETIRACETAM 500 MG/5ML
500 INJECTION, SOLUTION, CONCENTRATE INTRAVENOUS EVERY 12 HOURS
Status: DISCONTINUED | OUTPATIENT
Start: 2024-01-01 | End: 2024-01-01

## 2024-01-01 RX ORDER — LORAZEPAM 2 MG/ML
2 INJECTION INTRAMUSCULAR EVERY 4 HOURS PRN
Status: DISCONTINUED | OUTPATIENT
Start: 2024-01-01 | End: 2024-01-01

## 2024-01-01 RX ORDER — MELATONIN
325 2 TIMES DAILY WITH MEALS
Status: DISCONTINUED | OUTPATIENT
Start: 2024-01-01 | End: 2024-01-01

## 2024-01-01 RX ORDER — LACOSAMIDE 10 MG/ML
200 INJECTION, SOLUTION INTRAVENOUS EVERY 12 HOURS
Status: DISCONTINUED | OUTPATIENT
Start: 2024-01-01 | End: 2024-01-01

## 2024-01-01 RX ORDER — LEVETIRACETAM 500 MG/1
1500 TABLET ORAL 2 TIMES DAILY
Status: DISCONTINUED | OUTPATIENT
Start: 2024-01-01 | End: 2024-01-01

## 2024-01-01 RX ORDER — ECHINACEA PURPUREA EXTRACT 125 MG
1 TABLET ORAL
Status: DISCONTINUED | OUTPATIENT
Start: 2024-01-01 | End: 2024-01-01

## 2024-01-01 RX ORDER — POLYETHYLENE GLYCOL 3350 17 G/17G
17 POWDER, FOR SOLUTION ORAL DAILY PRN
Status: DISCONTINUED | OUTPATIENT
Start: 2024-01-01 | End: 2024-01-01

## 2024-01-01 RX ORDER — GADOTERATE MEGLUMINE 376.9 MG/ML
20 INJECTION INTRAVENOUS
Status: COMPLETED | OUTPATIENT
Start: 2024-01-01 | End: 2024-01-01

## 2024-01-01 RX ORDER — HYDRALAZINE HYDROCHLORIDE 50 MG/1
50 TABLET, FILM COATED ORAL EVERY 8 HOURS SCHEDULED
Status: DISCONTINUED | OUTPATIENT
Start: 2024-01-01 | End: 2024-01-01

## 2024-01-01 RX ORDER — LEVETIRACETAM 500 MG/5ML
1500 INJECTION, SOLUTION, CONCENTRATE INTRAVENOUS EVERY 12 HOURS
Status: DISCONTINUED | OUTPATIENT
Start: 2024-01-01 | End: 2024-01-01

## 2024-01-01 RX ORDER — ACETAMINOPHEN 160 MG/5ML
650 SOLUTION ORAL EVERY 6 HOURS PRN
Status: DISCONTINUED | OUTPATIENT
Start: 2024-01-01 | End: 2024-01-01

## 2024-01-01 RX ORDER — LEVETIRACETAM 500 MG/5ML
1000 INJECTION, SOLUTION, CONCENTRATE INTRAVENOUS ONCE
Status: COMPLETED | OUTPATIENT
Start: 2024-01-01 | End: 2024-01-01

## 2024-01-01 RX ORDER — METOCLOPRAMIDE HYDROCHLORIDE 5 MG/ML
5 INJECTION INTRAMUSCULAR; INTRAVENOUS EVERY 8 HOURS PRN
Status: DISCONTINUED | OUTPATIENT
Start: 2024-01-01 | End: 2024-01-01

## 2024-01-01 RX ORDER — ALBUTEROL SULFATE 90 UG/1
2 AEROSOL, METERED RESPIRATORY (INHALATION) EVERY 4 HOURS PRN
Status: DISCONTINUED | OUTPATIENT
Start: 2024-01-01 | End: 2024-01-01

## 2024-01-01 RX ORDER — DILTIAZEM HYDROCHLORIDE 60 MG/1
60 TABLET, FILM COATED ORAL EVERY 8 HOURS SCHEDULED
Status: DISCONTINUED | OUTPATIENT
Start: 2024-01-01 | End: 2024-01-01

## 2024-01-01 RX ORDER — HALOPERIDOL 5 MG/ML
2 INJECTION INTRAMUSCULAR
Status: DISCONTINUED | OUTPATIENT
Start: 2024-01-01 | End: 2024-01-01

## 2024-01-01 RX ORDER — DIAZEPAM 5 MG/ML
2.5 INJECTION, SOLUTION INTRAMUSCULAR; INTRAVENOUS ONCE
Status: COMPLETED | OUTPATIENT
Start: 2024-01-01 | End: 2024-01-01

## 2024-01-01 RX ORDER — HEPARIN SODIUM 1000 [USP'U]/ML
80 INJECTION, SOLUTION INTRAVENOUS; SUBCUTANEOUS ONCE
Status: COMPLETED | OUTPATIENT
Start: 2024-01-01 | End: 2024-01-01

## 2024-01-01 RX ORDER — POTASSIUM CHLORIDE 1.5 G/1.58G
40 POWDER, FOR SOLUTION ORAL ONCE
Status: COMPLETED | OUTPATIENT
Start: 2024-01-01 | End: 2024-01-01

## 2024-01-01 RX ORDER — SENNOSIDES 8.6 MG
17.2 TABLET ORAL NIGHTLY PRN
Status: DISCONTINUED | OUTPATIENT
Start: 2024-01-01 | End: 2024-01-01

## 2024-01-01 RX ORDER — DEXTROSE MONOHYDRATE 50 MG/ML
INJECTION, SOLUTION INTRAVENOUS CONTINUOUS
Status: DISCONTINUED | OUTPATIENT
Start: 2024-01-01 | End: 2024-01-01

## 2024-01-01 RX ORDER — ONDANSETRON 2 MG/ML
4 INJECTION INTRAMUSCULAR; INTRAVENOUS EVERY 6 HOURS PRN
Status: DISCONTINUED | OUTPATIENT
Start: 2024-01-01 | End: 2024-01-01

## 2024-01-01 RX ORDER — INSULIN DEGLUDEC 100 U/ML
5 INJECTION, SOLUTION SUBCUTANEOUS DAILY
Status: DISCONTINUED | OUTPATIENT
Start: 2024-01-01 | End: 2024-01-01

## 2024-01-01 RX ORDER — BISACODYL 10 MG
10 SUPPOSITORY, RECTAL RECTAL
Status: DISCONTINUED | OUTPATIENT
Start: 2024-01-01 | End: 2024-01-01

## 2024-01-01 RX ORDER — LORAZEPAM 2 MG/ML
1 INJECTION INTRAMUSCULAR EVERY 4 HOURS PRN
Status: DISCONTINUED | OUTPATIENT
Start: 2024-01-01 | End: 2024-01-01

## 2024-01-01 RX ORDER — LEVETIRACETAM 500 MG/5ML
1000 INJECTION, SOLUTION, CONCENTRATE INTRAVENOUS ONCE
Status: DISCONTINUED | OUTPATIENT
Start: 2024-01-01 | End: 2024-01-01

## 2024-01-01 RX ORDER — POTASSIUM CHLORIDE, DEXTROSE MONOHYDRATE 150; 5 MG/100ML; G/100ML
INJECTION, SOLUTION INTRAVENOUS CONTINUOUS
Status: DISCONTINUED | OUTPATIENT
Start: 2024-01-01 | End: 2024-01-01

## 2024-01-01 RX ORDER — NICOTINE POLACRILEX 4 MG
15 LOZENGE BUCCAL
Status: DISCONTINUED | OUTPATIENT
Start: 2024-01-01 | End: 2024-01-01

## 2024-01-01 RX ORDER — LEVETIRACETAM 500 MG/5ML
1000 INJECTION, SOLUTION, CONCENTRATE INTRAVENOUS EVERY 12 HOURS
Status: DISCONTINUED | OUTPATIENT
Start: 2024-01-01 | End: 2024-01-01

## 2024-01-01 RX ORDER — LEVETIRACETAM 500 MG/5ML
750 INJECTION, SOLUTION, CONCENTRATE INTRAVENOUS EVERY 12 HOURS
Status: DISCONTINUED | OUTPATIENT
Start: 2024-01-01 | End: 2024-01-01

## 2024-01-01 RX ORDER — HEPARIN SODIUM 1000 [USP'U]/ML
30 INJECTION, SOLUTION INTRAVENOUS; SUBCUTANEOUS ONCE
Status: COMPLETED | OUTPATIENT
Start: 2024-01-01 | End: 2024-01-01

## 2024-01-01 RX ORDER — GABAPENTIN 300 MG/1
600 CAPSULE ORAL NIGHTLY
Status: DISCONTINUED | OUTPATIENT
Start: 2024-01-01 | End: 2024-01-01

## 2024-01-01 RX ORDER — LEVETIRACETAM 500 MG/5ML
500 INJECTION, SOLUTION, CONCENTRATE INTRAVENOUS ONCE
Status: COMPLETED | OUTPATIENT
Start: 2024-01-01 | End: 2024-01-01

## 2024-01-01 RX ORDER — LORAZEPAM 2 MG/ML
1 INJECTION INTRAMUSCULAR ONCE
Status: COMPLETED | OUTPATIENT
Start: 2024-01-01 | End: 2024-01-01

## 2024-01-01 RX ORDER — INSULIN DEGLUDEC 100 U/ML
12 INJECTION, SOLUTION SUBCUTANEOUS DAILY
Status: DISCONTINUED | OUTPATIENT
Start: 2024-01-01 | End: 2024-01-01

## 2024-01-01 RX ORDER — ACETAMINOPHEN 500 MG
500 TABLET ORAL EVERY 4 HOURS PRN
Status: DISCONTINUED | OUTPATIENT
Start: 2024-01-01 | End: 2024-01-01

## 2024-01-01 RX ORDER — MIRTAZAPINE 15 MG/1
15 TABLET, ORALLY DISINTEGRATING ORAL NIGHTLY
Status: DISCONTINUED | OUTPATIENT
Start: 2024-01-01 | End: 2024-01-01

## 2024-01-01 RX ORDER — ASPIRIN 81 MG/1
81 TABLET ORAL DAILY
Status: DISCONTINUED | OUTPATIENT
Start: 2024-01-01 | End: 2024-01-01

## 2024-01-01 RX ORDER — SCOLOPAMINE TRANSDERMAL SYSTEM 1 MG/1
1 PATCH, EXTENDED RELEASE TRANSDERMAL
Status: DISCONTINUED | OUTPATIENT
Start: 2024-01-01 | End: 2024-01-01

## 2024-01-01 RX ORDER — GLYCOPYRROLATE 0.2 MG/ML
0.4 INJECTION, SOLUTION INTRAMUSCULAR; INTRAVENOUS
Status: DISCONTINUED | OUTPATIENT
Start: 2024-01-01 | End: 2024-01-01

## 2024-01-01 RX ORDER — PANTOPRAZOLE SODIUM 40 MG/1
40 TABLET, DELAYED RELEASE ORAL
Status: DISCONTINUED | OUTPATIENT
Start: 2024-01-01 | End: 2024-01-01

## 2024-01-01 RX ORDER — HALOPERIDOL 5 MG/ML
1 INJECTION INTRAMUSCULAR
Status: DISCONTINUED | OUTPATIENT
Start: 2024-01-01 | End: 2024-01-01

## 2024-01-01 RX ORDER — ALBUTEROL SULFATE 2.5 MG/3ML
3 SOLUTION RESPIRATORY (INHALATION) 3 TIMES DAILY PRN
Status: DISCONTINUED | OUTPATIENT
Start: 2024-01-01 | End: 2024-01-01

## 2024-01-01 RX ORDER — AMLODIPINE BESYLATE 5 MG/1
5 TABLET ORAL DAILY
Status: DISCONTINUED | OUTPATIENT
Start: 2024-01-01 | End: 2024-01-01

## 2024-01-01 RX ORDER — CLOBAZAM 2.5 MG/ML
2.5 SUSPENSION ORAL 2 TIMES DAILY
Status: DISCONTINUED | OUTPATIENT
Start: 2024-01-01 | End: 2024-01-01

## 2024-01-01 RX ORDER — INSULIN DEGLUDEC 100 U/ML
7 INJECTION, SOLUTION SUBCUTANEOUS DAILY
Status: DISCONTINUED | OUTPATIENT
Start: 2024-01-01 | End: 2024-01-01

## 2024-01-01 RX ORDER — MONTELUKAST SODIUM 10 MG/1
10 TABLET ORAL NIGHTLY
Status: DISCONTINUED | OUTPATIENT
Start: 2024-01-01 | End: 2024-01-01

## 2024-01-01 RX ORDER — BENZONATATE 100 MG/1
200 CAPSULE ORAL 3 TIMES DAILY PRN
Status: DISCONTINUED | OUTPATIENT
Start: 2024-01-01 | End: 2024-01-01

## 2024-01-01 RX ORDER — ACETAMINOPHEN 650 MG/1
650 SUPPOSITORY RECTAL EVERY 4 HOURS PRN
Status: DISCONTINUED | OUTPATIENT
Start: 2024-01-01 | End: 2024-01-01

## 2024-01-01 RX ORDER — DILTIAZEM HYDROCHLORIDE 240 MG/1
240 CAPSULE, COATED, EXTENDED RELEASE ORAL
Status: DISCONTINUED | OUTPATIENT
Start: 2024-01-01 | End: 2024-01-01

## 2024-01-01 RX ORDER — ONDANSETRON 2 MG/ML
4 INJECTION INTRAMUSCULAR; INTRAVENOUS EVERY 4 HOURS PRN
Status: ACTIVE | OUTPATIENT
Start: 2024-01-01 | End: 2024-01-01

## 2024-01-01 RX ORDER — LORAZEPAM 2 MG/ML
0.5 INJECTION INTRAMUSCULAR EVERY 4 HOURS PRN
Status: DISCONTINUED | OUTPATIENT
Start: 2024-01-01 | End: 2024-01-01

## 2024-01-01 RX ORDER — DEXTROSE MONOHYDRATE 25 G/50ML
50 INJECTION, SOLUTION INTRAVENOUS
Status: DISCONTINUED | OUTPATIENT
Start: 2024-01-01 | End: 2024-01-01

## 2024-01-01 RX ORDER — HEPARIN SODIUM AND DEXTROSE 10000; 5 [USP'U]/100ML; G/100ML
INJECTION INTRAVENOUS CONTINUOUS
Status: DISCONTINUED | OUTPATIENT
Start: 2024-01-01 | End: 2024-01-01

## 2024-01-01 RX ORDER — TRAMADOL HYDROCHLORIDE 50 MG/1
50 TABLET ORAL EVERY 12 HOURS PRN
Status: DISCONTINUED | OUTPATIENT
Start: 2024-01-01 | End: 2024-01-01

## 2024-01-01 RX ORDER — RUFINAMIDE 40 MG/ML
1 SUSPENSION ORAL DAILY
Status: DISCONTINUED | OUTPATIENT
Start: 2024-01-01 | End: 2024-01-01

## 2024-01-01 RX ORDER — FUROSEMIDE 20 MG/1
20 TABLET ORAL DAILY
Status: DISCONTINUED | OUTPATIENT
Start: 2024-01-01 | End: 2024-01-01

## 2024-01-01 RX ORDER — SODIUM CHLORIDE 9 MG/ML
INJECTION, SOLUTION INTRAVENOUS CONTINUOUS
Status: DISCONTINUED | OUTPATIENT
Start: 2024-01-01 | End: 2024-01-01

## 2024-01-01 RX ORDER — MELATONIN
100 2 TIMES DAILY
Status: DISCONTINUED | OUTPATIENT
Start: 2024-01-01 | End: 2024-01-01

## 2024-01-01 RX ORDER — ESCITALOPRAM OXALATE 10 MG/1
10 TABLET ORAL DAILY
Status: DISCONTINUED | OUTPATIENT
Start: 2024-01-01 | End: 2024-01-01

## 2024-01-01 RX ORDER — VALSARTAN 320 MG/1
320 TABLET ORAL DAILY
Status: DISCONTINUED | OUTPATIENT
Start: 2024-01-01 | End: 2024-01-01

## 2024-01-01 RX ORDER — ASPIRIN 81 MG/1
81 TABLET, CHEWABLE ORAL DAILY
Status: DISCONTINUED | OUTPATIENT
Start: 2024-01-01 | End: 2024-01-01

## 2024-01-01 RX ORDER — POTASSIUM CHLORIDE 20 MEQ/1
40 TABLET, EXTENDED RELEASE ORAL ONCE
Status: COMPLETED | OUTPATIENT
Start: 2024-01-01 | End: 2024-01-01

## 2024-01-01 RX ORDER — HEPARIN SODIUM AND DEXTROSE 10000; 5 [USP'U]/100ML; G/100ML
18 INJECTION INTRAVENOUS ONCE
Status: COMPLETED | OUTPATIENT
Start: 2024-01-01 | End: 2024-01-01

## 2024-01-01 RX ORDER — DIAZEPAM 5 MG/ML
5 INJECTION, SOLUTION INTRAMUSCULAR; INTRAVENOUS EVERY 30 MIN PRN
Status: DISCONTINUED | OUTPATIENT
Start: 2024-01-01 | End: 2024-01-01

## 2024-01-01 RX ORDER — LEVETIRACETAM 500 MG/1
500 TABLET ORAL 2 TIMES DAILY
Status: DISCONTINUED | OUTPATIENT
Start: 2024-01-01 | End: 2024-01-01

## 2024-01-01 RX ORDER — LEVETIRACETAM 500 MG/1
1500 TABLET ORAL 2 TIMES DAILY
Status: CANCELLED | OUTPATIENT
Start: 2024-01-01

## 2024-01-01 RX ORDER — SODIUM CHLORIDE, SODIUM LACTATE, POTASSIUM CHLORIDE, CALCIUM CHLORIDE 600; 310; 30; 20 MG/100ML; MG/100ML; MG/100ML; MG/100ML
INJECTION, SOLUTION INTRAVENOUS CONTINUOUS
Status: DISCONTINUED | OUTPATIENT
Start: 2024-01-01 | End: 2024-01-01

## 2024-01-01 RX ORDER — ATROPINE SULFATE 10 MG/ML
2 SOLUTION/ DROPS OPHTHALMIC EVERY 2 HOUR PRN
Status: DISCONTINUED | OUTPATIENT
Start: 2024-01-01 | End: 2024-01-01

## 2024-01-01 RX ORDER — DIPHENHYDRAMINE HCL 25 MG
25 CAPSULE ORAL NIGHTLY PRN
Status: DISCONTINUED | OUTPATIENT
Start: 2024-01-01 | End: 2024-01-01

## 2024-01-01 RX ORDER — NICOTINE POLACRILEX 4 MG
30 LOZENGE BUCCAL
Status: DISCONTINUED | OUTPATIENT
Start: 2024-01-01 | End: 2024-01-01

## 2024-01-01 RX ORDER — ROSUVASTATIN CALCIUM 10 MG/1
10 TABLET, COATED ORAL DAILY
Status: DISCONTINUED | OUTPATIENT
Start: 2024-01-01 | End: 2024-01-01

## 2024-01-01 RX ORDER — FUROSEMIDE 10 MG/ML
40 INJECTION INTRAMUSCULAR; INTRAVENOUS EVERY 8 HOURS PRN
Status: DISCONTINUED | OUTPATIENT
Start: 2024-01-01 | End: 2024-01-01

## 2024-01-01 RX ORDER — MORPHINE SULFATE 2 MG/ML
1 INJECTION, SOLUTION INTRAMUSCULAR; INTRAVENOUS
Status: DISCONTINUED | OUTPATIENT
Start: 2024-01-01 | End: 2024-01-01

## 2024-01-01 RX ORDER — METOPROLOL TARTRATE 1 MG/ML
5 INJECTION, SOLUTION INTRAVENOUS EVERY 6 HOURS
Status: DISCONTINUED | OUTPATIENT
Start: 2024-01-01 | End: 2024-01-01

## 2024-01-01 RX ORDER — FUROSEMIDE 10 MG/ML
40 INJECTION INTRAMUSCULAR; INTRAVENOUS ONCE
Status: COMPLETED | OUTPATIENT
Start: 2024-01-01 | End: 2024-01-01

## 2024-01-01 RX ORDER — ALLOPURINOL 100 MG/1
100 TABLET ORAL EVERY MORNING
Status: DISCONTINUED | OUTPATIENT
Start: 2024-01-01 | End: 2024-01-01

## 2024-01-01 RX ORDER — ASPIRIN 300 MG/1
300 SUPPOSITORY RECTAL DAILY
Status: DISCONTINUED | OUTPATIENT
Start: 2024-01-01 | End: 2024-01-01

## 2024-01-01 RX ORDER — HYDRALAZINE HYDROCHLORIDE 50 MG/1
50 TABLET, FILM COATED ORAL 3 TIMES DAILY
Status: DISCONTINUED | OUTPATIENT
Start: 2024-01-01 | End: 2024-01-01

## 2024-01-01 RX ORDER — DIAZEPAM 5 MG/ML
5 INJECTION, SOLUTION INTRAMUSCULAR; INTRAVENOUS ONCE
Status: COMPLETED | OUTPATIENT
Start: 2024-01-01 | End: 2024-01-01

## 2024-02-15 ENCOUNTER — APPOINTMENT (OUTPATIENT)
Dept: CT IMAGING | Facility: HOSPITAL | Age: 79
End: 2024-02-15
Payer: MEDICARE

## 2024-02-15 ENCOUNTER — APPOINTMENT (OUTPATIENT)
Dept: CT IMAGING | Facility: HOSPITAL | Age: 79
End: 2024-02-15
Attending: EMERGENCY MEDICINE
Payer: MEDICARE

## 2024-02-15 ENCOUNTER — APPOINTMENT (OUTPATIENT)
Dept: GENERAL RADIOLOGY | Facility: HOSPITAL | Age: 79
End: 2024-02-15
Attending: EMERGENCY MEDICINE
Payer: MEDICARE

## 2024-02-15 ENCOUNTER — HOSPITAL ENCOUNTER (OUTPATIENT)
Facility: HOSPITAL | Age: 79
Setting detail: OBSERVATION
Discharge: HOME | End: 2024-02-16
Attending: EMERGENCY MEDICINE | Admitting: HOSPITALIST
Payer: MEDICARE
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
## 2024-02-15 DIAGNOSIS — S70.02XA CONTUSION OF LEFT HIP, INITIAL ENCOUNTER: ICD-10-CM

## 2024-02-15 DIAGNOSIS — S09.90XA INJURY OF HEAD, INITIAL ENCOUNTER: ICD-10-CM

## 2024-02-15 DIAGNOSIS — R79.89 ELEVATED TROPONIN: ICD-10-CM

## 2024-02-15 DIAGNOSIS — R42 DIZZINESS: ICD-10-CM

## 2024-02-15 PROBLEM — E87.6 HYPOKALEMIA: Status: ACTIVE | Noted: 2024-01-01

## 2024-02-15 PROBLEM — E87.6 HYPOKALEMIA: Status: ACTIVE | Noted: 2024-02-15

## 2024-02-15 LAB
ALBUMIN SERPL-MCNC: 3.5 G/DL (ref 3.4–5)
ALBUMIN/GLOB SERPL: 0.8 {RATIO} (ref 1–2)
ALP LIVER SERPL-CCNC: 77 U/L
ALT SERPL-CCNC: 45 U/L
ANION GAP SERPL CALC-SCNC: 5 MMOL/L (ref 0–18)
AST SERPL-CCNC: 33 U/L (ref 15–37)
BASOPHILS # BLD AUTO: 0.02 X10(3) UL (ref 0–0.2)
BASOPHILS NFR BLD AUTO: 0.3 %
BILIRUB SERPL-MCNC: 0.3 MG/DL (ref 0.1–2)
BILIRUB UR QL STRIP.AUTO: NEGATIVE
BUN BLD-MCNC: 36 MG/DL (ref 9–23)
CALCIUM BLD-MCNC: 9.4 MG/DL (ref 8.5–10.1)
CHLORIDE SERPL-SCNC: 117 MMOL/L (ref 98–112)
CHOLEST SERPL-MCNC: 101 MG/DL (ref ?–200)
CLARITY UR REFRACT.AUTO: CLEAR
CO2 SERPL-SCNC: 22 MMOL/L (ref 21–32)
COLOR UR AUTO: COLORLESS
CREAT BLD-MCNC: 2.8 MG/DL
EGFRCR SERPLBLD CKD-EPI 2021: 17 ML/MIN/1.73M2 (ref 60–?)
EOSINOPHIL # BLD AUTO: 0 X10(3) UL (ref 0–0.7)
EOSINOPHIL NFR BLD AUTO: 0 %
ERYTHROCYTE [DISTWIDTH] IN BLOOD BY AUTOMATED COUNT: 14.5 %
GLOBULIN PLAS-MCNC: 4.6 G/DL (ref 2.8–4.4)
GLUCOSE BLD-MCNC: 110 MG/DL (ref 70–99)
GLUCOSE BLD-MCNC: 112 MG/DL (ref 70–99)
GLUCOSE BLD-MCNC: 136 MG/DL (ref 70–99)
GLUCOSE UR STRIP.AUTO-MCNC: NORMAL MG/DL
HCT VFR BLD AUTO: 25.9 %
HDLC SERPL-MCNC: 60 MG/DL (ref 40–59)
HGB BLD-MCNC: 8.6 G/DL
IMM GRANULOCYTES # BLD AUTO: 0.02 X10(3) UL (ref 0–1)
IMM GRANULOCYTES NFR BLD: 0.3 %
KETONES UR STRIP.AUTO-MCNC: NEGATIVE MG/DL
LDLC SERPL CALC-MCNC: 25 MG/DL (ref ?–100)
LEUKOCYTE ESTERASE UR QL STRIP.AUTO: NEGATIVE
LYMPHOCYTES # BLD AUTO: 1.96 X10(3) UL (ref 1–4)
LYMPHOCYTES NFR BLD AUTO: 30.9 %
MCH RBC QN AUTO: 32.6 PG (ref 26–34)
MCHC RBC AUTO-ENTMCNC: 33.2 G/DL (ref 31–37)
MCV RBC AUTO: 98.1 FL
MONOCYTES # BLD AUTO: 0.57 X10(3) UL (ref 0.1–1)
MONOCYTES NFR BLD AUTO: 9 %
NEUTROPHILS # BLD AUTO: 3.78 X10 (3) UL (ref 1.5–7.7)
NEUTROPHILS # BLD AUTO: 3.78 X10(3) UL (ref 1.5–7.7)
NEUTROPHILS NFR BLD AUTO: 59.5 %
NITRITE UR QL STRIP.AUTO: NEGATIVE
NONHDLC SERPL-MCNC: 41 MG/DL (ref ?–130)
OSMOLALITY SERPL CALC.SUM OF ELEC: 307 MOSM/KG (ref 275–295)
PH UR STRIP.AUTO: 5.5 [PH] (ref 5–8)
PLATELET # BLD AUTO: 251 10(3)UL (ref 150–450)
POTASSIUM SERPL-SCNC: 3.4 MMOL/L (ref 3.5–5.1)
PROT SERPL-MCNC: 8.1 G/DL (ref 6.4–8.2)
PROT UR STRIP.AUTO-MCNC: 20 MG/DL
RBC # BLD AUTO: 2.64 X10(6)UL
RBC UR QL AUTO: NEGATIVE
SARS-COV-2 RNA RESP QL NAA+PROBE: NOT DETECTED
SODIUM SERPL-SCNC: 144 MMOL/L (ref 136–145)
SP GR UR STRIP.AUTO: 1.01 (ref 1–1.03)
TRIGL SERPL-MCNC: 75 MG/DL (ref 30–149)
TROPONIN I SERPL HS-MCNC: 65 NG/L
TROPONIN I SERPL HS-MCNC: 75 NG/L
UROBILINOGEN UR STRIP.AUTO-MCNC: NORMAL MG/DL
VLDLC SERPL CALC-MCNC: 10 MG/DL (ref 0–30)
WBC # BLD AUTO: 6.4 X10(3) UL (ref 4–11)

## 2024-02-15 PROCEDURE — 70450 CT HEAD/BRAIN W/O DYE: CPT | Performed by: EMERGENCY MEDICINE

## 2024-02-15 PROCEDURE — 73130 X-RAY EXAM OF HAND: CPT | Performed by: EMERGENCY MEDICINE

## 2024-02-15 PROCEDURE — 71045 X-RAY EXAM CHEST 1 VIEW: CPT | Performed by: EMERGENCY MEDICINE

## 2024-02-15 PROCEDURE — 72125 CT NECK SPINE W/O DYE: CPT | Performed by: EMERGENCY MEDICINE

## 2024-02-15 PROCEDURE — 73110 X-RAY EXAM OF WRIST: CPT | Performed by: EMERGENCY MEDICINE

## 2024-02-15 PROCEDURE — 73502 X-RAY EXAM HIP UNI 2-3 VIEWS: CPT | Performed by: EMERGENCY MEDICINE

## 2024-02-15 PROCEDURE — 99223 1ST HOSP IP/OBS HIGH 75: CPT | Performed by: HOSPITALIST

## 2024-02-15 RX ORDER — METOCLOPRAMIDE HYDROCHLORIDE 5 MG/ML
5 INJECTION INTRAMUSCULAR; INTRAVENOUS EVERY 8 HOURS PRN
Status: DISCONTINUED | OUTPATIENT
Start: 2024-02-15 | End: 2024-02-16

## 2024-02-15 RX ORDER — ESCITALOPRAM OXALATE 10 MG/1
10 TABLET ORAL DAILY
Status: DISCONTINUED | OUTPATIENT
Start: 2024-02-16 | End: 2024-02-16

## 2024-02-15 RX ORDER — DEXTROSE MONOHYDRATE 25 G/50ML
50 INJECTION, SOLUTION INTRAVENOUS
Status: DISCONTINUED | OUTPATIENT
Start: 2024-02-15 | End: 2024-02-16

## 2024-02-15 RX ORDER — BISACODYL 10 MG
10 SUPPOSITORY, RECTAL RECTAL
Status: DISCONTINUED | OUTPATIENT
Start: 2024-02-15 | End: 2024-02-16

## 2024-02-15 RX ORDER — POTASSIUM CHLORIDE 20 MEQ/1
40 TABLET, EXTENDED RELEASE ORAL ONCE
Status: COMPLETED | OUTPATIENT
Start: 2024-02-15 | End: 2024-02-15

## 2024-02-15 RX ORDER — VALSARTAN 320 MG/1
320 TABLET ORAL DAILY
Status: DISCONTINUED | OUTPATIENT
Start: 2024-02-16 | End: 2024-02-16

## 2024-02-15 RX ORDER — MELATONIN
3 NIGHTLY PRN
Status: DISCONTINUED | OUTPATIENT
Start: 2024-02-15 | End: 2024-02-16

## 2024-02-15 RX ORDER — ASPIRIN 81 MG/1
81 TABLET ORAL DAILY
Status: DISCONTINUED | OUTPATIENT
Start: 2024-02-15 | End: 2024-02-16

## 2024-02-15 RX ORDER — BACLOFEN 10 MG/1
5 TABLET ORAL 3 TIMES DAILY
COMMUNITY
End: 2024-02-16

## 2024-02-15 RX ORDER — SODIUM BICARBONATE 325 MG/1
650 TABLET ORAL DAILY
Status: DISCONTINUED | OUTPATIENT
Start: 2024-02-15 | End: 2024-02-16

## 2024-02-15 RX ORDER — ONDANSETRON 2 MG/ML
4 INJECTION INTRAMUSCULAR; INTRAVENOUS EVERY 6 HOURS PRN
Status: DISCONTINUED | OUTPATIENT
Start: 2024-02-15 | End: 2024-02-16

## 2024-02-15 RX ORDER — CYCLOBENZAPRINE HCL 5 MG
5 TABLET ORAL 3 TIMES DAILY PRN
COMMUNITY
End: 2024-02-16

## 2024-02-15 RX ORDER — HYDRALAZINE HYDROCHLORIDE 50 MG/1
50 TABLET, FILM COATED ORAL 3 TIMES DAILY
Status: ON HOLD | COMMUNITY

## 2024-02-15 RX ORDER — BACLOFEN 5 MG/1
5 TABLET ORAL 3 TIMES DAILY
Status: DISCONTINUED | OUTPATIENT
Start: 2024-02-15 | End: 2024-02-16

## 2024-02-15 RX ORDER — DAPAGLIFLOZIN 10 MG/1
1 TABLET, FILM COATED ORAL DAILY
Status: ON HOLD | COMMUNITY
Start: 2023-09-10 | End: 2024-02-15

## 2024-02-15 RX ORDER — DILTIAZEM HYDROCHLORIDE 240 MG/1
240 CAPSULE, COATED, EXTENDED RELEASE ORAL
Status: DISCONTINUED | OUTPATIENT
Start: 2024-02-15 | End: 2024-02-16

## 2024-02-15 RX ORDER — DIPHENOXYLATE HYDROCHLORIDE AND ATROPINE SULFATE 2.5; .025 MG/1; MG/1
1 TABLET ORAL DAILY
Status: ON HOLD | COMMUNITY
Start: 2024-01-10

## 2024-02-15 RX ORDER — DAPAGLIFLOZIN 10 MG/1
10 TABLET, FILM COATED ORAL DAILY
Status: ON HOLD | COMMUNITY
Start: 2024-01-12

## 2024-02-15 RX ORDER — MIRTAZAPINE 15 MG/1
15 TABLET, ORALLY DISINTEGRATING ORAL NIGHTLY
Status: DISCONTINUED | OUTPATIENT
Start: 2024-02-15 | End: 2024-02-16

## 2024-02-15 RX ORDER — ALENDRONATE SODIUM 70 MG/1
70 TABLET ORAL
Status: ON HOLD | COMMUNITY

## 2024-02-15 RX ORDER — SODIUM CHLORIDE 9 MG/ML
INJECTION, SOLUTION INTRAVENOUS CONTINUOUS
Status: DISCONTINUED | OUTPATIENT
Start: 2024-02-15 | End: 2024-02-16

## 2024-02-15 RX ORDER — TRAMADOL HYDROCHLORIDE 50 MG/1
50 TABLET ORAL 2 TIMES DAILY PRN
Status: DISCONTINUED | OUTPATIENT
Start: 2024-02-15 | End: 2024-02-16

## 2024-02-15 RX ORDER — SODIUM BICARBONATE 650 MG/1
650 TABLET ORAL DAILY
Status: ON HOLD | COMMUNITY

## 2024-02-15 RX ORDER — PANTOPRAZOLE SODIUM 40 MG/1
40 TABLET, DELAYED RELEASE ORAL
Status: DISCONTINUED | OUTPATIENT
Start: 2024-02-16 | End: 2024-02-16

## 2024-02-15 RX ORDER — HYDRALAZINE HYDROCHLORIDE 50 MG/1
50 TABLET, FILM COATED ORAL 3 TIMES DAILY
Status: DISCONTINUED | OUTPATIENT
Start: 2024-02-15 | End: 2024-02-16

## 2024-02-15 RX ORDER — MAGNESIUM OXIDE 400 MG/1
1 TABLET ORAL DAILY
Status: ON HOLD | COMMUNITY
Start: 2023-02-11

## 2024-02-15 RX ORDER — SENNOSIDES 8.6 MG
17.2 TABLET ORAL NIGHTLY PRN
Status: DISCONTINUED | OUTPATIENT
Start: 2024-02-15 | End: 2024-02-16

## 2024-02-15 RX ORDER — TRAMADOL HYDROCHLORIDE 50 MG/1
50 TABLET ORAL 2 TIMES DAILY PRN
Status: ON HOLD | COMMUNITY
Start: 2024-02-01

## 2024-02-15 RX ORDER — ALLOPURINOL 100 MG/1
100 TABLET ORAL EVERY MORNING
Status: DISCONTINUED | OUTPATIENT
Start: 2024-02-16 | End: 2024-02-16

## 2024-02-15 RX ORDER — ESCITALOPRAM OXALATE 10 MG/1
10 TABLET ORAL DAILY
Status: ON HOLD | COMMUNITY

## 2024-02-15 RX ORDER — NICOTINE POLACRILEX 4 MG
15 LOZENGE BUCCAL
Status: DISCONTINUED | OUTPATIENT
Start: 2024-02-15 | End: 2024-02-16

## 2024-02-15 RX ORDER — CYCLOBENZAPRINE HCL 5 MG
5 TABLET ORAL 3 TIMES DAILY PRN
Status: DISCONTINUED | OUTPATIENT
Start: 2024-02-15 | End: 2024-02-16

## 2024-02-15 RX ORDER — POLYETHYLENE GLYCOL 3350 17 G/17G
17 POWDER, FOR SOLUTION ORAL DAILY PRN
Status: DISCONTINUED | OUTPATIENT
Start: 2024-02-15 | End: 2024-02-16

## 2024-02-15 RX ORDER — NICOTINE POLACRILEX 4 MG
30 LOZENGE BUCCAL
Status: DISCONTINUED | OUTPATIENT
Start: 2024-02-15 | End: 2024-02-16

## 2024-02-15 RX ORDER — GABAPENTIN 300 MG/1
600 CAPSULE ORAL NIGHTLY
Status: DISCONTINUED | OUTPATIENT
Start: 2024-02-15 | End: 2024-02-16

## 2024-02-15 RX ORDER — ALBUTEROL SULFATE 90 UG/1
2 AEROSOL, METERED RESPIRATORY (INHALATION) EVERY 4 HOURS PRN
Status: DISCONTINUED | OUTPATIENT
Start: 2024-02-15 | End: 2024-02-16

## 2024-02-15 NOTE — ED PROVIDER NOTES
Patient Seen in: [facility] Emergency Department      History     Chief Complaint   Patient presents with    Fall    Pain     Stated Complaint: Pt presents to ED for left sided pain after falling.    Subjective:   HPI    Patient presents with left side pain.  The patient had a fall at her apartment.  The patient states she was walking and then remembers feeling dizzy and fell on the tile floor.  She does not think she passed out.  She was able to scoot herself into the carpeted area and eventually get herself up.  She states that she has pain in her left side in her hand, wrist and hip.  She states that she now was not able to to bear weight on that side with her walker.  She also has a headache but does not feel any bump on her head.  She reports that she did have a black stool yesterday.  She denies any other recent illness.  She denies any chest pain, palpitations or shortness of breath.    Objective:   Past Medical History:   Diagnosis Date    Anemia     Aortic stenosis     Arrhythmia     Arthritis     Asthma     Back problem     Cataract     Deep vein thrombosis (DVT) of proximal lower extremity ([facility])     Depressive disorder 08/07/2010    Diverticulitis of colon 04/17/2009    Edema     Esophageal reflux     Extrinsic asthma, unspecified     Fibromyalgia     Gout     Heart attack (HCC) 01/01/1989    Heart valve disease     High blood pressure     High cholesterol     History of stomach ulcers     IBS (irritable bowel syndrome)     Incontinence     Migraines     Muscle weakness     Neuropathy     Osteoarthritis     Other and unspecified hyperlipidemia     Pneumonia due to organism     Pulmonary emphysema ([facility])     Renal disorder     Seizure disorder ([facility])     Shortness of breath     Sleep apnea     Stroke ([facility])     Type II or unspecified type diabetes mellitus without mention of complication, not stated as uncontrolled     Unspecified essential hypertension     Visual impairment               Past Surgical  History:   Procedure Laterality Date          CATH DRUG ELUTING STENT      HC  SECTION LEVEL I      KNEE SURGERY      TOTAL KNEE REPLACEMENT                  Social History     Socioeconomic History    Marital status: Single   Tobacco Use    Smoking status: Never    Smokeless tobacco: Never   Vaping Use    Vaping Use: Never used   Substance and Sexual Activity    Alcohol use: No     Comment: social years back    Drug use: No   Other Topics Concern    Caffeine Concern Yes     Comment: 1 cup coffee/day    Exercise Yes     Comment: walking              Review of Systems    Positive for stated complaint: Pt presents to ED for left sided pain after falling.  Other systems are as noted in HPI.  Constitutional and vital signs reviewed.      All other systems reviewed and negative except as noted above.    Physical Exam     ED Triage Vitals [02/15/24 0854]   /82   Pulse 78   Resp 20   Temp 96.7 °F (35.9 °C)   Temp src Temporal   SpO2 98 %   O2 Device None (Room air)       Current:BP (!) 170/80 (BP Location: Right arm)   Pulse 84   Temp 96.7 °F (35.9 °C) (Temporal)   Resp 20   Ht 162.6 cm (5' 4\")   Wt 90.7 kg   SpO2 99%   BMI 34.33 kg/m²         Physical Exam    General: Alert and oriented x3 in no acute distress.  HEENT: Normocephalic, atraumatic, pupils equal round and reactive to light, oropharynx clear, uvula midline.  Neck: Midline tenderness in the mid to lower C-spine.  Cardiovascular: Regular rate and rhythm,.  Respiratory: Lungs clear to auscultation.  Abdomen: Soft, nontender, no rebound or guarding, normal active bowel sounds, no CVA tenderness.  Rectal: Brown stool, heme-negative.  Extremities: Bony tenderness in the left lateral hip, no deformity to the left lower extremity, tenderness at the base of the left thumb and also in the radial aspect of the left wrist, no edema or deformity in those areas, intact pulses in all extremities.  Skin: Warm and dry.  No lacerations or  abrasions  Neurologic: Cranial nerves intact.  Distal motor function intact to all extremities.  Sensory exam grossly intact.    ED Course     Labs Reviewed   COMP METABOLIC PANEL (14) - Abnormal; Notable for the following components:       Result Value    Glucose 112 (*)     Potassium 3.4 (*)     Chloride 117 (*)     BUN 36 (*)     Creatinine 2.80 (*)     Calculated Osmolality 307 (*)     eGFR-Cr 17 (*)     Globulin  4.6 (*)     A/G Ratio 0.8 (*)     All other components within normal limits   TROPONIN I HIGH SENSITIVITY - Abnormal; Notable for the following components:    Troponin I (High Sensitivity) 65 (*)     All other components within normal limits   URINALYSIS, ROUTINE - Abnormal; Notable for the following components:    Urine Color Colorless (*)     Protein Urine 20 (*)     All other components within normal limits   LIPID PANEL - Abnormal; Notable for the following components:    HDL Cholesterol 60 (*)     All other components within normal limits   TROPONIN I HIGH SENSITIVITY - Abnormal; Notable for the following components:    Troponin I (High Sensitivity) 75 (*)     All other components within normal limits   CBC W/ DIFFERENTIAL - Abnormal; Notable for the following components:    RBC 2.64 (*)     HGB 8.6 (*)     HCT 25.9 (*)     All other components within normal limits   RAPID SARS-COV-2 BY PCR - Normal   CBC WITH DIFFERENTIAL WITH PLATELET    Narrative:     The following orders were created for panel order CBC With Differential With Platelet.  Procedure                               Abnormality         Status                     ---------                               -----------         ------                     CBC W/ DIFFERENTIAL[[id number]]          Abnormal            Final result                 Please view results for these tests on the individual orders.     EKG    Rate, intervals and axes as noted on EKG Report.  Rate: 80  Rhythm: Sinus Rhythm  Reading: LVH with repolarization abnormality,  no acute ischemic change               CT SPINE CERVICAL (CPT=72125)    Result Date: 2/15/2024  PROCEDURE:  CT SPINE CERVICAL (CPT=72125)  COMPARISON:  [facility] , CT, CT SPINE CERVICAL (CPT=72125), 11/09/2021, 2:55 PM.  INDICATIONS:  neck pain, fall  TECHNIQUE:  Noncontrast CT scanning of the cervical spine is performed from the skull base through C7.  Multiplanar reconstructions are generated.  Dose reduction techniques were used. Dose information is transmitted to the ACR (American College of Radiology) NRDR (National Radiology Data Registry) which includes the Dose Index Registry.  PATIENT STATED HISTORY: (As transcribed by Technologist)  Recent fall, neck pain.    FINDINGS:   No acute displaced osseous fracture.  Moderate degenerative changes in the cervical spine.  CPPD noted at the C1-C2 articulation.  Scattered atherosclerosis.  Subcentimeter thyroid nodule.            CONCLUSION:  1. No acute displaced osseous fracture. 2. Moderate degenerative changes in the cervical spine.    LOCATION:  [place]   Dictated by (CST): Stromberg, LeRoy, MD on 2/15/2024 at 11:58 AM     Finalized by (CST): Stromberg, LeRoy, MD on 2/15/2024 at 12:02 PM       I personally reviewed the hip/wrist/hand films and no acute fracture noted.    XR HIP W OR WO PELVIS 2 OR 3 VIEWS, LEFT (CPT=73502)    Result Date: 2/15/2024  PROCEDURE:  XR HIP W OR WO PELVIS 2 OR 3 VIEWS, LEFT (CPT=73502)  TECHNIQUE:  Unilateral 2 to 3 views of the hip and pelvis if performed.  COMPARISON:  None.  INDICATIONS:  Pt presents to ED for left sided hip pain after falling.  PATIENT STATED HISTORY: (As transcribed by Technologist)  Patient got dizzy and fell early this morning. Patient has left hand and left wrist pain from trying to stop her fall. Patient also has left hip pain from the fall.    FINDINGS:  No acute osseous injury/fractures.  Severe osteoarthritis of the left hip and moderate osteoarthritis of the right hip.  Moderate bilateral SI joint  arthropathy noted.  No radiographically significant soft tissue swelling.            CONCLUSION:  1. No acute osseous injuries. 2. Severe left hip osteoarthritis and moderate right hip osteoarthritis. 3. Moderate bilateral SI joint arthropathy.   LOCATION:  [place]   Dictated by ([facility] [[email or web address]]): Emily Friedman DO on 2/15/2024 at 11:45 AM     Finalized by (CST): Emily Friedman DO on 2/15/2024 at 11:45 AM       XR WRIST COMPLETE (MIN 3 VIEWS), LEFT (CPT=73110)    Result Date: 2/15/2024  PROCEDURE:  XR WRIST COMPLETE (MIN 3 VIEWS), LEFT (CPT=73110)  TECHNIQUE:  Three views were obtained.  COMPARISON:  None.  INDICATIONS:  Pt presents to ED for left sided pain after falling.  PATIENT STATED HISTORY: (As transcribed by Technologist)  Patient got dizzy and fell early this morning. Patient has left hand and left wrist pain from trying to stop her fall. Patient also has left hip pain from the fall.    FINDINGS:  Diffuse osseous demineralization.  No evidence of acute osseous injury/fractures.  Severe osteoarthritis along the CMC joint of the thumb.  Moderate to severe osteoarthritis along the scaphoid/trapezium articulation also noted.  Mild osteoarthritis of the DRUJ and radiocarpal joint space.  No significant soft tissue swelling.            CONCLUSION:  1. No acute osseous injuries. 2. Diffuse osteoarthritic changes as described above. 3. Osseous demineralization suspicious for osteopenia versus osteoporosis.   LOCATION:  [place]   Dictated by ([facility] [[email or web address]]): Emily Friedman DO on 2/15/2024 at 11:43 AM     Finalized by (CST): Emily Friedman DO on 2/15/2024 at 11:44 AM       XR CHEST AP PORTABLE  (CPT=71045)    Result Date: 2/15/2024  PROCEDURE:  XR CHEST AP PORTABLE  (CPT=71045)  TECHNIQUE:  AP chest radiograph was obtained.  COMPARISON:  [facility] , XR, XR CHEST AP PORTABLE  (CPT=71045), 12/01/2022, 9:57 PM.  [facility] , XR, XR CHEST AP PORTABLE  (CPT=71045), 1/26/2023, 9:48 AM.  INDICATIONS:  Pt presents to ED for left sided pain after falling.   PATIENT STATED HISTORY: (As transcribed by Technologist)  Patient got dizzy and fell early this morning. Patient has left hand and left wrist pain from trying to stop her fall. Patient also has left hip pain from the fall.    FINDINGS:  Cardiac silhouette is within normal limits.  Increased reticular opacities are noted throughout the lungs which may represent edema versus infiltrate.  There is no effusion or pneumothorax.  No aggressive osseous lesions are identified.            CONCLUSION:  Increased mild reticular opacities throughout the lungs may represent edema versus infiltrate.  Correlate clinically.   LOCATION:  [place]      Dictated by (CST): David Patricia MD on 2/15/2024 at 11:40 AM     Finalized by (CST): David Patricia MD on 2/15/2024 at 11:42 AM       XR HAND (MIN 3 VIEWS), LEFT (CPT=73130)    Result Date: 2/15/2024  PROCEDURE:  XR HAND (MIN 3 VIEWS), LEFT (CPT=73130)  TECHNIQUE:  Three views of the left hand were obtained.  COMPARISON:  None.  INDICATIONS:  Pt presents to ED for left sided pain after falling.  PATIENT STATED HISTORY: (As transcribed by Technologist)  Patient got dizzy and fell early this morning. Patient has left hand and left wrist pain from trying to stop her fall. Patient also has left hip pain from the fall.    FINDINGS:  There is a mild diffuse osseous demineralization.  No acute osseous injury/fractures are suggested.  There is mild to moderate diffuse osteoarthritis about the digits of the hand, most severe along the CMC joint of the thumb.  No soft tissue swelling identified.            CONCLUSION:  1. No acute osseous injury/fractures. 2. Diffuse osseous demineralization suggestive of osteopenia versus osteoporosis. 3. Diffuse osteoarthritic changes as described above.   LOCATION:  [place]   Dictated by (CST): Emily Friedman DO on 2/15/2024 at 11:40 AM     Finalized by (CST): Emily Friedman DO on 2/15/2024 at 11:41 AM       CT BRAIN OR HEAD (15324)    Result Date:  2/15/2024  PROCEDURE:  CT BRAIN OR HEAD (42690)  COMPARISON:  [facility] , CT, CT BRAIN OR HEAD (87519), 12/01/2022, 11:19 PM.  INDICATIONS:  Pt presents to ED for left sided pain after falling..  Headache.  TECHNIQUE:  Noncontrast CT scanning is performed through the brain. Dose reduction techniques were used. Dose information is transmitted to the ACR (American College of Radiology) NRDR (National Radiology Data Registry) which includes the Dose Index Registry.  PATIENT STATED HISTORY: (As transcribed by Technologist)  Fall. The patient hit right side of head.    FINDINGS:  Ventricles and sulci are within normal limits.  Trace chronic small vessel ischemic disease.  There is no midline shift or mass-effect.  The basal cisterns are patent.  The gray-white matter differentiation is intact.  There is no acute intracranial hemorrhage or extra-axial fluid collection.   There is no evident fracture.  The visualized paranasal sinuses and mastoid air cells are unremarkable.            CONCLUSION:  1. No acute intracranial hemorrhage or hydrocephalus. 2. Trace chronic small vessel ischemic disease. If there is clinical concern for acute ischemia/infarction, an MRI of the brain would be recommended for further evaluation.    LOCATION:  [place]   Dictated by (CST): Stromberg, LeRoy, MD on 2/15/2024 at 9:50 AM     Finalized by (CST): Stromberg, LeRoy, MD on 2/15/2024 at 9:52 AM        Medications   potassium chloride (K-Dur) tab 40 mEq (40 mEq Oral Given 2/15/24 6247)     Patient was given oral potassium supplementation.  She got up and was able to ambulate with a walker.       MDM      Patient presents with dizziness, fall and pain.  Differential diagnosis includes but is not limited to arrhythmia, anemia, dehydration, intracranial hemorrhage and fracture.  The patient's EKG shows a sinus rhythm.  Her cardiac enzymes are mildly elevated.  The patient had a recent admission for chest pain where she was found to have elevated  cardiac enzymes as well.  Her echo did not show any wall motion abnormality.  I reviewed the notes.  She was supposed to follow-up for an outpatient stress test and has not.  I discussed her case with Maryann Silva from [facility] who also discussed the case with Dr. Curtis from cardiology.  He recommended admission for stress testing given that the patient's repeat cardiac enzymes have gone slightly up.  She is anemic but that appears to be chronic.  It is slightly worsened from previous but the patient does not have evidence of active bleeding.  She does have renal failure which appears to be chronic.  She had a low potassium and her potassium was replaced orally.  The patient does not have evidence of intracranial hemorrhage on her CT and does not have evidence of fractures on her imaging.  She will be admitted to Dr. Boston from the hospitalist service primarily.  We have discussed the case.  Patient is agreeable to the plan.  Admission disposition: 2/15/2024  2:28 PM                                Medical Decision Making      Disposition and Plan     Clinical Impression:  1. Fall, initial encounter    2. Injury of head, initial encounter    3. Contusion of left hip, initial encounter    4. Dizziness    5. Elevated troponin         Disposition:  Admit  2/15/2024  2:28 pm    Follow-up:  Nonstaff, Physician  [address] [facility] [address]    Call in 1 day(s)            Medications Prescribed:  Current Discharge Medication List                            Hospital Problems       Present on Admission  Date Reviewed: 12/13/2023            ICD-10-CM Noted POA    * (Principal) Fall, initial encounter W19.XXXA 2/15/2024 Unknown    HIREN (acute kidney injury) (HCC) N17.9 Unknown Yes    Anxiety disorder F41.9 8/18/2017 Yes    Benign essential HTN I10 5/28/2017 Yes    Chronic kidney disease, unspecified CKD stage N18.9 5/28/2017 Yes    Contusion of left hip, initial encounter S70.02XA 2/15/2024 Unknown    Depressive disorder  F32.A 8/7/2010 Yes    Dizziness R42 2/15/2024 Unknown    Elevated troponin R79.89 7/23/2022 Unknown    Esophageal reflux (Chronic) K21.9 5/28/2017 Yes    Hypokalemia E87.6 2/15/2024 Yes    Injury of head, initial encounter S09.90XA 2/15/2024 Unknown    Primary hypertension I10 5/28/2017 Yes    Type 2 diabetes mellitus with stage 3 chronic kidney disease, without long-term current use of insulin (HCC) E11.22, N18.30 Unknown Yes

## 2024-02-15 NOTE — PROGRESS NOTES
02/15/24 1523 02/15/24 1526 02/15/24 1529   Vital Signs   BP (!) 170/80 (!) 168/73 140/72   MAP (mmHg) (!) 104 97 94   BP Location Right arm Right arm Right arm   BP Method Automatic Automatic Automatic   Patient Position Lying Sitting Standing     Orthostatic BP

## 2024-02-15 NOTE — CONSULTS
[facility]  Cardiology Consultation    Stephen Rodrigues Patient Status:  Observation    1945 MRN [id number]   Location [facility] 8NE-A Attending Brody Boston MD   Hosp Day # 0 PCP PHYSICIAN NONSTAFF     Reason for Consultation:  Presyncope and aortic stenosis    History of Present Illness:  Stephen Rodrigues is a a(n) 79 year old female who presents with unwitnessed fall and symptoms of presyncope.  She has been seen by [facility] service in early  but no recent follow-up.  She does not know her medications and not completely clear with history.  She reports some dyspnea but no chest pain, orthopnea, PND or significant worsening of lower extremity swelling.   She denies fever, chills, nausea/vomiting, diarrhea or urinary complaints.      History:  Past Medical History:   Diagnosis Date    Anemia     Aortic stenosis     Arrhythmia     Arthritis     Asthma     Back problem     Cataract     Deep vein thrombosis (DVT) of proximal lower extremity (HCC)     Depressive disorder 2010    Diverticulitis of colon 2009    Edema     Esophageal reflux     Extrinsic asthma, unspecified     Fibromyalgia     Gout     Heart attack (HCC) 1989    Heart valve disease     High blood pressure     High cholesterol     History of stomach ulcers     IBS (irritable bowel syndrome)     Incontinence     Migraines     Muscle weakness     Neuropathy     Osteoarthritis     Other and unspecified hyperlipidemia     Pneumonia due to organism     Pulmonary emphysema (HCC)     Renal disorder     Seizure disorder (HCC)     Shortness of breath     Sleep apnea     Stroke (HCC)     Type II or unspecified type diabetes mellitus without mention of complication, not stated as uncontrolled     Unspecified essential hypertension     Visual impairment      Past Surgical History:   Procedure Laterality Date          CATH DRUG ELUTING STENT      HC  SECTION LEVEL I      KNEE SURGERY      TOTAL KNEE  REPLACEMENT       Family History   Problem Relation Age of Onset    Hypertension Other       reports that she has never smoked. She has never used smokeless tobacco. She reports that she does not drink alcohol and does not use drugs.    Allergies:  Allergies   Allergen Reactions    Influenza Vaccines OTHER (SEE COMMENTS)     Fever    Dust Mite Extract UNKNOWN     Sneezing, itchy, watery eyes    Hydrocodone     Sulfa Antibiotics     Vicodin Tuss [Hydrocodone-Guaifenesin]        Medications:  No current facility-administered medications for this encounter.    Review of Systems:  A comprehensive review of systems was negative if not otherwise mention in above HPI.    /72 (BP Location: Right arm)   Pulse 94   Temp 98.3 °F (36.8 °C) (Oral)   Resp 17   Ht 5' 4\" (1.626 m)   Wt 203 lb 9.6 oz (92.4 kg)   SpO2 100%   BMI 34.95 kg/m²   Temp (24hrs), Av.5 °F (36.4 °C), Min:96.7 °F (35.9 °C), Max:98.3 °F (36.8 °C)     No intake or output data in the 24 hours ending 02/15/24 1715  Wt Readings from Last 3 Encounters:   02/15/24 203 lb 9.6 oz (92.4 kg)   23 200 lb (90.7 kg)   23 208 lb 8.9 oz (94.6 kg)       Physical Exam:   General: Alert and oriented x 3. No apparent distress. No respiratory or constitutional distress.  HEENT: Normocephalic, anicteric sclera, neck supple.  Neck: No JVD, carotids 2+, no bruits.  Cardiac: Regular rate and rhythm. S1, S2 normal. 3/6 systolic murmur with radiation to carotids  Lungs: Clear without wheezes, rales, rhonchi or dullness.  Normal excursions and effort.  Abdomen: Soft, non-tender.   Extremities: Trivial bilateral lower extremity edema  Neurologic: Alert and oriented, normal affect.  Skin: Warm and dry.     Laboratory Data:  Lab Results   Component Value Date    WBC 6.4 02/15/2024    HGB 8.6 02/15/2024    HCT 25.9 02/15/2024    .0 02/15/2024    CREATSERUM 2.80 02/15/2024    BUN 36 02/15/2024     02/15/2024    K 3.4 02/15/2024     02/15/2024     CO2 22.0 02/15/2024     02/15/2024    CA 9.4 02/15/2024    ALB 3.5 02/15/2024    ALKPHO 77 02/15/2024    BILT 0.3 02/15/2024    TP 8.1 02/15/2024    AST 33 02/15/2024    ALT 45 02/15/2024    PGLU 110 02/15/2024     Recent Labs   Lab 02/15/24  0953 02/15/24  1242   TROPHS 65* 75*      Latest Reference Range & Units 01/26/23 09:33 01/26/23 11:48 01/26/23 18:55   Troponin I (High Sensitivity) <=54 ng/L 73 (HH) 79 (HH) 74 (HH)   (HH): Data is critically high       Latest Reference Range & Units 02/15/24 09:53   Cholesterol, Total <200 mg/dL 101   Triglycerides 30 - 149 mg/dL 75   HDL Cholesterol 40 - 59 mg/dL 60 (H)   VLDL 0 - 30 mg/dL 10   NON-HDL CHOLESTEROL <130 mg/dL 41   LDL Cholesterol Calc <100 mg/dL 25   (H): Data is abnormally high    Imaging:  EKG 2/15/2024: sinus with LVH and mild repolarization but no acute ST changes    CXR 2/15/2024: CONCLUSION:  Increased mild reticular opacities throughout the lungs may represent edema versus infiltrate.  Correlate clinically.   Dictated by (CST): David Patricia MD on 2/15/2024 at 11:40 AM     CT head 2/15/2024: CONCLUSION:    1. No acute intracranial hemorrhage or hydrocephalus.   2. Trace chronic small vessel ischemic disease. If there is clinical concern for acute ischemia/infarction, an MRI of the brain would be recommended for further evaluation.   Dictated by (CST): Stromberg, LeRoy, MD on 2/15/2024 at 9:50 AM     Impression:  Fall, initial encounter  Presyncope  Elevated troponin  Chronic renal insufficiency  Aortic stenosis  H/o CVA  L popliteal DVT-12/2022, eliquis  HTN  DM2  Chronic anemia     Per my colleague Dr. Peck in January 2023 which I agree even now, \"Chronically elevated troponin levels.  Elevated troponin levels in November 2020 hospitalization, continues to have elevated high-sensitivity troponin levels.  This does not appear to be an acute coronary syndrome.  She has been seen in hospital consultation multiple times by multiple physicians in  our group, most recently Dr. Guido.  She has never followed up in office.\"    Recommendations:  -tele monitoring  -echocardiogram to make sure no significant change in degree of aortic stenosis that can explain possible presyncope/syncope  -if echo with no significant change and no significant telemetry event; OK to discharge from cardiac standpoint tomorrow with 7 day MCT monitor and follow-up in the [facility] clinic  -need to confirm what medications patient takes at home since patient does not recollect    Thank you for allowing me to participate in the care of your patient.    Benito Curtis MD  2/15/2024  5:15 PM

## 2024-02-15 NOTE — ED QUICK NOTES
Orders for admission, patient is aware of plan and ready to go upstairs. Any questions, please call ED RN Minna at extension [phone].     Patient Covid vaccination status: Unvaccinated     COVID Test Ordered in ED: Rapid SARS-CoV-2 by PCR    COVID Suspicion at Admission: N/A    Running Infusions:  None    Mental Status/LOC at time of transport: A&Ox4    Other pertinent information:   CIWA score: N/A   NIH score:  N/A

## 2024-02-15 NOTE — H&P
[facility]     Stephen Rodrigues Patient Status:  Emergency    1945 MRN [id number]   Location [facility] EMERGENCY DEPARTMENT Attending Siobhan Laureano MD   Hosp Day # 0 PCP PHYSICIAN NONSTAFF     Chief Complaint: dizzy     Subjective:    History of Present Illness:   Stephen Rodrigues is a 79 year old female with feeling dizzy and near syncopal event.  She did fall down.  Loss of consciousness was brief if there was complete loss of consciousness.  She has some diffuse body aches and pains.  Imaging did not show any fractures.  She denies any chest pain or palpitations prior to the event.  She denies any recent fevers or chills.  She denies shortness of breath or coughing spells.  She denies leg swelling or calf pain.  She denies dysuria hematuria abdominal pain nausea vomiting or diarrhea.    History/Other:    Past Medical History:  Past Medical History:   Diagnosis Date    Anemia     Aortic stenosis     Arrhythmia     Arthritis     Asthma     Back problem     Cataract     Deep vein thrombosis (DVT) of proximal lower extremity ([facility])     Depressive disorder 2010    Diverticulitis of colon 2009    Edema     Esophageal reflux     Extrinsic asthma, unspecified     Fibromyalgia     Gout     Heart attack (HCC) 1989    Heart valve disease     High blood pressure     High cholesterol     History of stomach ulcers     IBS (irritable bowel syndrome)     Incontinence     Migraines     Muscle weakness     Neuropathy     Osteoarthritis     Other and unspecified hyperlipidemia     Pneumonia due to organism     Pulmonary emphysema ([facility])     Renal disorder     Seizure disorder ([facility])     Shortness of breath     Sleep apnea     Stroke ([facility])     Type II or unspecified type diabetes mellitus without mention of complication, not stated as uncontrolled     Unspecified essential hypertension     Visual impairment      Past Surgical History:   Past Surgical History:   Procedure  Laterality Date          CATH DRUG ELUTING STENT      HC  SECTION LEVEL I      KNEE SURGERY      TOTAL KNEE REPLACEMENT        Family History:   Family History   Problem Relation Age of Onset    Hypertension Other      Social History:    reports that she has never smoked. She has never used smokeless tobacco. She reports that she does not drink alcohol and does not use drugs.   Allergies:   Allergies   Allergen Reactions    Influenza Vaccines OTHER (SEE COMMENTS)     Fever    Dust Mite Extract UNKNOWN     Sneezing, itchy, watery eyes    Hydrocodone     Sulfa Antibiotics     Vicodin Tuss [Hydrocodone-Guaifenesin]      Medications:    No current facility-administered medications on file prior to encounter.     Current Outpatient Medications on File Prior to Encounter   Medication Sig Dispense Refill    ferrous sulfate 325 (65 FE) MG Oral Tab EC Take 1 tablet (325 mg total) by mouth 2 (two) times daily with meals.      furosemide 20 MG Oral Tab Take 1 tablet (20 mg total) by mouth daily. 30 tablet 3    apixaban 5 MG Oral Tab Take 1 tablet (5 mg total) by mouth 2 (two) times daily. 180 tablet 0    acetaminophen 500 MG Oral Tab Take 2 tablets (1,000 mg total) by mouth once daily. For 30 days      lidocaine 5 % External Patch Place 1 patch onto the skin daily as needed.      Melatonin 5 MG Oral Tab Take 1 tablet (5 mg total) by mouth nightly.      Nephro-Franco 0.8 MG Oral Tab Take 1 tablet (0.8 mg total) by mouth daily.      Omeprazole 40 MG Oral Capsule Delayed Release Take 1 capsule (40 mg total) by mouth daily. Noon      dilTIAZem  MG Oral Capsule SR 24 Hr Take 1 capsule (240 mg total) by mouth once daily. Noon      albuterol (2.5 MG/3ML) 0.083% Inhalation Nebu Soln Take 3 mL by nebulization 3 (three) times daily as needed.      PEPTO-BISMOL 262 MG/15ML Oral Suspension Take 30 mL (524 mg total) by mouth every 6 (six) hours as needed.      valsartan 320 MG Oral Tab Take 1 tablet (320 mg total) by  mouth daily.      levetiracetam 1000 MG Oral Tab Take 1 tablet (1,000 mg total) by mouth every 12 (twelve) hours.      mirtazapine 15 MG Oral Tab Take 1 tablet (15 mg total) by mouth nightly.      metFORMIN HCl 1000 MG Oral Tab Take 1 tablet (1,000 mg total) by mouth 2 (two) times daily with meals.      aspirin 81 MG Oral Tab EC Take 1 tablet (81 mg total) by mouth daily. 30 tablet 0    gabapentin 300 MG Oral Cap Take 2 capsules (600 mg total) by mouth nightly.      albuterol 108 (90 Base) MCG/ACT Inhalation Aero Soln Inhale 2 puffs into the lungs every 4 (four) hours as needed for Wheezing.      allopurinol 100 MG Oral Tab Take 1 tablet (100 mg total) by mouth every morning.      Rosuvastatin Calcium (CRESTOR) 20 MG Oral Tab Take 1 tablet (20 mg total) by mouth daily.      Montelukast Sodium (SINGULAIR) 10 MG Oral Tab Take 1 tablet (10 mg total) by mouth daily.       Review of Systems:   A comprehensive review of systems was completed.    Pertinent positives and negatives noted in the HPI.    Objective:   Physical Exam:    /74   Pulse 92   Temp 96.7 °F (35.9 °C) (Temporal)   Resp 20   Ht 5' 4\" (1.626 m)   Wt 200 lb (90.7 kg)   SpO2 99%   BMI 34.33 kg/m²   General: No acute distress, Alert.    Respiratory: No rhonchi, no wheezes  Cardiovascular: S1, S2. Regular rate and rhythm  Abdomen: Soft, NT/ND, +BS  Neuro: No new focal deficits  Extremities: No edema    Results:    Labs:    Labs Last 24 Hours:  Recent Labs   Lab 02/15/24  0953   RBC 2.64*   HGB 8.6*   HCT 25.9*   MCV 98.1   MCH 32.6   MCHC 33.2   RDW 14.5   NEPRELIM 3.78   WBC 6.4   .0     Recent Labs   Lab 02/15/24  0953   *   BUN 36*   CREATSERUM 2.80*   EGFRCR 17*   CA 9.4   ALB 3.5      K 3.4*   *   CO2 22.0   ALKPHO 77   AST 33   ALT 45   BILT 0.3   TP 8.1     Lab Results   Component Value Date    INR 0.97 10/24/2022    INR 1.00 03/01/2022    INR 0.97 12/14/2020     Recent Labs   Lab 02/15/24  0953 02/15/24  1242    TROPHS 65* 75*     No results for input(s): \"TROP\", \"PBNP\" in the last 168 hours.  No results for input(s): \"PCT\" in the last 168 hours.  Imaging: Imaging data reviewed in Epic.    Assessment & Plan:      #Dizzy with fall- unclear etiology but h/o aortic stenosis   -cardiology consulted  -ECHO   -stress test defer timing to cardiology   -telemetry, may need outpt monitor     #Black stool- fecal occult negative-trend h/h but cont AC for now   #HIREN on CKD- IVF and monitor and hold lasix   #COPD  #Fibromyalgia  #Gout  #Hypertension  #Hyperlipidemia  #Migraines  #JHONY  #History of stroke  #Seizure disorder  #DM2-insulin    Lives assisted living    PT/OT        Quality:  DVT Mechanical Prophylaxis:        DVT Pharmacologic Prophylaxis   Medication   None                Code Status: Full Code  Gurrola: No urinary catheter in place  Gurrola Duration (in days):   Central line:    ELROY:     Plan of care discussed with patient and staff     Brody Boston MD  Supplementary Documentation:     The 21st Century Cures Act makes medical notes like these available to patients in the interest of transparency. Please be advised this is a medical document. Medical documents are intended to carry relevant information, facts as evident, and the clinical opinion of the practitioner. The medical note is intended as peer to peer communication and may appear blunt or direct. It is written in medical language and may contain abbreviations or verbiage that are unfamiliar.

## 2024-02-15 NOTE — ED INITIAL ASSESSMENT (HPI)
Pt to ED via EMS due to unwitnessed fall at home, pt reports she felt dizziness before the fall, reports feeling dizzy yesterday as well. Pt reports landing on her left side but hit her head on R side. Unable to recall LOC. Denies taking blood thinners. A&Ox4.

## 2024-02-16 ENCOUNTER — APPOINTMENT (OUTPATIENT)
Dept: CV DIAGNOSTICS | Facility: HOSPITAL | Age: 79
End: 2024-02-16
Attending: INTERNAL MEDICINE
Payer: MEDICARE

## 2024-02-16 VITALS
HEART RATE: 86 BPM | HEIGHT: 64 IN | OXYGEN SATURATION: 100 % | DIASTOLIC BLOOD PRESSURE: 58 MMHG | TEMPERATURE: 98 F | SYSTOLIC BLOOD PRESSURE: 137 MMHG | BODY MASS INDEX: 34.76 KG/M2 | WEIGHT: 203.63 LBS | RESPIRATION RATE: 16 BRPM

## 2024-02-16 LAB
ANION GAP SERPL CALC-SCNC: 6 MMOL/L (ref 0–18)
ATRIAL RATE: 80 BPM
BUN BLD-MCNC: 36 MG/DL (ref 9–23)
CALCIUM BLD-MCNC: 9.1 MG/DL (ref 8.5–10.1)
CHLORIDE SERPL-SCNC: 116 MMOL/L (ref 98–112)
CO2 SERPL-SCNC: 24 MMOL/L (ref 21–32)
CREAT BLD-MCNC: 2.68 MG/DL
EGFRCR SERPLBLD CKD-EPI 2021: 18 ML/MIN/1.73M2 (ref 60–?)
ERYTHROCYTE [DISTWIDTH] IN BLOOD BY AUTOMATED COUNT: 14.6 %
GLUCOSE BLD-MCNC: 115 MG/DL (ref 70–99)
GLUCOSE BLD-MCNC: 118 MG/DL (ref 70–99)
GLUCOSE BLD-MCNC: 124 MG/DL (ref 70–99)
GLUCOSE BLD-MCNC: 131 MG/DL (ref 70–99)
HCT VFR BLD AUTO: 25.7 %
HGB BLD-MCNC: 8.5 G/DL
MCH RBC QN AUTO: 32.2 PG (ref 26–34)
MCHC RBC AUTO-ENTMCNC: 33.1 G/DL (ref 31–37)
MCV RBC AUTO: 97.3 FL
OSMOLALITY SERPL CALC.SUM OF ELEC: 311 MOSM/KG (ref 275–295)
P AXIS: 58 DEGREES
P-R INTERVAL: 194 MS
PLATELET # BLD AUTO: 262 10(3)UL (ref 150–450)
POTASSIUM SERPL-SCNC: 3.7 MMOL/L (ref 3.5–5.1)
Q-T INTERVAL: 390 MS
QRS DURATION: 98 MS
QTC CALCULATION (BEZET): 449 MS
R AXIS: -25 DEGREES
RBC # BLD AUTO: 2.64 X10(6)UL
SODIUM SERPL-SCNC: 146 MMOL/L (ref 136–145)
T AXIS: 96 DEGREES
VENTRICULAR RATE: 80 BPM
WBC # BLD AUTO: 5.5 X10(3) UL (ref 4–11)

## 2024-02-16 PROCEDURE — 99239 HOSP IP/OBS DSCHRG MGMT >30: CPT | Performed by: HOSPITALIST

## 2024-02-16 PROCEDURE — 93306 TTE W/DOPPLER COMPLETE: CPT | Performed by: INTERNAL MEDICINE

## 2024-02-16 RX ORDER — ACETAMINOPHEN 500 MG
500 TABLET ORAL EVERY 6 HOURS PRN
Status: DISCONTINUED | OUTPATIENT
Start: 2024-02-16 | End: 2024-02-16

## 2024-02-16 RX ORDER — ACETAMINOPHEN 500 MG
1000 TABLET ORAL EVERY 6 HOURS PRN
Status: DISCONTINUED | OUTPATIENT
Start: 2024-02-16 | End: 2024-02-16

## 2024-02-16 RX ORDER — POTASSIUM CHLORIDE 20 MEQ/1
20 TABLET, EXTENDED RELEASE ORAL ONCE
Status: COMPLETED | OUTPATIENT
Start: 2024-02-16 | End: 2024-02-16

## 2024-02-16 NOTE — OCCUPATIONAL THERAPY NOTE
OCCUPATIONAL THERAPY              OT order received, chart reviewed. Patient continues to have orthostatic hypotension per RN, is not yet appropriate for activity. Will follow and re-attempt as able and as appropriate.

## 2024-02-16 NOTE — PLAN OF CARE
Assumed care of patient at 1930. Patient is alert and orientated x4. Currently room air. Lung sounds clear/diminished. Continuous pulse ox maintained. NSR w/occasional  PVCs on tele. Continent of bowel and bladder. External catheter in place for stress incontinence. PRN pain medication given for headache. Up 1 assist and walker. Call light within reach. Fall precautions in place.    Plan of care:  Cards to see  Echo  IV fluids     Problem: Diabetes/Glucose Control  Goal: Glucose maintained within prescribed range  Description: INTERVENTIONS:  - Monitor Blood Glucose as ordered  - Assess for signs and symptoms of hyperglycemia and hypoglycemia  - Administer ordered medications to maintain glucose within target range  - Assess barriers to adequate nutritional intake and initiate nutrition consult as needed  - Instruct patient on self management of diabetes  Outcome: Progressing

## 2024-02-16 NOTE — PLAN OF CARE
NURSING ADMISSION NOTE      Patient admitted to floor at approx. 1515. Patient A&O x 4, but poor historian. Pt called outpatient primary doctor to have office sent medication list to hospital due to patient unsure of what she is taking at home and when. NSR with some PVC's on tele. BLE non-pitting edema present. Oriented to room and call light in reach. Bed in lowest position and bed alarm on.      1504: Dr. Boston paged about home med rec completed, medication orders, and diabetic orders. Waiting for response.  1843: Dr. Boston paged to make aware patient still does not have medication or diabetics orders placed.     POC:   Cards consult  Echo

## 2024-02-16 NOTE — PLAN OF CARE
Pt is received around 0730. A&O x 4, denies a chest pain and dizziness. Lungs are clear/diminished, RA. NSR with some PCV's  on tele. Last BM 2/14/24, bowel sounds active x4 , abdomen is soft and non tender. IVF sodium chloride running 100 ml/hr. All needs are met, call light within the reach. Pt is ortho (+). Pt updated with plan of care.         POC:     ECHO  IVF  CARDS to see  Ortho Q shift    Problem: Diabetes/Glucose Control  Goal: Glucose maintained within prescribed range  Description: INTERVENTIONS:  - Monitor Blood Glucose as ordered  - Assess for signs and symptoms of hyperglycemia and hypoglycemia  - Administer ordered medications to maintain glucose within target range  - Assess barriers to adequate nutritional intake and initiate nutrition consult as needed  - Instruct patient on self management of diabetes  Outcome: Progressing     Problem: Patient/Family Goals  Goal: Patient/Family Long Term Goal  Description: Patient's Long Term Goal: to prevent readmission    Interventions:  - take medications as prescribed  - See additional Care Plan goals for specific interventions  Outcome: Progressing  Goal: Patient/Family Short Term Goal  Description: Patient's Short Term Goal: to be discharged    Interventions:   - Echo  -IVF  - See additional Care Plan goals for specific interventions  Outcome: Progressing

## 2024-02-16 NOTE — PROGRESS NOTES
02/15/24 1925   BiPAP   $ RT Standby Charge (per 15 min) 1   BiPAP/CPAP Monitored Parameters   Toleration Refused  (patient states she does not wear a CPAP)

## 2024-02-16 NOTE — PROGRESS NOTES
[facility]     Hospitalist Progress Note     Stephen Rodrigues Patient Status:  Observation    1945 MRN [id number]   Location [facility] 8NE-A Attending Mary Mcleod MD   Hosp Day # 0 PCP PHYSICIAN NONSTAFF     Chief Complaint: syncope    Subjective:     Patient complaining of dizziness when she stand and is sitting up,   was noted to be orthostatic on admission.  Also having left hip pain on movement and palpation    Objective:    Review of Systems:   A comprehensive review of systems was completed; pertinent positive and negatives stated in subjective.    Vital signs:  Temp:  [97.6 °F (36.4 °C)-98.4 °F (36.9 °C)] 98.4 °F (36.9 °C)  Pulse:  [75-94] 75  Resp:  [14-20] 16  BP: (132-170)/(68-84) 158/70  SpO2:  [97 %-100 %] 97 %    Physical Exam:    General: No acute distress  Respiratory: No wheezes, no rhonchi  Cardiovascular: S1, S2, regular rate and rhythm  Abdomen: Soft, Non-tender, non-distended, positive bowel sounds  Neuro: No new focal deficits.   Extremities: No edema      Diagnostic Data:    Labs:  Recent Labs   Lab 02/15/24  0953 24  0425   WBC 6.4 5.5   HGB 8.6* 8.5*   MCV 98.1 97.3   .0 262.0       Recent Labs   Lab 02/15/24  0953 24  0425   * 115*   BUN 36* 36*   CREATSERUM 2.80* 2.68*   CA 9.4 9.1   ALB 3.5  --     146*   K 3.4* 3.7   * 116*   CO2 22.0 24.0   ALKPHO 77  --    AST 33  --    ALT 45  --    BILT 0.3  --    TP 8.1  --        Estimated Creatinine Clearance: 14.7 mL/min (A) (based on SCr of 2.68 mg/dL (H)).    Recent Labs   Lab 02/15/24  0953 02/15/24  1242   TROPHS 65* 75*       No results for input(s): \"PTP\", \"INR\" in the last 168 hours.               Microbiology    No results found for this visit on 02/15/24.      Imaging: Reviewed in Epic.    Medications:    allopurinol  100 mg Oral QAM    apixaban  5 mg Oral BID    aspirin  81 mg Oral Daily    baclofen  5 mg Oral TID    dilTIAZem ER  240 mg Oral Daily    escitalopram  10 mg Oral Daily     gabapentin  600 mg Oral Nightly    hydrALAZINE  50 mg Oral TID    mirtazapine  15 mg Oral Nightly    pantoprazole  40 mg Oral QAM AC    sodium bicarbonate  650 mg Oral Daily    valsartan  320 mg Oral Daily    insulin aspart  1-10 Units Subcutaneous TID AC and HS    insulin aspart  1-68 Units Subcutaneous TID CC       Assessment & Plan:      #Dizziness  - + orthostats  -gentle IVF  -ECHO    #Black stool- fecal occult negative-trend h/h but cont AC for now     #HIREN on CKD- IVF and monitor and hold lasix     # Hip pain  -X-ray negative for fracture but showing severe OA    #COPD- stable  #Fibromyalgia  #Gout  #Hypertension  #Hyperlipidemia  #Migraines  #JHONY  #History of stroke  #Seizure disorder  #DM2-insulin      Mary Mcleod MD    Supplementary Documentation:     Quality:  DVT Mechanical Prophylaxis:   SCDs, Early ambuation  DVT Pharmacologic Prophylaxis   Medication    apixaban (Eliquis) tab 5 mg         DVT Pharmacologic prophylaxis: Aspirin 81 mg      Code Status: Full Code  Gurrola: No urinary catheter in place  Gurrola Duration (in days):   Central line:    ELROY:     Discharge is dependent on: progress  At this point Ms. Rodrigues is expected to be discharge to: home    The 21st Century Cures Act makes medical notes like these available to patients in the interest of transparency. Please be advised this is a medical document. Medical documents are intended to carry relevant information, facts as evident, and the clinical opinion of the practitioner. The medical note is intended as peer to peer communication and may appear blunt or direct. It is written in medical language and may contain abbreviations or verbiage that are unfamiliar.

## 2024-02-16 NOTE — PROGRESS NOTES
02/16/24 1240 02/16/24 1242 02/16/24 1244   Vital Signs   /68 119/65 134/66   MAP (mmHg) 92 82 87   BP Location Right arm Right arm Right arm   BP Method Automatic Automatic Automatic   Patient Position Lying Sitting Standing     Ortho (-)

## 2024-02-16 NOTE — PHYSICAL THERAPY NOTE
Order received for PT eval and chart reviewed. RN recommends to hold therapy due to orthostatic hypotension. PT will continue to follow.

## 2024-02-16 NOTE — PROGRESS NOTES
Progress Note  Stephen Rodrigues Patient Status:  Observation    1945 MRN [id number]   Location [facility] 8NE-A Attending Mary Mcleod MD   Hosp Day # 0 PCP PHYSICIAN NONSTAFF     Subjective:  Complains of hip pain this am. No chest pain or SOB    Objective:  /69 (BP Location: Right arm)   Pulse 78   Temp 97.9 °F (36.6 °C) (Oral)   Resp 18   Ht 5' 4\" (1.626 m)   Wt 203 lb 9.6 oz (92.4 kg)   SpO2 98%   BMI 34.95 kg/m²     Telemetry: SR, frequent PAC's, PVC's      Intake/Output: -500        Last 3 Weights   02/15/24 1529 203 lb 9.6 oz (92.4 kg)   02/15/24 0853 200 lb (90.7 kg)   23 1407 200 lb (90.7 kg)   23 0504 208 lb 8.9 oz (94.6 kg)   23 0610 207 lb 14.3 oz (94.3 kg)   23 1328 212 lb 14.4 oz (96.6 kg)   23 0927 200 lb (90.7 kg)       Labs:  Recent Labs   Lab 02/15/24  0953 24  0425   * 115*   BUN 36* 36*   CREATSERUM 2.80* 2.68*   EGFRCR 17* 18*   CA 9.4 9.1   ALB 3.5  --     146*   K 3.4* 3.7   * 116*   CO2 22.0 24.0   ALKPHO 77  --    AST 33  --    ALT 45  --    BILT 0.3  --    TP 8.1  --      Recent Labs   Lab 02/15/24  0953 24  0425   RBC 2.64* 2.64*   HGB 8.6* 8.5*   HCT 25.9* 25.7*   MCV 98.1 97.3   MCH 32.6 32.2   MCHC 33.2 33.1   RDW 14.5 14.6   NEPRELIM 3.78  --    WBC 6.4 5.5   .0 262.0         Recent Labs   Lab 02/15/24  0953 02/15/24  1242   TROPHS 65* 75*     Lab Results   Component Value Date    INR 0.97 10/24/2022    INR 1.00 2022    INR 0.97 2020       Diagnostics:     Review of Systems   Constitutional: Negative.   Cardiovascular: Negative.    Respiratory: Negative.     Musculoskeletal:  Positive for joint pain.       Physical Exam:    Gen: Alert, oriented x 3, in no apparent distress  Heent: Pupils equal, reactive. Mucous membranes moist.   Neck: No JVD  Cardiac: Regular rate and rhythm, normal S1,S2  Lungs: Clear to auscultation  Abd: Soft, non tender, non distended  Ext: No edema  Skin:  Warm, dry          Medications:     allopurinol  100 mg Oral QAM    apixaban  5 mg Oral BID    aspirin  81 mg Oral Daily    baclofen  5 mg Oral TID    dilTIAZem ER  240 mg Oral Daily    escitalopram  10 mg Oral Daily    gabapentin  600 mg Oral Nightly    hydrALAZINE  50 mg Oral TID    mirtazapine  15 mg Oral Nightly    pantoprazole  40 mg Oral QAM AC    sodium bicarbonate  650 mg Oral Daily    valsartan  320 mg Oral Daily    insulin aspart  1-10 Units Subcutaneous TID AC and HS    insulin aspart  1-68 Units Subcutaneous TID CC      sodium chloride 100 mL/hr at 02/15/24 2014           Assessment:  Presyncope, unwitnessed with dizziness  Chronic troponin elevation, - troponin 65, 75  Not c/w ACS  EKG without acute changes  Will plan outpatient stress testing  Aortic stenosis, mild to moderate per last echo with mean systolic gradient 18 mmHg  Acute on chronic renal insufficiency - creatinine 2.6 today. Getting IVF  Hx of CVA -  On ASA  L popliteal DVT 12/22 - on eliquis  HTN - 158/70 thi reynaldo   On CCB, ARB, hydralazine  Type II DM  Chronic anemia   PVC's, PAC's    Plan:  Await results of echo , If no significant change will discharge to home with 7 day MCT and follow up [facility].   Will recommend outpatient stress testing   Continue BB, ARB, CCB  Hold lasix today with IVF  Continue eliquis    Plan of care discussed with patient, RN.    LUCIO Russell  2/16/2024  7:52 AM      =======================================================  Note reviewed and labs reviewed.  Agree with above assessment and plan.    I have personally performed the medical decision making in its entirety. My additions include:  Echo with unchanged LVEF and no change in degree of aortic stenosis.  Possibly due to dehydration and primary service giving IV fluid for renal insufficiency.  No further inpatient cardiac imaging/testing recommended and when discharged will setup outpatient 7 day MCT and follow in the [facility] office (of note,  she has been recommended to follow-up with us in the past but has not followed through).    D/w LUCIO Silva.    Benito Curtis MD

## 2024-02-17 NOTE — PROGRESS NOTES
Pt discharged home.Tele removed, IV removed.F/U instructions provided and discussed. Pt and family verbalized understanding. Pt wheeled down by staff with belongins. Discharge instructions including medications and follow ups are given. Pt left denying any complains of pain, malaise, or cardiac symptoms.All needs met by staff.

## 2024-02-18 NOTE — DISCHARGE SUMMARY
[facility]     Stephen Rodrigues Patient Status:  Observation    1945 MRN [id number]   Location [facility] 8NE-A Attending No att. providers found   Hosp Day # 0 PCP PHYSICIAN NONSTAFF     Date of Admission: 2/15/2024  Date of Discharge:  2024     Discharge Disposition: Home or Self Care    Discharge Diagnosis:  Dizziness secondary orthostatic hypotension  HIREN on CKD  Hip pain  COPD  Fibromyalgia  Gout  Hypertension  Hyperlipidemia  Migraines  JHONY  History of stroke  Seizure disorder  Diabetes type 2    History of Present Illness:   Stephen Rodrigues is a 79 year old female with feeling dizzy and near syncopal event.  She did fall down.  Loss of consciousness was brief if there was complete loss of consciousness.  She has some diffuse body aches and pains.  Imaging did not show any fractures.  She denies any chest pain or palpitations prior to the event.  She denies any recent fevers or chills.  She denies shortness of breath or coughing spells.  She denies leg swelling or calf pain.  She denies dysuria hematuria abdominal pain nausea vomiting or diarrhea.       Brief Synopsis: Patient is 79-year-old female admitted with dizziness.  She is on to be orthostatic.  She was placed on gentle IV fluids.  She was seen by cardiology.  Her renal insufficiency improved with IV fluids and holding her diuretics.  She remained stable and she was discharged home in good condition    Lace+ Score: 61  59-90 High Risk  29-58 Medium Risk  0-28   Low Risk       TCM Follow-Up Recommendation:  LACE > 58: High Risk of readmission after discharge from the hospital.      Procedures during hospitalization:   none    Consultants:  Cardiology    Discharge Medication List:     Discharge Medications        CONTINUE taking these medications        Instructions Prescription details   acetaminophen 500 MG Tabs  Commonly known as: Tylenol Extra Strength      Take 2 tablets (1,000 mg total) by mouth once daily.  For 30 days   Refills: 0     albuterol 108 (90 Base) MCG/ACT Aers  Commonly known as: Ventolin HFA      Inhale 2 puffs into the lungs every 4 (four) hours as needed for Wheezing.   Refills: 0     albuterol (2.5 MG/3ML) 0.083% Nebu  Commonly known as: Ventolin      Take 3 mL by nebulization 3 (three) times daily as needed.   Refills: 0     alendronate 70 MG Tabs  Commonly known as: Fosamax      Take 1 tablet (70 mg total) by mouth every 7 days.   Refills: 0     allopurinol 100 MG Tabs  Commonly known as: Zyloprim      Take 1 tablet (100 mg total) by mouth every morning.   Refills: 0     apixaban 5 MG Tabs  Commonly known as: Eliquis      Take 1 tablet (5 mg total) by mouth 2 (two) times daily.   Quantity: 180 tablet  Refills: 0     aspirin 81 MG Tbec      Take 1 tablet (81 mg total) by mouth daily.   Quantity: 30 tablet  Refills: 0     cholecalciferol 1000 UNITS Caps  Commonly known as: Vitamin D3      Take 1 capsule (1,000 Units total) by mouth daily.   Refills: 0     dapagliflozin 10 MG Tabs  Commonly known as: Farxiga      Take 1 tablet (10 mg total) by mouth daily.   Refills: 0     dilTIAZem  MG Cp24  Commonly known as: CardIZEM CD      Take 1 capsule (240 mg total) by mouth once daily. Noon   Refills: 0     escitalopram 10 MG Tabs  Commonly known as: Lexapro      Take 1 tablet (10 mg total) by mouth daily.   Refills: 0     ferrous sulfate 325 (65 FE) MG Tbec      Take 1 tablet (325 mg total) by mouth 2 (two) times daily with meals.   Refills: 0     furosemide 20 MG Tabs  Commonly known as: Lasix      Take 1 tablet (20 mg total) by mouth daily.   Quantity: 30 tablet  Refills: 3     gabapentin 300 MG Caps  Commonly known as: Neurontin      Take 2 capsules (600 mg total) by mouth nightly.   Refills: 0     hydrALAZINE 50 MG Tabs  Commonly known as: Apresoline      Take 1 tablet (50 mg total) by mouth 3 (three) times daily.   Refills: 0     lidocaine 5 % Ptch  Commonly known as: Lidoderm      Place 1 patch onto  the skin daily as needed.   Refills: 0     magnesium oxide 400 MG Tabs  Commonly known as: Mag-Ox      Take 1 tablet (400 mg total) by mouth daily.   Refills: 0     Melatonin 5 MG Tabs      Take 1 tablet (5 mg total) by mouth nightly.   Refills: 0     mirtazapine 15 MG Tabs  Commonly known as: Remeron      Take 1 tablet (15 mg total) by mouth nightly.   Refills: 0     montelukast 10 MG Tabs  Commonly known as: Singulair      Take 1 tablet (10 mg total) by mouth daily.   Refills: 0     Nephro-Franco 0.8 MG Tabs      Take 1 tablet (0.8 mg total) by mouth daily.   Refills: 0     Omeprazole 40 MG Cpdr      Take 1 capsule (40 mg total) by mouth daily. Noon   Refills: 0     Pepto-Bismol 262 MG/15ML Susp  Generic drug: bismuth subsalicylate      Take 30 mL (524 mg total) by mouth every 6 (six) hours as needed.   Refills: 0     rosuvastatin 20 MG Tabs  Commonly known as: Crestor      Take 1 tablet (20 mg total) by mouth daily.   Refills: 0     SITagliptin Phosphate 100 MG Tabs  Commonly known as: JANUVIA      Take 1 tablet (100 mg total) by mouth daily.   Refills: 0     sodium bicarbonate 650 MG Tabs      Take 1 tablet (650 mg total) by mouth daily.   Refills: 0     THERA/BETA-CAROTENE Tabs      Take 1 tablet by mouth daily.   Refills: 0     traMADol 50 MG Tabs  Commonly known as: Ultram      Take 1 tablet (50 mg total) by mouth 2 (two) times daily as needed for Pain.   Refills: 0     valsartan 320 MG Tabs  Commonly known as: Diovan      Take 1 tablet (320 mg total) by mouth daily.   Refills: 0            STOP taking these medications      baclofen 10 MG Tabs  Commonly known as: Lioresal        cyclobenzaprine 5 MG Tabs  Commonly known as: Flexeril        levetiracetam 1000 MG Tabs  Commonly known as: KEPPRA                 [place] reviewed[place] n[place]    Follow-up appointment:   Nonstaff, Physician  [address] [facility] [address]    Call in 1 day(s)      Benito Curtis MD  [address]  [address]  [phone]    Follow up  our office will call you for an appointment    Appointments for Next 30 Days 2024 - 3/19/2024      None            Vital signs:       Physical Exam:    General: No acute distress   Lungs: clear to auscultation  Cardiovascular: S1, S2  Abdomen: Soft      -----------------------------------------------------------------------------------------------  PATIENT DISCHARGE INSTRUCTIONS: See electronic chart    Mary Mcleod MD    Total time spent on discharge plannin minutes     The  [employer] Cures Act makes medical notes like these available to patients in the interest of transparency. Please be advised this is a medical document. Medical documents are intended to carry relevant information, facts as evident, and the clinical opinion of the practitioner. The medical note is intended as peer to peer communication and may appear blunt or direct. It is written in medical language and may contain abbreviations or verbiage that are unfamiliar.

## 2024-02-19 ENCOUNTER — APPOINTMENT (OUTPATIENT)
Dept: CT IMAGING | Facility: HOSPITAL | Age: 79
End: 2024-02-19
Attending: EMERGENCY MEDICINE
Payer: MEDICARE

## 2024-02-19 ENCOUNTER — HOSPITAL ENCOUNTER (INPATIENT)
Facility: HOSPITAL | Age: 79
Discharge: NURSING, REHAB OR LONG-TERM CARE | End: 2024-02-19
Attending: EMERGENCY MEDICINE | Admitting: INTERNAL MEDICINE
Payer: MEDICARE

## 2024-02-19 ENCOUNTER — APPOINTMENT (OUTPATIENT)
Dept: GENERAL RADIOLOGY | Facility: HOSPITAL | Age: 79
End: 2024-02-19
Attending: EMERGENCY MEDICINE
Payer: MEDICARE

## 2024-02-19 ENCOUNTER — HOSPITAL ENCOUNTER (INPATIENT)
Facility: HOSPITAL | Age: 79
LOS: 27 days | Discharge: HOSPICE | End: 2024-03-17
Attending: EMERGENCY MEDICINE | Admitting: INTERNAL MEDICINE
Payer: MEDICARE

## 2024-02-19 PROBLEM — Z87.898 HISTORY OF SEIZURE: Status: ACTIVE | Noted: 2024-01-01

## 2024-02-19 PROBLEM — I50.9 ACUTE ON CHRONIC CONGESTIVE HEART FAILURE, UNSPECIFIED HEART FAILURE TYPE (HCC): Status: ACTIVE | Noted: 2024-01-01

## 2024-02-19 PROBLEM — Z87.898 HISTORY OF SEIZURE: Status: ACTIVE | Noted: 2024-02-19

## 2024-02-19 PROBLEM — I50.9 ACUTE ON CHRONIC CONGESTIVE HEART FAILURE, UNSPECIFIED HEART FAILURE TYPE (HCC): Status: ACTIVE | Noted: 2024-02-19

## 2024-02-20 ENCOUNTER — NURSE ONLY (OUTPATIENT)
Dept: ELECTROPHYSIOLOGY | Facility: HOSPITAL | Age: 79
End: 2024-02-20
Attending: Other
Payer: MEDICARE

## 2024-02-20 ENCOUNTER — APPOINTMENT (OUTPATIENT)
Dept: MRI IMAGING | Facility: HOSPITAL | Age: 79
End: 2024-02-20
Attending: INTERNAL MEDICINE
Payer: MEDICARE

## 2024-02-20 ENCOUNTER — NURSE ONLY (OUTPATIENT)
Dept: ELECTROPHYSIOLOGY | Facility: HOSPITAL | Age: 79
End: 2024-02-20
Attending: INTERNAL MEDICINE
Payer: MEDICARE

## 2024-02-20 NOTE — ED QUICK NOTES
Rounding Completed. Report from BERKLEY Delgado. Pt resting in bed. She is awake, oriented to self only. Pt in no acute distress    Plan of Care reviewed. Waiting for CT.  Elimination needs assessed.  Provided warm blankets, updates.    Bed is locked and in lowest position. Call light within reach.

## 2024-02-20 NOTE — SLP NOTE
ADULT SWALLOWING EVALUATION    ASSESSMENT    ASSESSMENT/OVERALL IMPRESSION:  Order received for BSE, chart reviewed. This is a 79 y.o female who presented from home with AMS. Patient was just discharged from here to home 3 days prior for dizziness; treated for orthostatic and went home in good condition. Upon well check by neighbor, patient was altered and not speaking like herself. Initial head CT negative.  Neuro consulted. MRI brain ordered. Patient with hx seizure disorder, CVA, CAD s/p stent, HTN, DM2, JHONY, migraines. Patient currently NPO.     Patient received awake, alert, and in seated upright position in bedside chair. Patient on room air. Patient edentulous and reported eating regular texture diet with thin liquids at baseline. Patient with no focal oral motor deficits appreciated. Patient with clear vocal quality however noted perseverative speech with patent responses. Patient able to self administer PO trials.     Patient exhibited mild difficulty with mastication and mild oral residuals on left. Prandial cough noted x2 after hard solid texture trials. No overt clinical s/s aspiration with other PO trials. Time spent with patient on diet texture analysis and patient education.  Patient expressed understanding however reinforcement is recommended.          RECOMMENDATIONS   Diet Recommendations - Solids: Soft/ Easy to chew  Diet Recommendations - Liquids: Thin Liquids  Compensatory Strategies Recommended: Slow rate;Small bites and sips  Aspiration Precautions: Upright position;Slow rate;Small bites and sips  Medication Administration Recommendations: One pill at a time  Treatment Plan/Recommendations: Aspiration precautions    HISTORY   MEDICAL HISTORY  Reason for Referral: R/O aspiration    Problem List  Principal Problem:    Altered mental status, unspecified altered mental status type  Active Problems:    Acute on chronic congestive heart failure, unspecified heart failure type (HCC)    History of  seizure      Past Medical History  Past Medical History:   Diagnosis Date    Anemia     Aortic stenosis     Arrhythmia     Arthritis     Asthma ([facility])     Back problem     Cataract     Deep vein thrombosis (DVT) of proximal lower extremity ([facility])     Depressive disorder 08/07/2010    Diverticulitis of colon 04/17/2009    Edema     Esophageal reflux     Extrinsic asthma, unspecified     Fibromyalgia     Gout     Heart attack (HCC) 01/01/1989    Heart valve disease     High blood pressure     High cholesterol     History of stomach ulcers     IBS (irritable bowel syndrome)     Incontinence     Migraines     Muscle weakness     Neuropathy     Osteoarthritis     Other and unspecified hyperlipidemia     Pneumonia due to organism     Pulmonary emphysema ([facility])     Renal disorder     Seizure disorder ([facility])     Shortness of breath     Sleep apnea     Stroke ([facility])     Type II or unspecified type diabetes mellitus without mention of complication, not stated as uncontrolled     Unspecified essential hypertension     Visual impairment        Prior Living Situation: Home with support  Diet Prior to Admission: Regular;Thin liquids       Patient/Family Goals: \"am I going to a nursing home?  I didn't like it last time\"    SWALLOWING HISTORY  Current Diet Consistency: NPO  Dysphagia History: previous BSE and dysphagia follow up in Dec 2022 with recommendation for soft/chopped and thin liquids.   Imaging Results: see EMR    OBJECTIVE   ORAL MOTOR EXAMINATION  Dentition: Edentulous  Symmetry: Within Functional Limits  Strength: Within Functional Limits  Tone: Within Functional Limits  Range of Motion: Within Functional Limits  Rate of Motion: Within Functional Limits    Voice Quality: Clear  Respiratory Status: Unlabored  Consistencies Trialed: Thin liquids;Puree;Hard solid  Method of Presentation: Self presentation  Patient Positioning: Upright;Midline    Oral Phase of Swallow: Impaired  Bolus Retrieval: Intact  Bilabial Seal:  Intact  Bolus Formation: Intact  Bolus Propulsion: Intact  Mastication: Impaired  Retention: Impaired    Pharyngeal Phase of Swallow: Within Functional Limits           (Please note: Silent aspiration cannot be evaluated clinically. Videofluoroscopic Swallow Study is required to rule-out silent aspiration.)    Esophageal Phase of Swallow: No complaints consistent with possible esophageal involvement              GOALS  Goal #1 The patient will tolerate soft/easy to chew consistency and thin liquids without overt signs or symptoms of aspiration with 90 % accuracy over 1-2 session(s).  In Progress   Goal #2 The patient/family/caregiver will demonstrate understanding and implementation of aspiration precautions and swallow strategies independently over 1-2 session(s).    In Progress   Goal #3 Assess need for cognitive comm evaluation however will await further neuro workup In progress     FOLLOW UP  Treatment Plan/Recommendations: Aspiration precautions     Follow Up Needed (Documentation Required): Yes  SLP Follow-up Date: 02/21/24    Thank you for your referral.   If you have any questions, please contact RONY Marie MS CCC-SLP/L, pager [phone]  Speech-Language Pathologist

## 2024-02-20 NOTE — PROGRESS NOTES
[facility]     Hospitalist Progress Note     Stephen Rodrigues Patient Status:  Inpatient    1945 MRN [id number]   [facility] 2NE-A Attending Nichole Kaur MD   Hosp Day # 1 PCP PHYSICIAN NONSTAFF     Chief Complaint: confusion    Subjective:     Patient still confused today but better  Knows her name and   Knows her familys names  Tries to answer all questions and have a conversation    Objective:    Review of Systems:   A comprehensive review of systems was completed; pertinent positive and negatives stated in subjective.    Vital signs:  Temp:  [98 °F (36.7 °C)-98.1 °F (36.7 °C)] 98 °F (36.7 °C)  Pulse:  [] 82  Resp:  [13-22] 20  BP: (159-188)/(75-97) 162/75  SpO2:  [95 %-100 %] 100 %    Physical Exam:    General: No acute distress  Respiratory: No wheezes, no rhonchi  Cardiovascular: S1, S2, regular rate and rhythm  Abdomen: Soft, Non-tender, non-distended, positive bowel sounds  Neuro: No new focal deficits.   Extremities: No edema      Diagnostic Data:    Labs:  Recent Labs   Lab 02/15/24  0953 24  0425 24  18124  0442   WBC 6.4 5.5 7.3 6.4   HGB 8.6* 8.5* 9.4* 9.4*   MCV 98.1 97.3 96.6 97.3   .0 262.0 270.0 245.0   INR  --   --  1.16  --        Recent Labs   Lab 02/15/24  0953 24  0425 24  0442   * 115* 133* 114*   BUN 36* 36* 35* 36*   CREATSERUM 2.80* 2.68* 2.56* 2.83*   CA 9.4 9.1 9.7 9.2   ALB 3.5  --  3.7  --     146* 145 146*   K 3.4* 3.7 3.4* 3.6  3.6   * 116* 117* 117*   CO2 22.0 24.0 23.0 25.0   ALKPHO 77  --  86  --    AST 33  --  15  --    ALT 45  --  22  --    BILT 0.3  --  0.5  --    TP 8.1  --  8.7*  --        Estimated Creatinine Clearance: 13.9 mL/min (A) (based on SCr of 2.83 mg/dL (H)).    Recent Labs   Lab 02/15/24  0953 02/15/24  1242   TROPHS 65* 75*       Recent Labs   Lab 24  1815   PTP 14.9*   INR 1.16                  Microbiology    No results found for this visit on  02/19/24.      Imaging: Reviewed in Epic.    Medications:    allopurinol  100 mg Oral QAM    apixaban  5 mg Oral BID    aspirin  81 mg Oral Daily    dilTIAZem ER  240 mg Oral Daily    escitalopram  10 mg Oral Daily    ferrous sulfate  325 mg Oral BID with meals    furosemide  20 mg Oral Daily    gabapentin  600 mg Oral Nightly    hydrALAZINE  50 mg Oral TID    mirtazapine  15 mg Oral Nightly    montelukast  10 mg Oral Nightly    pantoprazole  40 mg Oral QAM AC    rosuvastatin  10 mg Oral Daily    valsartan  320 mg Oral Daily    insulin aspart  1-10 Units Subcutaneous TID AC and HS       Assessment & Plan:      #Acute encephalopathy   -possibly due to seizures  #Hx of seizure disorder  -Given Valium and Keppra in the ER yesterday  -EEG today  -Neurology consult  -CT head without acute intracranial abnormalities.   -UA unremarkable  -MRI brain pending  -Seizure precautions     #Mild acute on chronic diastolic CHF  -CXR with findings of CHF  -On room air when awake     #Aortic stenosis  -Mild to moderate on most recent echo     #CAD s/p stent  -Aspirin, statin     #CKD stage IV  -Near baseline     #Hypertension  -Resume home medications    #Hyperlipidemia  -Statin     #DM type II with A1c 5.8  -Insulin sliding scale     #Hx of CVA  -Aspirin, statin     #JOHNY  -JHONY protocol     #COPD, stable  -Resume albuterol inhaler as needed     #Gout  -Allopurinol     #Fibromyalgia  -Gabapentin     #LLE DVT  -Eliquis     #Anxiety and depression  -Resume Lexapro and Remeron     #GERD  -PPI         Nichole Kaur MD    Supplementary Documentation:     Quality:  DVT Mechanical Prophylaxis:   SCDs,    DVT Pharmacologic Prophylaxis   Medication    apixaban (Eliquis) tab 5 mg         DVT Pharmacologic prophylaxis: Aspirin 81 mg      Code Status: Full Code  Gurrola: External urinary catheter in place  Gurrola Duration (in days):   Central line:    ELROY:     Discharge is dependent on: progress  At this point Ms. Rodrigues is expected to be discharge  to: tbd    The 21st Century Cures Act makes medical notes like these available to patients in the interest of transparency. Please be advised this is a medical document. Medical documents are intended to carry relevant information, facts as evident, and the clinical opinion of the practitioner. The medical note is intended as peer to peer communication and may appear blunt or direct. It is written in medical language and may contain abbreviations or verbiage that are unfamiliar.             **Certification      PHYSICIAN Certification of Need for Inpatient Hospitalization - Initial Certification    Patient will require inpatient services that will reasonably be expected to span two midnight's based on the clinical documentation in H+P.   Based on patients current state of illness, I anticipate that, after discharge, patient will require TBD.

## 2024-02-20 NOTE — OCCUPATIONAL THERAPY NOTE
OCCUPATIONAL THERAPY EVALUATION - INPATIENT     Room Number: 2605/2605-A  Evaluation Date: 2/20/2024  Type of Evaluation: Initial  Presenting Problem: AMS (CHF)    Physician Order: IP Consult to Occupational Therapy  Reason for Therapy: ADL/IADL Dysfunction and Discharge Planning    OCCUPATIONAL THERAPY ASSESSMENT   Patient is currently functioning near baseline with lower body dressing, bed mobility, and transfers. Prior to admission, patient's baseline is mod I with assist PRN according to pt.  Patient is requiring contact guard assist as a result of the following impairments: cognitive deficits (functional cognition, attention span, orientation, motor planning, perseveration), decreased insight to deficits, and decreased safety awareness. Occupational Therapy will continue to follow for duration of hospitalization.    Patient will benefit from continued skilled OT Services to promote return to prior level of function and safety with continuous assistance and gradual rehabilitative therapy       History Related to Current Admission: Patient is a 79 year old female admitted on 2/19/2024 with Presenting Problem: AMS (CHF). Co-Morbidities : Seizure, CHF, aortic stenosis, CAD, CKD, HTN, CVA, COPD, Fibromyalgia    WEIGHT BEARING RESTRICTION  Weight Bearing Restriction: None                Recommendations for nursing staff:   Transfers: 1p CGA c. FWW  Toileting location: [facility]    EVALUATION SESSION:  Patient Start of Session: Semi-supine in bed with nursing staff at bedside  FUNCTIONAL TRANSFER ASSESSMENT  Sit to Stand: Edge of Bed  Edge of Bed: Stand-by Assist    BED MOBILITY  Rolling: Modified Independent (extra time)  Supine to Sit : Modified Independent (extra time and HOB elevated)  Scooting: mod I to scoot EOB    BALANCE ASSESSMENT  Static Sitting: Modified Independent  Sitting Bilateral: Modified Independent  Static Standing: Stand-by Assist  Standing Bilateral: Contact Guard Assist (for safety 2/2 impulsivity  and poor safety awareness)    FUNCTIONAL ADL ASSESSMENT  LB Dressing Seated: Minimal Assist (extra time with moderate vc to redirect)      ACTIVITY TOLERANCE: WFL           BP: 137/74  BP Location: Left arm  BP Method: Automatic  Patient Position: Sitting (Sitting EOB, pt impulsively stood and shuffled toward chair with therapist providing CGA for safety. Pt adamantly refusing to sit back in bed, insisting on getting to chair pushing toward.)    O2 SATURATIONS       COGNITION  Overall Cognitive Status:  Impaired  Attention Span:  attends with cues to redirect  Orientation Level:  unable to assess  Following Commands:  follows one-step commands inconsistently  Initiation: cues to initiate tasks  Perseveration: perseverates during conversation  Safety Judgement:  decreased awareness of need for safety  Problem Solving:  assistance required to implement solutions    Upper Extremity   ROM: within functional limits   Strength: within functional limits   Coordination  Gross motor: WFL  Fine motor: WFL  Sensation: Light touch:  intact    EDUCATION PROVIDED  Patient: Role of Occupational Therapy; Plan of Care; Discharge Recommendations; DME Recommendations; Functional Transfer Techniques  Patient's Response to Education: Requires Further Education; Demonstrates Poor Carry Over to Information    Equipment used: FWW  Demonstrates functional use, Would benefit from additional trial     Therapist comments: Pt agreeable to therapy. Perseveration on water, asking repeatedly despite being given every request. Pt presents a poor historian given AMS. Pt wale's difficulty with processing, word finding, and concentrating attention during tasks. Reports she has a rollator and wheelchair at home. Demo's impulsivity and poor safety awareness, standing to walk to the chair and refusing to sit down during BP measurement. Pt repeatedly asking what floor she was on and mixing up orientation questions, wale'd difficulty concentrating on  questions. Needs extra time to attend to tasks wale's difficulty with problem solving to don socks over all five toes.    Patient End of Session: Up in chair;Needs met;Call light within reach;RN aware of session/findings;All patient questions and concerns addressed;Alarm set    OCCUPATIONAL PROFILE    HOME SITUATION  Type of Home: Apartment  Home Layout: Elevator (Lives on 5th floor)  Lives With: Other (Comment) (Félix)    Toilet and Equipment: Comfort height toilet;Grab bar  Shower/Tub and Equipment: Walk-in shower;Shower chair;Grab bar  Other Equipment: None          Drives: No  Patient Regularly Uses: Reading glasses    Prior Level of Function: Per pt report, IND with ADLs with assistance from félix PRN    SUBJECTIVE   \"I think I had a seizure\"    PAIN ASSESSMENT  Ratin  Location: None reported or observed       OBJECTIVE  Precautions: Bed/chair alarm;Seizure  Fall Risk: High fall risk      ASSESSMENTS    AM-PAC ‘6-Clicks’ Inpatient Daily Activity Short Form  -   Putting on and taking off regular lower body clothing?: A Little  -   Bathing (including washing, rinsing, drying)?: A Little  -   Toileting, which includes using toilet, bedpan or urinal? : A Little  -   Putting on and taking off regular upper body clothing?: A Little  -   Taking care of personal grooming such as brushing teeth?: A Little  -   Eating meals?: A Little    AM-PAC Score:  Score: 18  Approx Degree of Impairment: 46.65%  Standardized Score (AM-PAC Scale): 38.66    ADDITIONAL TESTS     NEUROLOGICAL FINDINGS      COGNITION ASSESSMENTS       PLAN  OT Treatment Plan: Balance activities;Energy conservation/work simplification techniques;ADL training;IADL training;Functional transfer training;UE strengthening/ROM;Endurance training;Cognitive reorientation;Patient/Family education;Patient/Family training;Equipment eval/education;Compensatory technique education;Continued evaluation  Rehab Potential : Fair  Frequency: 3-5x/week  Number of  Visits to Meet Established Goals: 5    ADL Goals   Patient will perform eating: with modified independent and while in chair  Patient will perform grooming: with modified independent and while standing at sink  Patient will perform lower body dressing:  with modified independent and with cues  Patient will perform toileting: with modified independent    Functional Transfer Goals  Patient will perform all functional transfers:  with modified independent  Patient will transfer from sit to stand:  with modified independent and with good judgement/safety  Patient will transfer to toilet:  with modified independent and with good judgement/safety    UE Exercise Program Goal  Patient will be modified independent with bilateral AROM HEP (home exercise program).    Additional Goals  Pt will demo good safety awareness during functional transfers to transfer from bed>chair 2/2 attempts.    Therapist Goals  Determine cognitive baseline and evaluate cognition  Patient Evaluation Complexity Level:   Occupational Profile/Medical History MODERATE - Expanded review of history including review of medical or therapy record   Specific performance deficits impacting engagement in ADL/IADL MODERATE  3 - 5 performance deficits   Client Assessment/Performance Deficits MODERATE - Comorbidities and min to mod modifications of tasks    Clinical Decision Making MODERATE - Analysis of occupational profile, detailed assessments, several treatment options    Overall Complexity MODERATE     OT Session Time: 45 minutes  Self-Care Home Management: 0 minutes  Therapeutic Activity: 40 minutes  Neuromuscular Re-education: 0 minutes  Therapeutic Exercise: 0 minutes  Cognitive Skills: 0 minutes  Sensory Integrative: 0 minutes  Orthotic Management and Trainin minutes  Can add/delete any of these

## 2024-02-20 NOTE — CONSULTS
.[facility]   NEUROLOGY   CONSULT NOTE    Admission date: 2/19/2024  Reason for Consult: Altered mental status.  Chief Complaint:   Chief Complaint   Patient presents with    Altered Mental Status     EMS states that they were called to the pt's residence for a wellbeing check. PT is not following commands, disoriented but awake. Neighbor states that hte pt is not acting normally and may have just been discharged from the hospital.   ________________________________________________________________    History     History of Presenting Illness  79 year old female hypertension, coronary artery disease, aortic stenosis, asthma, hyperlipidemia, previous history of stroke, gout and fibromyalgia consulted for altered mental status.  Patient also has history of diabetes and COPD.  Patient states he also has history of seizure disorder.  She used to have grand mal seizures.  Patient states that she was on medication for long time.  However she does not remember the name of medication.  She states that she started many years ago.  Patient was apparently not responding to phone calls from daughter, the neighbors went into check and noted the patient to be confused, disoriented and less communicating.  She did not particularly had expressive speech difficulty but was more disoriented than anything else.  Patient had similar symptoms in March 2022 and was seen in the ER with rapid resolution of symptoms.  No witnessed episode of jerking movements of the upper or lower extremity, tongue biting, bladder or bowel incontinence or loss of consciousness.  Also no history of asymmetric weakness.  Nursing staff noted patient having varying degrees of cognitive functioning since this morning.    History obtained from patient, nursing staff and chart review.    Past Medical History:   Diagnosis Date    Anemia     Aortic stenosis     Arrhythmia     Arthritis     Asthma (HCC)     Back problem     Cataract     Deep vein  thrombosis (DVT) of proximal lower extremity (HCC)     Depressive disorder 2010    Diverticulitis of colon 2009    Edema     Esophageal reflux     Extrinsic asthma, unspecified     Fibromyalgia     Gout     Heart attack (HCC) 1989    Heart valve disease     High blood pressure     High cholesterol     History of stomach ulcers     IBS (irritable bowel syndrome)     Incontinence     Migraines     Muscle weakness     Neuropathy     Osteoarthritis     Other and unspecified hyperlipidemia     Pneumonia due to organism     Pulmonary emphysema (HCC)     Renal disorder     Seizure disorder (HCC)     Shortness of breath     Sleep apnea     Stroke (HCC)     Type II or unspecified type diabetes mellitus without mention of complication, not stated as uncontrolled     Unspecified essential hypertension     Visual impairment      Past Surgical History:   Procedure Laterality Date          CATH DRUG ELUTING STENT      HC  SECTION LEVEL I      KNEE SURGERY      TOTAL KNEE REPLACEMENT       Social History     Socioeconomic History    Marital status: Single   Tobacco Use    Smoking status: Never    Smokeless tobacco: Never   Vaping Use    Vaping Use: Never used   Substance and Sexual Activity    Alcohol use: No     Comment: social years back    Drug use: No   Other Topics Concern    Caffeine Concern Yes     Comment: 1 cup coffee/day    Exercise Yes     Comment: walking     Social Determinants of Health     Food Insecurity: Unknown (2024)    Food Insecurity     Food Insecurity: Patient unable to answer   Transportation Needs: Unknown (2024)    Transportation Needs     Lack of Transportation: Patient unable to answer   Housing Stability: Unknown (2024)    Housing Stability     Housing Instability: Patient unable to answer     Family History   Problem Relation Age of Onset    Hypertension Other      Allergies   Allergies   Allergen Reactions    Influenza Vaccines OTHER (SEE COMMENTS)      Fever    Dust Mite Extract UNKNOWN     Sneezing, itchy, watery eyes    Hydrocodone     Sulfa Antibiotics     Vicodin Tuss [Hydrocodone-Guaifenesin]        Home Meds  Current Outpatient Medications   Medication Instructions    acetaminophen (TYLENOL EXTRA STRENGTH) 1,000 mg, Oral, Daily (RT), For 30 days    albuterol (2.5 MG/3ML) 0.083% Inhalation Nebu Soln 3 mL, Nebulization, 3 times daily PRN    albuterol 108 (90 Base) MCG/ACT Inhalation Aero Soln 2 puffs, Inhalation, Every 4 hours PRN    alendronate (FOSAMAX) 70 mg, Oral, Every 7 days    allopurinol (ZYLOPRIM) 100 mg, Oral, Every morning    apixaban (ELIQUIS) 5 mg, Oral, 2 times daily    aspirin 81 mg, Oral, Daily    cholecalciferol (VITAMIN D3) 1,000 Units, Oral, Daily    dapagliflozin (FARXIGA) 10 mg, Oral, Daily    dilTIAZem ER (CARDIZEM CD) 240 mg, Oral, Daily (RT), Noon     escitalopram (LEXAPRO) 10 mg, Oral, Daily    ferrous sulfate 325 mg, Oral, 2 times daily with meals    furosemide (LASIX) 20 mg, Oral, Daily    gabapentin (NEURONTIN) 600 mg, Oral, Nightly    hydrALAZINE (APRESOLINE) 50 mg, Oral, 3 times daily    lidocaine 5 % External Patch 1 patch, Transdermal, Daily as needed    magnesium oxide 400 MG Oral Tab 1 tablet, Oral, Daily    Melatonin 5 mg, Oral, Nightly    mirtazapine (REMERON) 15 mg, Oral, Nightly    montelukast (SINGULAIR) 10 mg, Oral, Daily    Multiple Vitamin (THERA/BETA-CAROTENE) Oral Tab 1 tablet, Oral, Daily    Nephro-Franco 0.8 mg, Oral, Daily    Omeprazole 40 mg, Oral, Daily, Noon    PEPTO-BISMOL 262 MG/15ML Oral Suspension 30 mL, Oral, Every 6 hours PRN    rosuvastatin (CRESTOR) 20 mg, Oral, Daily    SITagliptin Phosphate (JANUVIA) 100 mg, Oral, Daily    sodium bicarbonate 650 mg, Oral, Daily    traMADol (ULTRAM) 50 mg, Oral, 2 times daily PRN    valsartan (DIOVAN) 320 mg, Oral, Daily     Scheduled Meds:   allopurinol  100 mg Oral QAM    apixaban  5 mg Oral BID    aspirin  81 mg Oral Daily    dilTIAZem ER  240 mg Oral Daily     escitalopram  10 mg Oral Daily    furosemide  20 mg Oral Daily    gabapentin  600 mg Oral Nightly    hydrALAZINE  50 mg Oral TID    mirtazapine  15 mg Oral Nightly    pantoprazole  40 mg Oral QAM AC    rosuvastatin  10 mg Oral Daily    valsartan  320 mg Oral Daily    insulin aspart  1-10 Units Subcutaneous TID AC and HS     Continuous Infusions:  PRN Meds:albuterol, albuterol, glucose **OR** glucose **OR** glucose-vitamin C **OR** dextrose **OR** glucose **OR** glucose **OR** glucose-vitamin C, diazepam, acetaminophen, melatonin, polyethylene glycol (PEG 3350), sennosides, bisacodyl, ondansetron, metoclopramide, benzonatate, glycerin-hypromellose-, sodium chloride    OBJECTIVE   VITAL SIGNS:   Temp:  [98 °F (36.7 °C)-98.6 °F (37 °C)] 98.1 °F (36.7 °C)  Pulse:  [] 83  Resp:  [13-22] 20  BP: (137-188)/(69-97) 147/69  SpO2:  [95 %-100 %] 100 %    PHYSICAL EXAM:    NEUROLOGIC:    Mental Status: Awake, alert, responsive, dysarthric, following simple commands, no aphasia.  Mild expressive difficulties noted at times, inconsistently.  Attention, short-term memory slightly impaired.  Cranial nerves: PERRL.  Visual fields full.  EOMI.  Face symmetric with normal movement bilaterally.  Hearing grossly intact. Tongue midline with normal movements.   Motor: Drift:  Absent; Motor exam is 5 out of 5 in all extremities bilaterally  Sensation: Intact to light touch bilaterally  Cerebellar: Normal Finger-To-Nose test      LABORATORY DATA:  Last 24 hour labs were reviewed in detail.  Recent Labs   Lab 02/16/24 0425 02/19/24 1815 02/20/24  0442   * 145 146*   K 3.7 3.4* 3.6  3.6   * 117* 117*   CO2 24.0 23.0 25.0   * 133* 114*   BUN 36* 35* 36*   CREATSERUM 2.68* 2.56* 2.83*     Recent Labs   Lab 02/16/24 0425 02/19/24 1815 02/20/24  0442   WBC 5.5 7.3 6.4   HGB 8.5* 9.4* 9.4*   .0 270.0 245.0     Recent Labs   Lab 02/15/24  0953 02/19/24  1815   ALT 45 22   AST 33 15     No results for  input(s): \"MG\", \"PHOS\" in the last 168 hours.  Last A1c value was 5.8% done 1/27/2023.     Radiology:    CT BRAIN OR HEAD (13199)    Result Date: 2/19/2024  CONCLUSION: 1. No acute intracranial findings 2. Cerebral atrophy with chronic microvascular ischemic changes.    LOCATION:  [facility]   Dictated by (CST): Armando Ahmadi MD on 2/19/2024 at 8:28 PM     Finalized by (CST): Armando Ahmadi MD on 2/19/2024 at 8:31 PM       XR CHEST AP PORTABLE  (CPT=71045)    Result Date: 2/19/2024  CONCLUSION:   Stable cardiac and mediastinal contours.  Findings of congestive heart failure with mild interstitial pulmonary edema.  No associated pleural effusion or pneumothorax.   LOCATION:  [id number]      Dictated by (CST): Edgar Curtis MD on 2/19/2024 at 7:04 PM     Finalized by (CST): Edgar Curtis MD on 2/19/2024 at 7:05 PM      ASSESSMENT/PLAN   79 year old female with:    Patient with episode of alteration consciousness suspected to be due to seizures/encephalopathy.  There is increased suspicion of seizures since patient has previous history of seizures also.  Patient is noted to have some expressive speech difficulty also.  Unclear what patient's baseline is as far as speech is concerned.  Will further check with the family members.  Patient presently advised to have MRI of the brain as well as EEG for further evaluation.  Since patient's been having in intermittent waiting degree of cognitive dysfunction, suspicion of subclinical seizures distress.  Will request continued EEG monitoring for further evaluation.  Patient to continue Keppra 500 mg twice daily.  Expressive speech difficulty concerning for strokelike phenomena.  Patient currently on Eliquis, aspirin and rosuvastatin for stroke prophylaxis.  Will review MRI results.  Evidence of cognitive decline.  Patient does seem to have signs symptoms suggestive of dementia.  Will obtain further historical information from family members.      Principal Problem:    Altered mental  status, unspecified altered mental status type  Active Problems:    Acute on chronic congestive heart failure, unspecified heart failure type (HCC)    History of seizure       Jimmie Gonsalez MD  Neurohospitalist  [facility]    Disclaimer: This record was dictated using Dragon software. There may be errors due to voice recognition problems that were not realized and corrected during the completion of the note.

## 2024-02-20 NOTE — PLAN OF CARE
Assumed patient care, patient is alert and oriented x3. On room air, denies shortness of breath. Sinus rhythm on tele, no complains of chest pain. Abdomen is soft, non-tendered, bowel sounds are active in all four quadrants. Bed in lower position, call light within reach.    POC: MRI    Problem: Diabetes/Glucose Control  Goal: Glucose maintained within prescribed range  Description: INTERVENTIONS:  - Monitor Blood Glucose as ordered  - Assess for signs and symptoms of hyperglycemia and hypoglycemia  - Administer ordered medications to maintain glucose within target range  - Assess barriers to adequate nutritional intake and initiate nutrition consult as needed  - Instruct patient on self management of diabetes  Outcome: Progressing

## 2024-02-20 NOTE — ED QUICK NOTES
Orders for admission, patient is aware of plan and ready to go upstairs. Any questions, please call ED RN Pablo at extension [phone].     Patient Covid vaccination status: Unvaccinated     COVID Test Ordered in ED: None    COVID Suspicion at Admission: N/A    Running Infusions:  None    Mental Status/LOC at time of transport: Alert, oriented to self only    Other pertinent information:   CIWA score: N/A   NIH score:  N/A

## 2024-02-20 NOTE — PHYSICAL THERAPY NOTE
Order received for PT eval and chart reviewed. RN recommends to hold therapy today due to pending brain MRI and possibility of seizures. PT will continue to follow.

## 2024-02-20 NOTE — PROGRESS NOTES
NURSING ADMISSION NOTE      Patient admitted via Cart  Oriented to room.  Safety precautions initiated.  Bed in low position.  Call light in reach.  Skin check done with PCT Elizabeth    Received patient from ED. Alert to self- at times she will respond to questions other times she just nods head and says \"okay\". On 2L NC with O2 saturation greater than 90%. NSR on tele with occasional PVC's. Patient is poor historian unable to complete admission navigator due to lack of response. Skin is intact. Incontinent of bowel and bladder.  K+ 3.4-Replaced.  Seizure precautions in place.     POC: Neurology to see, EEG, MRI of brain, Speech to see

## 2024-02-20 NOTE — PROGRESS NOTES
[facility] Note:  Renal Dose Adjustment for Rosuvastatin (Crestor)    Stephen Rodrigues has been prescribed Rosuvastatin (Crestor) 20 mg daily.    Estimated Creatinine Clearance: 15.4 mL/min (A) (based on SCr of 2.56 mg/dL (H)).    The dose has been changed to 10 mg daily per P&T approved protocol.  Pharmacy will follow and resume original order if renal function improves.    Thank you,  Dmitri Nelson, [facility]  2/19/2024 11:43 PM

## 2024-02-20 NOTE — ED PROVIDER NOTES
Patient Seen in: [facility] Emergency Department      History     Chief Complaint   Patient presents with    Altered Mental Status     EMS states that they were called to the pt's residence for a wellbeing check. PT is not following commands, disoriented but awake. Neighbor states that hte pt is not acting normally and may have just been discharged from the hospital.     Stated Complaint: EMS states that they were called to the pt's residence for a wellbeing check. P*    Subjective:   HPI    79-year-old female presents today for evaluation of altered mental status.  Patient was released from the hospital on February 16 for fall and admission for dizziness.  There was a wellbeing check today, neighbors noted that patient was not acting like her normal self.  Patient is presently awake and looks at me when I talk to her, although she just mumbles sounds is unable to contribute with history.    Objective:   Past Medical History:   Diagnosis Date    Anemia     Aortic stenosis     Arrhythmia     Arthritis     Asthma ([facility])     Back problem     Cataract     Deep vein thrombosis (DVT) of proximal lower extremity ([facility])     Depressive disorder 08/07/2010    Diverticulitis of colon 04/17/2009    Edema     Esophageal reflux     Extrinsic asthma, unspecified     Fibromyalgia     Gout     Heart attack (HCC) 01/01/1989    Heart valve disease     High blood pressure     High cholesterol     History of stomach ulcers     IBS (irritable bowel syndrome)     Incontinence     Migraines     Muscle weakness     Neuropathy     Osteoarthritis     Other and unspecified hyperlipidemia     Pneumonia due to organism     Pulmonary emphysema ([facility])     Renal disorder     Seizure disorder ([facility])     Shortness of breath     Sleep apnea     Stroke ([facility])     Type II or unspecified type diabetes mellitus without mention of complication, not stated as uncontrolled     Unspecified essential hypertension     Visual impairment               Past  Surgical History:   Procedure Laterality Date          CATH DRUG ELUTING STENT      HC  SECTION LEVEL I      KNEE SURGERY      TOTAL KNEE REPLACEMENT                  Social History     Socioeconomic History    Marital status: Single   Tobacco Use    Smoking status: Never    Smokeless tobacco: Never   Vaping Use    Vaping Use: Never used   Substance and Sexual Activity    Alcohol use: No     Comment: social years back    Drug use: No   Other Topics Concern    Caffeine Concern Yes     Comment: 1 cup coffee/day    Exercise Yes     Comment: walking     Social Determinants of Health     Food Insecurity: No Food Insecurity (2/15/2024)    Food Insecurity     Food Insecurity: Never true   Transportation Needs: No Transportation Needs (2/15/2024)    Transportation Needs     Lack of Transportation: No   Housing Stability: Low Risk  (2/15/2024)    Housing Stability     Housing Instability: No              Review of Systems    Positive for stated complaint: EMS states that they were called to the pt's residence for a wellbeing check. P*  Other systems are as noted in HPI.  Constitutional and vital signs reviewed.      All other systems reviewed and negative except as noted above.    Physical Exam     ED Triage Vitals [24 1840]   BP (!) 175/82   Pulse 97   Resp 18   Temp 98 °F (36.7 °C)   Temp src Temporal   SpO2 95 %   O2 Device None (Room air)       Current:BP (!) 171/88   Pulse 104   Temp 98 °F (36.7 °C) (Temporal)   Resp 18   Wt 92 kg   SpO2 99%   BMI 34.81 kg/m²         Physical Exam  Constitutional:       Appearance: Normal appearance.   HENT:      Head: Normocephalic.      Nose: Nose normal.      Mouth/Throat:      Mouth: Mucous membranes are moist.   Eyes:      Extraocular Movements: Extraocular movements intact.   Cardiovascular:      Rate and Rhythm: Normal rate and regular rhythm.   Pulmonary:      Effort: Pulmonary effort is normal.      Breath sounds: Normal breath sounds.   Abdominal:       General: Abdomen is flat.   Musculoskeletal:         General: Normal range of motion.   Skin:     General: Skin is warm.   Neurological:      General: No focal deficit present.      Mental Status: She is alert.   Psychiatric:         Mood and Affect: Mood normal.           ED Course     Labs Reviewed   COMP METABOLIC PANEL (14) - Abnormal; Notable for the following components:       Result Value    Glucose 133 (*)     Potassium 3.4 (*)     Chloride 117 (*)     BUN 35 (*)     Creatinine 2.56 (*)     Calculated Osmolality 310 (*)     eGFR-Cr 19 (*)     Total Protein 8.7 (*)     Globulin  5.0 (*)     A/G Ratio 0.7 (*)     All other components within normal limits   PROTHROMBIN TIME (PT) - Abnormal; Notable for the following components:    PT 14.9 (*)     All other components within normal limits   URINALYSIS WITH CULTURE REFLEX - Abnormal; Notable for the following components:    Blood Urine 1+ (*)     Protein Urine 300 (*)     WBC Urine 6-10 (*)     RBC Urine 3-5 (*)     Bacteria Urine Rare (*)     Squamous Epi. Cells Few (*)     All other components within normal limits   PRO BETA NATRIURETIC PEPTIDE - Abnormal; Notable for the following components:    Pro-Beta Natriuretic Peptide 10,001 (*)     All other components within normal limits   CBC W/ DIFFERENTIAL - Abnormal; Notable for the following components:    RBC 2.91 (*)     HGB 9.4 (*)     HCT 28.1 (*)     All other components within normal limits   PTT, ACTIVATED - Normal   ETHYL ALCOHOL - Normal   AMMONIA, PLASMA - Normal   CBC WITH DIFFERENTIAL WITH PLATELET    Narrative:     The following orders were created for panel order CBC With Differential With Platelet.  Procedure                               Abnormality         Status                     ---------                               -----------         ------                     CBC W/ DIFFERENTIAL[[id number]]          Abnormal            Final result                 Please view results for these tests  on the individual orders.   RAINBOW DRAW GOLD   RAINBOW DRAW LAVENDER   RAINBOW DRAW LIGHT GREEN   RAINBOW DRAW BLUE     EKG    Rate, intervals and axes as noted on EKG Report.  Rate: 90  Rhythm: Sinus Rhythm  Reading: Several PVCs, no STEMI                 CT BRAIN OR HEAD ([id number])    Result Date: 2/19/2024  PROCEDURE:  CT BRAIN OR HEAD (56505)  COMPARISON:  [facility] , CT, CT BRAIN OR HEAD ([id number]), 2/15/2024, 9:26 AM.  [facility] , CT, CT BRAIN OR HEAD ([id number]), 12/01/2022, 11:19 PM.  INDICATIONS:  EMS states that they were called to the pt's residence for a wellbeing check. PT is not following commands, disoriented but awake. Neighbor states that hte pt i  TECHNIQUE:  Noncontrast CT scanning is performed through the brain. Dose reduction techniques were used. Dose information is transmitted to the ACR (American College of Radiology) NRDR (National Radiology Data Registry) which includes the Dose Index Registry.  PATIENT STATED HISTORY: (As transcribed by Technologist)  Pt not following commands. AMS    FINDINGS:  Motion artifact is present which limits evaluation skull base.  There is cerebral atrophy with symmetric prominence of the ventricles. There are patchy areas of low attenuation in the periventricular and deep white matter which are nonspecific but most consistent with small vessel ischemic changes. There is no evidence of hemorrhage, mass, midline shift, or extra-axial fluid collection.  The visualized paranasal sinuses show no significant sinus disease.. No evidence of depressed skull fracture.            CONCLUSION: 1. No acute intracranial findings 2. Cerebral atrophy with chronic microvascular ischemic changes.    LOCATION:  [facility]   Dictated by (CST): Armando Ahmadi MD on 2/19/2024 at 8:28 PM     Finalized by (CST): Armando Ahmadi MD on 2/19/2024 at 8:31 PM       XR CHEST AP PORTABLE  (CPT=71045)    Result Date: 2/19/2024  PROCEDURE:  XR CHEST AP PORTABLE  (CPT=71045)  TECHNIQUE:  AP chest radiograph was  obtained.  COMPARISON:  [facility] , XR, XR CHEST AP PORTABLE  (CPT=71045), 2/15/2024, 10:49 AM.  INDICATIONS:  EMS states that they were called to the pt's residence for a wellbeing check. PT is not following commands, disoriented but awake. Neighbor states that hte pt i  PATIENT STATED HISTORY: (As transcribed by Technologist)  Patient offered no additional history at this time.                  CONCLUSION:   Stable cardiac and mediastinal contours.  Findings of congestive heart failure with mild interstitial pulmonary edema.  No associated pleural effusion or pneumothorax.   LOCATION:  [place] [address]      Dictated by (CST): Edgar Curtis MD on 2/19/2024 at 7:04 PM     Finalized by (CST): Edgar Curtis MD on 2/19/2024 at 7:05 PM                  [facility]      Differential Diagnosis  79-year-old female presents today for evaluation of altered mental status.  On my assessment, patient is hemodynamically stable and in no acute distress.  She is awake and when I talk to her, she turns her head, looks at me and mumbles something that sounds like \"hmmmm.\"  I reviewed her medical record and in March or 2022, patient seem to have a similar presentation.  During her ED visit at that time, her symptoms resolved and it was thought that they were secondary to a seizure.  Patient may be having recurrent seizure, so we will try a benzodiazepine.  Will obtain CT of the head to assess for intracranial hemorrhage.  Plan for labs assess for electrolyte abnormality.  Will obtain UA to assess for UTI with encephalopathy.  Will observe and reassess.    6:40 pm  Patient now communicating with RN    8:30 pm  Patient's chest x-ray demonstrates edema.  Her proBNP is higher than previous.  She is fortunately maintaining her sats, I do not suspect a CHF exacerbation as a cause of her presenting symptoms.  On my reassessment, patient does minimally answer some of my questions and tracks me as I walk around the room.  She moves all 4 extremities equally although  does not consistently follow commands.  Her gross sensation is intact.  She has no facial asymmetry.  Although my suspicion for CVA is low, patient would not be a candidate for thrombolytics or neurointervention given her delayed presentation regardless.  I spoke with patient's daughter, Nathan.  After leaving the hospital, patient was entirely fine throughout the day on Saturday.  She last spoke with the mother at 8 AM yesterday morning.  She called her mother today and she was not picking up.  When the neighbor went to check on her, they asked for her to open the door and she would just stare at the neighbor and say yes.  Nathan says that her mother's seizures would present like this in the past, she thinks her mother has been off her seizure medicine because an event has not recurred in the last year.  Patient may be having a seizure-like event.  After reviewing case with the hospitalist, will give Keppra.  I admitted to the hospital for further care and monitoring.    External Chart Reviewed  On ER note of March 2022 was reviewed.  Patient presented for altered mental status at that time.  Her mental status improved while she was in the ED.  It was documented that she had a previous history of seizures and that is what her altered mentation was thought to be secondary to.    Echo from February 16 demonstrated an EF of 50 to 55% with grade 2 diastolic dysfunction.    History from [facility]  Medic states home is well maintained and not disheveled.    Daughter nathan last spoke with patient on Sunday at 8 am. Today, she wasn't answering phone so a neighbor checked on her today and she did not open the door and repeated \"yea\"    Discussions of Management  I spoke with the hospitalist in regards to admission  Admission disposition: 2/19/2024  9:36 PM                                        Medical Decision Making      Disposition and Plan     Clinical Impression:  1. Altered mental status, unspecified altered  mental status type    2. Acute on chronic congestive heart failure, unspecified heart failure type (HCC)    3. History of seizure         Disposition:  Admit  2/19/2024  9:36 pm    Follow-up:  No follow-up provider specified.        Medications Prescribed:  Current Discharge Medication List                            Hospital Problems       Present on Admission  Date Reviewed: 12/13/2023            ICD-10-CM Noted POA    * (Principal) Altered mental status, unspecified altered mental status type R41.82 2/25/2018 Unknown

## 2024-02-20 NOTE — H&P
[facility]     Stephen Rodrigues Patient Status:  Emergency    1945 MRN [id number]   Location [facility] EMERGENCY DEPARTMENT Attending Curtis Hussein*   Hosp Day # 0 PCP PHYSICIAN NONSTAFF     Chief Complaint: Altered mental status    Subjective:    History of Present Illness:   Stephen Rodrigues is a 79 year old female with PMHx aortic stenosis, CAD s/p stent, CKD stage IV, HTN, HLD, DM type II, CVA, JHONY, COPD, gout, fibromyalgia, migraines and seizure disorder who presents to the hospital with altered mental status.  Patient was discharged from the hospital 3 days ago when admitted with dizziness.  She was orthostatic and was given IV fluids and was discharged home.  Echo had showed grade 2 diastolic dysfunction with preserved LVEF.  Daughter called her on  and  and she was okay.  Today she was not picking up and so neighbor went to go check on her as a wellbeing check.  She was not acting like her normal self and was only mumbling \"okay\" with the neighbors.  Patient is currently an unreliable historian.  She only responds \"okay\" or \"oh God\" for me but does follow simple commands. Per ER, she did communicate more with the x-ray tech but now again is communicating less. CT head showed no acute intracranial findings.  Patient was in the ER 2022 with similar presentation and at that time her symptoms resolved and it was thought they were secondary to a seizure.  In the ER today, she was given Valium as well as Keppra.  CT head with no acute abnormality.  UA not suggestive of UTI.  Chest x-ray with findings of mild CHF and patient was given IV Lasix.  She was on room air with good oxygen saturations when I saw her but required 3 L when she fell asleep as she does have a diagnosis of JHONY.    History/Other:    Past Medical History:  Past Medical History:   Diagnosis Date    Anemia     Aortic stenosis     Arrhythmia     Arthritis     Asthma (HCC)     Back  problem     Cataract     Deep vein thrombosis (DVT) of proximal lower extremity (HCC)     Depressive disorder 2010    Diverticulitis of colon 2009    Edema     Esophageal reflux     Extrinsic asthma, unspecified     Fibromyalgia     Gout     Heart attack (HCC) 1989    Heart valve disease     High blood pressure     High cholesterol     History of stomach ulcers     IBS (irritable bowel syndrome)     Incontinence     Migraines     Muscle weakness     Neuropathy     Osteoarthritis     Other and unspecified hyperlipidemia     Pneumonia due to organism     Pulmonary emphysema (HCC)     Renal disorder     Seizure disorder (HCC)     Shortness of breath     Sleep apnea     Stroke (HCC)     Type II or unspecified type diabetes mellitus without mention of complication, not stated as uncontrolled     Unspecified essential hypertension     Visual impairment      Past Surgical History:   Past Surgical History:   Procedure Laterality Date          CATH DRUG ELUTING STENT      HC  SECTION LEVEL I      KNEE SURGERY      TOTAL KNEE REPLACEMENT        Family History:   Family History   Problem Relation Age of Onset    Hypertension Other      Social History:    reports that she has never smoked. She has never used smokeless tobacco. She reports that she does not drink alcohol and does not use drugs.     Allergies:   Allergies   Allergen Reactions    Influenza Vaccines OTHER (SEE COMMENTS)     Fever    Dust Mite Extract UNKNOWN     Sneezing, itchy, watery eyes    Hydrocodone     Sulfa Antibiotics     Vicodin Tuss [Hydrocodone-Guaifenesin]      Medications:    Current Facility-Administered Medications on File Prior to Encounter   Medication Dose Route Frequency Provider Last Rate Last Admin    [COMPLETED] potassium chloride (K-Dur) tab 20 mEq  20 mEq Oral Once Mary Mcleod MD   20 mEq at 24 0956    [COMPLETED] potassium chloride (K-Dur) tab 40 mEq  40 mEq Oral Once Siobhan Laureano MD   40  mEq at 02/15/24 1447     Current Outpatient Medications on File Prior to Encounter   Medication Sig Dispense Refill    dapagliflozin 10 MG Oral Tab Take 1 tablet (10 mg total) by mouth daily.      magnesium oxide 400 MG Oral Tab Take 1 tablet (400 mg total) by mouth daily.      hydrALAZINE 50 MG Oral Tab Take 1 tablet (50 mg total) by mouth 3 (three) times daily.      Multiple Vitamin (THERA/BETA-CAROTENE) Oral Tab Take 1 tablet by mouth daily.      traMADol 50 MG Oral Tab Take 1 tablet (50 mg total) by mouth 2 (two) times daily as needed for Pain.      cholecalciferol 1000 UNITS Oral Cap Take 1 capsule (1,000 Units total) by mouth daily.      alendronate 70 MG Oral Tab Take 1 tablet (70 mg total) by mouth every 7 days.      escitalopram 10 MG Oral Tab Take 1 tablet (10 mg total) by mouth daily.      sodium bicarbonate 650 MG Oral Tab Take 1 tablet (650 mg total) by mouth daily.      SITagliptin Phosphate 100 MG Oral Tab Take 1 tablet (100 mg total) by mouth daily.      ferrous sulfate 325 (65 FE) MG Oral Tab EC Take 1 tablet (325 mg total) by mouth 2 (two) times daily with meals.      furosemide 20 MG Oral Tab Take 1 tablet (20 mg total) by mouth daily. 30 tablet 3    apixaban 5 MG Oral Tab Take 1 tablet (5 mg total) by mouth 2 (two) times daily. 180 tablet 0    acetaminophen 500 MG Oral Tab Take 2 tablets (1,000 mg total) by mouth once daily. For 30 days      lidocaine 5 % External Patch Place 1 patch onto the skin daily as needed.      Melatonin 5 MG Oral Tab Take 1 tablet (5 mg total) by mouth nightly.      Nephro-Franco 0.8 MG Oral Tab Take 1 tablet (0.8 mg total) by mouth daily.      Omeprazole 40 MG Oral Capsule Delayed Release Take 1 capsule (40 mg total) by mouth daily. Noon      dilTIAZem  MG Oral Capsule SR 24 Hr Take 1 capsule (240 mg total) by mouth once daily. Noon      albuterol (2.5 MG/3ML) 0.083% Inhalation Nebu Soln Take 3 mL by nebulization 3 (three) times daily as needed.      PEPTO-BISMOL  262 MG/15ML Oral Suspension Take 30 mL (524 mg total) by mouth every 6 (six) hours as needed.      valsartan 320 MG Oral Tab Take 1 tablet (320 mg total) by mouth daily.      mirtazapine 15 MG Oral Tab Take 1 tablet (15 mg total) by mouth nightly.      aspirin 81 MG Oral Tab EC Take 1 tablet (81 mg total) by mouth daily. 30 tablet 0    gabapentin 300 MG Oral Cap Take 2 capsules (600 mg total) by mouth nightly.      albuterol 108 (90 Base) MCG/ACT Inhalation Aero Soln Inhale 2 puffs into the lungs every 4 (four) hours as needed for Wheezing.      allopurinol 100 MG Oral Tab Take 1 tablet (100 mg total) by mouth every morning.      Rosuvastatin Calcium (CRESTOR) 20 MG Oral Tab Take 1 tablet (20 mg total) by mouth daily.      Montelukast Sodium (SINGULAIR) 10 MG Oral Tab Take 1 tablet (10 mg total) by mouth daily.       Review of Systems:   A comprehensive review of systems was unable to be completed to patient clinical status.    Objective:   Physical Exam:    BP (!) 171/88   Pulse 104   Temp 98 °F (36.7 °C) (Temporal)   Resp 18   Wt 202 lb 13.2 oz (92 kg)   SpO2 99%   BMI 34.81 kg/m²   General: No acute distress, awake and alert but confused  Respiratory: No rhonchi, no wheezes  Cardiovascular: S1, S2. Regular rate and rhythm, + murmur  Abdomen: Soft, Non-tender, non-distended, positive bowel sounds  Neuro: DAO x 4  Extremities: No LE pitting edema    Results:    Labs:      Labs Last 24 Hours:  Recent Labs   Lab 02/15/24  0953 02/16/24  0425 02/19/24  1815   RBC 2.64* 2.64* 2.91*   HGB 8.6* 8.5* 9.4*   HCT 25.9* 25.7* 28.1*   MCV 98.1 97.3 96.6   MCH 32.6 32.2 32.3   MCHC 33.2 33.1 33.5   RDW 14.5 14.6 14.5   NEPRELIM 3.78  --  4.45   WBC 6.4 5.5 7.3   .0 262.0 270.0     Recent Labs   Lab 02/15/24  0953 02/16/24  0425 02/19/24  1815   * 115* 133*   BUN 36* 36* 35*   CREATSERUM 2.80* 2.68* 2.56*   EGFRCR 17* 18* 19*   CA 9.4 9.1 9.7   ALB 3.5  --  3.7    146* 145   K 3.4* 3.7 3.4*   *  116* 117*   CO2 22.0 24.0 23.0   ALKPHO 77  --  86   AST 33  --  15   ALT 45  --  22   BILT 0.3  --  0.5   TP 8.1  --  8.7*     Lab Results   Component Value Date    INR 1.16 02/19/2024    INR 0.97 10/24/2022    INR 1.00 03/01/2022     Recent Labs   Lab 02/15/24  0953 02/15/24  1242   TROPHS 65* 75*     Recent Labs   Lab 02/19/24  1825   PBNP 10,001*     No results for input(s): \"PCT\" in the last 168 hours.    Imaging: Imaging data reviewed in Epic.    Assessment & Plan:      #Acute encephalopathy possibly due to seizures  #Hx of seizure disorder  -Given Valium and Keppra in the ER  -EEG  -Neurology consult  -CT head without acute intracranial abnormalities. UA unremarkable  -Check MRI brain  -Seizure precautions    #Mild acute on chronic diastolic CHF  -Echo earlier this month reviewed  -CXR with findings of CHF  -Given IV Lasix in ER, resume home p.o. tomorrow  -On room air when awake    #Aortic stenosis  -Mild to moderate on most recent echo    #CAD s/p stent  -Aspirin, statin    #CKD stage IV  -Near baseline    #Hypertension  -Resume home medications    #Hyperlipidemia  -Statin    #DM type II with A1c 5.8  -Insulin sliding scale    #Hx of CVA  -Aspirin, statin    #JHONY  -JHONY protocol    #COPD, stable  -Resume albuterol inhaler as needed    #Gout  -Allopurinol    #Fibromyalgia  -Gabapentin    #LLE DVT  -Eliquis    #Anxiety and depression  -Resume Lexapro and Remeron     #GERD  -PPI    #Osteoporosis  -On alendronate as outpatient    Plan of care discussed with ER physician.    Marshall Hernandez DO    Supplementary Documentation:     The 21st Century Cures Act makes medical notes like these available to patients in the interest of transparency. Please be advised this is a medical document. Medical documents are intended to carry relevant information, facts as evident, and the clinical opinion of the practitioner. The medical note is intended as peer to peer communication and may appear blunt or direct. It is written in  medical language and may contain abbreviations or verbiage that are unfamiliar.

## 2024-02-21 NOTE — PROCEDURES
LONG-TERM VIDEO EEG REPORT;    Reason for Examination:Encephalopathy/seizure    Technical Summary:   18 Channels of EEG and 1 Channel of EKG was performed utilizing internation 10/20 method. Motion, muscle and machine artifacts were noted.       Recording Start date/time: 02/20  at 1140.  Recording end date/time: 02/21 at 1000.      Background Activity:   The background activity consisted of 9 Hz waveforms, reactive to eye opening/ external stimulation.    Abnormality:  Throughout the recording mild, intermittent generalized sharp wave activity was noted.  There was no associated electrographic seizures.  Throughout the recording low to medium voltage, polymorphic, 3 to 6 Hz slow activity was noted diffusely over both hemispheres      Activation:    Hyperventilation:   Not Performed.    Photic Stimulation:  Driving response seen.No       Sleep:  Stage I sleep seen.       Impression:  This is a Abnormal prolonged Video EEG study.    Generalized sharp wave activity was noted during the recording.  These activities are epileptiform in nature.  No evidence of electrographic seizures were noted.    Mild diffuse slowing into delta and theta range was noted.  This constellation of findings can be seen in encephalopathy due to metabolic/toxic etiology, medication effects or diffuse cerebral injury.  Clinical correlation is recommended.        Jimmie Gonsalez MD  [facility].

## 2024-02-21 NOTE — PLAN OF CARE
Assumed patient care, patient is alert and oriented x4. On room air, denies shortness of breath. Sinus rhythm on tele, no complains of chest pain. Abdomen is soft, non-tendered, bowel sounds are active in all four quadrants. Bed in lower position, call light within reach.Plan of care updated, questions are answered.     Problem: Diabetes/Glucose Control  Goal: Glucose maintained within prescribed range  Description: INTERVENTIONS:  - Monitor Blood Glucose as ordered  - Assess for signs and symptoms of hyperglycemia and hypoglycemia  - Administer ordered medications to maintain glucose within target range  - Assess barriers to adequate nutritional intake and initiate nutrition consult as needed  - Instruct patient on self management of diabetes  Outcome: Progressing

## 2024-02-21 NOTE — PHYSICAL THERAPY NOTE
PHYSICAL THERAPY EVALUATION - INPATIENT     Room Number: 2605/2605-A  Evaluation Date: 2/21/2024  Type of Evaluation: Initial  Physician Order: PT Eval and Treat    Presenting Problem: AMS  Co-Morbidities : Seizure, CHF, aortic stenosis, CAD, CKD, HTN, CVA, COPD, Fibromyalgia  Reason for Therapy: Mobility Dysfunction and Discharge Planning    PHYSICAL THERAPY ASSESSMENT   Patient is currently functioning below baseline with bed mobility, transfers, and gait.  Prior to admission, patient's baseline is MOD I.  Patient is requiring moderate assist as a result of the following impairments: decreased functional strength, impaired standing balance, and cognitive deficits (safety awareness and insight into impairments).  Physical Therapy will continue to follow for duration of hospitalization.    Patient will benefit from continued skilled PT Services to promote return to prior level of function and safety with continuous assistance and gradual rehabilitative therapy .    PLAN  PT Treatment Plan: Bed mobility;Endurance;Energy conservation;Patient education;Gait training;Strengthening;Balance training;Transfer training  Rehab Potential : Good  Frequency (Obs): 3-5x/week  Number of Visits to Meet Established Goals: 5      CURRENT GOALS    Goal #1 Patient is able to demonstrate supine - sit EOB @ level: minimum assistance     Goal #2 Patient is able to demonstrate transfers Sit to/from Stand at assistance level: minimum assistance     Goal #3 Patient is able to ambulate 50 feet with assist device: walker - rolling at assistance level: minimum assistance     Goal #4    Goal #5    Goal #6    Goal Comments: Goals established on 2/21/2024      PHYSICAL THERAPY MEDICAL/SOCIAL HISTORY  History related to current admission: Patient is a 79 year old female admitted on 2/19/2024 from home for AMS.  Pt diagnosed with TME. MRI brain (-) for acute pathology. Seizures suspected. 24hr EEG completed today.       HOME SITUATION  Type  of Home: Apartment   Home Layout: Elevator                Lives With: Alone  Drives: No  Patient Owned Equipment: Rollator;Wheelchair  Patient Regularly Uses: Reading glasses    Prior Level of [place]: Pt reports she lives alone in apartment building with elevated. Pt independent with ADL and mobility. Pts grandson and CG visit each day. They assist with cooking and cleaning. Pt ambulates with rollator.     SUBJECTIVE  \"My caregiver can help me more.\"       OBJECTIVE  Precautions: Bed/chair alarm  Fall Risk: High fall risk    WEIGHT BEARING RESTRICTION  Weight Bearing Restriction: None                PAIN ASSESSMENT  Ratin          COGNITION  Orientation Level:  oriented to place, oriented to person, and disoriented to time  Memory:  decreased recall of recent events  Following Commands:  follows one step commands with increased time and follows one step commands with repetition  Initiation: cues to initiate tasks  Motor Planning: impaired  Safety Judgement:  decreased awareness of need for assistance and decreased awareness of need for safety  Awareness of Errors:  decreased awareness of errors     RANGE OF MOTION AND STRENGTH ASSESSMENT  Upper extremity ROM and strength are within functional limits     Lower extremity ROM is within functional limits     Lower extremity strength is within functional limits except for the following:    Right Knee extension  4/5  Left Knee extension  4/5      BALANCE  Static Sitting: Fair +  Dynamic Sitting: Fair  Static Standing: Poor +  Dynamic Standing: Poor    ADDITIONAL TESTS                                    ACTIVITY TOLERANCE                         O2 WALK       NEUROLOGICAL FINDINGS                        AM-PAC '6-Clicks' INPATIENT SHORT FORM - BASIC MOBILITY  How much difficulty does the patient currently have...  Patient Difficulty: Turning over in bed (including adjusting bedclothes, sheets and blankets)?: A Lot   Patient Difficulty: Sitting down on and  standing up from a chair with arms (e.g., wheelchair, bedside commode, etc.): A Lot   Patient Difficulty: Moving from lying on back to sitting on the side of the bed?: A Lot   How much help from another person does the patient currently need...   Help from Another: Moving to and from a bed to a chair (including a wheelchair)?: A Little   Help from Another: Need to walk in hospital room?: A Lot   Help from Another: Climbing 3-5 steps with a railing?: A Lot       AM-PAC Score:  Raw Score: 13   Approx Degree of Impairment: 64.91%   Standardized Score (AM-PAC Scale): 36.74   CMS Modifier (G-Code): CL    FUNCTIONAL ABILITY STATUS  Gait Assessment   Functional Mobility/Gait Assessment  Gait Assistance: Minimum assistance  Distance (ft): 5  Assistive Device: Rolling walker  Pattern: Shuffle (excessive kyphosis)    Skilled Therapy Provided     Bed Mobility:  Rolling: NT  Supine to sit: MOD   Sit to supine: NT     Transfer Mobility:  Sit to stand: MOD   Stand to sit: MOD  Gait = MIN with RW    There-Act:  Increased time for supine-sit.   VC for UE placement and anterior weightshift.   MOD assist required for scooting to EOB and trunk support initially.   VC for safe transfer set up.   MOD assist for force generation/balance with sit-stand.   VC for posture and LE placement.   Pt ambulated 5ft with RW and MIN assist to bedside chair.   Pt ambulates with shuffle gait pattern and excessive kyphosis.   VC for posture and sequencing.     Therapist's Comments: Recommend RW for transfers.     Exercise/Education Provided:  Bed mobility  Energy conservation  Functional activity tolerated  Gait training  Posture  Strengthening  Transfer training    Patient End of Session: Up in chair;Needs met;Call light within reach;RN aware of session/findings;All patient questions and concerns addressed;Alarm set      Patient Evaluation Complexity Level:  History High - 3 or more personal factors and/or co-morbidities   Examination of body systems  Moderate - addressing a total of 3 or more elements   Clinical Presentation Moderate - Evolving   Clinical Decision Making Moderate - Evolving       PT Session Time: 25 minutes  Therapeutic Activity: 15 minutes

## 2024-02-21 NOTE — PROGRESS NOTES
[facility]  [facility] Neurology Progress Note    Stephen Rodrigues Patient Status:  Inpatient    1945 MRN [id number]   Location [facility] 2NE-A Attending Nichole Kaur MD   Hosp Day # 2 PCP PHYSICIAN NONSTAFF     CC: Altered mental status    Subjective:  Seen for follow up visit today. Per nursing she is much better, awake, alert and oriented at this time. She was drowsy earlier this morning. Patient denies any headache, no blurred or double vision, denies any new focal asymmetric weakness or paresthesias. Speech appears clear, no difficulty swallowing or receptive aphasia.     MEDICATIONS:  No current outpatient medications on file.     Current Facility-Administered Medications   Medication Dose Route Frequency    levETIRAcetam (Keppra) 500 mg/5mL injection 500 mg  500 mg Intravenous Q12H    albuterol (Ventolin HFA) 108 (90 Base) MCG/ACT inhaler 2 puff  2 puff Inhalation Q4H PRN    albuterol (Ventolin) (2.5 MG/3ML) 0.083% nebulizer solution 3 mL  3 mL Nebulization TID PRN    allopurinol (Zyloprim) tab 100 mg  100 mg Oral QAM    apixaban (Eliquis) tab 5 mg  5 mg Oral BID    aspirin DR tab 81 mg  81 mg Oral Daily    dilTIAZem ER (CardIZEM CD) 24 hr cap 240 mg  240 mg Oral Daily    escitalopram (Lexapro) tab 10 mg  10 mg Oral Daily    furosemide (Lasix) tab 20 mg  20 mg Oral Daily    gabapentin (Neurontin) cap 600 mg  600 mg Oral Nightly    hydrALAZINE (Apresoline) tab 50 mg  50 mg Oral TID    mirtazapine (REMERON SOL-TAB) disintegrating tab 15 mg  15 mg Oral Nightly    pantoprazole (Protonix) DR tab 40 mg  40 mg Oral QAM AC    rosuvastatin (Crestor) tab 10 mg  10 mg Oral Daily    valsartan (Diovan) tab 320 mg  320 mg Oral Daily    glucose (Dex4) 15 GM/59ML oral liquid 15 g  15 g Oral Q15 Min PRN    Or    glucose (Glutose) 40% oral gel 15 g  15 g Oral Q15 Min PRN    Or    glucose-vitamin C (Dex-4) chewable tab 4 tablet  4 tablet Oral Q15 Min PRN    Or    dextrose 50% injection 50 mL  50 mL Intravenous Q15  Min PRN    Or    glucose (Dex4) 15 GM/59ML oral liquid 30 g  30 g Oral Q15 Min PRN    Or    glucose (Glutose) 40% oral gel 30 g  30 g Oral Q15 Min PRN    Or    glucose-vitamin C (Dex-4) chewable tab 8 tablet  8 tablet Oral Q15 Min PRN    insulin aspart (NovoLOG) 100 Units/mL FlexPen 1-10 Units  1-10 Units Subcutaneous TID AC and HS    diazepam (Valium) 5 mg/mL injection 5 mg  5 mg Intravenous Q30 Min PRN    acetaminophen (Tylenol Extra Strength) tab 500 mg  500 mg Oral Q4H PRN    melatonin cap/tab 5 mg  5 mg Oral Nightly PRN    polyethylene glycol (PEG 3350) (Miralax) 17 g oral packet 17 g  17 g Oral Daily PRN    sennosides (Senokot) tab 17.2 mg  17.2 mg Oral Nightly PRN    bisacodyl (Dulcolax) 10 MG rectal suppository 10 mg  10 mg Rectal Daily PRN    ondansetron (Zofran) 4 MG/2ML injection 4 mg  4 mg Intravenous Q6H PRN    metoclopramide (Reglan) 5 mg/mL injection 5 mg  5 mg Intravenous Q8H PRN    benzonatate (Tessalon) cap 200 mg  200 mg Oral TID PRN    glycerin-hypromellose- (Artifical Tears) 0.2-0.2-1 % ophthalmic solution 1 drop  1 drop Both Eyes QID PRN    sodium chloride (Saline Mist) 0.65 % nasal solution 1 spray  1 spray Each Nare Q3H PRN       REVIEW OF SYSTEMS:  A 10-point system was reviewed.  Pertinent positives and negatives are noted in HPI.      PHYSICAL EXAMINATION:  VITAL SIGNS: /69 (BP Location: Left arm)   Pulse 71   Temp 98.6 °F (37 °C) (Oral)   Resp 18   Wt 190 lb 11.2 oz (86.5 kg)   SpO2 97%   BMI 32.73 kg/m²   GENERAL:  Patient is a 79 year old female in no acute distress.  HEENT:  Normocephalic, atraumatic  ABD: Soft, non tender  SKIN: Warm, dry, no rashes    NEUROLOGICAL:   Mental status: Oriented to person, place, situation and time   Speech: Fluent, no obvious expressive or receptive aphasia at this time, mild dysarthria noted  Memory and comprehension: Impaired, short term memory and attention seems to be mildly impaired, hx of dementia,   Cranial Nerves: VFF, PERRL 2  mm brisk, EOMI, no nystagmus, facial sensation intact, face symmetric, tongue midline, shoulder shrug equal, remainder CN grossly intact  Motor: No drift, no focal arm or leg weakness. Not able to left arms al the way up, motor strength is 5 out of 5 in all extremities  Sensory: Intact to light touch  Coordination: FTN intact  Gait: Deferred      Imaging/Diagnostics:  MRI BRAIN (CPT=70551)    Result Date: 2/20/2024  CONCLUSION:  Stable chronic changes.  No acute disease.   LOCATION:  [facility]   Dictated by (CST): Quentin Angeles MD on 2/20/2024 at 10:39 PM     Finalized by (CST): Quentin Angeles MD on 2/20/2024 at 10:43 PM       CT BRAIN OR HEAD (59245)    Result Date: 2/19/2024  CONCLUSION: 1. No acute intracranial findings 2. Cerebral atrophy with chronic microvascular ischemic changes.    LOCATION:  [facility]   Dictated by (CST): Armando Ahmadi MD on 2/19/2024 at 8:28 PM     Finalized by (CST): Armando Ahmadi MD on 2/19/2024 at 8:31 PM       XR CHEST AP PORTABLE  (CPT=71045)    Result Date: 2/19/2024  CONCLUSION:   Stable cardiac and mediastinal contours.  Findings of congestive heart failure with mild interstitial pulmonary edema.  No associated pleural effusion or pneumothorax.   LOCATION:  [id number]      Dictated by (CST): Edgar Curtis MD on 2/19/2024 at 7:04 PM     Finalized by (CST): Edgar Curtis MD on 2/19/2024 at 7:05 PM       CT SPINE CERVICAL (CPT=72125)    Result Date: 2/15/2024  CONCLUSION:  1. No acute displaced osseous fracture. 2. Moderate degenerative changes in the cervical spine.    LOCATION:  [place]   Dictated by (CST): Stromberg, LeRoy, MD on 2/15/2024 at 11:58 AM     Finalized by (CST): Stromberg, LeRoy, MD on 2/15/2024 at 12:02 PM       XR HIP W OR WO PELVIS 2 OR 3 VIEWS, LEFT (CPT=73502)    Result Date: 2/15/2024  CONCLUSION:  1. No acute osseous injuries. 2. Severe left hip osteoarthritis and moderate right hip osteoarthritis. 3. Moderate bilateral SI joint arthropathy.   LOCATION:   [facility]   Dictated by (CST): Emily Friedman DO on 2/15/2024 at 11:45 AM     Finalized by (CST): Emily Friedman DO on 2/15/2024 at 11:45 AM       XR WRIST COMPLETE (MIN 3 VIEWS), LEFT (CPT=73110)    Result Date: 2/15/2024  CONCLUSION:  1. No acute osseous injuries. 2. Diffuse osteoarthritic changes as described above. 3. Osseous demineralization suspicious for osteopenia versus osteoporosis.   LOCATION:  [facility]   Dictated by (CST): Emily Friedman DO on 2/15/2024 at 11:43 AM     Finalized by (CST): Emily Friedman DO on 2/15/2024 at 11:44 AM       XR CHEST AP PORTABLE  (CPT=71045)    Result Date: 2/15/2024  CONCLUSION:  Increased mild reticular opacities throughout the lungs may represent edema versus infiltrate.  Correlate clinically.   LOCATION:  [facility]      Dictated by (CST): David Patricia MD on 2/15/2024 at 11:40 AM     Finalized by (CST): David Patricia MD on 2/15/2024 at 11:42 AM       XR HAND (MIN 3 VIEWS), LEFT (CPT=73130)    Result Date: 2/15/2024  CONCLUSION:  1. No acute osseous injury/fractures. 2. Diffuse osseous demineralization suggestive of osteopenia versus osteoporosis. 3. Diffuse osteoarthritic changes as described above.   LOCATION:  [facility]   Dictated by (CST): Emily Friedman DO on 2/15/2024 at 11:40 AM     Finalized by (CST): Emily Friedman DO on 2/15/2024 at 11:41 AM       CT BRAIN OR HEAD (36876)    Result Date: 2/15/2024  CONCLUSION:  1. No acute intracranial hemorrhage or hydrocephalus. 2. Trace chronic small vessel ischemic disease. If there is clinical concern for acute ischemia/infarction, an MRI of the brain would be recommended for further evaluation.    LOCATION:  [place]   Dictated by (CST): Stromberg, LeRoy, MD on 2/15/2024 at 9:50 AM     Finalized by (CST): Stromberg, LeRoy, MD on 2/15/2024 at 9:52 AM          Labs:  Recent Labs   Lab 02/15/24  0953 02/16/24  0425 02/19/24  1815 02/20/24  0442   RBC 2.64* 2.64* 2.91* 2.91*   HGB 8.6* 8.5* 9.4* 9.4*   HCT 25.9* 25.7* 28.1* 28.3*   MCV 98.1  97.3 96.6 97.3   MCH 32.6 32.2 32.3 32.3   MCHC 33.2 33.1 33.5 33.2   RDW 14.5 14.6 14.5 14.6   NEPRELIM 3.78  --  4.45 3.92   WBC 6.4 5.5 7.3 6.4   .0 262.0 270.0 245.0         Recent Labs   Lab 02/16/24  0425 02/19/24  1815 02/20/24  0442   * 133* 114*   BUN 36* 35* 36*   CREATSERUM 2.68* 2.56* 2.83*   EGFRCR 18* 19* 16*   CA 9.1 9.7 9.2   * 145 146*   K 3.7 3.4* 3.6  3.6   * 117* 117*   CO2 24.0 23.0 25.0       Pre-morbid mRS 1      Assessment & Plan:    A 79 year old female with:     An episode of alteration consciousness suspected to be due to seizures/encephalopathy. Concern for seizure activity, as patient has history of seizures and not currently on medication. Expressive speech difficulties intermittently, somewhat improved today.   LTM EEG - preliminary findings revealed generalized epileptiform activity, no active electrographic seizures. Final reading pending.   Patient is to continue Keppra 750 mg BID  Continue with seizure and safety precautions   Stroke like symptoms - currently on Eliquis, aspirin 81 mg, rosuvastatin 20 mg daily. CT brain and MRI zheng are negative for acute changes, bleed or an ischemic stroke. Expressive aphasia sees to be much improved.   Cognitive decline with intermittent worsening and improvement of alertness and mental status, likely changes of dementia. Seems to be better today. Fluctuation in mental status can be possibly related to underlying seizure activity. Continue to monitor. Can be evaluated as outpatient by neuropsychiatry specialist.   Discussed with patent, discussed with Dr. Gonsalez. To follow with further recommendations if indicated.   Is this a shared or split note between Advanced Practice Provider and Physician? Yes       May DUGGAN  [facility]  2/21/2024, 10:21 AM   John # [id number]      Impression/plan/MDM:  Patient seen and examined personally.  Investigations reviewed.    Seizures.  Patient did not  have any episode, clinically visible seizure.  However, EEG showed generalized sharp wave activity.  Patient also has history of prior seizures.  Patient to continue Keppra 750 mg twice daily.  Side effects explained.  Concern for stroke.  MRI of the brain performed.  No evidence of stroke noted.

## 2024-02-21 NOTE — PLAN OF CARE
Assumed care for pt. At approximately midnight. Asleep, resting comfortably. 24 hour EEG in progress. A&Ox3-4. Seizure precautions in place. Lungs clear on room air. Purewick in place, draining clear yellow urine. Up with assist to commode. PT to eval. Bed alarm in place, frequent checks made for safety/needs. Bed alarm in place. Will continue to monitor.     Problem: NEUROLOGICAL - ADULT  Goal: Achieves stable or improved neurological status  Description: INTERVENTIONS  - Assess for and report changes in neurological status  - Initiate measures to prevent increased intracranial pressure  - Maintain blood pressure and fluid volume within ordered parameters to optimize cerebral perfusion and minimize risk of hemorrhage  - Monitor temperature, glucose, and sodium. Initiate appropriate interventions as ordered  Outcome: Progressing  Goal: Absence of seizures  Description: INTERVENTIONS  - Monitor for seizure activity  - Administer anti-seizure medications as ordered  - Monitor neurological status  Outcome: Progressing  Goal: Remains free of injury related to seizure activity  Description: INTERVENTIONS:  - Maintain airway, patient safety  and administer oxygen as ordered  - Monitor patient for seizure activity, document and report duration and description of seizure to MD/LIP  - If seizure occurs, turn patient to side and suction secretions as needed  - Reorient patient post seizure  - Seizure pads on all 4 side rails  - Instruct patient/family to notify RN of any seizure activity  - Instruct patient/family to call for assistance with activity based on assessment  Outcome: Progressing  Goal: Achieves maximal functionality and self care  Description: INTERVENTIONS  - Monitor swallowing and airway patency with patient fatigue and changes in neurological status  - Encourage and assist patient to increase activity and self care with guidance from PT/OT  - Encourage visually impaired, hearing impaired and aphasic patients  to use assistive/communication devices  Outcome: Progressing     Problem: Diabetes/Glucose Control  Goal: Glucose maintained within prescribed range  Description: INTERVENTIONS:  - Monitor Blood Glucose as ordered  - Assess for signs and symptoms of hyperglycemia and hypoglycemia  - Administer ordered medications to maintain glucose within target range  - Assess barriers to adequate nutritional intake and initiate nutrition consult as needed  - Instruct patient on self management of diabetes  Outcome: Progressing

## 2024-02-22 ENCOUNTER — NURSE ONLY (OUTPATIENT)
Dept: ELECTROPHYSIOLOGY | Facility: HOSPITAL | Age: 79
End: 2024-02-22
Payer: MEDICARE

## 2024-02-22 NOTE — CM/SW NOTE
[facility] approved NANETTE.  Sw provided pt with copy of NANETTE list and also emailed to daughter.  Await choice.  Pt getting 24 hour EEG started today.    Chrissie Siegel, RADHAW  /Discharge Planner'

## 2024-02-22 NOTE — SLP NOTE
SPEECH DAILY NOTE - INPATIENT    ASSESSMENT & PLAN   ASSESSMENT  Pt seen for dysphagia tx to assess tolerance with recommended diet, ensure appropriate utilization of aspiration precautions and provide pt/family education. Pt found lying semi-upright in bed; alert & participatory; able to follow simple commands however unable to feed herself d/t weakness. Breakfast tray arrived concurrently with clinician entering room and contained pureed consistency with thin liquids. SLP provided tray set up and assist with eating. Good tolerance observed with thin and pureed consistencies. Attempted hard solid however patient unable to bite down on cracker. MRI completed 2/20/24 revealed stable chronic changes; no acute disease. Informed staff of patient's need for assist feeding. Will continue to follow as per plan.     Diet Recommendations - Solids: Soft/ Easy to chew  Diet Recommendations - Liquids: Thin Liquids    Compensatory Strategies Recommended: Slow rate;Small bites and sips  Aspiration Precautions: Upright position;Slow rate;Small bites and sips  Medication Administration Recommendations: One pill at a time                     Treatment Plan  Treatment Plan/Recommendations: Aspiration precautions    Interdisciplinary Communication: Discussed with RN  Disussed with other staff             GOALS  Goal #1 The patient will tolerate soft/easy to chew consistency and thin liquids without overt signs or symptoms of aspiration with 90 % accuracy over 1-2 session(s).  In Progress   Goal #2 The patient/family/caregiver will demonstrate understanding and implementation of aspiration precautions and swallow strategies independently over 1-2 session(s).     In Progress   Goal #3 Assess need for cognitive comm evaluation however will await further neuro workup N/a - MRI negative for acute findings           FOLLOW UP  Follow Up Needed (Documentation Required): Yes  SLP Follow-up Date: 02/23/24       Session: 1/2    If you have any  questions, please contact Astrid BOWDEN, SLP    Astrid Stevens MA, CCC-SLP  Pager [phone]

## 2024-02-22 NOTE — PLAN OF CARE
Assumed care of patient at 0700. Pt A/Ox 2-3, forgetful and confused at times. Disoriented to time. Drowsy at times, able to answer questions. Pt having difficulty with motor skills, feeling weaker. Slight drift noted to SCAR Neuro LUCIO Olvera made aware. Pt unable to say if this is baseline from previous stroke. O2 sats maintained on room air. NSR on tele. Last BM 2/20. Voiding without difficulty. Pt reports no pain. Pt up x2 assist and walker. Pt updated on plan of care. Care needs met. Bed in lowest position, Call light within reach. Bed alarm on.     POC: EEG, monitor for seizure activity, NANETTE at discharge     Problem: Diabetes/Glucose Control  Goal: Glucose maintained within prescribed range  Description: INTERVENTIONS:  - Monitor Blood Glucose as ordered  - Assess for signs and symptoms of hyperglycemia and hypoglycemia  - Administer ordered medications to maintain glucose within target range  - Assess barriers to adequate nutritional intake and initiate nutrition consult as needed  - Instruct patient on self management of diabetes  Outcome: Progressing     Problem: Patient/Family Goals  Goal: Patient/Family Long Term Goal  Description: Patient's Long Term Goal: to go home    Interventions:  - Mds to see  - IV keppra  - See additional Care Plan goals for specific interventions  Outcome: Progressing  Goal: Patient/Family Short Term Goal  Description: Patient's Short Term Goal: to feel better    Interventions:   - IV keppra  - Mds to see  - Comply to care plan  - See additional Care Plan goals for specific interventions  Outcome: Progressing     Problem: NEUROLOGICAL - ADULT  Goal: Achieves stable or improved neurological status  Description: INTERVENTIONS  - Assess for and report changes in neurological status  - Initiate measures to prevent increased intracranial pressure  - Maintain blood pressure and fluid volume within ordered parameters to optimize cerebral perfusion and minimize risk of hemorrhage  -  Monitor temperature, glucose, and sodium. Initiate appropriate interventions as ordered  Outcome: Progressing  Goal: Absence of seizures  Description: INTERVENTIONS  - Monitor for seizure activity  - Administer anti-seizure medications as ordered  - Monitor neurological status  Outcome: Progressing  Goal: Remains free of injury related to seizure activity  Description: INTERVENTIONS:  - Maintain airway, patient safety  and administer oxygen as ordered  - Monitor patient for seizure activity, document and report duration and description of seizure to MD/LIP  - If seizure occurs, turn patient to side and suction secretions as needed  - Reorient patient post seizure  - Seizure pads on all 4 side rails  - Instruct patient/family to notify RN of any seizure activity  - Instruct patient/family to call for assistance with activity based on assessment  Outcome: Progressing  Goal: Achieves maximal functionality and self care  Description: INTERVENTIONS  - Monitor swallowing and airway patency with patient fatigue and changes in neurological status  - Encourage and assist patient to increase activity and self care with guidance from PT/OT  - Encourage visually impaired, hearing impaired and aphasic patients to use assistive/communication devices  Outcome: Progressing

## 2024-02-22 NOTE — CM/SW NOTE
02/22/24 1100   CM/SW Referral Data   Referral Source Physician   Reason for Referral Discharge planning   Informant Patient;Daughter;EMR;Clinical Staff Member   Patient Info   Patient's Current Mental Status at Time of Assessment Alert;Oriented   Patient's Home Environment Condo/Apt with elevator   Patient lives with Alone   Patient Status Prior to Admission   Independent with ADLs and Mobility No   Pt. requires assistance with Housework;Driving;Bathing   Services in place prior to admission DME/Supplies at home;Home Health Care;long-term Home Care   Home Health Provider Info [facility]   Type of DME/Supplies Rollator Walker   long-term Home Care Provider Department on Aging   long-term Care hours per day 5-6   long-term Care days per week 3-4   Discharge Needs   Anticipated D/C needs Subacute rehab   Services Requested   PASRR Level 1 Submitted Yes     Sw met with pt and later called her daughter to discuss dc planning.    Pt is 78 yo female, admitted due to ams/seizure.  Pt normally lives alone in [address].  She has caregiver 3-4 days per week for 5-6 hours each visit.  Pt is current with [facility] for [facility] RN.    Discussed therapy eval yesterday and that pt appears weaker and may need more care at dc.  Daughter states she works full time and would not be able to provide needed care for her at home.  Suggested NANETTE as an option, will send NANETTE referral in aidin and email daughter NANETTE list  [email or web address] once responses received.  PASSR completed.    Pt has been current with Health system for home RN- will send resume of care to them and add PT/OT/CNA in case pt ends up going home.    Will ask DSC to send [facility] referral.    Chrissie Siegel LCSW  /Discharge Planner

## 2024-02-22 NOTE — CM/SW NOTE
02/22/24 1500   Choice of Post-Acute Provider   Informed patient of right to choose their preferred provider Yes   List of appropriate post-acute services provided to patient/family with quality data Yes   Patient/family choice [facility]   Information given to Patient;Daughter     Sw spoke to daughter - she chose [facility]. Reserved in aidin.    Chrissie Siegel, CHET  /Discharge Planner

## 2024-02-22 NOTE — PROGRESS NOTES
[facility]  RENETTA Neurology Progress Note    Stephen Rodrigues Patient Status:  Inpatient    1945 MRN [id number]   Location [facility] 2NE-A Attending Nichole Kaur MD   Hosp Day # 3 PCP PHYSICIAN NONSTAFF     CC: Intermittent alteration of mental status    Subjective:  Seen for a follow up visit today. Initially during my visit patient appeared more awake, alert and oriented, regarded and followed commands. As the exam progressed, she became more tired, sleepy, unable to keep eyes open, unable to follow some directions. Confused to month and year, which she got right yesterday. She stopped following commands, unable to show me how she drinks form her cup, only holding it in her hand as it is still on the tray. Patient's nurse is present at the bedside, reported that Stephen was having hard time walking to the bathroom this morning and was weak. Has episodes where she is more confused alternating with lucid episodes when she regards and follows directions. Ate her oatmeal, but having more hard time managing t feed herself today. No focal weakness noted, reports generalized weakness, \" I feel tired\". Denies new vision changes, some blurry vision, but is not new, states \" I need new glasses\". No headache.    MEDICATIONS:  No current outpatient medications on file.     Current Facility-Administered Medications   Medication Dose Route Frequency    levETIRAcetam (Keppra) tab 750 mg  750 mg Oral BID    albuterol (Ventolin HFA) 108 (90 Base) MCG/ACT inhaler 2 puff  2 puff Inhalation Q4H PRN    albuterol (Ventolin) (2.5 MG/3ML) 0.083% nebulizer solution 3 mL  3 mL Nebulization TID PRN    allopurinol (Zyloprim) tab 100 mg  100 mg Oral QAM    apixaban (Eliquis) tab 5 mg  5 mg Oral BID    aspirin DR tab 81 mg  81 mg Oral Daily    dilTIAZem ER (CardIZEM CD) 24 hr cap 240 mg  240 mg Oral Daily    escitalopram (Lexapro) tab 10 mg  10 mg Oral Daily    furosemide (Lasix) tab 20 mg  20 mg Oral Daily    gabapentin  (Neurontin) cap 600 mg  600 mg Oral Nightly    hydrALAZINE (Apresoline) tab 50 mg  50 mg Oral TID    [Held by provider] mirtazapine (REMERON SOL-TAB) disintegrating tab 15 mg  15 mg Oral Nightly    pantoprazole (Protonix) DR tab 40 mg  40 mg Oral QAM AC    rosuvastatin (Crestor) tab 10 mg  10 mg Oral Daily    valsartan (Diovan) tab 320 mg  320 mg Oral Daily    glucose (Dex4) 15 GM/59ML oral liquid 15 g  15 g Oral Q15 Min PRN    Or    glucose (Glutose) 40% oral gel 15 g  15 g Oral Q15 Min PRN    Or    glucose-vitamin C (Dex-4) chewable tab 4 tablet  4 tablet Oral Q15 Min PRN    Or    dextrose 50% injection 50 mL  50 mL Intravenous Q15 Min PRN    Or    glucose (Dex4) 15 GM/59ML oral liquid 30 g  30 g Oral Q15 Min PRN    Or    glucose (Glutose) 40% oral gel 30 g  30 g Oral Q15 Min PRN    Or    glucose-vitamin C (Dex-4) chewable tab 8 tablet  8 tablet Oral Q15 Min PRN    insulin aspart (NovoLOG) 100 Units/mL FlexPen 1-10 Units  1-10 Units Subcutaneous TID AC and HS    diazepam (Valium) 5 mg/mL injection 5 mg  5 mg Intravenous Q30 Min PRN    acetaminophen (Tylenol Extra Strength) tab 500 mg  500 mg Oral Q4H PRN    melatonin cap/tab 5 mg  5 mg Oral Nightly PRN    polyethylene glycol (PEG 3350) (Miralax) 17 g oral packet 17 g  17 g Oral Daily PRN    sennosides (Senokot) tab 17.2 mg  17.2 mg Oral Nightly PRN    bisacodyl (Dulcolax) 10 MG rectal suppository 10 mg  10 mg Rectal Daily PRN    ondansetron (Zofran) 4 MG/2ML injection 4 mg  4 mg Intravenous Q6H PRN    metoclopramide (Reglan) 5 mg/mL injection 5 mg  5 mg Intravenous Q8H PRN    benzonatate (Tessalon) cap 200 mg  200 mg Oral TID PRN    glycerin-hypromellose- (Artifical Tears) 0.2-0.2-1 % ophthalmic solution 1 drop  1 drop Both Eyes QID PRN    sodium chloride (Saline Mist) 0.65 % nasal solution 1 spray  1 spray Each Nare Q3H PRN       REVIEW OF SYSTEMS:  A 10-point system was reviewed.  Pertinent positives and negatives are noted in HPI.      PHYSICAL  EXAMINATION:  VITAL SIGNS: /52 (BP Location: Left arm)   Pulse 67   Temp 98.4 °F (36.9 °C) (Oral)   Resp 16   Wt 190 lb 11.2 oz (86.5 kg)   SpO2 97%   BMI 32.73 kg/m²   GENERAL:  Patient is a 79 year old female in no acute distress.  HEENT:  Normocephalic, atraumatic  ABD: Soft, non tender  SKIN: Warm, dry, no rashes    NEUROLOGICAL:   Mental status: Oriented to person, place, aware she is in the hospital, had hard time with month and year   Speech: Fluent, no obvious aphasia, mild dysarthria  Memory and comprehension: Impaired, short term memory issues.  Cranial Nerves: VFF, PERRL 2 mm brisk, EOMI, no nystagmus, facial sensation intact, face symmetric, tongue midline, shoulder shrug equal, remainder CN intact  Motor: Unable to keep arms up for long, unable to lift them above 90 degrees, feels tired. Able to lift both legs off the bed, not able to keep them up. Left drifting down sooner than right.  strength is 4/5 in hands bilaterally.   Sensory: Intact to light touch  Gait: Deferred      Imaging/Diagnostics:  MRI BRAIN (CPT=70551)    Result Date: 2/20/2024  CONCLUSION:  Stable chronic changes.  No acute disease.   LOCATION:  [facility]   Dictated by (CST): Quentin Angeles MD on 2/20/2024 at 10:39 PM     Finalized by (CST): Quentin Angeles MD on 2/20/2024 at 10:43 PM       CT BRAIN OR HEAD (44322)    Result Date: 2/19/2024  CONCLUSION: 1. No acute intracranial findings 2. Cerebral atrophy with chronic microvascular ischemic changes.    LOCATION:  [facility]   Dictated by (CST): Armando Ahmadi MD on 2/19/2024 at 8:28 PM     Finalized by (CST): Armando Ahmadi MD on 2/19/2024 at 8:31 PM       XR CHEST AP PORTABLE  (CPT=71045)    Result Date: 2/19/2024  CONCLUSION:   Stable cardiac and mediastinal contours.  Findings of congestive heart failure with mild interstitial pulmonary edema.  No associated pleural effusion or pneumothorax.   LOCATION:  [id number]      Dictated by (CST): Edgar Curtis MD on  2/19/2024 at 7:04 PM     Finalized by (CST): Edgar Curtis MD on 2/19/2024 at 7:05 PM       CT SPINE CERVICAL (CPT=72125)    Result Date: 2/15/2024  CONCLUSION:  1. No acute displaced osseous fracture. 2. Moderate degenerative changes in the cervical spine.    LOCATION:  [place]   Dictated by (CST): Stromberg, LeRoy, MD on 2/15/2024 at 11:58 AM     Finalized by (CST): Stromberg, LeRoy, MD on 2/15/2024 at 12:02 PM       XR HIP W OR WO PELVIS 2 OR 3 VIEWS, LEFT (CPT=73502)    Result Date: 2/15/2024  CONCLUSION:  1. No acute osseous injuries. 2. Severe left hip osteoarthritis and moderate right hip osteoarthritis. 3. Moderate bilateral SI joint arthropathy.   LOCATION:  [facility]   Dictated by (CST): Emily Friedman DO on 2/15/2024 at 11:45 AM     Finalized by (CST): Emily Friedman DO on 2/15/2024 at 11:45 AM       XR WRIST COMPLETE (MIN 3 VIEWS), LEFT (CPT=73110)    Result Date: 2/15/2024  CONCLUSION:  1. No acute osseous injuries. 2. Diffuse osteoarthritic changes as described above. 3. Osseous demineralization suspicious for osteopenia versus osteoporosis.   LOCATION:  [facility]   Dictated by (CST): Emily Friedman DO on 2/15/2024 at 11:43 AM     Finalized by (CST): Emily Friedman DO on 2/15/2024 at 11:44 AM       XR CHEST AP PORTABLE  (CPT=71045)    Result Date: 2/15/2024  CONCLUSION:  Increased mild reticular opacities throughout the lungs may represent edema versus infiltrate.  Correlate clinically.   LOCATION:  [facility]      Dictated by (CST): David Patricia MD on 2/15/2024 at 11:40 AM     Finalized by (CST): David Patricia MD on 2/15/2024 at 11:42 AM       XR HAND (MIN 3 VIEWS), LEFT (CPT=73130)    Result Date: 2/15/2024  CONCLUSION:  1. No acute osseous injury/fractures. 2. Diffuse osseous demineralization suggestive of osteopenia versus osteoporosis. 3. Diffuse osteoarthritic changes as described above.   LOCATION:  [facility]   Dictated by (CST): Emily Friedman DO on 2/15/2024 at 11:40 AM     Finalized by (PAULO): Emily Friedman,  DO on 2/15/2024 at 11:41 AM       CT BRAIN OR HEAD ([id number])    Result Date: 2/15/2024  CONCLUSION:  1. No acute intracranial hemorrhage or hydrocephalus. 2. Trace chronic small vessel ischemic disease. If there is clinical concern for acute ischemia/infarction, an MRI of the brain would be recommended for further evaluation.    LOCATION:  [facility]   Dictated by (CST): Stromberg, LeRoy, MD on 2/15/2024 at 9:50 AM     Finalized by (CST): Stromberg, LeRoy, MD on 2/15/2024 at 9:52 AM          Labs:  Recent Labs   Lab 02/15/24  0953 02/16/24 0425 02/19/24 1815 02/20/24  0442   RBC 2.64* 2.64* 2.91* 2.91*   HGB 8.6* 8.5* 9.4* 9.4*   HCT 25.9* 25.7* 28.1* 28.3*   MCV 98.1 97.3 96.6 97.3   MCH 32.6 32.2 32.3 32.3   MCHC 33.2 33.1 33.5 33.2   RDW 14.5 14.6 14.5 14.6   NEPRELIM 3.78  --  4.45 3.92   WBC 6.4 5.5 7.3 6.4   .0 262.0 270.0 245.0         Recent Labs   Lab 02/16/24 0425 02/19/24 1815 02/20/24  0442   * 133* 114*   BUN 36* 35* 36*   CREATSERUM 2.68* 2.56* 2.83*   EGFRCR 18* 19* 16*   CA 9.1 9.7 9.2   * 145 146*   K 3.7 3.4* 3.6  3.6   * 117* 117*   CO2 24.0 23.0 25.0       Pre-morbid mRS 1      Assessment/Plan:    A 79 year old female with:      An episode of alteration consciousness suspected to be due to seizures/encephalopathy. Concern for seizure activity, as patient has history of seizures and was not currently/prior to admission on medication. Expressive speech difficulties intermittently. Still with alternating episodes of lethargy and alertness.   2/20-2/21 LTM EEG - Abnormal, generalized sharp wave activity, epileptiform in nature. No evidence of electrographic seizures.   Will get another EEG LTM today as patient continues to have intermittent worsening of lethargy and confusion.  Continue Keppra 750 mg BID for now, further adjustment pending LTM  Continue with seizure and safety precautions   Stroke like symptoms - currently on Eliquis, aspirin 81 mg, rosuvastatin 20 mg  daily. CT brain and MRI zheng are negative for acute changes, bleed or an ischemic stroke.    Cognitive decline with intermittent worsening and improvement of alertness and mental status, likely changes of dementia. Seems to be better today. Fluctuation in mental status can be possibly related to underlying seizure activity. Continue to monitor. Can be evaluated as outpatient by neuropsychiatry specialist.   Discussed with patent, discussed with Dr. Gonsalez. To follow with further recommendations if indicated.     Is this a shared or split note between Advanced Practice Provider and Physician? Yes       May Soto APRANETTE  [facility]  2/22/2024, 8:32 AM  [place] # [id number]    Impression/plan/MDM:  Patient seen and examined personally.  Investigations reviewed.    Seizures.  Although no clinically apparent seizure with jerking movements, tongue biting or complete loss of consciousness noted, there is concern that patient is having intermittent waxing and waning level of consciousness, suspected to be due to complex partial seizures.  Patient is currently already on Keppra.  Will request continuous EEG monitoring for further evaluation.  Stroke prophylaxis patient to continue Eliquis, aspirin and rosuvastatin.  Cognitive decline with intermittent alteration of consciousness  Case discussed with nursing staff.

## 2024-02-22 NOTE — CM/SW NOTE
Department  notified of request for HHC, aidin referrals started. Assigned CM/SW to follow up with pt/family on further discharge planning.      Peyton Riggs  DSC

## 2024-02-22 NOTE — CONGREGATE LIVING REVIEW
[facility] Authorization    The [facility] Review Committee has reviewed this case and the patient IS APPROVED for discharge to a facility for Short Term Skilled once the following procedure is followed:     - The physician discharge instructions (contained within the ISABELA note for SNF) must inlcude the below appropriate and approved COVID instructions to the facility    For questions regarding CLRC approval process, please contact the CM assigned to the case.  For questions regarding RN discharge workflow, please contact the unit Clinical Leader.

## 2024-02-22 NOTE — PLAN OF CARE
Assumed care at 1930. Pt is A&Ox 2-3, forgetful, confused. Pt is on RA, O2 sats WNL. NSR on tele, VSS. Denies pain at this time. Up w/ 1-2 and walker, tolerating well. Plan of care reviewed with patient, verbalizes understanding, all needs addressed at this time, pt seems to be resting comfortably. Call light within reach. Bed alarm on.     POC: IV keppra, seizure precautions    Problem: Diabetes/Glucose Control  Goal: Glucose maintained within prescribed range  Description: INTERVENTIONS:  - Monitor Blood Glucose as ordered  - Assess for signs and symptoms of hyperglycemia and hypoglycemia  - Administer ordered medications to maintain glucose within target range  - Assess barriers to adequate nutritional intake and initiate nutrition consult as needed  - Instruct patient on self management of diabetes  2/22/2024 0050 by Maryann Herrera RN  Outcome: Progressing  2/22/2024 0047 by Maryann Herrera RN  Outcome: Progressing     Problem: Patient/Family Goals  Goal: Patient/Family Long Term Goal  Description: Patient's Long Term Goal: to go home    Interventions:  - Mds to see  - IV keppra  - See additional Care Plan goals for specific interventions  2/22/2024 0050 by Maryann Herrera RN  Outcome: Progressing  2/22/2024 0047 by Maryann Herrera RN  Outcome: Progressing  Goal: Patient/Family Short Term Goal  Description: Patient's Short Term Goal: to feel better    Interventions:   - IV keppra  - Mds to see  - Comply to care plan  - See additional Care Plan goals for specific interventions  2/22/2024 0050 by Maryann Herrera RN  Outcome: Progressing  2/22/2024 0047 by Maryann Herrera RN  Outcome: Progressing     Problem: NEUROLOGICAL - ADULT  Goal: Achieves stable or improved neurological status  Description: INTERVENTIONS  - Assess for and report changes in neurological status  - Initiate measures to prevent increased intracranial pressure  - Maintain blood pressure and fluid volume within ordered parameters to  optimize cerebral perfusion and minimize risk of hemorrhage  - Monitor temperature, glucose, and sodium. Initiate appropriate interventions as ordered  2/22/2024 0050 by Maryann Herrera RN  Outcome: Progressing  2/22/2024 0047 by Maryann Herrera RN  Outcome: Progressing  Goal: Absence of seizures  Description: INTERVENTIONS  - Monitor for seizure activity  - Administer anti-seizure medications as ordered  - Monitor neurological status  2/22/2024 0050 by Maryann Herrera RN  Outcome: Progressing  2/22/2024 0047 by Maryann Herrera RN  Outcome: Progressing  Goal: Remains free of injury related to seizure activity  Description: INTERVENTIONS:  - Maintain airway, patient safety  and administer oxygen as ordered  - Monitor patient for seizure activity, document and report duration and description of seizure to MD/LIP  - If seizure occurs, turn patient to side and suction secretions as needed  - Reorient patient post seizure  - Seizure pads on all 4 side rails  - Instruct patient/family to notify RN of any seizure activity  - Instruct patient/family to call for assistance with activity based on assessment  2/22/2024 0050 by Maryann Herrera RN  Outcome: Progressing  2/22/2024 0047 by Maryann Herrera RN  Outcome: Progressing  Goal: Achieves maximal functionality and self care  Description: INTERVENTIONS  - Monitor swallowing and airway patency with patient fatigue and changes in neurological status  - Encourage and assist patient to increase activity and self care with guidance from PT/OT  - Encourage visually impaired, hearing impaired and aphasic patients to use assistive/communication devices  2/22/2024 0050 by Maryann Herrera RN  Outcome: Progressing  2/22/2024 0047 by Maryann Herrera RN  Outcome: Progressing

## 2024-02-23 NOTE — PROGRESS NOTES
[facility]     Hospitalist Progress Note     Stephen Rodrigues Patient Status:  Inpatient    1945 MRN [id number]   [facility] 2NE-A Attending Nichole Kaur MD   Hosp Day # 3 PCP PHYSICIAN NONSTAFF     Chief Complaint: confusion    Subjective:     axox3  Knows her name and   Knows her familys names  Able to have a conversation    Objective:    Review of Systems:   A comprehensive review of systems was completed; pertinent positive and negatives stated in subjective.    Vital signs:  Temp:  [97.3 °F (36.3 °C)-99.4 °F (37.4 °C)] 98.5 °F (36.9 °C)  Pulse:  [67-96] 85  Resp:  [16-18] 16  BP: ()/(47-57) 113/57  SpO2:  [97 %-100 %] 99 %    Physical Exam:    General: No acute distress  Respiratory: No wheezes, no rhonchi  Cardiovascular: S1, S2, regular rate and rhythm  Abdomen: Soft, Non-tender, non-distended, positive bowel sounds  Neuro: No new focal deficits.   Extremities: No edema      Diagnostic Data:    Labs:  Recent Labs   Lab 24  0425 24  1815 24  0442   WBC 5.5 7.3 6.4   HGB 8.5* 9.4* 9.4*   MCV 97.3 96.6 97.3   .0 270.0 245.0   INR  --  1.16  --        Recent Labs   Lab 24  1815 24  0442   * 133* 114*   BUN 36* 35* 36*   CREATSERUM 2.68* 2.56* 2.83*   CA 9.1 9.7 9.2   ALB  --  3.7  --    * 145 146*   K 3.7 3.4* 3.6  3.6   * 117* 117*   CO2 24.0 23.0 25.0   ALKPHO  --  86  --    AST  --  15  --    ALT  --  22  --    BILT  --  0.5  --    TP  --  8.7*  --        Estimated Creatinine Clearance: 13.9 mL/min (A) (based on SCr of 2.83 mg/dL (H)).    No results for input(s): \"TROP\", \"TROPHS\", \"CK\" in the last 168 hours.      Recent Labs   Lab 24  1815   PTP 14.9*   INR 1.16                  Microbiology    No results found for this visit on 24.      Imaging: Reviewed in Epic.    Medications:    levETIRAcetam  750 mg Oral BID    allopurinol  100 mg Oral QAM    apixaban  5 mg Oral BID    aspirin  81 mg Oral  Daily    dilTIAZem ER  240 mg Oral Daily    escitalopram  10 mg Oral Daily    furosemide  20 mg Oral Daily    gabapentin  600 mg Oral Nightly    hydrALAZINE  50 mg Oral TID    [Held by provider] mirtazapine  15 mg Oral Nightly    pantoprazole  40 mg Oral QAM AC    rosuvastatin  10 mg Oral Daily    valsartan  320 mg Oral Daily    insulin aspart  1-10 Units Subcutaneous TID AC and HS       Assessment & Plan:      #Acute encephalopathy   -etiology unclear  -on Keppra  -EEG again today  #Hx of seizure disorder  -Given Valium and Keppra  -Neurology consult  -CT head without acute intracranial abnormalities.   -UA unremarkable  -MRI brain negative  -Seizure precautions     #Mild acute on chronic diastolic CHF  -CXR with findings of CHF  -On room air when awake     #Aortic stenosis  -Mild to moderate on most recent echo     #CAD s/p stent  -Aspirin, statin     #CKD stage IV  -Near baseline     #Hypertension  -Resume home medications    #Hyperlipidemia  -Statin     #DM type II with A1c 5.8  -Insulin sliding scale     #Hx of CVA  -Aspirin, statin     #JHONY  -JHONY protocol     #COPD, stable  -Resume albuterol inhaler as needed     #Gout  -Allopurinol     #Fibromyalgia  -Gabapentin     #LLE DVT  -Eliquis     #Anxiety and depression  -Resume Lexapro and Remeron     #GERD  -PPI         Nichole Kaur MD    Supplementary Documentation:     Quality:  DVT Mechanical Prophylaxis:   SCDs,    DVT Pharmacologic Prophylaxis   Medication    apixaban (Eliquis) tab 5 mg         DVT Pharmacologic prophylaxis: Aspirin 81 mg      Code Status: Full Code  Gurrola: External urinary catheter in place  Gurrola Duration (in days):   Central line:    ELROY: 2/23/2024    Discharge is dependent on: progress  At this point Ms. Rodrigues is expected to be discharge to: tbd    The 21st Century Cures Act makes medical notes like these available to patients in the interest of transparency. Please be advised this is a medical document. Medical documents are intended to  carry relevant information, facts as evident, and the clinical opinion of the practitioner. The medical note is intended as peer to peer communication and may appear blunt or direct. It is written in medical language and may contain abbreviations or verbiage that are unfamiliar.             **Certification      PHYSICIAN Certification of Need for Inpatient Hospitalization - Initial Certification    Patient will require inpatient services that will reasonably be expected to span two midnight's based on the clinical documentation in H+P.   Based on patients current state of illness, I anticipate that, after discharge, patient will require TBD.

## 2024-02-23 NOTE — PLAN OF CARE
Assumed care of patient at 0700. Pt A/Ox 2, very drowsy this morning. Appears to be closing eyes/falling back asleep mid conversation but can be easily woken up and reoriented. Disoriented to time and situation. Pt needing help feeding herself. Pt having difficulty with motor skills, feeling weaker. Neurology updated. O2 sats maintained on room air. NSR on tele. Last BM 2/20. Voiding without difficulty. Pt reports no pain. Pt up x2 assist and walker. Pt updated on plan of care. Care needs met. Bed in lowest position, Call light within reach. Bed alarm on.     POC: EEG, adjusting Keppra dose, NANETTE at discharge       Problem: Diabetes/Glucose Control  Goal: Glucose maintained within prescribed range  Description: INTERVENTIONS:  - Monitor Blood Glucose as ordered  - Assess for signs and symptoms of hyperglycemia and hypoglycemia  - Administer ordered medications to maintain glucose within target range  - Assess barriers to adequate nutritional intake and initiate nutrition consult as needed  - Instruct patient on self management of diabetes  Outcome: Progressing     Problem: Patient/Family Goals  Goal: Patient/Family Long Term Goal  Description: Patient's Long Term Goal: to go home    Interventions:  - Mds to see  - IV keppra  - See additional Care Plan goals for specific interventions  Outcome: Progressing  Goal: Patient/Family Short Term Goal  Description: Patient's Short Term Goal: to feel better    Interventions:   - IV keppra  - Mds to see  - Comply to care plan  - See additional Care Plan goals for specific interventions  Outcome: Progressing     Problem: NEUROLOGICAL - ADULT  Goal: Achieves stable or improved neurological status  Description: INTERVENTIONS  - Assess for and report changes in neurological status  - Initiate measures to prevent increased intracranial pressure  - Maintain blood pressure and fluid volume within ordered parameters to optimize cerebral perfusion and minimize risk of hemorrhage  -  Monitor temperature, glucose, and sodium. Initiate appropriate interventions as ordered  Outcome: Progressing  Goal: Absence of seizures  Description: INTERVENTIONS  - Monitor for seizure activity  - Administer anti-seizure medications as ordered  - Monitor neurological status  Outcome: Progressing  Goal: Remains free of injury related to seizure activity  Description: INTERVENTIONS:  - Maintain airway, patient safety  and administer oxygen as ordered  - Monitor patient for seizure activity, document and report duration and description of seizure to MD/LIP  - If seizure occurs, turn patient to side and suction secretions as needed  - Reorient patient post seizure  - Seizure pads on all 4 side rails  - Instruct patient/family to notify RN of any seizure activity  - Instruct patient/family to call for assistance with activity based on assessment  Outcome: Progressing  Goal: Achieves maximal functionality and self care  Description: INTERVENTIONS  - Monitor swallowing and airway patency with patient fatigue and changes in neurological status  - Encourage and assist patient to increase activity and self care with guidance from PT/OT  - Encourage visually impaired, hearing impaired and aphasic patients to use assistive/communication devices  Outcome: Progressing

## 2024-02-23 NOTE — PHYSICAL THERAPY NOTE
Attempted to see Pt this AM - RN aware of attempt.  Pt occupied with continuous EEG.  Will f/u later today if time permits, after all other patients are attempted per tentative schedule.

## 2024-02-23 NOTE — PROGRESS NOTES
[facility]  [facility] Neurology Progress Note    Stephen Rodrigues Patient Status:  Inpatient    1945 MRN [id number]   Location [facility] 2NE-A Attending Nichole Kaur MD   Hosp Day # 4 PCP PHYSICIAN NONSTAFF     CC: Intermittent alteration of mental status     Subjective:  Seen for a follow up visit today. Appears sleepy, drowsy, falls asleep in the middle of sentence at times. Able to answer most questions appropriately, recognized daughter and granddaughter at bedside. Able to tell me their names. Unable to follow some commands. Per nursing , patient has been more sleepy and having hard time following directions, took a long time to take her pills. Denies headache, denies any focal neurological deficits, \" I just feel tired\". No vision changes, having hard time keeping eyes open.       MEDICATIONS:  No current outpatient medications on file.     Current Facility-Administered Medications   Medication Dose Route Frequency    levETIRAcetam (Keppra) tab 750 mg  750 mg Oral BID    albuterol (Ventolin HFA) 108 (90 Base) MCG/ACT inhaler 2 puff  2 puff Inhalation Q4H PRN    albuterol (Ventolin) (2.5 MG/3ML) 0.083% nebulizer solution 3 mL  3 mL Nebulization TID PRN    allopurinol (Zyloprim) tab 100 mg  100 mg Oral QAM    apixaban (Eliquis) tab 5 mg  5 mg Oral BID    aspirin DR tab 81 mg  81 mg Oral Daily    dilTIAZem ER (CardIZEM CD) 24 hr cap 240 mg  240 mg Oral Daily    escitalopram (Lexapro) tab 10 mg  10 mg Oral Daily    furosemide (Lasix) tab 20 mg  20 mg Oral Daily    gabapentin (Neurontin) cap 600 mg  600 mg Oral Nightly    hydrALAZINE (Apresoline) tab 50 mg  50 mg Oral TID    [Held by provider] mirtazapine (REMERON SOL-TAB) disintegrating tab 15 mg  15 mg Oral Nightly    pantoprazole (Protonix) DR tab 40 mg  40 mg Oral QAM AC    rosuvastatin (Crestor) tab 10 mg  10 mg Oral Daily    valsartan (Diovan) tab 320 mg  320 mg Oral Daily    glucose (Dex4) 15 GM/59ML oral liquid 15 g  15 g Oral Q15 Min PRN    Or     glucose (Glutose) 40% oral gel 15 g  15 g Oral Q15 Min PRN    Or    glucose-vitamin C (Dex-4) chewable tab 4 tablet  4 tablet Oral Q15 Min PRN    Or    dextrose 50% injection 50 mL  50 mL Intravenous Q15 Min PRN    Or    glucose (Dex4) 15 GM/59ML oral liquid 30 g  30 g Oral Q15 Min PRN    Or    glucose (Glutose) 40% oral gel 30 g  30 g Oral Q15 Min PRN    Or    glucose-vitamin C (Dex-4) chewable tab 8 tablet  8 tablet Oral Q15 Min PRN    insulin aspart (NovoLOG) 100 Units/mL FlexPen 1-10 Units  1-10 Units Subcutaneous TID AC and HS    diazepam (Valium) 5 mg/mL injection 5 mg  5 mg Intravenous Q30 Min PRN    acetaminophen (Tylenol Extra Strength) tab 500 mg  500 mg Oral Q4H PRN    melatonin cap/tab 5 mg  5 mg Oral Nightly PRN    polyethylene glycol (PEG 3350) (Miralax) 17 g oral packet 17 g  17 g Oral Daily PRN    sennosides (Senokot) tab 17.2 mg  17.2 mg Oral Nightly PRN    bisacodyl (Dulcolax) 10 MG rectal suppository 10 mg  10 mg Rectal Daily PRN    ondansetron (Zofran) 4 MG/2ML injection 4 mg  4 mg Intravenous Q6H PRN    metoclopramide (Reglan) 5 mg/mL injection 5 mg  5 mg Intravenous Q8H PRN    benzonatate (Tessalon) cap 200 mg  200 mg Oral TID PRN    glycerin-hypromellose- (Artifical Tears) 0.2-0.2-1 % ophthalmic solution 1 drop  1 drop Both Eyes QID PRN    sodium chloride (Saline Mist) 0.65 % nasal solution 1 spray  1 spray Each Nare Q3H PRN       REVIEW OF SYSTEMS:  A 10-point system was reviewed.  Pertinent positives and negatives are noted in HPI.      PHYSICAL EXAMINATION:  VITAL SIGNS: /60 (BP Location: Left arm)   Pulse 82   Temp 97.9 °F (36.6 °C) (Axillary)   Resp 18   Wt 196 lb 6.9 oz (89.1 kg)   SpO2 97%   BMI 33.72 kg/m²   GENERAL:  Patient is a 79 year old female in no acute distress.  HEENT:  Normocephalic, atraumatic  ABD: Soft, non tender  SKIN: Warm, dry, no rashes    NEUROLOGICAL:   Mental status: Oriented to person, place, aware she is in the hospital, had hard time with  month and year, very sleepy  Speech: No obvious aphasia, mild dysarthria, dry mouth, tongue is white.   Memory and comprehension: Impaired, short term memory issues.  Cranial Nerves: VFF, PERRL 2 mm brisk, EOMI, no nystagmus, facial sensation intact, face symmetric, tongue midline, shoulder shrug equal, remainder CN intact  Motor: Unable to keep arms up for long, unable to lift them above 90 degrees, feels tired. Able to lift both legs off the bed, not able to keep them up. Left drifting down sooner than right. Not able to follow directions and demonstrate hand , lethargic  Sensory: Intact to light touch  Gait: Deferred      Imaging/Diagnostics:  MRI BRAIN (CPT=70551)    Result Date: 2/20/2024  CONCLUSION:  Stable chronic changes.  No acute disease.   LOCATION:  [facility]   Dictated by (CST): Quentin Angeles MD on 2/20/2024 at 10:39 PM     Finalized by (CST): Quentin Angeles MD on 2/20/2024 at 10:43 PM       CT BRAIN OR HEAD (30425)    Result Date: 2/19/2024  CONCLUSION: 1. No acute intracranial findings 2. Cerebral atrophy with chronic microvascular ischemic changes.    LOCATION:  [facility]   Dictated by (CST): Armando Ahmadi MD on 2/19/2024 at 8:28 PM     Finalized by (CST): Armando Ahmadi MD on 2/19/2024 at 8:31 PM       XR CHEST AP PORTABLE  (CPT=71045)    Result Date: 2/19/2024  CONCLUSION:   Stable cardiac and mediastinal contours.  Findings of congestive heart failure with mild interstitial pulmonary edema.  No associated pleural effusion or pneumothorax.   LOCATION:  [id number]      Dictated by (CST): Edgar Curtis MD on 2/19/2024 at 7:04 PM     Finalized by (CST): Edgar Curtis MD on 2/19/2024 at 7:05 PM       CT SPINE CERVICAL (CPT=72125)    Result Date: 2/15/2024  CONCLUSION:  1. No acute displaced osseous fracture. 2. Moderate degenerative changes in the cervical spine.    LOCATION:  [place]   Dictated by (CST): Stromberg, LeRoy, MD on 2/15/2024 at 11:58 AM     Finalized by (CST): Stromberg, LeRoy,  MD on 2/15/2024 at 12:02 PM       XR HIP W OR WO PELVIS 2 OR 3 VIEWS, LEFT (CPT=73502)    Result Date: 2/15/2024  CONCLUSION:  1. No acute osseous injuries. 2. Severe left hip osteoarthritis and moderate right hip osteoarthritis. 3. Moderate bilateral SI joint arthropathy.   LOCATION:  [facility]   Dictated by (CST): Emily Friedman DO on 2/15/2024 at 11:45 AM     Finalized by (CST): Emily Friedman DO on 2/15/2024 at 11:45 AM       XR WRIST COMPLETE (MIN 3 VIEWS), LEFT (CPT=73110)    Result Date: 2/15/2024  CONCLUSION:  1. No acute osseous injuries. 2. Diffuse osteoarthritic changes as described above. 3. Osseous demineralization suspicious for osteopenia versus osteoporosis.   LOCATION:  [facility]   Dictated by (CST): Emily Friedman DO on 2/15/2024 at 11:43 AM     Finalized by (CST): Emily Friedman DO on 2/15/2024 at 11:44 AM       XR CHEST AP PORTABLE  (CPT=71045)    Result Date: 2/15/2024  CONCLUSION:  Increased mild reticular opacities throughout the lungs may represent edema versus infiltrate.  Correlate clinically.   LOCATION:  [facility]      Dictated by (CST): David Patricia MD on 2/15/2024 at 11:40 AM     Finalized by (CST): David Patricia MD on 2/15/2024 at 11:42 AM       XR HAND (MIN 3 VIEWS), LEFT (CPT=73130)    Result Date: 2/15/2024  CONCLUSION:  1. No acute osseous injury/fractures. 2. Diffuse osseous demineralization suggestive of osteopenia versus osteoporosis. 3. Diffuse osteoarthritic changes as described above.   LOCATION:  [facility]   Dictated by (CST): Emily Friedman DO on 2/15/2024 at 11:40 AM     Finalized by (CST): Emily Friedman DO on 2/15/2024 at 11:41 AM       CT BRAIN OR HEAD (16379)    Result Date: 2/15/2024  CONCLUSION:  1. No acute intracranial hemorrhage or hydrocephalus. 2. Trace chronic small vessel ischemic disease. If there is clinical concern for acute ischemia/infarction, an MRI of the brain would be recommended for further evaluation.    LOCATION:  [place]   Dictated by (CST): Stromberg, LeRoy,  MD on 2/15/2024 at 9:50 AM     Finalized by (CST): Stromberg, LeRoy, MD on 2/15/2024 at 9:52 AM          Labs:  Recent Labs   Lab 02/19/24 1815 02/20/24  0442   RBC 2.91* 2.91*   HGB 9.4* 9.4*   HCT 28.1* 28.3*   MCV 96.6 97.3   MCH 32.3 32.3   MCHC 33.5 33.2   RDW 14.5 14.6   NEPRELIM 4.45 3.92   WBC 7.3 6.4   .0 245.0         Recent Labs   Lab 02/19/24  1815 02/20/24  0442   * 114*   BUN 35* 36*   CREATSERUM 2.56* 2.83*   EGFRCR 19* 16*   CA 9.7 9.2    146*   K 3.4* 3.6  3.6   * 117*   CO2 23.0 25.0       Pre-morbid mRS 1      Assessment/Plan:    A 79 year old female with:      Alteration consciousness suspected to be due to seizures/encephalopathy. Concern for seizure activity, as patient has history of seizures and was not currently/prior to admission on medication. Expressive speech difficulties intermittently. Still with alternating episodes of lethargy and alertness. More lethargic now than yesterday.  2/20-2/21 LTM EEG - Abnormal, generalized sharp wave activity, epileptiform in nature. No evidence of electrographic seizures.   2/22  EEG LTM - no active seizures, no epileptiform activity. Lethargy possibly can be related to the Keppra dose, will decrease dose to 500 mg PO BID.   Advised to check  ABGs and JHONY eval as possibly alteration of mental status can be related to retaining CO2.   Continue with seizure and safety precautions   Stroke like symptoms - currently on Eliquis, aspirin 81 mg, rosuvastatin 20 mg daily. CT brain and MRI zheng are negative for acute changes, bleed or an ischemic stroke.    Cognitive decline with intermittent worsening and improvement of alertness and mental status, likely changes of dementia. Fluctuation in mental status can be possibly related to underlying seizure activity. Continue Keppra and monitoring.   Can be evaluated as outpatient by neuropsychiatry specialist.   Discussed with patent, discussed with Dr. Gonsalez. To follow with further  recommendations if indicated.     Is this a shared or split note between Advanced Practice Provider and Physician? Yes       May DUGGAN  [facility]  2/23/2024, 8:58 AM   [place] # [id number]      Impression/plan/MDM:  Patient seen and examined personally.  Investigations reviewed.      Encephalopathy complicated by underlying dementia.  MRI of the brain did not show any acute infarct.  Long-term continuous EEG recording showed evidence of epileptiform activity but no evidence of electrographic seizures.  Mild encephalopathic changes were also noted.  Advise further evaluation for obstructive sleep apnea, possibly overnight oximetry to assess for significant somnolence mostly during the first part of the day.  This could also be related to side effect of Keppra.  Will decrease Keppra dose to 500 mg twice daily.  Stroke prophylaxis.  Patient to continue Eliquis, aspirin and rosuvastatin.

## 2024-02-23 NOTE — PROGRESS NOTES
[facility]     Hospitalist Progress Note     Stephen Rodrigues Patient Status:  Inpatient    1945 MRN [id number]   [facility] 2NE-A Attending Nichole Kaur MD   Hosp Day # 4 PCP PHYSICIAN NONSTAFF     Chief Complaint: confusion    Subjective:     Eeg still on  confused    Objective:    Review of Systems:   A comprehensive review of systems was  not completed  Vital signs:  Temp:  [97.9 °F (36.6 °C)-99.9 °F (37.7 °C)] 97.9 °F (36.6 °C)  Pulse:  [73-87] 84  Resp:  [16-18] 18  BP: ()/(51-68) 124/68  SpO2:  [96 %-100 %] 98 %    Physical Exam:    General: No acute distress  Respiratory: No wheezes, no rhonchi  Cardiovascular: S1, S2, regular rate and rhythm  Abdomen: Soft, Non-tender, non-distended, positive bowel sounds  Neuro: No new focal deficits.   Extremities: No edema      Diagnostic Data:    Labs:  Recent Labs   Lab 24  0442   WBC 7.3 6.4   HGB 9.4* 9.4*   MCV 96.6 97.3   .0 245.0   INR 1.16  --        Recent Labs   Lab 24  0442   * 114*   BUN 35* 36*   CREATSERUM 2.56* 2.83*   CA 9.7 9.2   ALB 3.7  --     146*   K 3.4* 3.6  3.6   * 117*   CO2 23.0 25.0   ALKPHO 86  --    AST 15  --    ALT 22  --    BILT 0.5  --    TP 8.7*  --        Estimated Creatinine Clearance: 13.9 mL/min (A) (based on SCr of 2.83 mg/dL (H)).    No results for input(s): \"TROP\", \"TROPHS\", \"CK\" in the last 168 hours.      Recent Labs   Lab 24   PTP 14.9*   INR 1.16                  Microbiology    No results found for this visit on 24.      Imaging: Reviewed in Epic.    Medications:    levETIRAcetam  750 mg Oral BID    allopurinol  100 mg Oral QAM    apixaban  5 mg Oral BID    aspirin  81 mg Oral Daily    dilTIAZem ER  240 mg Oral Daily    escitalopram  10 mg Oral Daily    furosemide  20 mg Oral Daily    gabapentin  600 mg Oral Nightly    hydrALAZINE  50 mg Oral TID    [Held by provider] mirtazapine  15 mg Oral Nightly     pantoprazole  40 mg Oral QAM AC    rosuvastatin  10 mg Oral Daily    valsartan  320 mg Oral Daily    insulin aspart  1-10 Units Subcutaneous TID AC and HS       Assessment & Plan:      #Acute encephalopathy   -etiology unclear  -on Keppra  -EEG again today  #Hx of seizure disorder  -Given Valium and Keppra  -Neurology consult  -CT head without acute intracranial abnormalities.   -UA unremarkable  -MRI brain negative  -Seizure precautions     #Mild acute on chronic diastolic CHF  -resolved     #Aortic stenosis  -Mild to moderate on most recent echo     #CAD s/p stent  -Aspirin, statin     #CKD stage IV  -Near baseline     #Hypertension  -Resume home medications    #Hyperlipidemia  -Statin     #DM type II with A1c 5.8  -Insulin sliding scale     #Hx of CVA  -Aspirin, statin     #JHONY  -JHONY protocol     #COPD, stable  -Resume albuterol inhaler as needed     #Gout  -Allopurinol     #Fibromyalgia  -Gabapentin     #LLE DVT  -Eliquis     #Anxiety and depression  -Resume Lexapro and Remeron     #GERD  -PPI         Nichole Kaur MD    Supplementary Documentation:     Quality:  DVT Mechanical Prophylaxis:   SCDs,    DVT Pharmacologic Prophylaxis   Medication    apixaban (Eliquis) tab 5 mg         DVT Pharmacologic prophylaxis: Aspirin 81 mg      Code Status: Full Code  Gurrola: External urinary catheter in place  Gurrola Duration (in days):   Central line:    ELROY: 2/23/2024    Discharge is dependent on: progress  At this point Ms. Rodrigues is expected to be discharge to: tbd    The 21st Century Cures Act makes medical notes like these available to patients in the interest of transparency. Please be advised this is a medical document. Medical documents are intended to carry relevant information, facts as evident, and the clinical opinion of the practitioner. The medical note is intended as peer to peer communication and may appear blunt or direct. It is written in medical language and may contain abbreviations or verbiage that are  unfamiliar.             **Certification      PHYSICIAN Certification of Need for Inpatient Hospitalization - Initial Certification    Patient will require inpatient services that will reasonably be expected to span two midnight's based on the clinical documentation in H+P.   Based on patients current state of illness, I anticipate that, after discharge, patient will require TBD.

## 2024-02-23 NOTE — PROCEDURES
LONG-TERM VIDEO EEG REPORT;     Reason for Examination:Encephalopathy/seizure     Technical Summary:   18 Channels of EEG and 1 Channel of EKG was performed utilizing internation 10/20 method. Motion, muscle and machine artifacts were noted.         Recording Start date/time: 02/22 at 1301.  Recording end date/time: 02/23 at 0800.        Background Activity:   The background activity consisted of 9 Hz waveforms, reactive to eye opening/ external stimulation.     Abnormality:  Throughout the recording mild, intermittent generalized sharp wave activity was noted.  There was no associated electrographic seizures.  Throughout the recording low to medium voltage, polymorphic, 3 to 6 Hz slow activity was noted diffusely over both hemispheres        Activation:     Hyperventilation:   Not Performed.     Photic Stimulation:  Driving response seen.No        Sleep:  Stage I sleep seen.         Impression:  This is a Abnormal prolonged Video EEG study.    Generalized sharp wave activity was noted during the recording.  These activities are epileptiform in nature.  No evidence of electrographic seizures were noted.    Mild diffuse slowing into delta and theta range was noted.  This constellation of findings can be seen in encephalopathy due to metabolic/toxic etiology, medication effects or diffuse cerebral injury.  Clinical correlation is recommended.           Jimmie Gonsalez MD  [facility].

## 2024-02-23 NOTE — PROGRESS NOTES
[facility]     Hospitalist Progress Note     Stephen Rodrigues Patient Status:  Inpatient    1945 MRN [id number]   [facility] 2NE-A Attending Nichole Kaur MD   Hosp Day # 3 PCP PHYSICIAN NONSTAFF     Chief Complaint: confusion    Subjective:     Patient still confused today but better  Knows her name and   Knows her familys names  Able to have a conversation    Objective:    Review of Systems:   A comprehensive review of systems was completed; pertinent positive and negatives stated in subjective.    Vital signs:  Temp:  [97.3 °F (36.3 °C)-99.4 °F (37.4 °C)] 98.5 °F (36.9 °C)  Pulse:  [67-96] 85  Resp:  [16-18] 16  BP: ()/(47-57) 113/57  SpO2:  [97 %-100 %] 99 %    Physical Exam:    General: No acute distress  Respiratory: No wheezes, no rhonchi  Cardiovascular: S1, S2, regular rate and rhythm  Abdomen: Soft, Non-tender, non-distended, positive bowel sounds  Neuro: No new focal deficits.   Extremities: No edema      Diagnostic Data:    Labs:  Recent Labs   Lab 24  0425 24  1815 24  0442   WBC 5.5 7.3 6.4   HGB 8.5* 9.4* 9.4*   MCV 97.3 96.6 97.3   .0 270.0 245.0   INR  --  1.16  --        Recent Labs   Lab 24  1815 24  0442   * 133* 114*   BUN 36* 35* 36*   CREATSERUM 2.68* 2.56* 2.83*   CA 9.1 9.7 9.2   ALB  --  3.7  --    * 145 146*   K 3.7 3.4* 3.6  3.6   * 117* 117*   CO2 24.0 23.0 25.0   ALKPHO  --  86  --    AST  --  15  --    ALT  --  22  --    BILT  --  0.5  --    TP  --  8.7*  --        Estimated Creatinine Clearance: 13.9 mL/min (A) (based on SCr of 2.83 mg/dL (H)).    No results for input(s): \"TROP\", \"TROPHS\", \"CK\" in the last 168 hours.      Recent Labs   Lab 24  1815   PTP 14.9*   INR 1.16                  Microbiology    No results found for this visit on 24.      Imaging: Reviewed in Epic.    Medications:    levETIRAcetam  750 mg Oral BID    allopurinol  100 mg Oral QAM    apixaban  5 mg  Oral BID    aspirin  81 mg Oral Daily    dilTIAZem ER  240 mg Oral Daily    escitalopram  10 mg Oral Daily    furosemide  20 mg Oral Daily    gabapentin  600 mg Oral Nightly    hydrALAZINE  50 mg Oral TID    [Held by provider] mirtazapine  15 mg Oral Nightly    pantoprazole  40 mg Oral QAM AC    rosuvastatin  10 mg Oral Daily    valsartan  320 mg Oral Daily    insulin aspart  1-10 Units Subcutaneous TID AC and HS       Assessment & Plan:      #Acute encephalopathy   -possibly due to seizures  #Hx of seizure disorder  -Given Valium and Keppra in the ER  -EEG   -Neurology consult  -CT head without acute intracranial abnormalities.   -UA unremarkable  -MRI brain pending  -Seizure precautions     #Mild acute on chronic diastolic CHF  -CXR with findings of CHF  -On room air when awake     #Aortic stenosis  -Mild to moderate on most recent echo     #CAD s/p stent  -Aspirin, statin     #CKD stage IV  -Near baseline     #Hypertension  -Resume home medications    #Hyperlipidemia  -Statin     #DM type II with A1c 5.8  -Insulin sliding scale     #Hx of CVA  -Aspirin, statin     #JHONY  -JHONY protocol     #COPD, stable  -Resume albuterol inhaler as needed     #Gout  -Allopurinol     #Fibromyalgia  -Gabapentin     #LLE DVT  -Eliquis     #Anxiety and depression  -Resume Lexapro and Remeron     #GERD  -PPI         Nichole Kaur MD    Supplementary Documentation:     Quality:  DVT Mechanical Prophylaxis:   SCDs,    DVT Pharmacologic Prophylaxis   Medication    apixaban (Eliquis) tab 5 mg         DVT Pharmacologic prophylaxis: Aspirin 81 mg      Code Status: Full Code  Gurrola: External urinary catheter in place  Gurrola Duration (in days):   Central line:    ELROY: 2/23/2024    Discharge is dependent on: progress  At this point Ms. Rodrigues is expected to be discharge to: tbd    The 21st Century Cures Act makes medical notes like these available to patients in the interest of transparency. Please be advised this is a medical document. Medical  documents are intended to carry relevant information, facts as evident, and the clinical opinion of the practitioner. The medical note is intended as peer to peer communication and may appear blunt or direct. It is written in medical language and may contain abbreviations or verbiage that are unfamiliar.             **Certification      PHYSICIAN Certification of Need for Inpatient Hospitalization - Initial Certification    Patient will require inpatient services that will reasonably be expected to span two midnight's based on the clinical documentation in H+P.   Based on patients current state of illness, I anticipate that, after discharge, patient will require TBD.

## 2024-02-24 NOTE — PROGRESS NOTES
History of Presenting Illness:  Patient seen for follow-up.  Patient noted to be more drowsy in the morning then later in the day.  Patient when seen was awake, answering questions reasonably appropriately, denying any kind of pain or discomfort.  Nursing staff tells me that patient was more drowsy in the morning.  Overnight pulse ox was performed to assess for any evidence of hypoxia while patient is sleeping.  No significant drop in oxygenation was noted.  No episode of seizure-like activity.      Vitals:   Vitals:    02/24/24 1227   BP: 116/51   Pulse: 83   Resp: 18   Temp: 97.9 °F (36.6 °C)        Examination:    Awake , non-dysarthric, expressive an receptive language normal.   Pupils round and reactive to light, extra ocular movement normal, no facial weakness, hearing normal.  Motor strength and reflexes symmetrical.  Finger to Nose testing normal.     Investigations:       Lab Results   Component Value Date    A1C 5.8 (H) 01/27/2023        Lab Results   Component Value Date    LDL 25 02/15/2024    HDL 60 (H) 02/15/2024    TRIG 75 02/15/2024        Recent Labs   Lab 02/19/24  1815 02/20/24  0442   RBC 2.91* 2.91*   HGB 9.4* 9.4*   HCT 28.1* 28.3*   MCV 96.6 97.3   MCH 32.3 32.3   MCHC 33.5 33.2   RDW 14.5 14.6   NEPRELIM 4.45 3.92   WBC 7.3 6.4   .0 245.0       Recent Labs   Lab 02/19/24  1815 02/20/24  0442   * 114*   BUN 35* 36*   CREATSERUM 2.56* 2.83*   EGFRCR 19* 16*   CA 9.7 9.2    146*   K 3.4* 3.6  3.6   * 117*   CO2 23.0 25.0         Impression/MDM.  Encephalopathy complicated by underlying dementia.  MRI of the brain did not show any acute infarct.  Long-term continuous EEG recording showed evidence of epileptiform activity but no evidence of electrographic seizures.  Mild encephalopathic changes were also noted.  Overnight oximetry did not show significant drop in saturations.  Keppra dose decreased to 500 mg twice daily  Stroke prophylaxis.  Patient to continue Eliquis,  aspirin and rosuvastatin.  Nursing staff appraised.  Discussed possibility of rehab.

## 2024-02-24 NOTE — PROGRESS NOTES
[facility]     Hospitalist Progress Note     Stephen Rodrigues Patient Status:  Inpatient    1945 MRN [id number]   [facility] 2NE-A Attending Nichole Kaur MD   Hosp Day # 5 PCP PHYSICIAN NONSTAFF     Chief Complaint: confusion    Subjective:     Eeg still on  Sleepy this am    Objective:    Review of Systems:   A comprehensive review of systems was  not completed  Vital signs:  Temp:  [97.8 °F (36.6 °C)-98.7 °F (37.1 °C)] 97.9 °F (36.6 °C)  Pulse:  [83-93] 83  Resp:  [18-20] 18  BP: (100-132)/(51-55) 116/51  SpO2:  [96 %-99 %] 97 %    Physical Exam:    General: No acute distress  Respiratory: No wheezes, no rhonchi  Cardiovascular: S1, S2, regular rate and rhythm  Abdomen: Soft, Non-tender, non-distended, positive bowel sounds  Neuro: No new focal deficits.   Extremities: No edema      Diagnostic Data:    Labs:  Recent Labs   Lab 24  0442   WBC 7.3 6.4   HGB 9.4* 9.4*   MCV 96.6 97.3   .0 245.0   INR 1.16  --        Recent Labs   Lab 24  0442   * 114*   BUN 35* 36*   CREATSERUM 2.56* 2.83*   CA 9.7 9.2   ALB 3.7  --     146*   K 3.4* 3.6  3.6   * 117*   CO2 23.0 25.0   ALKPHO 86  --    AST 15  --    ALT 22  --    BILT 0.5  --    TP 8.7*  --        Estimated Creatinine Clearance: 13.9 mL/min (A) (based on SCr of 2.83 mg/dL (H)).    No results for input(s): \"TROP\", \"TROPHS\", \"CK\" in the last 168 hours.      Recent Labs   Lab 24   PTP 14.9*   INR 1.16                  Microbiology    No results found for this visit on 24.      Imaging: Reviewed in Epic.    Medications:    levETIRAcetam  500 mg Oral BID    allopurinol  100 mg Oral QAM    apixaban  5 mg Oral BID    aspirin  81 mg Oral Daily    dilTIAZem ER  240 mg Oral Daily    escitalopram  10 mg Oral Daily    furosemide  20 mg Oral Daily    gabapentin  600 mg Oral Nightly    hydrALAZINE  50 mg Oral TID    [Held by provider] mirtazapine  15 mg Oral Nightly     pantoprazole  40 mg Oral QAM AC    rosuvastatin  10 mg Oral Daily    valsartan  320 mg Oral Daily    insulin aspart  1-10 Units Subcutaneous TID AC and HS       Assessment & Plan:      #Acute encephalopathy   -etiology unclear  -on Keppra  -EEG again today  #Hx of seizure disorder  -Keppra  -Neurology consult  -CT head without acute intracranial abnormalities.   -UA unremarkable  -MRI brain negative  -Seizure precautions     #Mild acute on chronic diastolic CHF  -resolved     #Aortic stenosis  -Mild to moderate on most recent echo     #CAD s/p stent  -Aspirin, statin     #CKD stage IV  -Near baseline     #Hypertension  -Resume home medications    #Hyperlipidemia  -Statin     #DM type II with A1c 5.8  -Insulin sliding scale     #Hx of CVA  -Aspirin, statin     #JHONY  -JHONY protocol     #COPD, stable  -Resume albuterol inhaler as needed     #Gout  -Allopurinol     #Fibromyalgia  -Gabapentin     #LLE DVT  -Eliquis     #Anxiety and depression  -Resume Lexapro and Remeron     #GERD  -PPI         Nichole Kaur MD    Supplementary Documentation:     Quality:  DVT Mechanical Prophylaxis:   SCDs,    DVT Pharmacologic Prophylaxis   Medication    apixaban (Eliquis) tab 5 mg         DVT Pharmacologic prophylaxis: Aspirin 81 mg      Code Status: Full Code  Gurrola: External urinary catheter in place  Gurrola Duration (in days):   Central line:    ELROY: 2/26/2024    Discharge is dependent on: progress  At this point Ms. Rodrigues is expected to be discharge to: tbd    The 21st Century Cures Act makes medical notes like these available to patients in the interest of transparency. Please be advised this is a medical document. Medical documents are intended to carry relevant information, facts as evident, and the clinical opinion of the practitioner. The medical note is intended as peer to peer communication and may appear blunt or direct. It is written in medical language and may contain abbreviations or verbiage that are unfamiliar.              **Certification      PHYSICIAN Certification of Need for Inpatient Hospitalization - Initial Certification    Patient will require inpatient services that will reasonably be expected to span two midnight's based on the clinical documentation in H+P.   Based on patients current state of illness, I anticipate that, after discharge, patient will require TBD.

## 2024-02-24 NOTE — PLAN OF CARE
PT A&OX 1-2 (oriented to person and place in the morning), more lethargic and confused at night.  PT is on RA, NSR on tele. Bilateral breath diminished. No cough reported  Denied CP. Denied SOB. Complaint of both arm pain when lift up. Weak hand grab. No BM, abd soft, bowel sound present, non distended upon palpitation.   Up x2 with walker and gait belt.   Updated POC to pt, all needs meet at this time.   Call light within reach, bed alarm on and in low position for pt safety.     POC:   Oral Keppra 500mg BID  Dc to [facility]    Problem: Diabetes/Glucose Control  Goal: Glucose maintained within prescribed range  Description: INTERVENTIONS:  - Monitor Blood Glucose as ordered  - Assess for signs and symptoms of hyperglycemia and hypoglycemia  - Administer ordered medications to maintain glucose within target range  - Assess barriers to adequate nutritional intake and initiate nutrition consult as needed  - Instruct patient on self management of diabetes  Outcome: Progressing     Problem: Patient/Family Goals  Goal: Patient/Family Long Term Goal  Description: Patient's Long Term Goal: to go home    Interventions:  - Mds to see  - IV keppra  - See additional Care Plan goals for specific interventions  Outcome: Progressing  Goal: Patient/Family Short Term Goal  Description: Patient's Short Term Goal: to feel better    Interventions:   - IV keppra  - Mds to see  - Comply to care plan  - See additional Care Plan goals for specific interventions  Outcome: Progressing     Problem: NEUROLOGICAL - ADULT  Goal: Achieves stable or improved neurological status  Description: INTERVENTIONS  - Assess for and report changes in neurological status  - Initiate measures to prevent increased intracranial pressure  - Maintain blood pressure and fluid volume within ordered parameters to optimize cerebral perfusion and minimize risk of hemorrhage  - Monitor temperature, glucose, and sodium. Initiate appropriate interventions as ordered  Outcome:  Progressing  Goal: Absence of seizures  Description: INTERVENTIONS  - Monitor for seizure activity  - Administer anti-seizure medications as ordered  - Monitor neurological status  Outcome: Progressing  Goal: Remains free of injury related to seizure activity  Description: INTERVENTIONS:  - Maintain airway, patient safety  and administer oxygen as ordered  - Monitor patient for seizure activity, document and report duration and description of seizure to MD/LIP  - If seizure occurs, turn patient to side and suction secretions as needed  - Reorient patient post seizure  - Seizure pads on all 4 side rails  - Instruct patient/family to notify RN of any seizure activity  - Instruct patient/family to call for assistance with activity based on assessment  Outcome: Progressing  Goal: Achieves maximal functionality and self care  Description: INTERVENTIONS  - Monitor swallowing and airway patency with patient fatigue and changes in neurological status  - Encourage and assist patient to increase activity and self care with guidance from PT/OT  - Encourage visually impaired, hearing impaired and aphasic patients to use assistive/communication devices  Outcome: Progressing

## 2024-02-24 NOTE — PLAN OF CARE
Pt denies c/o pain, malaise, or cardiac symptoms. A&Ox1, lethargic. Lungs diminished bilaterally with equal expansion, on room air. Pt NSR on monitor with regular rate. Abdomen soft, round, and non-tender with active bowel sounds in all four quadrants. Incontinent of B&B.     Problem: Diabetes/Glucose Control  Goal: Glucose maintained within prescribed range  Description: INTERVENTIONS:  - Monitor Blood Glucose as ordered  - Assess for signs and symptoms of hyperglycemia and hypoglycemia  - Administer ordered medications to maintain glucose within target range  - Assess barriers to adequate nutritional intake and initiate nutrition consult as needed  - Instruct patient on self management of diabetes  Outcome: Progressing     Problem: Patient/Family Goals  Goal: Patient/Family Long Term Goal  Description: Patient's Long Term Goal: to go home    Interventions:  - Mds to see  - IV keppra  - See additional Care Plan goals for specific interventions  Outcome: Progressing  Goal: Patient/Family Short Term Goal  Description: Patient's Short Term Goal: to feel better    Interventions:   - IV keppra  - Mds to see  - Comply to care plan  - See additional Care Plan goals for specific interventions  Outcome: Progressing     Problem: NEUROLOGICAL - ADULT  Goal: Achieves stable or improved neurological status  Description: INTERVENTIONS  - Assess for and report changes in neurological status  - Initiate measures to prevent increased intracranial pressure  - Maintain blood pressure and fluid volume within ordered parameters to optimize cerebral perfusion and minimize risk of hemorrhage  - Monitor temperature, glucose, and sodium. Initiate appropriate interventions as ordered  Outcome: Progressing  Goal: Absence of seizures  Description: INTERVENTIONS  - Monitor for seizure activity  - Administer anti-seizure medications as ordered  - Monitor neurological status  Outcome: Progressing  Goal: Remains free of injury related to seizure  activity  Description: INTERVENTIONS:  - Maintain airway, patient safety  and administer oxygen as ordered  - Monitor patient for seizure activity, document and report duration and description of seizure to MD/LIP  - If seizure occurs, turn patient to side and suction secretions as needed  - Reorient patient post seizure  - Seizure pads on all 4 side rails  - Instruct patient/family to notify RN of any seizure activity  - Instruct patient/family to call for assistance with activity based on assessment  Outcome: Progressing  Goal: Achieves maximal functionality and self care  Description: INTERVENTIONS  - Monitor swallowing and airway patency with patient fatigue and changes in neurological status  - Encourage and assist patient to increase activity and self care with guidance from PT/OT  - Encourage visually impaired, hearing impaired and aphasic patients to use assistive/communication devices  Outcome: Progressing

## 2024-02-25 PROBLEM — R41.89 COGNITIVE DECLINE: Status: ACTIVE | Noted: 2024-01-01

## 2024-02-25 PROBLEM — R41.89 COGNITIVE DECLINE: Status: ACTIVE | Noted: 2024-02-25

## 2024-02-25 NOTE — PROGRESS NOTES
History of Presenting Illness:  Patient seen for follow-up.  Patient awake, reasonably responsive.  Patient's daughter also available at the time of visit.  No episode of seizure, tongue biting, jerking most of the upper or lower extremities.  Patient continues to have waxing and waning level of arousal.  No new weakness or seizure-like activity.      Vitals:   Vitals:    02/25/24 1145   BP: 132/51   Pulse: 85   Resp: 18   Temp: 100.2 °F (37.9 °C)          Examination:    Awake , non-dysarthric, expressive an receptive language normal.   Pupils round and reactive to light, extra ocular movement normal, no facial weakness, hearing normal.  Motor strength and reflexes symmetrical.  Finger to Nose testing normal.     Investigations:       Lab Results   Component Value Date    A1C 5.8 (H) 01/27/2023        Lab Results   Component Value Date    LDL 25 02/15/2024    HDL 60 (H) 02/15/2024    TRIG 75 02/15/2024        Recent Labs   Lab 02/19/24  1815 02/20/24  0442   RBC 2.91* 2.91*   HGB 9.4* 9.4*   HCT 28.1* 28.3*   MCV 96.6 97.3   MCH 32.3 32.3   MCHC 33.5 33.2   RDW 14.5 14.6   NEPRELIM 4.45 3.92   WBC 7.3 6.4   .0 245.0       Recent Labs   Lab 02/19/24  1815 02/20/24  0442   * 114*   BUN 35* 36*   CREATSERUM 2.56* 2.83*   EGFRCR 19* 16*   CA 9.7 9.2    146*   K 3.4* 3.6  3.6   * 117*   CO2 23.0 25.0         Impression/MDM.    Encephalopathy, complicated by underlying dementia.  Situation discussed with patient's daughter in detail.  Test results performed were all also discussed in detail.  At this time we have agreed that patient is not a good candidate to be left alone at home independently and a transition from rehab or a skilled nursing facility would be a better solution.  Patient's daughter concurs.  History of seizures.  Continue Keppra 500 mg twice daily.  Mild hypernatremia.  Last checked few days ago.  Advise correction.  Hypoxia on recent ABG.  Most recent ABG showed pO2 of 78.   Further recommendations from hospitalist/pulmonologist.  There has been concern that patient's encephalopathy may partially be related to obstructive sleep apnea.  No further active neurological intervention needed.  Please feel free to call for further queries.

## 2024-02-25 NOTE — PROGRESS NOTES
[facility]     Hospitalist Progress Note     Stephen Rodrigues Patient Status:  Inpatient    1945 MRN [id number]   Location [facility] 2NE-A Attending Nichole Kaur MD   Hosp Day # 6 PCP PHYSICIAN NONSTAFF     Chief Complaint: confusion    Subjective:     Eeg repeated  Sleepy this am but better than yesterday    Objective:    Review of Systems:   A comprehensive review of systems was  not completed  Vital signs:  Temp:  [97.9 °F (36.6 °C)-100.2 °F (37.9 °C)] 100.2 °F (37.9 °C)  Pulse:  [83-89] 85  Resp:  [14-18] 18  BP: (116-142)/(46-58) 132/51  SpO2:  [95 %-100 %] 95 %    Physical Exam:    General: No acute distress  Respiratory: No wheezes, no rhonchi  Cardiovascular: S1, S2, regular rate and rhythm  Abdomen: Soft, Non-tender, non-distended, positive bowel sounds  Neuro: No new focal deficits.   Extremities: No edema      Diagnostic Data:    Labs:  Recent Labs   Lab 24  0442   WBC 7.3 6.4   HGB 9.4* 9.4*   MCV 96.6 97.3   .0 245.0   INR 1.16  --        Recent Labs   Lab 24  0442   * 114*   BUN 35* 36*   CREATSERUM 2.56* 2.83*   CA 9.7 9.2   ALB 3.7  --     146*   K 3.4* 3.6  3.6   * 117*   CO2 23.0 25.0   ALKPHO 86  --    AST 15  --    ALT 22  --    BILT 0.5  --    TP 8.7*  --        Estimated Creatinine Clearance: 13.9 mL/min (A) (based on SCr of 2.83 mg/dL (H)).    No results for input(s): \"TROP\", \"TROPHS\", \"CK\" in the last 168 hours.      Recent Labs   Lab 24   PTP 14.9*   INR 1.16                  Microbiology    No results found for this visit on 24.      Imaging: Reviewed in Epic.    Medications:    levETIRAcetam  500 mg Oral BID    allopurinol  100 mg Oral QAM    apixaban  5 mg Oral BID    aspirin  81 mg Oral Daily    dilTIAZem ER  240 mg Oral Daily    escitalopram  10 mg Oral Daily    furosemide  20 mg Oral Daily    gabapentin  600 mg Oral Nightly    hydrALAZINE  50 mg Oral TID    mirtazapine  15 mg Oral  Nightly    pantoprazole  40 mg Oral QAM AC    rosuvastatin  10 mg Oral Daily    valsartan  320 mg Oral Daily    insulin aspart  1-10 Units Subcutaneous TID AC and HS       Assessment & Plan:      #Acute encephalopathy   -on Keppra  -EEG repeated  -better today  #Hx of seizure disorder  -Keppra dose adjusted down  -Neurology consult  -CT head without acute intracranial abnormalities.   -UA unremarkable  -MRI brain negative  -Seizure precautions     #Mild acute on chronic diastolic CHF  -resolved     #Aortic stenosis  -Mild to moderate on most recent echo     #CAD s/p stent  -Aspirin, statin     #CKD stage IV  -Near baseline     #Hypertension  -Resume home medications    #Hyperlipidemia  -Statin     #DM type II with A1c 5.8  -Insulin sliding scale     #Hx of CVA  -Aspirin, statin     #JHONY  -JHONY protocol     #COPD, stable  -Resume albuterol inhaler as needed     #Gout  -Allopurinol     #Fibromyalgia  -Gabapentin     #LLE DVT  -Eliquis     #Anxiety and depression  -Resume Lexapro and Remeron     #GERD  -PPI         Nichole Kaur MD    Supplementary Documentation:     Quality:  DVT Mechanical Prophylaxis:   SCDs,    DVT Pharmacologic Prophylaxis   Medication    apixaban (Eliquis) tab 5 mg         DVT Pharmacologic prophylaxis: Aspirin 81 mg      Code Status: Full Code  Gurrola: External urinary catheter in place  Gurrola Duration (in days):   Central line:    ELROY: 2/26/2024    Discharge is dependent on: progress  At this point Ms. Rodrigues is expected to be discharge to: tbd    The 21st Century Cures Act makes medical notes like these available to patients in the interest of transparency. Please be advised this is a medical document. Medical documents are intended to carry relevant information, facts as evident, and the clinical opinion of the practitioner. The medical note is intended as peer to peer communication and may appear blunt or direct. It is written in medical language and may contain abbreviations or verbiage that  are unfamiliar.             **Certification      PHYSICIAN Certification of Need for Inpatient Hospitalization - Initial Certification    Patient will require inpatient services that will reasonably be expected to span two midnight's based on the clinical documentation in H+P.   Based on patients current state of illness, I anticipate that, after discharge, patient will require TBD.

## 2024-02-25 NOTE — PLAN OF CARE
Pt continuous to be lethargic. Occasion request for water and drifts back to sleep. Vitals stable. Kept clean and dry. Repositioned every 2 hours.       Problem: Diabetes/Glucose Control  Goal: Glucose maintained within prescribed range  Description: INTERVENTIONS:  - Monitor Blood Glucose as ordered  - Assess for signs and symptoms of hyperglycemia and hypoglycemia  - Administer ordered medications to maintain glucose within target range  - Assess barriers to adequate nutritional intake and initiate nutrition consult as needed  - Instruct patient on self management of diabetes  Outcome: Progressing     Problem: NEUROLOGICAL - ADULT  Goal: Achieves stable or improved neurological status  Description: INTERVENTIONS  - Assess for and report changes in neurological status  - Initiate measures to prevent increased intracranial pressure  - Maintain blood pressure and fluid volume within ordered parameters to optimize cerebral perfusion and minimize risk of hemorrhage  - Monitor temperature, glucose, and sodium. Initiate appropriate interventions as ordered  Outcome: Progressing  Goal: Absence of seizures  Description: INTERVENTIONS  - Monitor for seizure activity  - Administer anti-seizure medications as ordered  - Monitor neurological status  Outcome: Progressing  Goal: Remains free of injury related to seizure activity  Description: INTERVENTIONS:  - Maintain airway, patient safety  and administer oxygen as ordered  - Monitor patient for seizure activity, document and report duration and description of seizure to MD/LIP  - If seizure occurs, turn patient to side and suction secretions as needed  - Reorient patient post seizure  - Seizure pads on all 4 side rails  - Instruct patient/family to notify RN of any seizure activity  - Instruct patient/family to call for assistance with activity based on assessment  Outcome: Progressing  Goal: Achieves maximal functionality and self care  Description: INTERVENTIONS  - Monitor  swallowing and airway patency with patient fatigue and changes in neurological status  - Encourage and assist patient to increase activity and self care with guidance from PT/OT  - Encourage visually impaired, hearing impaired and aphasic patients to use assistive/communication devices  Outcome: Progressing

## 2024-02-25 NOTE — PLAN OF CARE
Patient AXOx2,drowsy,poor appetite.On room air.NSR on tele.No c/o pain.Incontinent of bowel and bladder.Repositioned.Call light within reach.NANETTE placement.Managing keppra.PT/OT.Plan of care updated.    Problem: Diabetes/Glucose Control  Goal: Glucose maintained within prescribed range  Description: INTERVENTIONS:  - Monitor Blood Glucose as ordered  - Assess for signs and symptoms of hyperglycemia and hypoglycemia  - Administer ordered medications to maintain glucose within target range  - Assess barriers to adequate nutritional intake and initiate nutrition consult as needed  - Instruct patient on self management of diabetes  Outcome: Progressing     Problem: Patient/Family Goals  Goal: Patient/Family Long Term Goal  Description: Patient's Long Term Goal: to go home    Interventions:  - Mds to see  - IV keppra  - See additional Care Plan goals for specific interventions  Outcome: Progressing  Goal: Patient/Family Short Term Goal  Description: Patient's Short Term Goal: to feel better    Interventions:   - IV keppra  - Mds to see  - Comply to care plan  - See additional Care Plan goals for specific interventions  Outcome: Progressing     Problem: NEUROLOGICAL - ADULT  Goal: Achieves stable or improved neurological status  Description: INTERVENTIONS  - Assess for and report changes in neurological status  - Initiate measures to prevent increased intracranial pressure  - Maintain blood pressure and fluid volume within ordered parameters to optimize cerebral perfusion and minimize risk of hemorrhage  - Monitor temperature, glucose, and sodium. Initiate appropriate interventions as ordered  Outcome: Progressing  Goal: Absence of seizures  Description: INTERVENTIONS  - Monitor for seizure activity  - Administer anti-seizure medications as ordered  - Monitor neurological status  Outcome: Progressing  Goal: Remains free of injury related to seizure activity  Description: INTERVENTIONS:  - Maintain airway, patient safety   and administer oxygen as ordered  - Monitor patient for seizure activity, document and report duration and description of seizure to MD/LIP  - If seizure occurs, turn patient to side and suction secretions as needed  - Reorient patient post seizure  - Seizure pads on all 4 side rails  - Instruct patient/family to notify RN of any seizure activity  - Instruct patient/family to call for assistance with activity based on assessment  Outcome: Progressing  Goal: Achieves maximal functionality and self care  Description: INTERVENTIONS  - Monitor swallowing and airway patency with patient fatigue and changes in neurological status  - Encourage and assist patient to increase activity and self care with guidance from PT/OT  - Encourage visually impaired, hearing impaired and aphasic patients to use assistive/communication devices  Outcome: Progressing     Problem: Delirium  Goal: Minimize duration of delirium  Description: Interventions:  - Encourage use of hearing aids, eye glasses  - Promote highest level of mobility daily  - Provide frequent reorientation  - Promote wakefulness i.e. lights on, blinds open  - Promote sleep, encourage patient's normal rest cycle i.e. lights off, TV off, minimize noise and interruptions  - Encourage family to assist in orientation and promotion of home routines  Outcome: Progressing

## 2024-02-26 PROBLEM — N18.4 STAGE 4 CHRONIC KIDNEY DISEASE (HCC): Status: ACTIVE | Noted: 2017-05-28

## 2024-02-26 PROBLEM — N18.4 CKD (CHRONIC KIDNEY DISEASE) STAGE 4, GFR 15-29 ML/MIN (HCC): Status: ACTIVE | Noted: 2017-05-28

## 2024-02-26 PROBLEM — E87.20 METABOLIC ACIDOSIS: Status: ACTIVE | Noted: 2024-02-26

## 2024-02-26 PROBLEM — E87.20 METABOLIC ACIDOSIS: Status: ACTIVE | Noted: 2024-01-01

## 2024-02-26 NOTE — DIETARY NOTE
[facility]    NUTRITION ASSESSMENT    Unable to diagnose malnutrition criteria at this time.    NUTRITION INTERVENTION:    Meal and Snacks - Monitor and encourage adequate PO intake. Pt currently NPO. Diet advancement per SLP/MD.  Medical Food Supplements - Will evaluate need when diet is advanced.  Enteral Nutrition - Pt w/ poor PO intakes this admit (7 days). If PO intakes do not improve in the next 24-48 hours, consider placing DHT for TF's, if aggressive measures wish to be taken.   Coordination of Nutrition Care - SLP consult prior to diet advancement. Consider Palliative care consult for goals of care.      PATIENT STATUS:     2/26/24- 80 y/o female admitted with AMS. Pt seen d/t length of stay. A&O to self. Very lethargic.  Pt stated she had a good appetite PTA. Pt then fell back asleep and did not answer any other questions. Has had a poor appetite/PO intakes this admit. Recorded PO intakes vary:0-80% with 50% or less most meals. Evaluated by SLP today and made NPO d/t lethargy. Will monitor for diet advancement vs need for nutrition support.   PMH:HTN, HLD, CAD s/p stent, aortic stenosis, DM2, CVA, CKD4, COPD, JHONY, IBS, fibromyalgia, seizure disorder.       ANTHROPOMETRICS:  Ht:  5' 4\"  Wt: 87.5 kg (192 lb 14.4 oz).   BMI: Body mass index is 33.11 kg/m².  IBW: 54.5 kg      WEIGHT HISTORY:     Wts vary per EMR hx. Per EMR hx, pt w/ 11 lb (5.4%) wt loss in about 1.5 weeks?    Wt Readings from Last 10 Encounters:   02/26/24 87.5 kg (192 lb 14.4 oz)   02/15/24 92.4 kg (203 lb 9.6 oz)   12/13/23 90.7 kg (200 lb)   01/28/23 94.6 kg (208 lb 8.9 oz)   12/06/22 98.7 kg (217 lb 9.5 oz)   11/26/22 99 kg (218 lb 4.1 oz)   10/24/22 99.8 kg (220 lb)   08/27/22 98.7 kg (217 lb 9.5 oz)   07/23/22 100.4 kg (221 lb 4.8 oz)   11/09/21 99.8 kg (220 lb)        NUTRITION:  Diet:       Procedures    NPO      Food Allergies: No  Cultural/Ethnic/Taoism Preferences Addressed: Yes    Percent Meals Eaten (last 3 days)        Date/Time Percent Meals Eaten (%)    02/23/24 1052 10 %    02/23/24 1656 0 %    02/23/24 1735 0 %    02/23/24 1900 10 %    02/24/24 0900 20 %    02/24/24 1500 10 %    02/25/24 0731 0 %    02/25/24 1040 0 %    02/25/24 1145 0 %    02/25/24 1411 50 %    02/25/24 1417 0 %    02/25/24 1552 0 %    02/25/24 1930 30 %            GI system review:  Last BM:2/24    Skin and wounds: no pressure ulcers per RN documentation    NUTRITION RELATED PHYSICAL FINDINGS:     1. Body Fat/Muscle Mass: no wasting noted     2. Fluid Accumulation: none per RN documentation     NUTRITION PRESCRIPTION: 54.5 kg (IBW)  Calories: 1704-9001 calories/day (30-35 kcal/kg)  Protein: 71-98 grams protein/day (1.3-1.8 grams protein per kg)  Fluid: ~1 ml/kcal or per MD discretion    NUTRITION DIAGNOSIS/PROBLEM:  Inadequate energy intake related to  decreased ability to consume sufficient energy intakes  as evidenced by documented/reported insufficient oral intake and current NPO status.       MONITOR AND EVALUATE/NUTRITION GOALS:  Weight stable within 1 to 2 lbs during admission - New  Return to PO intake or advance diet in 24-48 hrs - New  Start alternative nutrition in 24-48 hrs if diet is not able to advance- New      MEDICATIONS:  Insulin, Keppra, Remeron, IVF:NS at 100 ml/hr    LABS:  POC Glu:151-180, Glu:179, BUN:92, Cr:4.85, GFR:9    Pt is at High nutrition risk    Denisse Moran MS, RD, LDN  Clinical Dietitian  Ext:[phone]

## 2024-02-26 NOTE — PLAN OF CARE
No acute events overnight. Pt talking more. Vitals stable. Kept clean and dry    Problem: Diabetes/Glucose Control  Goal: Glucose maintained within prescribed range  Description: INTERVENTIONS:  - Monitor Blood Glucose as ordered  - Assess for signs and symptoms of hyperglycemia and hypoglycemia  - Administer ordered medications to maintain glucose within target range  - Assess barriers to adequate nutritional intake and initiate nutrition consult as needed  - Instruct patient on self management of diabetes  Outcome: Progressing     Problem: NEUROLOGICAL - ADULT  Goal: Achieves stable or improved neurological status  Description: INTERVENTIONS  - Assess for and report changes in neurological status  - Initiate measures to prevent increased intracranial pressure  - Maintain blood pressure and fluid volume within ordered parameters to optimize cerebral perfusion and minimize risk of hemorrhage  - Monitor temperature, glucose, and sodium. Initiate appropriate interventions as ordered  Outcome: Progressing  Goal: Absence of seizures  Description: INTERVENTIONS  - Monitor for seizure activity  - Administer anti-seizure medications as ordered  - Monitor neurological status  Outcome: Progressing  Goal: Remains free of injury related to seizure activity  Description: INTERVENTIONS:  - Maintain airway, patient safety  and administer oxygen as ordered  - Monitor patient for seizure activity, document and report duration and description of seizure to MD/LIP  - If seizure occurs, turn patient to side and suction secretions as needed  - Reorient patient post seizure  - Seizure pads on all 4 side rails  - Instruct patient/family to notify RN of any seizure activity  - Instruct patient/family to call for assistance with activity based on assessment  Outcome: Progressing  Goal: Achieves maximal functionality and self care  Description: INTERVENTIONS  - Monitor swallowing and airway patency with patient fatigue and changes in  neurological status  - Encourage and assist patient to increase activity and self care with guidance from PT/OT  - Encourage visually impaired, hearing impaired and aphasic patients to use assistive/communication devices  Outcome: Progressing     Problem: Delirium  Goal: Minimize duration of delirium  Description: Interventions:  - Encourage use of hearing aids, eye glasses  - Promote highest level of mobility daily  - Provide frequent reorientation  - Promote wakefulness i.e. lights on, blinds open  - Promote sleep, encourage patient's normal rest cycle i.e. lights off, TV off, minimize noise and interruptions  - Encourage family to assist in orientation and promotion of home routines  Outcome: Progressing

## 2024-02-26 NOTE — PHYSICAL THERAPY NOTE
PT attempted to see pt for tx this pm, however pt is very lethargic and only able to wake up for RN or MD briefly to answer simple commands. Seen by SLP this pm and made NPO. Pt is not currently appropriate for PT/OT as she is unable to participate today. Will re-attempt when medically appropriate.

## 2024-02-26 NOTE — PLAN OF CARE
Pt A&O to self. Pt very lethargic. Pt arousable but slow to respond. Hospitalist notified. RN attempted to give pt po meds this am without success. Attempted later in the AM and unable. RN attempted to give patient liquids and she began to cough immediately after. Speech therapy evaluated pt and recommending NPO. 0.9 @100ml initiated. Turning 2q. Seizure precautions in place. PO meds switched to IV as able.        Problem: Diabetes/Glucose Control  Goal: Glucose maintained within prescribed range  Description: INTERVENTIONS:  - Monitor Blood Glucose as ordered  - Assess for signs and symptoms of hyperglycemia and hypoglycemia  - Administer ordered medications to maintain glucose within target range  - Assess barriers to adequate nutritional intake and initiate nutrition consult as needed  - Instruct patient on self management of diabetes  Outcome: Progressing     Problem: Patient/Family Goals  Goal: Patient/Family Long Term Goal  Description: Patient's Long Term Goal: to go home    Interventions:  - Mds to see  - IV keppra  - See additional Care Plan goals for specific interventions  Outcome: Progressing  Goal: Patient/Family Short Term Goal  Description: Patient's Short Term Goal: to feel better    Interventions:   - IV keppra  - Mds to see  - Comply to care plan  - See additional Care Plan goals for specific interventions  Outcome: Progressing     Problem: NEUROLOGICAL - ADULT  Goal: Achieves stable or improved neurological status  Description: INTERVENTIONS  - Assess for and report changes in neurological status  - Initiate measures to prevent increased intracranial pressure  - Maintain blood pressure and fluid volume within ordered parameters to optimize cerebral perfusion and minimize risk of hemorrhage  - Monitor temperature, glucose, and sodium. Initiate appropriate interventions as ordered  Outcome: Progressing  Goal: Absence of seizures  Description: INTERVENTIONS  - Monitor for seizure activity  -  Administer anti-seizure medications as ordered  - Monitor neurological status  Outcome: Progressing  Goal: Remains free of injury related to seizure activity  Description: INTERVENTIONS:  - Maintain airway, patient safety  and administer oxygen as ordered  - Monitor patient for seizure activity, document and report duration and description of seizure to MD/LIP  - If seizure occurs, turn patient to side and suction secretions as needed  - Reorient patient post seizure  - Seizure pads on all 4 side rails  - Instruct patient/family to notify RN of any seizure activity  - Instruct patient/family to call for assistance with activity based on assessment  Outcome: Progressing  Goal: Achieves maximal functionality and self care  Description: INTERVENTIONS  - Monitor swallowing and airway patency with patient fatigue and changes in neurological status  - Encourage and assist patient to increase activity and self care with guidance from PT/OT  - Encourage visually impaired, hearing impaired and aphasic patients to use assistive/communication devices  Outcome: Progressing     Problem: Delirium  Goal: Minimize duration of delirium  Description: Interventions:  - Encourage use of hearing aids, eye glasses  - Promote highest level of mobility daily  - Provide frequent reorientation  - Promote wakefulness i.e. lights on, blinds open  - Promote sleep, encourage patient's normal rest cycle i.e. lights off, TV off, minimize noise and interruptions  - Encourage family to assist in orientation and promotion of home routines  Outcome: Progressing

## 2024-02-26 NOTE — PROGRESS NOTES
[facility]     Hospitalist Progress Note     Stephen Rodrigues Patient Status:  Inpatient    1945 MRN [id number]   [facility] 2NE-A Attending Brody Boston MD   Hosp Day # 7 PCP PHYSICIAN NONSTAFF     Subjective:   Patient still confused but arousable and follows commands. But not taking pills down consistently. Drifts off after I examine her and stop taking    Objective:    Review of Systems:   A comprehensive review of systems was completed; pertinent positive and negatives stated in subjective.  Vital signs:  Temp:  [98.1 °F (36.7 °C)-100.2 °F (37.9 °C)] 99.6 °F (37.6 °C)  Pulse:  [] 95  Resp:  [18-20] 20  BP: (105-134)/(51-55) 119/55  SpO2:  [94 %-99 %] 94 %  Physical Exam:    General: No acute distress, confused   Respiratory: no wheezes, no rhonchi  Cardiovascular: S1, S2, RRR  Abdomen: Soft, NT/ND, +BS  Extremities: no edema, moves ext.     Diagnostic Data:    Labs:  Recent Labs   Lab 24   WBC 7.3 6.4   HGB 9.4* 9.4*   MCV 96.6 97.3   .0 245.0   INR 1.16  --      Recent Labs   Lab 242 24  1050   * 114* 179*   BUN 35* 36* 92*   CREATSERUM 2.56* 2.83* 4.85*   CA 9.7 9.2 9.8   ALB 3.7  --   --     146* 136   K 3.4* 3.6  3.6 4.0   * 117* 109   CO2 23.0 25.0 20.0*   ALKPHO 86  --   --    AST 15  --   --    ALT 22  --   --    BILT 0.5  --   --    TP 8.7*  --   --      Estimated Creatinine Clearance: 13.9 mL/min (A) (based on SCr of 2.83 mg/dL (H)).  Recent Labs   Lab 24   PTP 14.9*   INR 1.16        Microbiology  No results found for this visit on 02/19/24.  Imaging: Reviewed in Epic.  Medications:    levETIRAcetam  500 mg Oral BID    allopurinol  100 mg Oral QAM    apixaban  5 mg Oral BID    aspirin  81 mg Oral Daily    dilTIAZem ER  240 mg Oral Daily    escitalopram  10 mg Oral Daily    furosemide  20 mg Oral Daily    gabapentin  600 mg Oral Nightly    hydrALAZINE  50 mg Oral TID     mirtazapine  15 mg Oral Nightly    pantoprazole  40 mg Oral QAM AC    rosuvastatin  10 mg Oral Daily    valsartan  320 mg Oral Daily    insulin aspart  1-10 Units Subcutaneous TID AC and HS       Assessment & Plan:      #Acute encephalopathy   -on Keppra  -EEG repeated    #Hx of seizure disorder  -Keppra dose adjusted down  -Neurology consulted  -CT head without acute intracranial abnormalities.   -UA unremarkable  -MRI brain negative  -Seizure precautions    #Hypernatremia- check BMP today - improved     #HIREN on CKD IV- IVF and renal consult. Stop lasix and aldactone      #Acute on chronic diastolic CHF-resolved     #Aortic stenosis-Mild to moderate on most recent echo     #CAD s/p stent-Aspirin, statin     #Hypertension-Resume home medications    #Hyperlipidemia-Statin     #DM type II with A1c 5.8-Insulin sliding scale     #Hx of CVA-Aspirin, statin     #JHONY-JHONY protocol     #COPD, stable-Resume albuterol inhaler as needed     #Gout-Allopurinol     #Fibromyalgia-Gabapentin     #LLE DVT-Eliquis     #Anxiety and depression-Resume Lexapro and Remeron     #GERD-PPI    ADDENDUM:  Pt now NPO per speech  Meds adjusted as possible  Hold heparin gtt as DVT in Dec 2022- if still unable to tolerate Eliquis soon then can start heparin gtt with renal function       Brody Boston MD  Supplementary Documentation:     Quality:  DVT Mechanical Prophylaxis:   SCDs,    DVT Pharmacologic Prophylaxis   Medication    apixaban (Eliquis) tab 5 mg         DVT Pharmacologic prophylaxis: Aspirin 81 mg      Code Status: Full Code  Gurrola: External urinary catheter in place  Gurrola Duration (in days):   Central line:    ELROY: 2/27/2024  Discharge is dependent on: renal consult  At this point Ms. Rodrigues is expected to be discharge to: tbd     The 21st Century Cures Act makes medical notes like these available to patients in the interest of transparency. Please be advised this is a medical document. Medical documents are intended to carry  relevant information, facts as evident, and the clinical opinion of the practitioner. The medical note is intended as peer to peer communication and may appear blunt or direct. It is written in medical language and may contain abbreviations or verbiage that are unfamiliar.

## 2024-02-26 NOTE — SLP NOTE
ADULT SWALLOWING EVALUATION    ASSESSMENT    ASSESSMENT/OVERALL IMPRESSION:  Order received for repeat bedside swallow evaluation to r/o aspiration due to decline in cognitive status and clinical signs of aspiration. Per RN, patient observed coughing during sips of cranberry juice and did not orally transit pills given in pureed. Pt admitted with AMS. MRI revealed no acute disease. Pt familiar to this dept following previous bedside swallow evaluation on 2/20/24 which recommended soft/easy to chew diet with thin liquids and pills one at a time.     Pt found lying semi-upright in bed asleep; able to awaken however quickly falling bask asleep when stimulus removed with reduced participation in feeding tasks. Unable to complete formal oral mech/motor exam due to pt's inability to follow directions for tasks. Head of bed elevated to 90 degrees and pt fed x 1 spoonful of thin liquid and x1 spoonful of pureed only. No meaningful response to spoon presentation noted. Assist with removal of bolus from spoon required when utensil inserted into pt's oral cavity. No attempt to orally transit bolus noted. Suspect thin liquid bolus ran uncontrolled posteriorly and noted pureed bolus remaining on anterior tongue. Pureed bolus was manually removed from pt's mouth by clinician and po trials were terminated.    Discussed pt's status with RN and agreed pt is not safe for po feedings or meds at this time. Recommend NPO and re-evaluation of swallow function in the am. Family may need to consider alternate means of nutrition/hydration or hospice options if pt's intake and swallow safety remains compromised. Will continue to follow as per plan.          RECOMMENDATIONS   Diet Recommendations - Solids: NPO  Diet Recommendations - Liquids: NPO                        Compensatory Strategies Recommended:  (n/a)  Aspiration Precautions:  (n/a)  Medication Administration Recommendations: Non-oral  Treatment Plan/Recommendations: SLP to  reassess    HISTORY   MEDICAL HISTORY  Reason for Referral: R/O aspiration    Problem List  Principal Problem:    Altered mental status, unspecified altered mental status type  Active Problems:    Stage 4 chronic kidney disease ([facility])    Acute on chronic congestive heart failure, unspecified heart failure type ([facility])    History of seizure    Cognitive decline      Past Medical History  Past Medical History:   Diagnosis Date    Anemia     Aortic stenosis     Arrhythmia     Arthritis     Asthma ([facility])     Back problem     Cataract     Deep vein thrombosis (DVT) of proximal lower extremity ([facility])     Depressive disorder 08/07/2010    Diverticulitis of colon 04/17/2009    Edema     Esophageal reflux     Extrinsic asthma, unspecified     Fibromyalgia     Gout     Heart attack ([facility]) 01/01/1989    Heart valve disease     High blood pressure     High cholesterol     History of stomach ulcers     IBS (irritable bowel syndrome)     Incontinence     Migraines     Muscle weakness     Neuropathy     Osteoarthritis     Other and unspecified hyperlipidemia     Pneumonia due to organism     Pulmonary emphysema ([facility])     Renal disorder     Seizure disorder ([facility])     Shortness of breath     Sleep apnea     Stroke ([facility])     Type II or unspecified type diabetes mellitus without mention of complication, not stated as uncontrolled     Unspecified essential hypertension     Visual impairment        Prior Living Situation: Home with support  Diet Prior to Admission: Regular;Thin liquids       Patient/Family Goals: safest/least restrictive means of nutrition/hydration    SWALLOWING HISTORY  Current Diet Consistency: Soft/ Easy to Chew;Thin liquids  Dysphagia History: see above  Imaging Results: CXR 2/19/24  Stable cardiac and mediastinal contours.  Findings of congestive heart failure with mild interstitial pulmonary edema.  No associated pleural effusion or pneumothorax.   SUBJECTIVE       OBJECTIVE   ORAL MOTOR EXAMINATION  Dentition:  Edentulous  Symmetry: Unable to assess (pt unable to follow directions for task)  Strength: Unable to assess  Tone: Unable to assess  Range of Motion: Unable to assess  Rate of Motion: Unable to assess    Voice Quality: Breathy  Respiratory Status: Unlabored  Consistencies Trialed: Thin liquids;Puree (x1 each only then d/c'd po trials d/t absent swallow response)  Method of Presentation: Staff/Clinician assistance;Spoon  Patient Positioning: Upright;Midline    Oral Phase of Swallow: Impaired  Bolus Retrieval: Impaired  Bilabial Seal: Impaired  Bolus Formation: Impaired  Bolus Propulsion: Impaired  Mastication:  (n/a - did not test with solids)  Retention: Impaired    Pharyngeal Phase of Swallow:  (unable to assess d/t absent oral transit of bolus)           (Please note: Silent aspiration cannot be evaluated clinically. Videofluoroscopic Swallow Study is required to rule-out silent aspiration.)    Esophageal Phase of Swallow: No complaints consistent with possible esophageal involvement  Comments:               GOALS  Goal #1 On-going re-assessment of swallow function  In Progress   Goal #2 On-going patient/family education as able    In Progress                                   FOLLOW UP  Treatment Plan/Recommendations: SLP to reassess  Number of Visits to Meet Established Goals: 2  Follow Up Needed (Documentation Required): Yes  SLP Follow-up Date: 02/27/24    Thank you for your referral.   If you have any questions, please contact RONY Neal MA, CCC-SLP  Pager [phone]

## 2024-02-26 NOTE — CONSULTS
[facility]  Report of Consultation    Stephen Rodrigues Patient Status:  Inpatient    1945 MRN [id number]   Location [facility] 2NE-A Attending Brody Boston MD   Hosp Day # 7 PCP PHYSICIAN NONSTAFF       Assessment / Plan:    1) CKD 4- baseline Cr approaching 3 mg/dl is due to longstanding DM / HTN; no further w/u    2) HIREN- likely due to volume depletion in setting of AMS / poor PO intake / diuretics. PLAN- agree with IVF; hold ARB / diuretics / SGLT2    3) DM 2    4) HTN- normally on HEARN / cardizem / ARB    5) Gout / hyperuricemia- on allopurinol     6) AMS- more lethargic today; in part due to dehydration / HIREN. MRI without acute changes; meds benign. Hold gabapentin with HIREN      Reason for Consultation:  HIREN / CKD 4    History of Present Illness:  Stephen Rodrigues is a a(n) 79 year old female.  Very pleasant 79-year-old female known to known to our service with a history of chronic kidney disease with a baseline creatinine of approximately 3 mg/dL due to longstanding hypertension diabetes; I met her in the office in December.  Has now developed worsening kidney function over the last several days and has also become quite lethargic today.  He is unable to answer any questions or provide any history.    History:  Past Medical History:   Diagnosis Date    Anemia     Aortic stenosis     Arrhythmia     Arthritis     Asthma ([facility])     Back problem     Cataract     Deep vein thrombosis (DVT) of proximal lower extremity ([facility])     Depressive disorder 2010    Diverticulitis of colon 2009    Edema     Esophageal reflux     Extrinsic asthma, unspecified     Fibromyalgia     Gout     Heart attack ([facility]) 1989    Heart valve disease     High blood pressure     High cholesterol     History of stomach ulcers     IBS (irritable bowel syndrome)     Incontinence     Migraines     Muscle weakness     Neuropathy     Osteoarthritis     Other and unspecified hyperlipidemia     Pneumonia due to organism      Pulmonary emphysema (H[facility])     Renal disorder     Seizure disorder ([facility])     Shortness of breath     Sleep apnea     Stroke ([facility])     Type II or unspecified type diabetes mellitus without mention of complication, not stated as uncontrolled     Unspecified essential hypertension     Visual impairment      Past Surgical History:   Procedure Laterality Date          CATH DRUG ELUTING STENT      HC  SECTION LEVEL I      KNEE SURGERY      TOTAL KNEE REPLACEMENT       Family History   Problem Relation Age of Onset    Hypertension Other      Denies family history of kidney disease.    reports that she has never smoked. She has never used smokeless tobacco. She reports that she does not drink alcohol and does not use drugs.    Allergies:  Allergies   Allergen Reactions    Influenza Vaccines OTHER (SEE COMMENTS)     Fever    Dust Mite Extract UNKNOWN     Sneezing, itchy, watery eyes    Hydrocodone     Sulfa Antibiotics     Vicodin Tuss [Hydrocodone-Guaifenesin]        Medications:    Current Facility-Administered Medications:     sodium chloride 0.9% infusion, , Intravenous, Continuous    metoprolol (Lopressor) 5 mg/5mL injection 5 mg, 5 mg, Intravenous, Q6H    [START ON 2024] pantoprazole (Protonix) 40 mg in sodium chloride 0.9% PF 10 mL IV push, 40 mg, Intravenous, Daily    levETIRAcetam (Keppra) 500 mg/5mL injection 500 mg, 500 mg, Intravenous, Q12H    [START ON 2024] aspirin 300 MG rectal suppository 300 mg, 300 mg, Rectal, Daily    albuterol (Ventolin HFA) 108 (90 Base) MCG/ACT inhaler 2 puff, 2 puff, Inhalation, Q4H PRN    albuterol (Ventolin) (2.5 MG/3ML) 0.083% nebulizer solution 3 mL, 3 mL, Nebulization, TID PRN    [Held by provider] allopurinol (Zyloprim) tab 100 mg, 100 mg, Oral, QAM    [Held by provider] apixaban (Eliquis) tab 5 mg, 5 mg, Oral, BID    [Held by provider] aspirin DR tab 81 mg, 81 mg, Oral, Daily    [Held by provider] dilTIAZem ER (CardIZEM CD) 24 hr cap 240 mg, 240 mg,  Oral, Daily    escitalopram (Lexapro) tab 10 mg, 10 mg, Oral, Daily    gabapentin (Neurontin) cap 600 mg, 600 mg, Oral, Nightly    [Held by provider] hydrALAZINE (Apresoline) tab 50 mg, 50 mg, Oral, TID    [Held by provider] mirtazapine (REMERON SOL-TAB) disintegrating tab 15 mg, 15 mg, Oral, Nightly    [Held by provider] rosuvastatin (Crestor) tab 10 mg, 10 mg, Oral, Daily    [Held by provider] valsartan (Diovan) tab 320 mg, 320 mg, Oral, Daily    glucose (Dex4) 15 GM/59ML oral liquid 15 g, 15 g, Oral, Q15 Min PRN **OR** glucose (Glutose) 40% oral gel 15 g, 15 g, Oral, Q15 Min PRN **OR** glucose-vitamin C (Dex-4) chewable tab 4 tablet, 4 tablet, Oral, Q15 Min PRN **OR** dextrose 50% injection 50 mL, 50 mL, Intravenous, Q15 Min PRN **OR** glucose (Dex4) 15 GM/59ML oral liquid 30 g, 30 g, Oral, Q15 Min PRN **OR** glucose (Glutose) 40% oral gel 30 g, 30 g, Oral, Q15 Min PRN **OR** glucose-vitamin C (Dex-4) chewable tab 8 tablet, 8 tablet, Oral, Q15 Min PRN    insulin aspart (NovoLOG) 100 Units/mL FlexPen 1-10 Units, 1-10 Units, Subcutaneous, TID AC and HS    diazepam (Valium) 5 mg/mL injection 5 mg, 5 mg, Intravenous, Q30 Min PRN    acetaminophen (Tylenol Extra Strength) tab 500 mg, 500 mg, Oral, Q4H PRN    melatonin cap/tab 5 mg, 5 mg, Oral, Nightly PRN    polyethylene glycol (PEG 3350) (Miralax) 17 g oral packet 17 g, 17 g, Oral, Daily PRN    sennosides (Senokot) tab 17.2 mg, 17.2 mg, Oral, Nightly PRN    bisacodyl (Dulcolax) 10 MG rectal suppository 10 mg, 10 mg, Rectal, Daily PRN    ondansetron (Zofran) 4 MG/2ML injection 4 mg, 4 mg, Intravenous, Q6H PRN    metoclopramide (Reglan) 5 mg/mL injection 5 mg, 5 mg, Intravenous, Q8H PRN    benzonatate (Tessalon) cap 200 mg, 200 mg, Oral, TID PRN    glycerin-hypromellose- (Artifical Tears) 0.2-0.2-1 % ophthalmic solution 1 drop, 1 drop, Both Eyes, QID PRN    sodium chloride (Saline Mist) 0.65 % nasal solution 1 spray, 1 spray, Each Nare, Q3H PRN  No current  outpatient medications on file.       Review of Systems:  Please see HPI for pertinent positives. 10 point review of systems otherwise reviewed and negative.     Physical Exam:  /55 (BP Location: Right arm)   Pulse 78   Temp 99.4 °F (37.4 °C) (Oral)   Resp 18   Wt 192 lb 14.4 oz (87.5 kg)   SpO2 92%   BMI 33.11 kg/m²   Temp (24hrs), Av.1 °F (37.3 °C), Min:98.1 °F (36.7 °C), Max:99.6 °F (37.6 °C)       Intake/Output Summary (Last 24 hours) at 2024 1710  Last data filed at 2024 1703  Gross per 24 hour   Intake 170 ml   Output 800 ml   Net -630 ml     Wt Readings from Last 3 Encounters:   24 192 lb 14.4 oz (87.5 kg)   02/15/24 203 lb 9.6 oz (92.4 kg)   23 200 lb (90.7 kg)     General: awke   HEENT: No scleral icterus, MMM  Neck: Supple, no WILLIAM or thyromegaly  Cardiac: Regular rate and rhythm, S1, S2 normal, no murmur, rub, or gallop  Lungs: Decreased breath sounds at the bases bilaterally.   Abdomen: Soft, non-tender. + bowel sounds, no palpable organomegaly  Extremities: Without clubbing, cyanosis; no edema  Neurologic: Cranial nerves grossly intact, moving all extremities  Skin: Warm and dry, no rashes      Laboratory Data:  Lab Results   Component Value Date    CREATSERUM 4.85 2024    BUN 92 2024     2024    K 4.0 2024     2024    CO2 20.0 2024     2024    CA 9.8 2024    PGLU 179 2024       Imaging:  All imaging studies reviewed.      Thank you for allowing me to participate in the care of your patient.    Apple Aguilar MD  2024  5:10 PM

## 2024-02-27 NOTE — PLAN OF CARE
RN SHIFT NOTE    Assumed care of pt at 0700. No c/o pain. A&O x 1 and lethargic. NSR on tele. No edema noted. Lung sounds diminished. Abdomen soft and round. Skin C/D/I. Tolerating medications and care needs have been met at this time.     POC: SLP reassessment, sodium bicarb infusion, NPO, PT/OT, seizure precautions.             Problem: Diabetes/Glucose Control  Goal: Glucose maintained within prescribed range  Description: INTERVENTIONS:  - Monitor Blood Glucose as ordered  - Assess for signs and symptoms of hyperglycemia and hypoglycemia  - Administer ordered medications to maintain glucose within target range  - Assess barriers to adequate nutritional intake and initiate nutrition consult as needed  - Instruct patient on self management of diabetes  Outcome: Progressing     Problem: Patient/Family Goals  Goal: Patient/Family Long Term Goal  Description: Patient's Long Term Goal: to go home    Interventions:  - Mds to see  - IV keppra  - See additional Care Plan goals for specific interventions  Outcome: Progressing  Goal: Patient/Family Short Term Goal  Description: Patient's Short Term Goal: to feel better    Interventions:   - IV keppra  - Mds to see  - Comply to care plan  - See additional Care Plan goals for specific interventions  Outcome: Progressing     Problem: NEUROLOGICAL - ADULT  Goal: Achieves stable or improved neurological status  Description: INTERVENTIONS  - Assess for and report changes in neurological status  - Initiate measures to prevent increased intracranial pressure  - Maintain blood pressure and fluid volume within ordered parameters to optimize cerebral perfusion and minimize risk of hemorrhage  - Monitor temperature, glucose, and sodium. Initiate appropriate interventions as ordered  Outcome: Progressing  Goal: Absence of seizures  Description: INTERVENTIONS  - Monitor for seizure activity  - Administer anti-seizure medications as ordered  - Monitor neurological status  Outcome:  Progressing  Goal: Remains free of injury related to seizure activity  Description: INTERVENTIONS:  - Maintain airway, patient safety  and administer oxygen as ordered  - Monitor patient for seizure activity, document and report duration and description of seizure to MD/LIP  - If seizure occurs, turn patient to side and suction secretions as needed  - Reorient patient post seizure  - Seizure pads on all 4 side rails  - Instruct patient/family to notify RN of any seizure activity  - Instruct patient/family to call for assistance with activity based on assessment  Outcome: Progressing  Goal: Achieves maximal functionality and self care  Description: INTERVENTIONS  - Monitor swallowing and airway patency with patient fatigue and changes in neurological status  - Encourage and assist patient to increase activity and self care with guidance from PT/OT  - Encourage visually impaired, hearing impaired and aphasic patients to use assistive/communication devices  Outcome: Progressing     Problem: Delirium  Goal: Minimize duration of delirium  Description: Interventions:  - Encourage use of hearing aids, eye glasses  - Promote highest level of mobility daily  - Provide frequent reorientation  - Promote wakefulness i.e. lights on, blinds open  - Promote sleep, encourage patient's normal rest cycle i.e. lights off, TV off, minimize noise and interruptions  - Encourage family to assist in orientation and promotion of home routines  Outcome: Progressing

## 2024-02-27 NOTE — PHYSICAL THERAPY NOTE
PHYSICAL THERAPY TREATMENT NOTE - INPATIENT    Room Number: 2605/2605-A     Session: 1     Number of Visits to Meet Established Goals: 5    Presenting Problem: AMS  Co-Morbidities : Seizure, CHF, aortic stenosis, CAD, CKD, HTN, CVA, COPD, Fibromyalgia    ASSESSMENT   Patient demonstrates limited progress this session, goals  remain in progress.    Patient continues to function below baseline with bed mobility, transfers, and maintaining seated position.  Contributing factors to remaining limitations include decreased functional strength, impaired sitting balance, impaired motor planning, and cognitive deficits (difficulty following commands, poor insight into impairments).  Next session anticipate patient to progress bed mobility, transfers, and maintaining seated position.  Physical Therapy will continue to follow patient for duration of hospitalization.    Patient continues to benefit from continued skilled PT services: to promote return to prior level of function and safety with continuous assistance and gradual rehabilitative therapy .    PLAN  PT Treatment Plan: Bed mobility;Endurance;Energy conservation;Patient education;Gait training;Strengthening;Balance training;Transfer training  Rehab Potential : Good  Frequency (Obs): 3-5x/week    CURRENT GOALS     Goal #1 Patient is able to demonstrate supine - sit EOB @ level: minimum assistance      Goal #2 Patient is able to demonstrate transfers Sit to/from Stand at assistance level: minimum assistance      Goal #3 Patient is able to ambulate 50 feet with assist device: walker - rolling at assistance level: minimum assistance      Goal #4     Goal #5     Goal #6     Goal Comments: Goals established on 2/21/2024        PHYSICAL THERAPY MEDICAL/SOCIAL HISTORY  History related to current admission: Patient is a 79 year old female admitted on 2/19/2024 from home for AMS.  Pt diagnosed with TME. MRI brain (-) for acute pathology.   EEG with epileptiform activity but  no evidence of electrographic seizures.     SUBJECTIVE  \"I feel ok.\"     OBJECTIVE  Precautions: Bed/chair alarm    WEIGHT BEARING RESTRICTION  Weight Bearing Restriction: None                PAIN ASSESSMENT   Ratin          BALANCE                                                                                                                       Static Sitting: Poor  Dynamic Sitting: Poor -           Static Standing: Not tested  Dynamic Standing: Not tested    ACTIVITY TOLERANCE                         O2 WALK         AM-PAC '6-Clicks' INPATIENT SHORT FORM - BASIC MOBILITY  How much difficulty does the patient currently have...  Patient Difficulty: Turning over in bed (including adjusting bedclothes, sheets and blankets)?: A Lot   Patient Difficulty: Sitting down on and standing up from a chair with arms (e.g., wheelchair, bedside commode, etc.): Unable   Patient Difficulty: Moving from lying on back to sitting on the side of the bed?: A Lot   How much help from another person does the patient currently need...   Help from Another: Moving to and from a bed to a chair (including a wheelchair)?: Total   Help from Another: Need to walk in hospital room?: Total   Help from Another: Climbing 3-5 steps with a railing?: Total       AM-PAC Score:  Raw Score: 8   Approx Degree of Impairment: 86.62%   Standardized Score (AM-PAC Scale): 28.58   CMS Modifier (G-Code): CM    FUNCTIONAL ABILITY STATUS  Gait Assessment   Functional Mobility/Gait Assessment  Gait Assistance: Not tested  Distance (ft): 0  Assistive Device: Rolling walker  Pattern: Shuffle (excessive kyphosis)    Skilled Therapy Provided    Bed Mobility:  Rolling: MAX x 2   Supine<>Sit: MAX x 2   Sit<>Supine: MAX x 2      Transfer Mobility:  Sit<>Stand: NT   Stand<>Sit: NT   Gait: NT    There-Act:  Pt performed AAROM in supine.   MAX assist x 2P for LE/trunk initiation and support.   Constant VC for initiation of UE/LE.   Pt assisted with scooting EOB.   Pt  leaning L.  VC for anterior weightshift and midline positioning.   Weightshifting facilitation to R.   MOD assist for static sitting at EOB.   Pt performed B forearm weightbearing with MAX assist.   Minimal trunk initiation to shift back to midline.   Pt tolerated sitting EOB for 8 min.   MAX assist x 2P for LE/trunk support with sit-supine.       Therapist's Comments: Pt following commands and answering questions ~ 50% of the time.       THERAPEUTIC EXERCISES  Lower Extremity AAROM  Ankle pumps   LAQ  Heel slides      Upper Extremity      Position Sitting and Supine     Repetitions   10   Sets   1     Patient End of Session: In bed;Needs met;Call light within reach;RN aware of session/findings;All patient questions and concerns addressed;Alarm set    PT Session Time: 23 minutes  Gait Training:  minutes  Therapeutic Activity: 10 minutes  Therapeutic Exercise: 8 minutes   Neuromuscular Re-education:  minutes

## 2024-02-27 NOTE — PROGRESS NOTES
[facility]  Nephrology Progress Note    Stephen Rodrigues Attending:  Brody Boston MD       Assessment and Plan:    1) CKD 4- baseline Cr approaching 3 mg/dl is due to longstanding DM / HTN; no further w/u     2) HIREN- likely due to volume depletion in setting of AMS / poor PO intake / diuretics. Improving. PLAN- adjust IVF; hold ARB / diuretics / SGLT2     3) DM 2     4) HTN- normally on HEARN / cardizem / ARB     5) Gout / hyperuricemia- on allopurinol      6) AMS- in part due to dehydration / HIREN. MRI without acute changes; meds benign. PLAN- hold gabapentin with HIREN      Subjective:  Awake answering questions, significantly better than yesterday    Physical Exam:   /67 (BP Location: Right arm)   Pulse 84   Temp 98.7 °F (37.1 °C) (Oral)   Resp 18   Wt 191 lb 2.2 oz (86.7 kg)   SpO2 97%   BMI 32.81 kg/m²   Temp (24hrs), Av.1 °F (37.3 °C), Min:98.7 °F (37.1 °C), Max:99.6 °F (37.6 °C)       Intake/Output Summary (Last 24 hours) at 2024 0902  Last data filed at 2024 0830  Gross per 24 hour   Intake 0 ml   Output 1150 ml   Net -1150 ml     Wt Readings from Last 3 Encounters:   24 191 lb 2.2 oz (86.7 kg)   02/15/24 203 lb 9.6 oz (92.4 kg)   23 200 lb (90.7 kg)     General: awake  HEENT: No scleral icterus, MMM  Neck: Supple, no WILLIAM or thyromegaly  Cardiac: Regular rate and rhythm, S1, S2 normal, no murmur or tub  Lungs: Decreased BS at bases bilaterally   Abdomen: Soft, non-tender. + bowel sounds, no palpable organomegaly  Extremities: Without clubbing, cyanosis; no edema  Neurologic: Cranial nerves grossly intact, moving all extremities  Skin: Warm and dry, no rashes       Labs:   Lab Results   Component Value Date    CREATSERUM 4.05 2024    BUN 93 2024     2024    K 4.1 2024     2024    CO2 24.0 2024     2024    CA 9.8 2024    PGLU 149 2024       Imaging:  All imaging studies reviewed.    Meds:   Current  Facility-Administered Medications   Medication Dose Route Frequency    metoprolol (Lopressor) 5 mg/5mL injection 5 mg  5 mg Intravenous Q6H    pantoprazole (Protonix) 40 mg in sodium chloride 0.9% PF 10 mL IV push  40 mg Intravenous Daily    levETIRAcetam (Keppra) 500 mg/5mL injection 500 mg  500 mg Intravenous Q12H    aspirin 300 MG rectal suppository 300 mg  300 mg Rectal Daily    sodium bicarbonate 150 mEq in sterile water 1,000 mL infusion  150 mEq Intravenous Continuous    albuterol (Ventolin HFA) 108 (90 Base) MCG/ACT inhaler 2 puff  2 puff Inhalation Q4H PRN    albuterol (Ventolin) (2.5 MG/3ML) 0.083% nebulizer solution 3 mL  3 mL Nebulization TID PRN    [Held by provider] apixaban (Eliquis) tab 5 mg  5 mg Oral BID    [Held by provider] aspirin DR tab 81 mg  81 mg Oral Daily    [Held by provider] dilTIAZem ER (CardIZEM CD) 24 hr cap 240 mg  240 mg Oral Daily    escitalopram (Lexapro) tab 10 mg  10 mg Oral Daily    hydrALAZINE (Apresoline) tab 50 mg  50 mg Oral TID    [Held by provider] mirtazapine (REMERON SOL-TAB) disintegrating tab 15 mg  15 mg Oral Nightly    [Held by provider] rosuvastatin (Crestor) tab 10 mg  10 mg Oral Daily    [Held by provider] valsartan (Diovan) tab 320 mg  320 mg Oral Daily    glucose (Dex4) 15 GM/59ML oral liquid 15 g  15 g Oral Q15 Min PRN    Or    glucose (Glutose) 40% oral gel 15 g  15 g Oral Q15 Min PRN    Or    glucose-vitamin C (Dex-4) chewable tab 4 tablet  4 tablet Oral Q15 Min PRN    Or    dextrose 50% injection 50 mL  50 mL Intravenous Q15 Min PRN    Or    glucose (Dex4) 15 GM/59ML oral liquid 30 g  30 g Oral Q15 Min PRN    Or    glucose (Glutose) 40% oral gel 30 g  30 g Oral Q15 Min PRN    Or    glucose-vitamin C (Dex-4) chewable tab 8 tablet  8 tablet Oral Q15 Min PRN    insulin aspart (NovoLOG) 100 Units/mL FlexPen 1-10 Units  1-10 Units Subcutaneous TID AC and HS    diazepam (Valium) 5 mg/mL injection 5 mg  5 mg Intravenous Q30 Min PRN    acetaminophen (Tylenol Extra  Strength) tab 500 mg  500 mg Oral Q4H PRN    melatonin cap/tab 5 mg  5 mg Oral Nightly PRN    polyethylene glycol (PEG 3350) (Miralax) 17 g oral packet 17 g  17 g Oral Daily PRN    sennosides (Senokot) tab 17.2 mg  17.2 mg Oral Nightly PRN    bisacodyl (Dulcolax) 10 MG rectal suppository 10 mg  10 mg Rectal Daily PRN    ondansetron (Zofran) 4 MG/2ML injection 4 mg  4 mg Intravenous Q6H PRN    metoclopramide (Reglan) 5 mg/mL injection 5 mg  5 mg Intravenous Q8H PRN    benzonatate (Tessalon) cap 200 mg  200 mg Oral TID PRN    glycerin-hypromellose- (Artifical Tears) 0.2-0.2-1 % ophthalmic solution 1 drop  1 drop Both Eyes QID PRN    sodium chloride (Saline Mist) 0.65 % nasal solution 1 spray  1 spray Each Nare Q3H PRN         Questions/concerns were discussed with patient and/or family by bedside.          Apple Aguilar MD  2/27/2024  9:02 AM

## 2024-02-27 NOTE — OCCUPATIONAL THERAPY NOTE
OCCUPATIONAL THERAPY TREATMENT NOTE - INPATIENT     Room Number: 2605/2605-A  Session: 1   Number of Visits to Meet Established Goals: 5    Presenting Problem: AMS (CHF)  Prior Level of Function: Per pt report, IND with ADLs with assistance from grandson PRN    ASSESSMENT   Patient demonstrates limited progress this session, goals remain in progress.    Patient continues to function below baseline with toileting, lower body dressing, bed mobility, transfers, stating sitting balance, dynamic sitting balance, and maintaining seated position.   Contributing factors to remaining limitations include decreased functional strength, decreased endurance, impaired sitting balance, impaired coordination, impaired motor planning, decreased muscular endurance, medical status, and decreased insight to deficits.  Next session anticipate patient to progress bed mobility, transfers, stating sitting balance, dynamic sitting balance, and maintaining seated position.  Occupational Therapy will continue to follow patient for duration of hospitalization.    Patient continues to benefit from continued skilled OT services: to promote return to prior level of function and safety with continuous assistance and gradual rehabilitative therapy .          OT Device Recommendations: None    History: Patient is a 79 year old female admitted on 2/19/2024 with Presenting Problem: AMS (CHF). Co-Morbidities : Seizure, CHF, aortic stenosis, CAD, CKD, HTN, CVA, COPD, Fibromyalgia    WEIGHT BEARING RESTRICTION  Weight Bearing Restriction: None              TREATMENT SESSION:  Patient Start of Session: semi supine in bed  FUNCTIONAL TRANSFER ASSESSMENT  Sit to Stand: Edge of Bed  Edge of Bed: Not Tested    BED MOBILITY  Rolling: Maximum Assist  Supine to Sit : Dependent (max A x 2)  Sit to Supine (OT): Dependent (max A x 2)  Scooting: Max A    BALANCE ASSESSMENT  Static Sitting: Maximum Assist (max initially; occasional min A)  Sitting Bilateral: Maximum  Assist (max A initially; occasional min A)  Static Standing: Not Tested  Standing Bilateral: Not Tested    FUNCTIONAL ADL ASSESSMENT  LB Dressing Seated: Dependent (footwear at bed level)      ACTIVITY TOLERANCE: poor                         O2 SATURATIONS       EDUCATION PROVIDED  Patient: Role of Occupational Therapy; Plan of Care; Discharge Recommendations; Functional Transfer Techniques; Fall Prevention; Posture/Positioning; Energy Conservation; Proper Body Mechanics  Patient's Response to Education: Requires Further Education    Therapist comments: Pt requires max A x 2 to sit EOB with multimodal cueing provided for hand placement and sequencing. Pt requires constant cues to keep eyes open during session. While seated EOB, pt engages in forward trunk flexion and bilateral elbow propping to improve core strength and sitting balance required for improved bed mobility and in preparation for bedside commode transfers and ADLs. Pt with poor carryover and requiring increased encouragement. Pt returns supine in bed requiring max A x 2.  Patient End of Session: In bed;Needs met;Call light within reach;RN aware of session/findings;All patient questions and concerns addressed;Alarm set    SUBJECTIVE  \"That lady.\"    PAIN ASSESSMENT  Rating: Unable to rate  Location: inconsistent with pain; one moment states pain, another moment states no pain        OBJECTIVE  Precautions: Bed/chair alarm    AM-PAC ‘6-Clicks’ Inpatient Daily Activity Short Form  -   Putting on and taking off regular lower body clothing?: A Lot  -   Bathing (including washing, rinsing, drying)?: A Lot  -   Toileting, which includes using toilet, bedpan or urinal? : A Lot  -   Putting on and taking off regular upper body clothing?: A Lot  -   Taking care of personal grooming such as brushing teeth?: A Lot  -   Eating meals?: A Lot    AM-PAC Score:  Score: 12  Approx Degree of Impairment: 66.57%  Standardized Score (AM-PAC Scale): 30.6    PLAN  OT Treatment  Plan: Balance activities;Energy conservation/work simplification techniques;ADL training;IADL training;Functional transfer training;UE strengthening/ROM;Endurance training;Cognitive reorientation;Patient/Family education;Patient/Family training;Equipment eval/education;Compensatory technique education;Continued evaluation  Rehab Potential : Fair  Frequency: 3-5x/week    OT Goals:     All goals ongoing 02/27    ADL Goals   Patient will perform eating: with modified independent and while in chair  Patient will perform grooming: with modified independent and while standing at sink  Patient will perform lower body dressing:  with modified independent and with cues  Patient will perform toileting: with modified independent     Functional Transfer Goals  Patient will perform all functional transfers:  with modified independent  Patient will transfer from sit to stand:  with modified independent and with good judgement/safety  Patient will transfer to toilet:  with modified independent and with good judgement/safety     UE Exercise Program Goal  Patient will be modified independent with bilateral AROM HEP (home exercise program).     Additional Goals  Pt will demo good safety awareness during functional transfers to transfer from bed>chair 2/2 attempts.     Therapist Goals  Determine cognitive baseline and evaluate cognition    OT Session Time: 15 minutes  Therapeutic Activity: 15 minutes

## 2024-02-27 NOTE — PLAN OF CARE
Patient alert and oriented x1-2. Resp regular and unlabored. SR in tele. Total assist. W/ purewick. IVF Na Bicarb infusing well. NPO. Repositioned. Bed alarm on. Call light w/in reach.      Problem: Diabetes/Glucose Control  Goal: Glucose maintained within prescribed range  Description: INTERVENTIONS:  - Monitor Blood Glucose as ordered  - Assess for signs and symptoms of hyperglycemia and hypoglycemia  - Administer ordered medications to maintain glucose within target range  - Assess barriers to adequate nutritional intake and initiate nutrition consult as needed  - Instruct patient on self management of diabetes  Outcome: Progressing     Problem: Patient/Family Goals  Goal: Patient/Family Long Term Goal  Description: Patient's Long Term Goal: to go home    Interventions:  - Mds to see  - IV keppra  - See additional Care Plan goals for specific interventions  Outcome: Progressing  Goal: Patient/Family Short Term Goal  Description: Patient's Short Term Goal: to feel better    Interventions:   - IV keppra  - Mds to see  - Comply to care plan  - See additional Care Plan goals for specific interventions  Outcome: Progressing     Problem: NEUROLOGICAL - ADULT  Goal: Achieves stable or improved neurological status  Description: INTERVENTIONS  - Assess for and report changes in neurological status  - Initiate measures to prevent increased intracranial pressure  - Maintain blood pressure and fluid volume within ordered parameters to optimize cerebral perfusion and minimize risk of hemorrhage  - Monitor temperature, glucose, and sodium. Initiate appropriate interventions as ordered  Outcome: Progressing  Goal: Absence of seizures  Description: INTERVENTIONS  - Monitor for seizure activity  - Administer anti-seizure medications as ordered  - Monitor neurological status  Outcome: Progressing  Goal: Remains free of injury related to seizure activity  Description: INTERVENTIONS:  - Maintain airway, patient safety  and  administer oxygen as ordered  - Monitor patient for seizure activity, document and report duration and description of seizure to MD/LIP  - If seizure occurs, turn patient to side and suction secretions as needed  - Reorient patient post seizure  - Seizure pads on all 4 side rails  - Instruct patient/family to notify RN of any seizure activity  - Instruct patient/family to call for assistance with activity based on assessment  Outcome: Progressing  Goal: Achieves maximal functionality and self care  Description: INTERVENTIONS  - Monitor swallowing and airway patency with patient fatigue and changes in neurological status  - Encourage and assist patient to increase activity and self care with guidance from PT/OT  - Encourage visually impaired, hearing impaired and aphasic patients to use assistive/communication devices  Outcome: Progressing     Problem: Delirium  Goal: Minimize duration of delirium  Description: Interventions:  - Encourage use of hearing aids, eye glasses  - Promote highest level of mobility daily  - Provide frequent reorientation  - Promote wakefulness i.e. lights on, blinds open  - Promote sleep, encourage patient's normal rest cycle i.e. lights off, TV off, minimize noise and interruptions  - Encourage family to assist in orientation and promotion of home routines  Outcome: Progressing

## 2024-02-27 NOTE — PROGRESS NOTES
[facility]     Hospitalist Progress Note     Stephen Rodrigues Patient Status:  Inpatient    1945 MRN [id number]   Formerly [facility] 2NE-A Attending Brody Boston MD   Hosp Day # 8 PCP PHYSICIAN NONSTAFF     Subjective:   Patient significantly better, less confused   Talking to me answering simple questions with slight delay.     Objective:    Review of Systems:   A comprehensive review of systems was completed; pertinent positive and negatives stated in subjective.  Vital signs:  Temp:  [98.8 °F (37.1 °C)-99.6 °F (37.6 °C)] 98.9 °F (37.2 °C)  Pulse:  [73-96] 86  Resp:  [18-20] 18  BP: (117-134)/(54-70) 128/70  SpO2:  [86 %-100 %] 100 %  Physical Exam:    General: No acute distress, less confused   Respiratory: no wheezes, no rhonchi  Cardiovascular: S1, S2, RRR  Abdomen: Soft, NT/ND, +BS  Extremities: no edema, moves ext.     Diagnostic Data:    Labs:  No results for input(s): \"WBC\", \"HGB\", \"MCV\", \"PLT\", \"BAND\", \"INR\" in the last 168 hours.    Invalid input(s): \"LYM#\", \"MONO#\", \"BASOS#\", \"EOSIN#\"    Recent Labs   Lab 24  1050 24  0601   * 144*   BUN 92* 93*   CREATSERUM 4.85* 4.05*   CA 9.8 9.8    141   K 4.0 4.1    112   CO2 20.0* 24.0     Estimated Creatinine Clearance: 9.7 mL/min (A) (based on SCr of 4.05 mg/dL (H)).  No results for input(s): \"PTP\", \"INR\" in the last 168 hours.       Microbiology  No results found for this visit on 24.  Imaging: Reviewed in Epic.  Medications:    metoprolol  5 mg Intravenous Q6H    pantoprazole  40 mg Intravenous Daily    levETIRAcetam  500 mg Intravenous Q12H    aspirin  300 mg Rectal Daily    [Held by provider] apixaban  5 mg Oral BID    [Held by provider] aspirin  81 mg Oral Daily    [Held by provider] dilTIAZem ER  240 mg Oral Daily    escitalopram  10 mg Oral Daily    hydrALAZINE  50 mg Oral TID    [Held by provider] mirtazapine  15 mg Oral Nightly    [Held by provider] rosuvastatin  10 mg Oral Daily    [Held by  provider] valsartan  320 mg Oral Daily    insulin aspart  1-10 Units Subcutaneous TID AC and HS       Assessment & Plan:      #Acute encephalopathy   -on Keppra  -EEG repeated  -gabapenitn held with HIREN     #HIREN on CKD IV  - IVF  - bicarb  -renal consult appreciated  -Stopped lasix and aldactone     #Hx of seizure disorder  -Keppra dose adjusted down  -Neurology consulted  -CT head without acute intracranial abnormalities.   -UA unremarkable  -MRI brain negative  -Seizure precautions    #Hypernatremia- improved     #Acute on chronic diastolic CHF-resolved     #Aortic stenosis-Mild to moderate on most recent echo     #CAD s/p stent-Aspirin, statin     #Hypertension-Resume home medications    #Hyperlipidemia-Statin     #DM type II with A1c 5.8-Insulin sliding scale     #Hx of CVA-Aspirin, statin     #JHONY-JHONY protocol     #COPD, stable-Resume albuterol inhaler as needed     #Gout-Allopurinol     #Fibromyalgia-Gabapentin     #LLE DVT-Eliquis     #Anxiety and depression-Resume Lexapro and Remeron     #GERD-PPI    Spech re-evaluaton as more alert today   Hold heparin gtt as DVT in Dec 2022- if still unable to tolerate Eliquis soon then can start heparin gtt with renal function       Brody Boston MD  Supplementary Documentation:     Quality:  DVT Mechanical Prophylaxis:   SCDs,    DVT Pharmacologic Prophylaxis   Medication    [Held by provider] apixaban (Eliquis) tab 5 mg         DVT Pharmacologic prophylaxis: Aspirin 81 mg      Code Status: Full Code  Gurrola: External urinary catheter in place  Gurrola Duration (in days):   Central line:    ELROY: 2/28/2024  Discharge is dependent on: renal consult  At this point Ms. Rodrigues is expected to be discharge to: tbd     The 21st Century Cures Act makes medical notes like these available to patients in the interest of transparency. Please be advised this is a medical document. Medical documents are intended to carry relevant information, facts as evident, and the clinical opinion  of the practitioner. The medical note is intended as peer to peer communication and may appear blunt or direct. It is written in medical language and may contain abbreviations or verbiage that are unfamiliar.

## 2024-02-27 NOTE — SLP NOTE
SPEECH DAILY NOTE - INPATIENT    ASSESSMENT & PLAN   ASSESSMENT  Pt seen for dysphagia tx to reassess swallow fxn, r/o aspiration, determine safest/least restrictive diet and develop plan of care. Pt found lying semi-upright in bed sleeping; able to awaken sufficiently for safe po trials however poor participation in feeding tasks observed. No meaningful response to spoon presentation and incomplete closure around spoon when inserted into oral cavity. SLP assisted with removal of nectar thick liquid from spoon with incomplete oral transit and absent swallow response noted. Pt falling back asleep and no further po trials attempted. Will continue to follow as per plan however pt may require enteral support or hospice discussion given severity of deficit and lack of intake at this time. Discussed results and pt's status with RN. Will continue to follow for on-going assessment.     Diet Recommendations - Solids: NPO  Diet Recommendations - Liquids: NPO    Compensatory Strategies Recommended:  (n/a)  Aspiration Precautions:  (n/a)  Medication Administration Recommendations: Non-oral                     Treatment Plan  Treatment Plan/Recommendations: SLP to reassess    Interdisciplinary Communication: Discussed with RN            GOALS  Goal #1 On-going reassessment of swallow fxn  In Progress                                        FOLLOW UP  Follow Up Needed (Documentation Required): Yes  SLP Follow-up Date: 02/28/24  Number of Visits to Meet Established Goals: 2    Session: 1    If you have any questions, please contact RONY Neal MA, CCC-SLP  Pager [phone]

## 2024-02-28 ENCOUNTER — APPOINTMENT (OUTPATIENT)
Dept: GENERAL RADIOLOGY | Facility: HOSPITAL | Age: 79
End: 2024-02-28
Attending: INTERNAL MEDICINE
Payer: MEDICARE

## 2024-02-28 NOTE — DIETARY NOTE
[facility]    NUTRITION ASSESSMENT    Unable to diagnose malnutrition criteria at this time.    NUTRITION INTERVENTION:    Meal and Snacks - Monitor and encourage adequate PO intake. NPO x 3 days. Diet advancement per SLP/MD.  Medical Food Supplements - Will evaluate need when diet is advanced.  Enteral Nutrition - Pt w/ poor PO intakes this admit + NPO x ~3 days. Recommend placing DHT for TF's, if aggressive measures wish to be taken.   Coordination of Nutrition Care - SLP consult prior to diet advancement. Consider Palliative care consult for goals of care.      PATIENT STATUS:     2/28- Pt w/ poor PO intakes this admit + NPO x 3 days. SLP re-evaluated on 2/27 and 2/28 w/ continued diet recs for NPO. Recommend placing DHT for TF's, if aggressive measures wish to be taken. RD available for TF recs upon consult. Will continue to monitor.     2/26/24- 78 y/o female admitted with AMS. Pt seen d/t length of stay. A&O to self. Very lethargic.  Pt stated she had a good appetite PTA. Pt then fell back asleep and did not answer any other questions. Has had a poor appetite/PO intakes this admit. Recorded PO intakes vary:0-80% with 50% or less most meals. Evaluated by SLP today and made NPO d/t lethargy. Will monitor for diet advancement vs need for nutrition support.   PMH:HTN, HLD, CAD s/p stent, aortic stenosis, DM2, CVA, CKD4, COPD, JHONY, IBS, fibromyalgia, seizure disorder.       ANTHROPOMETRICS:  Ht:  5' 4\"  Wt: 88.5 kg (195 lb 1.6 oz).   BMI: Body mass index is 33.49 kg/m².  IBW: 54.5 kg      WEIGHT HISTORY:     Wts vary per EMR hx. Per EMR hx, pt w/ 11 lb (5.4%) wt loss in about 1.5 weeks?    Wt Readings from Last 10 Encounters:   02/28/24 88.5 kg (195 lb 1.6 oz)   02/15/24 92.4 kg (203 lb 9.6 oz)   12/13/23 90.7 kg (200 lb)   01/28/23 94.6 kg (208 lb 8.9 oz)   12/06/22 98.7 kg (217 lb 9.5 oz)   11/26/22 99 kg (218 lb 4.1 oz)   10/24/22 99.8 kg (220 lb)   08/27/22 98.7 kg (217 lb 9.5 oz)   07/23/22 100.4 kg (221  lb 4.8 oz)   11/09/21 99.8 kg (220 lb)        NUTRITION:  Diet:       Procedures    NPO      Food Allergies: No  Cultural/Ethnic/Nondenominational Preferences Addressed: Yes    Percent Meals Eaten (last 3 days)       Date/Time Percent Meals Eaten (%)    02/25/24 0731 0 %    02/25/24 1040 0 %    02/25/24 1145 0 %    02/25/24 1411 50 %    02/25/24 1417 0 %    02/25/24 1552 0 %    02/25/24 1930 30 %            GI system review:  Last BM:2/28    Skin and wounds: no pressure ulcers per RN documentation    NUTRITION RELATED PHYSICAL FINDINGS:     1. Body Fat/Muscle Mass: no wasting noted     2. Fluid Accumulation: none per RN documentation     NUTRITION PRESCRIPTION: 54.5 kg (IBW)  Calories: 8088-5061 calories/day (30-35 kcal/kg)  Protein: 71-98 grams protein/day (1.3-1.8 grams protein per kg)  Fluid: ~1 ml/kcal or per MD discretion    NUTRITION DIAGNOSIS/PROBLEM:  Inadequate energy intake related to  decreased ability to consume sufficient energy intakes  as evidenced by documented/reported insufficient oral intake and NPO status x 3 days.       MONITOR AND EVALUATE/NUTRITION GOALS:  Weight stable within 1 to 2 lbs during admission - Ongoing  Return to PO intake or advance diet in 24-48 hrs - Not met, Continues  Start alternative nutrition in 24-48 hrs if diet is not able to advance- Not met, Continues      MEDICATIONS:  IVF:LR at 75 ml/hr, Remeron, Keppra    LABS:  Glu:126, BUN:80, Cr:3.09, GFR:15    Pt is at High nutrition risk    Denisse Moran MS, RD, LDN  Clinical Dietitian  Ext:[phone]

## 2024-02-28 NOTE — PLAN OF CARE
Pt. alert and oriented x1. Easily arousable. W/ delayed response.  Breathing regular and easy. On RA. SR in tele. W/ purewick in place draining to gordon colored urine. Max assist. Repositioned. IVF Na Bicarb infusing well. Bed alarm on. Call light w/in reach.      Problem: Diabetes/Glucose Control  Goal: Glucose maintained within prescribed range  Description: INTERVENTIONS:  - Monitor Blood Glucose as ordered  - Assess for signs and symptoms of hyperglycemia and hypoglycemia  - Administer ordered medications to maintain glucose within target range  - Assess barriers to adequate nutritional intake and initiate nutrition consult as needed  - Instruct patient on self management of diabetes  Outcome: Progressing     Problem: Patient/Family Goals  Goal: Patient/Family Long Term Goal  Description: Patient's Long Term Goal: to go home    Interventions:  - Mds to see  - IV keppra  - See additional Care Plan goals for specific interventions  Outcome: Progressing  Goal: Patient/Family Short Term Goal  Description: Patient's Short Term Goal: to feel better    Interventions:   - IV keppra  - Mds to see  - Comply to care plan  - See additional Care Plan goals for specific interventions  Outcome: Progressing     Problem: NEUROLOGICAL - ADULT  Goal: Achieves stable or improved neurological status  Description: INTERVENTIONS  - Assess for and report changes in neurological status  - Initiate measures to prevent increased intracranial pressure  - Maintain blood pressure and fluid volume within ordered parameters to optimize cerebral perfusion and minimize risk of hemorrhage  - Monitor temperature, glucose, and sodium. Initiate appropriate interventions as ordered  Outcome: Progressing  Goal: Absence of seizures  Description: INTERVENTIONS  - Monitor for seizure activity  - Administer anti-seizure medications as ordered  - Monitor neurological status  Outcome: Progressing  Goal: Remains free of injury related to seizure  activity  Description: INTERVENTIONS:  - Maintain airway, patient safety  and administer oxygen as ordered  - Monitor patient for seizure activity, document and report duration and description of seizure to MD/LIP  - If seizure occurs, turn patient to side and suction secretions as needed  - Reorient patient post seizure  - Seizure pads on all 4 side rails  - Instruct patient/family to notify RN of any seizure activity  - Instruct patient/family to call for assistance with activity based on assessment  Outcome: Progressing  Goal: Achieves maximal functionality and self care  Description: INTERVENTIONS  - Monitor swallowing and airway patency with patient fatigue and changes in neurological status  - Encourage and assist patient to increase activity and self care with guidance from PT/OT  - Encourage visually impaired, hearing impaired and aphasic patients to use assistive/communication devices  Outcome: Progressing     Problem: Delirium  Goal: Minimize duration of delirium  Description: Interventions:  - Encourage use of hearing aids, eye glasses  - Promote highest level of mobility daily  - Provide frequent reorientation  - Promote wakefulness i.e. lights on, blinds open  - Promote sleep, encourage patient's normal rest cycle i.e. lights off, TV off, minimize noise and interruptions  - Encourage family to assist in orientation and promotion of home routines  Outcome: Progressing

## 2024-02-28 NOTE — PROGRESS NOTES
[facility]  Nephrology Progress Note    Stephen Rodrigues Attending:  Brody Boston MD       Assessment and Plan:    1) CKD 4- baseline Cr > 2 mg/dl is due to longstanding DM / HTN; no further w/u     2) HIREN- due to volume depletion in setting of AMS / poor PO intake / diuretics. Improving rapidly. PLAN- adjust IVF; hold ARB / diuretics / SGLT2 and reassess regimen      3) DM 2     4) HTN- on scheduled IV HEARN / metoprolol q6     5) Gout / hyperuricemia- normally on allopurinol      6) AMS- multifactorial; holding gabapentin, was somewhat better yesterday    7) Nutrition- consider dobhoff for TF + meds      Subjective:  Awake following simple commands but more lethargic than yesterday     Physical Exam:   BP (!) 172/80 (BP Location: Left arm)   Pulse 83   Temp 98.6 °F (37 °C) (Oral)   Resp 18   Wt 195 lb 1.6 oz (88.5 kg)   SpO2 99%   BMI 33.49 kg/m²   Temp (24hrs), Av.9 °F (37.2 °C), Min:98.1 °F (36.7 °C), Max:99.4 °F (37.4 °C)       Intake/Output Summary (Last 24 hours) at 2024 0927  Last data filed at 2024 0835  Gross per 24 hour   Intake --   Output 1225 ml   Net -1225 ml     Wt Readings from Last 3 Encounters:   24 195 lb 1.6 oz (88.5 kg)   02/15/24 203 lb 9.6 oz (92.4 kg)   23 200 lb (90.7 kg)     General: awake  HEENT: No scleral icterus, MMM  Neck: Supple, no WILLIAM or thyromegaly  Cardiac: Regular rate and rhythm, S1, S2 normal, no murmur or tub  Lungs: Decreased BS at bases bilaterally   Abdomen: Soft, non-tender. + bowel sounds, no palpable organomegaly  Extremities: Without clubbing, cyanosis; no edema  Neurologic: Cranial nerves grossly intact, moving all extremities  Skin: Warm and dry, no rashes       Labs:   Lab Results   Component Value Date    WBC 7.9 2024    HGB 9.5 2024    HCT 29.7 2024    .0 2024    CREATSERUM 3.09 2024    BUN 80 2024     2024    K 3.9 2024     2024    CO2 24.0 2024      02/28/2024    CA 9.9 02/28/2024    PTT 30.2 02/28/2024    PGLU 128 02/28/2024       Imaging:  All imaging studies reviewed.    Meds:           Questions/concerns were discussed with patient and/or family by bedside.          Apple Aguilar MD  2/288/2024  927 AM

## 2024-02-28 NOTE — PLAN OF CARE
Received patient at 0730. Drowsy/lethargic, dose wake up to speech and follows some commands, some slow words. Tele Rhythm NSR with occasional PVC. O2 saturation 98% On room air. Breath sounds diminished but clear. Bed is locked and in low position. Call light and personal items within reach. Pt incontinent purewick used, incont of small BM this am. Pt turned q2. Started on hepatin drip this am as unable to take eliquis. ASA RS otherwise all essential meds at present via IV. Sodium bicarb drip at 75.     Seen by Dr Aguilar. IV changed to LR, consider dobhoff placement for TF/medications. Hydralazine changed to IV for now.       Problem: Diabetes/Glucose Control  Goal: Glucose maintained within prescribed range  Description: INTERVENTIONS:  - Monitor Blood Glucose as ordered  - Assess for signs and symptoms of hyperglycemia and hypoglycemia  - Administer ordered medications to maintain glucose within target range  - Assess barriers to adequate nutritional intake and initiate nutrition consult as needed  - Instruct patient on self management of diabetes  Outcome: Progressing     Problem: Patient/Family Goals  Goal: Patient/Family Long Term Goal  Description: Patient's Long Term Goal: to go home    Interventions:  - Mds to see  - IV keppra  - See additional Care Plan goals for specific interventions  Outcome: Progressing  Goal: Patient/Family Short Term Goal  Description: Patient's Short Term Goal: to feel better    Interventions:   - IV keppra  - Mds to see  - Comply to care plan  - See additional Care Plan goals for specific interventions  Outcome: Progressing     Problem: NEUROLOGICAL - ADULT  Goal: Achieves stable or improved neurological status  Description: INTERVENTIONS  - Assess for and report changes in neurological status  - Initiate measures to prevent increased intracranial pressure  - Maintain blood pressure and fluid volume within ordered parameters to optimize cerebral perfusion and minimize risk of  hemorrhage  - Monitor temperature, glucose, and sodium. Initiate appropriate interventions as ordered  Outcome: Progressing  Goal: Absence of seizures  Description: INTERVENTIONS  - Monitor for seizure activity  - Administer anti-seizure medications as ordered  - Monitor neurological status  Outcome: Progressing  Goal: Remains free of injury related to seizure activity  Description: INTERVENTIONS:  - Maintain airway, patient safety  and administer oxygen as ordered  - Monitor patient for seizure activity, document and report duration and description of seizure to MD/LIP  - If seizure occurs, turn patient to side and suction secretions as needed  - Reorient patient post seizure  - Seizure pads on all 4 side rails  - Instruct patient/family to notify RN of any seizure activity  - Instruct patient/family to call for assistance with activity based on assessment  Outcome: Progressing  Goal: Achieves maximal functionality and self care  Description: INTERVENTIONS  - Monitor swallowing and airway patency with patient fatigue and changes in neurological status  - Encourage and assist patient to increase activity and self care with guidance from PT/OT  - Encourage visually impaired, hearing impaired and aphasic patients to use assistive/communication devices  Outcome: Progressing     Problem: Delirium  Goal: Minimize duration of delirium  Description: Interventions:  - Encourage use of hearing aids, eye glasses  - Promote highest level of mobility daily  - Provide frequent reorientation  - Promote wakefulness i.e. lights on, blinds open  - Promote sleep, encourage patient's normal rest cycle i.e. lights off, TV off, minimize noise and interruptions  - Encourage family to assist in orientation and promotion of home routines  Outcome: Progressing

## 2024-02-28 NOTE — SLP NOTE
SPEECH DAILY NOTE - INPATIENT    ASSESSMENT & PLAN   ASSESSMENT  Contacted patient at the bedside to reassess swallow function, determine safest po diet and r/o aspiration. Per chart review and notes earlier on this date, patient demonstrating improved alertness and following commands. Patient contacted at the bedside with PCT present to assist with positioning patient to upright and midline position in bed. Patient demonstrated willingness to accept thin and mildly thick liquids by spoon. Patient with loss of bolus anteriorly and holding bolus in oral cavity for prolonged periods of time with eventual inconsistent hyolaryngeal elevation on palpation. Patient with severely impaired oral phase and apparent impaired pharyngeal phase due to decreased level of alertness. Recommend NPO status. Collaborated with RN who was in agreement. Will continue to follow to determine safest po diet.     Diet Recommendations - Solids: NPO  Diet Recommendations - Liquids: NPO    Compensatory Strategies Recommended:  (n/a)  Aspiration Precautions:  (n/a)  Medication Administration Recommendations: Non-oral                     Treatment Plan  Treatment Plan/Recommendations: SLP to reassess    Interdisciplinary Communication: Discussed with RN            GOALS    Goal #1 On-going reassessment of swallow fxn  In Progress     FOLLOW UP  Follow Up Needed (Documentation Required): Yes  SLP Follow-up Date: 02/29/24  Number of Visits to Meet Established Goals: 2    Session: 1 of 3    If you have any questions, please contact RONY Daniels

## 2024-02-28 NOTE — PROGRESS NOTES
[facility]     Hospitalist Progress Note     Stephen Rodrigues Patient Status:  Inpatient    1945 MRN [id number]   [facility] 2NE-A Attending Brody Boston MD   Hosp Day # 9 PCP PHYSICIAN NONSTAFF     Subjective:   Mental status wax and wanes  Responsive and follows all commands but not as conversant as yesterday     Objective:    Review of Systems:   A comprehensive review of systems was completed; pertinent positive and negatives stated in subjective.  Vital signs:  Temp:  [98.1 °F (36.7 °C)-99.4 °F (37.4 °C)] 99.4 °F (37.4 °C)  Pulse:  [73-88] 78  Resp:  [16-20] 18  BP: (137-162)/(67-79) 159/79  SpO2:  [95 %-99 %] 95 %  Physical Exam:    General: No acute distress, confused   Respiratory: no wheezes, no rhonchi  Cardiovascular: S1, S2, RRR  Abdomen: Soft, NT/ND, +BS  Extremities: no edema, moves ext.     Diagnostic Data:    Labs:  Recent Labs   Lab 24  0633   WBC 7.9   HGB 9.5*   .0   .0       Recent Labs   Lab 24  1050 24  0601 24  0633   * 144* 126*   BUN 92* 93* 80*   CREATSERUM 4.85* 4.05* 3.09*   CA 9.8 9.8 9.9    141 143   K 4.0 4.1 3.9    112 113*   CO2 20.0* 24.0 24.0     Estimated Creatinine Clearance: 12.7 mL/min (A) (based on SCr of 3.09 mg/dL (H)).  No results for input(s): \"PTP\", \"INR\" in the last 168 hours.       Microbiology  No results found for this visit on 24.  Imaging: Reviewed in Epic.  Medications:    hydrALAzine  10 mg Intravenous q6h    pantoprazole  40 mg Intravenous Q24H    metoprolol  5 mg Intravenous Q6H    levETIRAcetam  500 mg Intravenous Q12H    aspirin  300 mg Rectal Daily    [Held by provider] aspirin  81 mg Oral Daily    [Held by provider] dilTIAZem ER  240 mg Oral Daily    [Held by provider] escitalopram  10 mg Oral Daily    [Held by provider] hydrALAZINE  50 mg Oral TID    mirtazapine  15 mg Oral Nightly    [Held by provider] rosuvastatin  10 mg Oral Daily    insulin aspart  1-10 Units  Subcutaneous TID AC and HS       Assessment & Plan:      #Acute encephalopathy   -on Keppra  -EEG repeated  -gabapenitn held with HIREN     #HIREN on CKD IV- improving nicely   - IVF/ bicarb changed to LR  -renal consult appreciated  -Stopped lasix and aldactone     #Hx of seizure disorder- no further seizure activity   -Keppra dose adjusted down  -Neurology consulted  -CT head without acute intracranial abnormalities.   -UA unremarkable  -MRI brain negative  -Seizure precautions    #Hypernatremia- improved     #Acute on chronic diastolic CHF-resolved     #Aortic stenosis-Mild to moderate on most recent echo     #CAD s/p stent-Aspirin, statin     #Hypertension-Resume home medications    #Hyperlipidemia-Statin     #DM type II with A1c 5.8-Insulin sliding scale     #Hx of CVA-Aspirin, statin     #JHONY-JHONY protocol     #COPD, stable-Resume albuterol inhaler as needed     #Gout-Allopurinol     #Fibromyalgia-Gabapentin     #LLE DVT-Eliquis     #Anxiety and depression-Resume Lexapro and Remeron     #GERD-PPI    Spech re-evaluaton as more alert but still absolute NPO  -starting Heparin gtt as can't take Eliquis  -monitor 24 hours then discussion to be had about tube feeds but risks/benefits to d/w family      Brody Boston MD  Supplementary Documentation:     Quality:  DVT Mechanical Prophylaxis:   SCDs,    DVT Pharmacologic Prophylaxis   Medication    apixaban (Eliquis) tab 5 mg         DVT Pharmacologic prophylaxis: Aspirin 81 mg      Code Status: Full Code  Gurrola: External urinary catheter in place  Gurrola Duration (in days):   Central line:    ELROY: 2/28/2024  Discharge is dependent on: renal consult  At this point Ms. Rodrigues is expected to be discharge to: tbd     The 21st Century Cures Act makes medical notes like these available to patients in the interest of transparency. Please be advised this is a medical document. Medical documents are intended to carry relevant information, facts as evident, and the clinical opinion  of the practitioner. The medical note is intended as peer to peer communication and may appear blunt or direct. It is written in medical language and may contain abbreviations or verbiage that are unfamiliar.

## 2024-02-29 ENCOUNTER — APPOINTMENT (OUTPATIENT)
Dept: GENERAL RADIOLOGY | Facility: HOSPITAL | Age: 79
End: 2024-02-29
Attending: HOSPITALIST
Payer: MEDICARE

## 2024-02-29 ENCOUNTER — NURSE ONLY (OUTPATIENT)
Dept: ELECTROPHYSIOLOGY | Facility: HOSPITAL | Age: 79
End: 2024-02-29
Attending: INTERNAL MEDICINE
Payer: MEDICARE

## 2024-02-29 ENCOUNTER — APPOINTMENT (OUTPATIENT)
Dept: CT IMAGING | Facility: HOSPITAL | Age: 79
End: 2024-02-29
Attending: HOSPITALIST
Payer: MEDICARE

## 2024-02-29 PROBLEM — N18.30 STAGE 3 CHRONIC KIDNEY DISEASE (HCC): Status: ACTIVE | Noted: 2024-02-29

## 2024-02-29 PROBLEM — N18.30 STAGE 3 CHRONIC KIDNEY DISEASE (HCC): Status: ACTIVE | Noted: 2024-01-01

## 2024-02-29 NOTE — PLAN OF CARE
Assumed care around 1930. Lethargic, unable to answer questions. On RA w/ sats >90. Monitor shows NSR. Incontinent of bowel and bladder. No indicators of pain. Pt max assist. Plan to vaibhav ROGEL MD to see.  Safety precautions in place, call light within reach, bed alarm on.     Problem: Diabetes/Glucose Control  Goal: Glucose maintained within prescribed range  Description: INTERVENTIONS:  - Monitor Blood Glucose as ordered  - Assess for signs and symptoms of hyperglycemia and hypoglycemia  - Administer ordered medications to maintain glucose within target range  - Assess barriers to adequate nutritional intake and initiate nutrition consult as needed  - Instruct patient on self management of diabetes  Outcome: Progressing     Problem: Patient/Family Goals  Goal: Patient/Family Long Term Goal  Description: Patient's Long Term Goal: to go home    Interventions:  - Mds to see  - IV keppra  - See additional Care Plan goals for specific interventions  Outcome: Progressing  Goal: Patient/Family Short Term Goal  Description: Patient's Short Term Goal: to feel better    Interventions:   - IV keppra  - Mds to see  - Comply to care plan  - See additional Care Plan goals for specific interventions  Outcome: Progressing     Problem: NEUROLOGICAL - ADULT  Goal: Achieves stable or improved neurological status  Description: INTERVENTIONS  - Assess for and report changes in neurological status  - Initiate measures to prevent increased intracranial pressure  - Maintain blood pressure and fluid volume within ordered parameters to optimize cerebral perfusion and minimize risk of hemorrhage  - Monitor temperature, glucose, and sodium. Initiate appropriate interventions as ordered  Outcome: Progressing  Goal: Absence of seizures  Description: INTERVENTIONS  - Monitor for seizure activity  - Administer anti-seizure medications as ordered  - Monitor neurological status  Outcome: Progressing  Goal: Remains free of injury related to seizure  activity  Description: INTERVENTIONS:  - Maintain airway, patient safety  and administer oxygen as ordered  - Monitor patient for seizure activity, document and report duration and description of seizure to MD/LIP  - If seizure occurs, turn patient to side and suction secretions as needed  - Reorient patient post seizure  - Seizure pads on all 4 side rails  - Instruct patient/family to notify RN of any seizure activity  - Instruct patient/family to call for assistance with activity based on assessment  Outcome: Progressing  Goal: Achieves maximal functionality and self care  Description: INTERVENTIONS  - Monitor swallowing and airway patency with patient fatigue and changes in neurological status  - Encourage and assist patient to increase activity and self care with guidance from PT/OT  - Encourage visually impaired, hearing impaired and aphasic patients to use assistive/communication devices  Outcome: Progressing     Problem: Delirium  Goal: Minimize duration of delirium  Description: Interventions:  - Encourage use of hearing aids, eye glasses  - Promote highest level of mobility daily  - Provide frequent reorientation  - Promote wakefulness i.e. lights on, blinds open  - Promote sleep, encourage patient's normal rest cycle i.e. lights off, TV off, minimize noise and interruptions  - Encourage family to assist in orientation and promotion of home routines  Outcome: Progressing

## 2024-02-29 NOTE — PROGRESS NOTES
[facility]     Hospitalist Progress Note     Stephen Rodrigues Patient Status:  Inpatient    1945 MRN [id number]   [facility] 2NE-A Attending Brody Boston MD   Hosp Day # 10 PCP PHYSICIAN NONSTAFF     Subjective:     Responds to verbal stimuli but then drifts off, was better on Tuesday, less oriented yesterday and not wanting to follow commands today   Not conversant today     Objective:    Review of Systems:   A comprehensive review of systems was completed; pertinent positive and negatives stated in subjective.  Vital signs:  Temp:  [97.9 °F (36.6 °C)-100.1 °F (37.8 °C)] 100.1 °F (37.8 °C)  Pulse:  [80-94] 94  Resp:  [17-20] 17  BP: (137-175)/(70-83) 154/70  SpO2:  [95 %-99 %] 99 %  Physical Exam:    General: No acute distress, confused   Respiratory: no wheezes, no rhonchi  Cardiovascular: S1, S2, RRR  Abdomen: Soft, NT/ND, +BS  Extremities: no edema, moves ext. Spontaneously but not following commands consistently     Diagnostic Data:    Labs:  Recent Labs   Lab 24  0633 24  0616   WBC 7.9 9.2   HGB 9.5* 9.2*   .0 100.3*   .0 298.0  298.0       Recent Labs   Lab 24  0601 24  0633 24  0616   * 126* 150*   BUN 93* 80* 67*   CREATSERUM 4.05* 3.09* 2.38*   CA 9.8 9.9 9.9    143 148*   K 4.1 3.9 3.7    113* 118*   CO2 24.0 24.0 24.0     Estimated Creatinine Clearance: 16.6 mL/min (A) (based on SCr of 2.38 mg/dL (H)).  No results for input(s): \"PTP\", \"INR\" in the last 168 hours.       Microbiology  No results found for this visit on 24.  Imaging: Reviewed in Epic.  Medications:    hydrALAzine  10 mg Intravenous q6h    pantoprazole  40 mg Intravenous Q24H    metoprolol  5 mg Intravenous Q6H    levETIRAcetam  500 mg Intravenous Q12H    aspirin  300 mg Rectal Daily    aspirin  81 mg Oral Daily    dilTIAZem ER  240 mg Oral Daily    [Held by provider] escitalopram  10 mg Oral Daily    [Held by provider] hydrALAZINE  50 mg Oral  TID    mirtazapine  15 mg Oral Nightly    rosuvastatin  10 mg Oral Daily    insulin aspart  1-10 Units Subcutaneous TID AC and HS       Assessment & Plan:      #Acute encephalopathy - seems worse today  -on Keppra  -EEG repeated  -gabapenitn held with HIREN   -d/w Neuro- to reevaluate  - no obvious infection. ?encephalitis, subclinical Sz- no obvious seizure ?24 hour EEG    #HIREN on CKD IV- improved well   - IVF/ bicarb changed to LR  -renal consult appreciated  -Stopped lasix and aldactone     #Hx of seizure disorder- no further seizure activity   -Keppra dose adjusted down  -Neurology consulted  -CT head without acute intracranial abnormalities.   -UA unremarkable  -MRI brain negative  -Seizure precautions  -STAT CT Brain as on heparin gtt     #Hypernatremia- monitor     #Acute on chronic diastolic CHF-resolved     #Aortic stenosis-Mild to moderate on most recent echo     #CAD s/p stent-Aspirin, statin     #Hypertension-Resume home medications    #Hyperlipidemia-Statin     #DM type II with A1c 5.8-Insulin sliding scale     #Hx of CVA-Aspirin, statin     #JHONY-JHONY protocol     #COPD, stable-Resume albuterol inhaler as needed     #Gout-Allopurinol     #Fibromyalgia-Gabapentin     #LLE DVT-Eliquis     #Anxiety and depression-Resume Lexapro and Remeron     #GERD-PPI      #TEN- will d/w family benefit/risk dobhoff and tube feeds   CCT 37 minutes  D/w Neuro and Renal     ADDENDUM:  D/w Alla (POA, daughter)  Matyetn has a h/o \"Silent\" Seizures  Pt now on continuous EEG monitor  On Keppra- dosing per Neuro  -place dohoff and check CXR prior to feeds    Brody Boston MD  Supplementary Documentation:     Quality:  DVT Mechanical Prophylaxis:   SCDs,    DVT Pharmacologic Prophylaxis   Medication    heparin (Porcine) 78539 units/250mL infusion PE/DVT/THROMBUS CONTINUOUS         DVT Pharmacologic prophylaxis: Aspirin 81 mg      Code Status: Full Code  Gurrola: External urinary catheter in place  Gurrola Duration (in days):   Central  line:    ELROY: 3/1/2024  Discharge is dependent on: renal consult  At this point Ms. Rodrigues is expected to be discharge to: tbd     The 21st Century Cures Act makes medical notes like these available to patients in the interest of transparency. Please be advised this is a medical document. Medical documents are intended to carry relevant information, facts as evident, and the clinical opinion of the practitioner. The medical note is intended as peer to peer communication and may appear blunt or direct. It is written in medical language and may contain abbreviations or verbiage that are unfamiliar.

## 2024-02-29 NOTE — PLAN OF CARE
Received patient at 0730. Pt remains drowsy/lethargic. Does open eyes, and follows some commands but not much verbal response. Tele Rhythm NSR with occasional PVC. O2 saturation 99% On room air. Breath sounds diminished but clear. Bed is locked and in low position. Call light and personal items within reach. Pt turned q2 for comfort/skin.  Purewick used for incontinence. IVF changed to 1/2 NS with 20K per nephrology. Continues on all IV and RS medications. Possible Dobbhodiandra/MD MARILEE to talk with family. Daughter updated by writer yesterday of patient status and she will be in later this afternoon.      Dr Boston and Fang in to see pt. Due to improvement in labs and not in patient mentation Neurology re consulted. CT head and continuous EEG monitoring ordered. UA via straight cath also ordered.     Problem: Diabetes/Glucose Control  Goal: Glucose maintained within prescribed range  Description: INTERVENTIONS:  - Monitor Blood Glucose as ordered  - Assess for signs and symptoms of hyperglycemia and hypoglycemia  - Administer ordered medications to maintain glucose within target range  - Assess barriers to adequate nutritional intake and initiate nutrition consult as needed  - Instruct patient on self management of diabetes  Outcome: Progressing     Problem: Patient/Family Goals  Goal: Patient/Family Long Term Goal  Description: Patient's Long Term Goal: to go home    Interventions:  - Mds to see  - IV keppra  - See additional Care Plan goals for specific interventions  Outcome: Progressing  Goal: Patient/Family Short Term Goal  Description: Patient's Short Term Goal: to feel better    Interventions:   - IV keppra  - Mds to see  - Comply to care plan  - See additional Care Plan goals for specific interventions  Outcome: Progressing     Problem: NEUROLOGICAL - ADULT  Goal: Achieves stable or improved neurological status  Description: INTERVENTIONS  - Assess for and report changes in neurological status  - Initiate measures  to prevent increased intracranial pressure  - Maintain blood pressure and fluid volume within ordered parameters to optimize cerebral perfusion and minimize risk of hemorrhage  - Monitor temperature, glucose, and sodium. Initiate appropriate interventions as ordered  Outcome: Progressing  Goal: Absence of seizures  Description: INTERVENTIONS  - Monitor for seizure activity  - Administer anti-seizure medications as ordered  - Monitor neurological status  Outcome: Progressing  Goal: Remains free of injury related to seizure activity  Description: INTERVENTIONS:  - Maintain airway, patient safety  and administer oxygen as ordered  - Monitor patient for seizure activity, document and report duration and description of seizure to MD/LIP  - If seizure occurs, turn patient to side and suction secretions as needed  - Reorient patient post seizure  - Seizure pads on all 4 side rails  - Instruct patient/family to notify RN of any seizure activity  - Instruct patient/family to call for assistance with activity based on assessment  Outcome: Progressing  Goal: Achieves maximal functionality and self care  Description: INTERVENTIONS  - Monitor swallowing and airway patency with patient fatigue and changes in neurological status  - Encourage and assist patient to increase activity and self care with guidance from PT/OT  - Encourage visually impaired, hearing impaired and aphasic patients to use assistive/communication devices  Outcome: Progressing     Problem: Delirium  Goal: Minimize duration of delirium  Description: Interventions:  - Encourage use of hearing aids, eye glasses  - Promote highest level of mobility daily  - Provide frequent reorientation  - Promote wakefulness i.e. lights on, blinds open  - Promote sleep, encourage patient's normal rest cycle i.e. lights off, TV off, minimize noise and interruptions  - Encourage family to assist in orientation and promotion of home routines  Outcome: Progressing

## 2024-02-29 NOTE — PROGRESS NOTES
[facility]  [facility] Neurology Progress Note    Stephen Rodrigues Patient Status:  Inpatient    1945 MRN [id number]   Location [facility] 2NE-A Attending Brody Boston MD   Hosp Day # 10 PCP PHYSICIAN NONSTAFF       Chief Complaint: Lethargy, AMS      Subjective:  Stephen Rodrigues is a(n) 79 year old female with history of seizure disorder, aortic stenosis, CAD s/p stents, HLD, HTN, DM II, CKD IV, JHONY, COPD, and migraines who originally presented to the ED with AMS after a neighbor checked on her for a wellbeing check on 24. Patient was evaluated by the inpatient neuro service early during admission and EEG revealed generalized sharp wave activity, so keppra was increased to 750 mg BID. The following day after Keppra dose was increased, patient was noted to be lethargic and confused, thus keppra was decreased to Keppra 500 mg BID. MRI brain and CT brain both negative for acute intracranial findings. Since that time, patient was having fluctuations of mentation and has become too lethargic for PO intake. Neurology has been requested to re-evaluate patient. Of note, on chart review patient has been seen in the past by Dr. Wong in outpatient neuro clinic for seizure management, last note in  patient was prescribed Keppra 1,000 mg BID. Per chart review appears that Keppra 1,000 mg BID was discontinued on discharge from prior admission on 2/15/24 along with baclofen and cyclobenzaprine per discharge note/ med rec. Patient was admitted at that time for work up of dizziness and orthostatic hypotension.       Current complaints: Patient moaning to stimulation, opens eyes briefly but unable to stay awake.       Objective/Physical Exam:    Vital Signs:  Blood pressure 158/66, pulse 90, temperature 98.9 °F (37.2 °C), temperature source Oral, resp. rate 18, weight 196 lb 4.8 oz (89 kg), SpO2 97%.    GENERAL:  Patient is a 79 year old female in no acute distress.  PSYCH: Obtunded  HEENT:  Normocephalic,  atraumatic  NECK: Symmetrical, atraumatic  LUNGS: Regular rate, no accessory muscle use  HEART: Regular rate and rhythm.  ABD: Soft, non tender  SKIN: Warm, dry, no rashes  EXTREMITIES: Symmetrical     NEUROLOGICAL:   Mental status: Obtunded  Speech: Moaning and groaning only  Memory and comprehension: impaired  Cranial Nerves: Blinks to threat, unable to test visual fields given mentation, PERRL 3mm brisk, EOMI, no nystagmus, facial sensation intact, face symmetric, tongue midline, shoulder shrug equal, remainder CN intact  LUE limited motion due to prior injury per RN. Difficulty to assess given patient is obtunded. Intermittently all extremities noted to be antigravity but not to commands. Withdraws to minimal noxious stim x4       Labs:  Lab Results   Component Value Date    WBC 9.2 02/29/2024    HGB 9.2 02/29/2024    HCT 29.1 02/29/2024    .0 02/29/2024    .0 02/29/2024    CREATSERUM 2.38 02/29/2024    BUN 67 02/29/2024     02/29/2024    K 3.7 02/29/2024     02/29/2024    CO2 24.0 02/29/2024     02/29/2024    CA 9.9 02/29/2024    PTT 97.1 02/29/2024    PGLU 138 02/29/2024                  Imaging/Diagnostic:  CT BRAIN OR HEAD (96882)    Result Date: 2/29/2024  CONCLUSION:  No acute intracranial abnormality identified.  Stable minimal chronic microvascular ischemic changes in the cerebral white matter. If there is clinical concern for acute ischemia/infarction, an MRI of the brain would be recommended for further evaluation.  LOCATION:  [id number]   Dictated by (CST): Jey Arevalo MD on 2/29/2024 at 1:20 PM     Finalized by (CST): Jey Arevalo MD on 2/29/2024 at 1:22 PM       XR CHEST AP PORTABLE  (CPT=71045)    Result Date: 2/28/2024  CONCLUSION:  There are scar-like densities in both lungs.  There is otherwise no evidence of active cardiopulmonary disease.  There is no tube present.  Based on the technologist note the nurse was aware of this.  No tube was placed.   LOCATION:   [facility]      Dictated by (CST): Mayank Burleson MD on 2/28/2024 at 12:57 PM     Finalized by (CST): Mayank Burleson MD on 2/28/2024 at 12:59 PM       Principal Problem:    Altered mental status, unspecified altered mental status type  Active Problems:    CKD (chronic kidney disease) stage 4, GFR 15-29 ml/min (HCC)    Acute on chronic congestive heart failure, unspecified heart failure type (HCC)    History of seizure    Cognitive decline    Metabolic acidosis    Stage 3 chronic kidney disease (HCC)         Assessment/Plan:  Altered mental status possibly in the setting of breakthrough subclinical seizure?  -CT brain today negative for acute intracranial abnormalities  -vEEG to evaluate for epileptiform activity  -Continue Keppra 500 mg BID for now, pending results of EEG may increase dose  -Sodium with slight elevation today at 148, advise normonatremia  -Seizure precautions  -Discussed the above with hospitalist and Dr. Chadwick, additional recommendations pending EEG findings      URBAN Beard  [facility] [place]  2/29/2024  3:06 PM  Spectra [id number]    Is this a shared or split note between Advanced Practice Provider and Physician? No

## 2024-02-29 NOTE — PROGRESS NOTES
[facility]  Nephrology Progress Note    Stephen Rodrigues Attending:  Brody Boston MD       Assessment and Plan:    1) CKD 4- baseline Cr > 2 mg/dl is due to longstanding DM / HTN; no further w/u     2) HIREN- due to volume depletion in setting of AMS / poor PO intake / diuretics. Improving rapidly. PLAN- adjust IVF; hold ARB / diuretics / SGLT2 and reassess regimen      3) DM 2     4) HTN- BP reasonable on scheduled IV HEARN / metoprolol q6     5) Gout / hyperuricemia- normally on allopurinol      6) AMS- multifactorial; holding gabapentin. Meds benign. ABG  OK. Afebrile. Straight cath for UA / cx    7) Hypernatremia- due to insensible losses; adjust IVF      Subjective:  Pretty lethargic, essentially nonverbal    Physical Exam:   /70 (BP Location: Right arm)   Pulse 94   Temp 100.1 °F (37.8 °C) (Oral)   Resp 17   Wt 196 lb 4.8 oz (89 kg)   SpO2 99%   BMI 33.69 kg/m²   Temp (24hrs), Av.7 °F (37.1 °C), Min:97.9 °F (36.6 °C), Max:100.1 °F (37.8 °C)       Intake/Output Summary (Last 24 hours) at 2024 0944  Last data filed at 2024 0830  Gross per 24 hour   Intake 995.53 ml   Output 1000 ml   Net -4.47 ml     Wt Readings from Last 3 Encounters:   24 196 lb 4.8 oz (89 kg)   02/15/24 203 lb 9.6 oz (92.4 kg)   23 200 lb (90.7 kg)     General: awake  HEENT: No scleral icterus, MMM  Neck: Supple, no WILLIAM or thyromegaly  Cardiac: Regular rate and rhythm, S1, S2 normal, no murmur or tub  Lungs: Decreased BS at bases bilaterally   Abdomen: Soft, non-tender. + bowel sounds, no palpable organomegaly  Extremities: Without clubbing, cyanosis; no edema  Neurologic: Cranial nerves grossly intact, moving all extremities  Skin: Warm and dry, no rashes       Labs:   Lab Results   Component Value Date    WBC 9.2 2024    HGB 9.2 2024    HCT 29.1 2024    .0 2024    .0 2024    CREATSERUM 2.38 2024    BUN 67 2024     2024    K 3.7  02/29/2024     02/29/2024    CO2 24.0 02/29/2024     02/29/2024    CA 9.9 02/29/2024    PTT 97.1 02/29/2024    PGLU 147 02/29/2024       Imaging:  All imaging studies reviewed.    Meds:           Questions/concerns were discussed with patient and/or family by bedside.          Apple Aguilar MD  2/29/2024  944 AM

## 2024-02-29 NOTE — DIETARY NOTE
[facility]    NUTRITION ASSESSMENT    Unable to diagnose malnutrition criteria at this time.    NUTRITION INTERVENTION:    Meal and Snacks - Monitor and encourage adequate PO intake. NPO x 4 days. Diet advancement per SLP/MD.  Medical Food Supplements - Will evaluate need when diet is advanced.  Coordination of Nutrition Care - SLP consult prior to diet advancement. Consider Palliative care consult for goals of care.  Vitamin and Mineral Supplements - adding Thiamine and Chewable MVI    Enteral Nutrition - via DHT     Initiate Glucerna 1.5 at 10 ml/hr. Increase 10 ml q 8 hours, as tolerated to goal rate of 50 ml/hr.     Recommend free water flush of 100 ml q 4 hours or per Nephrology.  Goal TF to provide:1200 ml, 1800 kcal, 99 gm protein, 912 ml free H20, Total H20 (TF+free water flush)=1512 ml, and 100% RDI's.    D/t prolonged poor PO intakes, pt may be at risk for refeeding syndrome. Will gradually increase TF's to goal rate, monitor K+, Phos, and Mg and replace as needed.   Will add Thiamine 200 mg per day.    PATIENT STATUS:     2/29- Received nutrition consult for DHT feeds. Pt remains lethargic and inappropriate for PO trials. NPO x ~4 days. Plans for DHT placement and TF's to start today per RN. Will continue to monitor.    2/28- Pt w/ poor PO intakes this admit + NPO x 3 days. SLP re-evaluated on 2/27 and 2/28 w/ continued diet recs for NPO. Recommend placing DHT for TF's, if aggressive measures wish to be taken. RD available for TF recs upon consult. Will continue to monitor.     2/26/24- 80 y/o female admitted with AMS. Pt seen d/t length of stay. A&O to self. Very lethargic.  Pt stated she had a good appetite PTA. Pt then fell back asleep and did not answer any other questions. Has had a poor appetite/PO intakes this admit. Recorded PO intakes vary:0-80% with 50% or less most meals. Evaluated by SLP today and made NPO d/t lethargy. Will monitor for diet advancement vs need for nutrition support.    PMH:HTN, HLD, CAD s/p stent, aortic stenosis, DM2, CVA, CKD4, COPD, JHONY, IBS, fibromyalgia, seizure disorder.       ANTHROPOMETRICS:  Ht:  5' 4\"  Wt: 89 kg (196 lb 4.8 oz).   BMI: Body mass index is 33.69 kg/m².  IBW: 54.5 kg      WEIGHT HISTORY:     Wts vary per EMR hx. Per EMR hx, pt w/ 11 lb (5.4%) wt loss in about 1.5 weeks?    Wt Readings from Last 10 Encounters:   02/29/24 89 kg (196 lb 4.8 oz)   02/15/24 92.4 kg (203 lb 9.6 oz)   12/13/23 90.7 kg (200 lb)   01/28/23 94.6 kg (208 lb 8.9 oz)   12/06/22 98.7 kg (217 lb 9.5 oz)   11/26/22 99 kg (218 lb 4.1 oz)   10/24/22 99.8 kg (220 lb)   08/27/22 98.7 kg (217 lb 9.5 oz)   07/23/22 100.4 kg (221 lb 4.8 oz)   11/09/21 99.8 kg (220 lb)        NUTRITION:  Diet:       Procedures    NPO      Food Allergies: No  Cultural/Ethnic/Advent Preferences Addressed: Yes    Percent Meals Eaten (last 3 days)       Date/Time Percent Meals Eaten (%)    02/29/24 0830 0 %     Percent Meals Eaten (%): NPO at 02/29/24 0830    02/29/24 1032 0 %     Percent Meals Eaten (%): NPO at 02/29/24 1032    02/29/24 1242 0 %     Percent Meals Eaten (%): NPO at 02/29/24 1242    02/29/24 1356 0 %            GI system review:  Last BM:2/29    Skin and wounds: no pressure ulcers per RN documentation    NUTRITION RELATED PHYSICAL FINDINGS:     1. Body Fat/Muscle Mass: no wasting noted     2. Fluid Accumulation: none per RN documentation     NUTRITION PRESCRIPTION: 54.5 kg (IBW)  Calories: 8636-7039 calories/day (30-35 kcal/kg)  Protein: 71-98 grams protein/day (1.3-1.8 grams protein per kg)  Fluid: ~1 ml/kcal or per MD discretion    NUTRITION DIAGNOSIS/PROBLEM:  Inadequate energy intake related to  decreased ability to consume sufficient energy intakes  as evidenced by documented/reported insufficient oral intake and NPO status x 4 days.       MONITOR AND EVALUATE/NUTRITION GOALS:  Weight stable within 1 to 2 lbs during admission - Ongoing  Return to PO intake or advance diet in 24-48 hrs - Not  met, Continues  Start alternative nutrition in 24-48 hrs if diet is not able to advance- TF to start 2/29  Tolerate alternative nutrition at 100% of goal - New      MEDICATIONS:  IVF:1/2 NS (+Kcl:20 meq) at 75 ml/hr, Insulin, Keppra, Remeron, Protonix    LABS:  POC Glu:138-147, Glu:148, K+:3.7, BUN:67, Cr:2.38, GFR:20    Pt is at High nutrition risk    Denisse Moran MS, RD, LDN  Clinical Dietitian  Ext:[phone]

## 2024-02-29 NOTE — SLP NOTE
Spoke to RN prior to seeing patient. RN reports that there is no change in mental status from 2/28 and remains inappropriate for po trials. Will continue to follow to determine readiness for re-evaluation of swallowing function.

## 2024-03-01 PROBLEM — N17.0 ATN (ACUTE TUBULAR NECROSIS) (HCC): Status: ACTIVE | Noted: 2024-03-01

## 2024-03-01 PROBLEM — N17.0 ATN (ACUTE TUBULAR NECROSIS) (HCC): Status: ACTIVE | Noted: 2024-01-01

## 2024-03-01 PROBLEM — E87.0 HYPERNATREMIA: Status: ACTIVE | Noted: 2024-01-01

## 2024-03-01 PROBLEM — E87.0 HYPERNATREMIA: Status: ACTIVE | Noted: 2024-03-01

## 2024-03-01 NOTE — PHYSICAL THERAPY NOTE
PHYSICAL THERAPY TREATMENT NOTE - INPATIENT    Room Number: 2605/2605-A     Session: 2    Number of Visits to Meet Established Goals: 5    Presenting Problem: AMS  Co-Morbidities : Seizure, CHF, aortic stenosis, CAD, CKD, HTN, CVA, COPD, Fibromyalgia    ASSESSMENT   Patient demonstrates limited progress this session, goals  remain in progress. Eyes opened to command only.     Patient continues to function below baseline with bed mobility, transfers, and maintaining seated position.  Contributing factors to remaining limitations include decreased functional strength, impaired sitting balance, impaired motor planning, and cognitive deficits (difficulty following commands, poor insight into impairments).  Next session anticipate patient to progress bed mobility, transfers, and maintaining seated position.  Physical Therapy will continue to follow patient for duration of hospitalization.    Patient continues to benefit from continued skilled PT services: to promote return to prior level of function and safety with continuous assistance and gradual rehabilitative therapy .    PLAN  PT Treatment Plan: Bed mobility;Endurance;Energy conservation;Patient education;Gait training;Strengthening;Balance training;Transfer training  Rehab Potential : Good  Frequency (Obs): 3-5x/week    CURRENT GOALS     Goal #1 Patient is able to demonstrate supine - sit EOB @ level: minimum assistance      Goal #2 Patient is able to demonstrate transfers Sit to/from Stand at assistance level: minimum assistance      Goal #3 Patient is able to ambulate 50 feet with assist device: walker - rolling at assistance level: minimum assistance      Goal #4     Goal #5     Goal #6     Goal Comments: Goals established on 2/21/2024        PHYSICAL THERAPY MEDICAL/SOCIAL HISTORY  History related to current admission: Patient is a 79 year old female admitted on 2/19/2024 from home for AMS.  Pt diagnosed with TME. MRI brain (-) for acute pathology.   EEG  with epileptiform activity but no evidence of electrographic seizures.     SUBJECTIVE  Didn't communicate.     OBJECTIVE  Precautions: Bed/chair alarm    WEIGHT BEARING RESTRICTION  Weight Bearing Restriction: None                PAIN ASSESSMENT   Ratin          BALANCE                                                                                                                       Static Sitting: Not tested  Dynamic Sitting: Not tested           Static Standing: Not tested  Dynamic Standing: Not tested    ACTIVITY TOLERANCE                         O2 WALK         AM-PAC '6-Clicks' INPATIENT SHORT FORM - BASIC MOBILITY  How much difficulty does the patient currently have...  Patient Difficulty: Turning over in bed (including adjusting bedclothes, sheets and blankets)?: A Lot   Patient Difficulty: Sitting down on and standing up from a chair with arms (e.g., wheelchair, bedside commode, etc.): Unable   Patient Difficulty: Moving from lying on back to sitting on the side of the bed?: Unable   How much help from another person does the patient currently need...   Help from Another: Moving to and from a bed to a chair (including a wheelchair)?: Total   Help from Another: Need to walk in hospital room?: Total   Help from Another: Climbing 3-5 steps with a railing?: Total       AM-PAC Score:  Raw Score: 7   Approx Degree of Impairment: 92.36%   Standardized Score (AM-PAC Scale): 26.42   CMS Modifier (G-Code): CM    FUNCTIONAL ABILITY STATUS  Gait Assessment   Functional Mobility/Gait Assessment  Gait Assistance: Not tested  Distance (ft): 0  Assistive Device:  (na)  Pattern:  (NA)    Skilled Therapy Provided    Bed Mobility:  Rolling: MAX x 2   Supine<>Sit: NT  Sit<>Supine: NT    Transfer Mobility:  Sit<>Stand: NT   Stand<>Sit: NT   Gait: NT        Therapist's Comments: Pt only opened eyes a few times. Non verbal to questions. Pt lethargic. Rolled side to side three times, PCT present to check skin and for change  of brief and sheets. Patient didn't assist with the activity. RN permitted for pt to roll side to side during testing.     Patient End of Session: In bed;With  staff;Needs met    PT Session Time: 8 minutes  Gait Training:  minutes  Therapeutic Activity: 8 minutes

## 2024-03-01 NOTE — PROGRESS NOTES
[facility]     Hospitalist Progress Note     Stephen Rodrigues Patient Status:  Inpatient    1945 MRN [id number]   [facility] 2NE-A Attending Brody Boston MD   Hosp Day # 11 PCP PHYSICIAN NONSTAFF     Subjective:   Still confused  Neuro following     Objective:    Review of Systems:   A comprehensive review of systems was completed; pertinent positive and negatives stated in subjective.  Vital signs:  Temp:  [98.3 °F (36.8 °C)-100.1 °F (37.8 °C)] 98.3 °F (36.8 °C)  Pulse:  [88-94] 92  Resp:  [17-19] 18  BP: (143-166)/(66-94) 143/80  SpO2:  [97 %-99 %] 98 %  Physical Exam:    General: No acute distress, confused   Respiratory: no wheezes, no rhonchi  Cardiovascular: S1, S2, RRR  Abdomen: Soft, NT/ND, +BS  Extremities: no edema, moves ext. Spontaneously but not following commands. Still, responds to verbal stimuli     Diagnostic Data:    Labs:  Recent Labs   Lab 24  0633 24  0616 24  0551   WBC 7.9 9.2  --    HGB 9.5* 9.2*  --    .0 100.3*  --    .0 298.0  298.0 337.0       Recent Labs   Lab 24  0633 24  0616 24  0551   * 150* 148*   BUN 80* 67* 61*   CREATSERUM 3.09* 2.38* 2.41*   CA 9.9 9.9 9.8    148* 148*   K 3.9 3.7 4.1   * 118* 119*   CO2 24.0 24.0 23.0     Estimated Creatinine Clearance: 16.3 mL/min (A) (based on SCr of 2.41 mg/dL (H)).  No results for input(s): \"PTP\", \"INR\" in the last 168 hours.       Microbiology  No results found for this visit on 24.  Imaging: Reviewed in Epic.  Medications:    multivitamin  1 tablet Oral Daily    thiamine  100 mg Per NG Tube BID    insulin aspart  1-10 Units Subcutaneous q6h    hydrALAzine  10 mg Intravenous q6h    pantoprazole  40 mg Intravenous Q24H    metoprolol  5 mg Intravenous Q6H    levETIRAcetam  500 mg Intravenous Q12H    aspirin  300 mg Rectal Daily    aspirin  81 mg Oral Daily    dilTIAZem ER  240 mg Oral Daily    [Held by provider] escitalopram  10 mg Oral  Daily    [Held by provider] hydrALAZINE  50 mg Oral TID    mirtazapine  15 mg Oral Nightly    rosuvastatin  10 mg Oral Daily       Assessment & Plan:      #Acute encephalopathy - unclear etiology   -on Keppra  -EEG repeated  -gabapenitn held with HIREN   -d/w Neuro- to reevaluate  - no obvious infection. Low suspicion for encephalitis- d/w Neuro.   -possible subclinical Sz- now on 24 hour continuous EEG monitoring     #HIREN on CKD IV- improved well   -renal consult appreciated  -Stopped lasix and aldactone     #Hx of seizure disorder- no further seizure activity   -Keppra dose adjusted down per Neuro previously in the admission- defer dosing pending EEG monitoring   -Neurology consulted  -STAT CT Brain as on heparin gtt was unremarkable on 2/29  -CT head without acute intracranial abnormalities.   -UA unremarkable  -MRI brain negative  -Seizure precautions    #Hypernatremia- monitor     #Acute on chronic diastolic CHF-resolved     #Aortic stenosis-Mild to moderate on most recent echo     #CAD s/p stent-Aspirin, statin     #Hypertension-Resume home medications    #Hyperlipidemia-Statin     #DM type II with A1c 5.8-Insulin sliding scale     #Hx of CVA-Aspirin, statin     #JHONY-JHONY protocol     #COPD, stable-Resume albuterol inhaler as needed     #Gout-Allopurinol     #Fibromyalgia-Gabapentin     #LLE DVT-Eliquis     #Anxiety and depression-Resume Lexapro and Remeron     #GERD-PPI    #TEN- dobhoff and tube feeds       Total time today 35 minutes  Brody Boston MD  Supplementary Documentation:     Quality:  DVT Mechanical Prophylaxis:   SCDs,    DVT Pharmacologic Prophylaxis   Medication    heparin (Porcine) 36826 units/250mL infusion PE/DVT/THROMBUS CONTINUOUS         DVT Pharmacologic prophylaxis: Aspirin 81 mg      Code Status: Full Code  Gurrola: External urinary catheter in place  Gurrola Duration (in days):   Central line:    ELROY: 3/4/2024  Discharge is dependent on: renal consult  At this point Ms. Rodrigues is expected  to be discharge to: tbd     The 21st Century Cures Act makes medical notes like these available to patients in the interest of transparency. Please be advised this is a medical document. Medical documents are intended to carry relevant information, facts as evident, and the clinical opinion of the practitioner. The medical note is intended as peer to peer communication and may appear blunt or direct. It is written in medical language and may contain abbreviations or verbiage that are unfamiliar.

## 2024-03-01 NOTE — PROGRESS NOTES
[facility]  Nephrology Progress Note    Stephen Rodrigues Attending:  Brody Boston MD       Assessment and Plan:    1) CKD 4- baseline Cr > 2 mg/dl is due to longstanding DM / HTN; no further w/u     2) HIREN- due to volume depletion in setting of AMS / poor PO intake / diuretics. Improving rapidly. PLAN- adjust IVF; holding ARB / diuretics / SGLT2     3) DM 2     4) HTN- transition to PO meds (HEARN / amlodipine / BB) via dobhoff     5) Gout / hyperuricemia- normally on allopurinol      6) AMS- multifactorial; holding gabapentin. Meds benign. ABG  OK. Afebrile. Straight cath UA clear. Ongoing neuro eval. Await EEG; ? LP ?    7) Hypernatremia- due to insensible losses; adjust IVF / start FWF    8) Nutrition- Dobhoff placed; on glucerna      Subjective:  Pretty lethargic, essentially nonverbal    Physical Exam:   BP (!) 166/70 (BP Location: Right arm)   Pulse 91   Temp 98.7 °F (37.1 °C) (Oral)   Resp 17   Wt 196 lb 4.8 oz (89 kg)   SpO2 98%   BMI 33.69 kg/m²   Temp (24hrs), Av.3 °F (37.4 °C), Min:98.7 °F (37.1 °C), Max:100.1 °F (37.8 °C)       Intake/Output Summary (Last 24 hours) at 3/1/2024 0701  Last data filed at 3/1/2024 0627  Gross per 24 hour   Intake 857.58 ml   Output 1005 ml   Net -147.42 ml     Wt Readings from Last 3 Encounters:   24 196 lb 4.8 oz (89 kg)   02/15/24 203 lb 9.6 oz (92.4 kg)   23 200 lb (90.7 kg)     General: awake  HEENT: No scleral icterus, MMM  Neck: Supple, no WILLIAM or thyromegaly  Cardiac: Regular rate and rhythm, S1, S2 normal, no murmur or tub  Lungs: Decreased BS at bases bilaterally   Abdomen: Soft, non-tender. + bowel sounds, no palpable organomegaly  Extremities: Without clubbing, cyanosis; no edema  Neurologic: Cranial nerves grossly intact, moving all extremities  Skin: Warm and dry, no rashes       Labs:   Lab Results   Component Value Date    .0 2024    CREATSERUM 2.41 2024    BUN 61 2024     2024    K 4.1  03/01/2024     03/01/2024    CO2 23.0 03/01/2024     03/01/2024    CA 9.8 03/01/2024    PTT 90.6 03/01/2024    MG 2.8 03/01/2024    PHOS 3.1 03/01/2024    PGLU 145 03/01/2024       Imaging:  All imaging studies reviewed.    Meds:           Questions/concerns were discussed with patient and/or family by bedside.          Apple Aguilar MD  3/1/2024  701 AM

## 2024-03-01 NOTE — PLAN OF CARE
Assumed care around 1930. Lethargic, oriented to self. On RA w/ sats >90. Monitor shows NSR. Incontinent of bowel and bladder. No indicators of pain. Plan to cont EEG monitoring. Safety precautions in place, call light within reach, bed alarm on, video monitoring active.     Problem: Diabetes/Glucose Control  Goal: Glucose maintained within prescribed range  Description: INTERVENTIONS:  - Monitor Blood Glucose as ordered  - Assess for signs and symptoms of hyperglycemia and hypoglycemia  - Administer ordered medications to maintain glucose within target range  - Assess barriers to adequate nutritional intake and initiate nutrition consult as needed  - Instruct patient on self management of diabetes  Outcome: Progressing     Problem: Patient/Family Goals  Goal: Patient/Family Long Term Goal  Description: Patient's Long Term Goal: to go home    Interventions:  - Mds to see  - IV keppra  - See additional Care Plan goals for specific interventions  Outcome: Progressing  Goal: Patient/Family Short Term Goal  Description: Patient's Short Term Goal: to feel better    Interventions:   - IV keppra  - Mds to see  - Comply to care plan  - See additional Care Plan goals for specific interventions  Outcome: Progressing     Problem: NEUROLOGICAL - ADULT  Goal: Achieves stable or improved neurological status  Description: INTERVENTIONS  - Assess for and report changes in neurological status  - Initiate measures to prevent increased intracranial pressure  - Maintain blood pressure and fluid volume within ordered parameters to optimize cerebral perfusion and minimize risk of hemorrhage  - Monitor temperature, glucose, and sodium. Initiate appropriate interventions as ordered  Outcome: Progressing  Goal: Absence of seizures  Description: INTERVENTIONS  - Monitor for seizure activity  - Administer anti-seizure medications as ordered  - Monitor neurological status  Outcome: Progressing  Goal: Remains free of injury related to seizure  activity  Description: INTERVENTIONS:  - Maintain airway, patient safety  and administer oxygen as ordered  - Monitor patient for seizure activity, document and report duration and description of seizure to MD/LIP  - If seizure occurs, turn patient to side and suction secretions as needed  - Reorient patient post seizure  - Seizure pads on all 4 side rails  - Instruct patient/family to notify RN of any seizure activity  - Instruct patient/family to call for assistance with activity based on assessment  Outcome: Progressing  Goal: Achieves maximal functionality and self care  Description: INTERVENTIONS  - Monitor swallowing and airway patency with patient fatigue and changes in neurological status  - Encourage and assist patient to increase activity and self care with guidance from PT/OT  - Encourage visually impaired, hearing impaired and aphasic patients to use assistive/communication devices  Outcome: Progressing     Problem: Delirium  Goal: Minimize duration of delirium  Description: Interventions:  - Encourage use of hearing aids, eye glasses  - Promote highest level of mobility daily  - Provide frequent reorientation  - Promote wakefulness i.e. lights on, blinds open  - Promote sleep, encourage patient's normal rest cycle i.e. lights off, TV off, minimize noise and interruptions  - Encourage family to assist in orientation and promotion of home routines  Outcome: Progressing

## 2024-03-01 NOTE — PROGRESS NOTES
[facility]  [facility] Neurology Progress Note    Stephen Rodrigues Patient Status:  Inpatient    1945 MRN [id number]   Location [facility] 2NE-A Attending Brody Boston MD   Hosp Day # 11 PCP PHYSICIAN NONSTAFF     CC: Lethargy, altered mental status    Subjective:  Stephen Rodrigues is a(n) 79 year old female with history of seizure disorder, aortic stenosis, CAD s/p stents, HLD, HTN, DM II, CKD IV, JHONY, COPD, and migraines who originally presented to the ED with AMS after a neighbor checked on her for a wellbeing check on 24.      Seen for a follow up visit today. Currently in bed,  continuous video monitoring EEG going.Remains obtunded, moaning to painful stimuli, opens eyes when called her name, but unable to stay awake. Does not regard and follow commands.     MEDICATIONS:  No current outpatient medications on file.     Current Facility-Administered Medications   Medication Dose Route Frequency    potassium chloride 20 mEq in dextrose 5% 1000mL infusion premix   Intravenous Continuous    hydrALAZINE (Apresoline) tab 50 mg  50 mg Per NG Tube Q8H ANIBAL    amLODIPine (Norvasc) tab 5 mg  5 mg Per NG Tube Daily    metoprolol tartrate (Lopressor) tab 25 mg  25 mg Per NG Tube 2x Daily(Beta Blocker)    pancrelipase (Lip-Prot-Amyl) (Zenpep) DR particles cap 10,000 Units  10,000 Units Per G Tube PRN    And    sodium bicarbonate tab 325 mg  325 mg Per G Tube PRN    multivitamin (Centrum) chewable tab (Adult) 1 tablet  1 tablet Oral Daily    thiamine (Vitamin B1) tab 100 mg  100 mg Per NG Tube BID    insulin aspart (NovoLOG) 100 Units/mL FlexPen 1-10 Units  1-10 Units Subcutaneous q6h    heparin (Porcine) 64086 units/250mL infusion PE/DVT/THROMBUS CONTINUOUS  200-3,000 Units/hr Intravenous Continuous    pantoprazole (Protonix) 40 mg in sodium chloride 0.9% PF 10 mL IV push  40 mg Intravenous Q24H    levETIRAcetam (Keppra) 500 mg/5mL injection 500 mg  500 mg Intravenous Q12H    aspirin 300 MG rectal suppository  300 mg  300 mg Rectal Daily    albuterol (Ventolin HFA) 108 (90 Base) MCG/ACT inhaler 2 puff  2 puff Inhalation Q4H PRN    albuterol (Ventolin) (2.5 MG/3ML) 0.083% nebulizer solution 3 mL  3 mL Nebulization TID PRN    aspirin DR tab 81 mg  81 mg Oral Daily    escitalopram (Lexapro) tab 10 mg  10 mg Oral Daily    mirtazapine (REMERON SOL-TAB) disintegrating tab 15 mg  15 mg Oral Nightly    rosuvastatin (Crestor) tab 10 mg  10 mg Oral Daily    glucose (Dex4) 15 GM/59ML oral liquid 15 g  15 g Oral Q15 Min PRN    Or    glucose (Glutose) 40% oral gel 15 g  15 g Oral Q15 Min PRN    Or    glucose-vitamin C (Dex-4) chewable tab 4 tablet  4 tablet Oral Q15 Min PRN    Or    dextrose 50% injection 50 mL  50 mL Intravenous Q15 Min PRN    Or    glucose (Dex4) 15 GM/59ML oral liquid 30 g  30 g Oral Q15 Min PRN    Or    glucose (Glutose) 40% oral gel 30 g  30 g Oral Q15 Min PRN    Or    glucose-vitamin C (Dex-4) chewable tab 8 tablet  8 tablet Oral Q15 Min PRN    diazepam (Valium) 5 mg/mL injection 5 mg  5 mg Intravenous Q30 Min PRN    acetaminophen (Tylenol Extra Strength) tab 500 mg  500 mg Oral Q4H PRN    melatonin cap/tab 5 mg  5 mg Oral Nightly PRN    polyethylene glycol (PEG 3350) (Miralax) 17 g oral packet 17 g  17 g Oral Daily PRN    sennosides (Senokot) tab 17.2 mg  17.2 mg Oral Nightly PRN    bisacodyl (Dulcolax) 10 MG rectal suppository 10 mg  10 mg Rectal Daily PRN    ondansetron (Zofran) 4 MG/2ML injection 4 mg  4 mg Intravenous Q6H PRN    metoclopramide (Reglan) 5 mg/mL injection 5 mg  5 mg Intravenous Q8H PRN    benzonatate (Tessalon) cap 200 mg  200 mg Oral TID PRN    glycerin-hypromellose- (Artifical Tears) 0.2-0.2-1 % ophthalmic solution 1 drop  1 drop Both Eyes QID PRN    sodium chloride (Saline Mist) 0.65 % nasal solution 1 spray  1 spray Each Nare Q3H PRN       REVIEW OF SYSTEMS:  A 10-point system was reviewed.  Pertinent positives and negatives are noted in HPI.      PHYSICAL EXAMINATION:  VITAL  SIGNS: /80 (BP Location: Right arm)   Pulse 92   Temp 98.3 °F (36.8 °C) (Oral)   Resp 18   Wt 196 lb 4.8 oz (89 kg)   SpO2 98%   BMI 33.69 kg/m²   GENERAL:  Patient is a 79 year old female in no acute distress.  HEENT:  Normocephalic, atraumatic  ABD: Soft, non tender  SKIN: Warm, dry, no rashes    NEUROLOGICAL:   Mental status: Obtunded, opens eyes when called her name  Speech: Moaning, especially when left arm is touched, there is an IV there.   Memory and comprehension: Impaired   Cranial Nerves: Blinks to threat, unable to test visual fields given mentation, PERRL 3mm brisk, EOMI, no nystagmus, facial sensation intact, face symmetric, tongue midline, shoulder shrug equal, remainder CN grossly intact     Motor/Sensory: LUE limited motion due to prior injury per RN. Difficulty to assess given patient is obtunded. Intermittently all extremities noted to be antigravity but not to commands. Withdraws to minimal noxious stimuli throughout  Gait: Deferred      Imaging/Diagnostics:  XR CHEST AP PORTABLE  (CPT=71045)    Result Date: 2/29/2024  CONCLUSION:  Dobbhoff tube tip is in region of gastric antrum or duodenal bulb.   LOCATION:  [facility]      Dictated by (CST): Mayank Burleson MD on 2/29/2024 at 6:26 PM     Finalized by (CST): Mayank Burleson MD on 2/29/2024 at 6:26 PM       CT BRAIN OR HEAD (24837)    Result Date: 2/29/2024  CONCLUSION:  No acute intracranial abnormality identified.  Stable minimal chronic microvascular ischemic changes in the cerebral white matter. If there is clinical concern for acute ischemia/infarction, an MRI of the brain would be recommended for further evaluation.  LOCATION:  [id number]   Dictated by (CST): Jey Arevalo MD on 2/29/2024 at 1:20 PM     Finalized by (CST): Jey Arevalo MD on 2/29/2024 at 1:22 PM       XR CHEST AP PORTABLE  (CPT=71045)    Result Date: 2/28/2024  CONCLUSION:  There are scar-like densities in both lungs.  There is otherwise no evidence of active  cardiopulmonary disease.  There is no tube present.  Based on the technologist note the nurse was aware of this.  No tube was placed.   LOCATION:  [facility]      Dictated by (CST): Mayank Burleson MD on 2/28/2024 at 12:57 PM     Finalized by (CST): Mayank Burlesno MD on 2/28/2024 at 12:59 PM       MRI BRAIN (CPT=70551)    Result Date: 2/20/2024  CONCLUSION:  Stable chronic changes.  No acute disease.   LOCATION:  [facility]   Dictated by (CST): Quentin Angeles MD on 2/20/2024 at 10:39 PM     Finalized by (CST): Quentin Angeles MD on 2/20/2024 at 10:43 PM       CT BRAIN OR HEAD (05621)    Result Date: 2/19/2024  CONCLUSION: 1. No acute intracranial findings 2. Cerebral atrophy with chronic microvascular ischemic changes.    LOCATION:  [facility]   Dictated by (CST): Armando Ahmadi MD on 2/19/2024 at 8:28 PM     Finalized by (CST): Armando Ahmadi MD on 2/19/2024 at 8:31 PM       XR CHEST AP PORTABLE  (CPT=71045)    Result Date: 2/19/2024  CONCLUSION:   Stable cardiac and mediastinal contours.  Findings of congestive heart failure with mild interstitial pulmonary edema.  No associated pleural effusion or pneumothorax.   LOCATION:  [id number]      Dictated by (CST): Edgar Curtis MD on 2/19/2024 at 7:04 PM     Finalized by (CST): Edgar Curtis MD on 2/19/2024 at 7:05 PM          Labs:  Recent Labs   Lab 02/28/24 0633 02/29/24 0616 03/01/24  0551   RBC 2.97* 2.90*  --    HGB 9.5* 9.2*  --    HCT 29.7* 29.1*  --    .0 100.3*  --    MCH 32.0 31.7  --    MCHC 32.0 31.6  --    RDW 14.0 13.9  --    WBC 7.9 9.2  --    .0 298.0  298.0 337.0         Recent Labs   Lab 02/28/24 0633 02/29/24 0616 03/01/24  0551   * 150* 148*   BUN 80* 67* 61*   CREATSERUM 3.09* 2.38* 2.41*   EGFRCR 15* 20* 20*   CA 9.9 9.9 9.8    148* 148*   K 3.9 3.7 4.1   * 118* 119*   CO2 24.0 24.0 23.0       Assessment/Plan:    A 79 year old female with:    Altered mental status possibly in the setting of breakthrough subclinical  seizure? In a setting of history of seizures, was off medications for awhile.  According to daughter the patient has had \"silent\" seizures in the past.   CT brain 2/29 - negative for acute intracranial abnormalities  CK level today 249. Consistent with possible seizure activity. Rechecked ammonia level - normal at 26. B12, folate and TSH normal, RPR pending.  vEEG to evaluate for epileptiform activity ongoing - preliminary reading per Dr. Marie reported as frequent runs of generalized periodic discharges of triphasic morphology,. No definitive seizures but cortical irritability is present.   Will give a loading dose of 1000 mg Keppra IV x 1, then will changes maintenance to Keppra  1000 mg IV BID, LTM to continue for close monitoring.  Sodium with slight elevation on 2/29, 148, advise normo natremia  Seizure precautions with tele monitoring and continuous pulse ox.   Discussed the above with hospitalist and Dr. Merino, to follow with further recommendations if indicated.     Is this a shared or split note between Advanced Practice Provider and Physician? Yes       May Soto [facility]  3/1/2024, 9:53 AM   [place] # [id number]      Neurology Attending Addendum:  I have seen the patient independently, reviewed the history, labs and imaging, and agree with above note with following additions:  S:persistent lethargy  O: BP (!) 168/74   Pulse 83   Temp 98.5 °F (36.9 °C) (Temporal)   Resp 21   Wt 196 lb 4.8 oz (89 kg)   SpO2 97%   BMI 33.69 kg/m²   Neuro exam pertinent for opens eyes minimally, does not regard, EOMI, withdraws to noxious stim  EEG with GPEDS    Assessment/Plan:  Additions/editing/modifications made within above APN/PA plan, please see above  Status epilepticus  Seizure disorder  HIREN ckd stage 4  Encephalopathy due to generalized period epileptiform discharges, medications    1000mg Keppra, and this evening additional 2000mg, and dose increased to 1000mg q 12, will likely  have increased sedation due to this    Depakote 1500mg given, started 750mg q 8 and Ativan 1mg     Patient transferred to NICU for concern of nonconvulsive status epilepticus, concern for patient not being able to protect airway with additional sedating medication she would need        Kaylene Merino, DO  Neuromuscular and General Neurology  [facility]  pager [phone]

## 2024-03-01 NOTE — SLP NOTE
Contacted patient at the bedside. Patient was sleeping and awoke for this writer however unable to sustain wakefulness. Patient is not appropriate for po trials to assess swallowing. Will continue to follow to determine appropriateness for bedside swallowing evaluation.

## 2024-03-02 PROBLEM — G40.901 STATUS EPILEPTICUS (HCC): Status: ACTIVE | Noted: 2024-01-01

## 2024-03-02 PROBLEM — G40.901 STATUS EPILEPTICUS (HCC): Status: ACTIVE | Noted: 2024-03-02

## 2024-03-02 NOTE — PROGRESS NOTES
[facility]     Hospitalist Progress Note     Stephen Rodrigues Patient Status:  Inpatient    1945 MRN [id number]   [facility] 2NE-A Attending Brody Boston MD   Hosp Day # 12 PCP PHYSICIAN NONSTAFF     Subjective:   Events noted since I last evaluated patient  T/f to NICU   On EEG monitor  Minimally responsive to verbal and painful stimuli       Objective:    Review of Systems:   A comprehensive review of systems was completed; pertinent positive and negatives stated in subjective.  Vital signs:  Temp:  [98 °F (36.7 °C)-98.6 °F (37 °C)] 98.3 °F (36.8 °C)  Pulse:  [] 77  Resp:  [13-21] 19  BP: (119-168)/(58-80) 155/66  SpO2:  [96 %-100 %] 100 %  Physical Exam:    General: No acute distress, confused   Respiratory: no wheezes, no rhonchi  Cardiovascular: S1, S2, RRR  Abdomen: Soft, NT/ND, +BS  Extremities: no edema. Responds more to painful stimuli than verbal stimuli     Diagnostic Data:    Labs:  Recent Labs   Lab 24  0633 24  0616 24  0551 24  0453   WBC 7.9 9.2  --  9.1   HGB 9.5* 9.2*  --  8.9*   .0 100.3*  --  98.2   .0 298.0  298.0 337.0 315.0       Recent Labs   Lab 24  1407 24  2049 24  0453   * 217* 215*   BUN 57* 54* 51*   CREATSERUM 2.26* 2.26* 2.19*   CA 9.4 9.0 8.8    144 143   K 4.2 4.2 4.4   * 116* 115*   CO2 22.0 25.0 22.0     Estimated Creatinine Clearance: 18 mL/min (A) (based on SCr of 2.19 mg/dL (H)).  No results for input(s): \"PTP\", \"INR\" in the last 168 hours.    Lab Results   Component Value Date    TSH 1.690 2024     Microbiology  No results found for this visit on 24.  Imaging: Reviewed in Epic.  Medications:    hydrALAZINE  50 mg Per NG Tube Q8H ANIBAL    amLODIPine  5 mg Per NG Tube Daily    metoprolol tartrate  25 mg Per NG Tube 2x Daily(Beta Blocker)    aspirin  81 mg Per NG Tube Daily    valproate  750 mg Intravenous Q8H    levETIRAcetam  1,000 mg Intravenous Q12H     lacosamide  200 mg Intravenous Q12H    multivitamin  1 tablet Oral Daily    thiamine  100 mg Per NG Tube BID    insulin aspart  1-10 Units Subcutaneous q6h    pantoprazole  40 mg Intravenous Q24H    escitalopram  10 mg Oral Daily    mirtazapine  15 mg Oral Nightly    rosuvastatin  10 mg Oral Daily       Assessment & Plan:      #Acute encephalopathy - unclear etiology   -on Keppra, Depakote, Vimpat   -EEG monitoring per NeuroICU   -no obvious infection. Low suspicion for encephalitis- d/w Neuro.     #HIREN on CKD IV- improved well   -renal consult appreciated    #Hx of seizure disorder- as above   #Hypernatremia- monitor   #Acute on chronic diastolic CHF-resolved  #Aortic stenosis-Mild to moderate on most recent echo  #CAD s/p stent-Aspirin, statin  #Hypertension-home meds   #Hyperlipidemia-Statin  #DM type II with A1c 5.8-Insulin sliding scale  #Hx of CVA-Aspirin, statin  #JHONY-JHONY protocol  #COPD, stable-Resume albuterol inhaler as needed  #Gout-Allopurinol  #Fibromyalgia-Gabapentin held for now  #LLE DVT- heoarin as NPO, will start DOAC once more alert and tolerating PO   #Anxiety and depression-Resume Lexapro and Remeron once PO  #GERD-PPI  #TEN- dobhoff and tube feeds for now     Total time today 33 minutes  D/w consults   Brody Boston MD  Supplementary Documentation:     Quality:  DVT Mechanical Prophylaxis:   SCDs,    DVT Pharmacologic Prophylaxis   Medication    heparin (Porcine) 62538 units/250mL infusion PE/DVT/THROMBUS CONTINUOUS      DVT Pharmacologic prophylaxis: Aspirin 162 mg         Code Status: Full Code  Gurrola: External urinary catheter in place  Gurrola Duration (in days):   Central line:    ELROY: 3/4/2024  Discharge is dependent on: renal consult  At this point Ms. Rodrigues is expected to be discharge to: tbd     The 21st Century Cures Act makes medical notes like these available to patients in the interest of transparency. Please be advised this is a medical document. Medical documents are intended to  carry relevant information, facts as evident, and the clinical opinion of the practitioner. The medical note is intended as peer to peer communication and may appear blunt or direct. It is written in medical language and may contain abbreviations or verbiage that are unfamiliar.

## 2024-03-02 NOTE — PROGRESS NOTES
Assumed care of pt at approximately 1930. NSR on tele, Neuro checks Q2, pt only responds to pain with moaning, no visible movement in any extremity, see flowsheets for full neuro assessments. Seizure pads in place. Tube feeds remain infusing, advanced towards goal rate overnight. No acute events overnight. See flowsheets for full assessments.

## 2024-03-02 NOTE — PROCEDURES
ELECTROENCEPHALOGRAM REPORT      Patient Name: Stephen Rodrigues   : 1945    Date of Test: 3/1/24 at 12:20 until 3/1/24 at 18:53  History: 79 year old female with history of epilepsy with HIREN, recent seizure, and increased confusion    TECHNICAL ASPECTS OF EEG RECORDING:  This is a scalp EEG monitoring study. Scalp electrodes were positioned according to the 10-20 International system of electrode placement. EEG data were recorded continuously and digitally stored, and this is a routine study. No sedation was given. EEG data were reviewed using bipolar and referential montages. Video recording is also present.    DESCRIPTION OF EEG FINDINGS:  BACKGROUND ACTIVITY: The background rhythm is not seen, and instead are generalized periodic discharges, throughout the record. These do not respond to a dose of Keppra.There are pauses lasting 2 to 10 seconds, or rarely more, but then activity restarts, spike and wave complexes at 1-2 per second, generalized but at times more right predominant.    INTERICTAL EPILEPTIFORM ACTIVITY: None  ICTAL ACTIVITY: As above  SLEEP: No sleep pattern seen    IMPRESSION:  There are generalized periodic discharges that are near continuous, and at times pause for 2-10 seconds, and occur at around 1-2 per second frequency. This is suggestive of a nonconvulsive status epilepticus. Patient was given IV depakote and Keppra.      Kaylene Merino DO  Neuromuscular and General Neurology  [facility]

## 2024-03-02 NOTE — PROGRESS NOTES
[facility]  Neurocritical Care APRN Progress Note    NAME: Stephen Rodrigues - ROOM: [facility]/6609-A - MRN: [id number] - Age: 79 year old - :1945    History Of Present Illness:  Stephen Rodrgiues is a 79 year old female with PMHx significant for aortic stenosis, CAD s/p stent, CKD stage IV, HTN, HL, DM2, CVA, JHONY, COPD, HFpEF, gout, fibromyalgia, migraines and seizure disorder who transferred to CNICU in nonconvulsive status epilepticus.  She originally admitted on  with encephalopathy due to seizures.  EEG today concerning for nonconvulsive status epilepticus.  She was loaded with Keppra 3gm, Depacon 1500 mg, and Vimpat 200 mg.  She is obtunded and responsive to painful stimuli only.      Past Medical History:  Past Medical History:   Diagnosis Date    Anemia     Aortic stenosis     Arrhythmia     Arthritis     Asthma ([facility])     Back problem     Cataract     Deep vein thrombosis (DVT) of proximal lower extremity ([facility])     Depressive disorder 2010    Diverticulitis of colon 2009    Edema     Esophageal reflux     Extrinsic asthma, unspecified     Fibromyalgia     Gout     Heart attack (HCC) 1989    Heart valve disease     High blood pressure     High cholesterol     History of stomach ulcers     IBS (irritable bowel syndrome)     Incontinence     Migraines     Muscle weakness     Neuropathy     Osteoarthritis     Other and unspecified hyperlipidemia     Pneumonia due to organism     Pulmonary emphysema ([facility])     Renal disorder     Seizure disorder ([facility])     Shortness of breath     Sleep apnea     Stroke ([facility])     Type II or unspecified type diabetes mellitus without mention of complication, not stated as uncontrolled     Unspecified essential hypertension     Visual impairment      Past Surgical History:   Past Surgical History:   Procedure Laterality Date          CATH DRUG ELUTING STENT      HC  SECTION LEVEL I      KNEE SURGERY      TOTAL KNEE REPLACEMENT         Family History:   Family History   Problem Relation Age of Onset    Hypertension Other      Social History:    reports that she has never smoked. She has never used smokeless tobacco. She reports that she does not drink alcohol and does not use drugs.     Review of Systems:   A comprehensive 10 point review of systems was completed.  Pertinent positives and negatives noted in the HPI.    Current Facility-Administered Medications   Medication Dose Route Frequency    potassium chloride 20 mEq in dextrose 5% 1000mL infusion premix   Intravenous Continuous    hydrALAZINE (Apresoline) tab 50 mg  50 mg Per NG Tube Q8H ANIBAL    amLODIPine (Norvasc) tab 5 mg  5 mg Per NG Tube Daily    metoprolol tartrate (Lopressor) tab 25 mg  25 mg Per NG Tube 2x Daily(Beta Blocker)    aspirin chewable tab 81 mg  81 mg Per NG Tube Daily    valproate (Depacon) 1,500 mg in sodium chloride 0.9% 50 mL IVPB  1,500 mg Intravenous Once    [START ON 3/2/2024] valproate (Depacon) 750 mg in sodium chloride 0.9% 50 mL IVPB  750 mg Intravenous Q8H    levETIRAcetam (Keppra) 500 mg/5mL injection 1,000 mg  1,000 mg Intravenous Q12H    lacosamide (Vimpat) 200 mg/20mL injection 200 mg  200 mg Intravenous Q12H    pancrelipase (Lip-Prot-Amyl) (Zenpep) DR particles cap 10,000 Units  10,000 Units Per G Tube PRN    And    sodium bicarbonate tab 325 mg  325 mg Per G Tube PRN    multivitamin (Centrum) chewable tab (Adult) 1 tablet  1 tablet Oral Daily    thiamine (Vitamin B1) tab 100 mg  100 mg Per NG Tube BID    insulin aspart (NovoLOG) 100 Units/mL FlexPen 1-10 Units  1-10 Units Subcutaneous q6h    heparin (Porcine) 99797 units/250mL infusion PE/DVT/THROMBUS CONTINUOUS  200-3,000 Units/hr Intravenous Continuous    pantoprazole (Protonix) 40 mg in sodium chloride 0.9% PF 10 mL IV push  40 mg Intravenous Q24H    albuterol (Ventolin HFA) 108 (90 Base) MCG/ACT inhaler 2 puff  2 puff Inhalation Q4H PRN    albuterol (Ventolin) (2.5 MG/3ML) 0.083% nebulizer solution 3  mL  3 mL Nebulization TID PRN    escitalopram (Lexapro) tab 10 mg  10 mg Oral Daily    mirtazapine (REMERON SOL-TAB) disintegrating tab 15 mg  15 mg Oral Nightly    rosuvastatin (Crestor) tab 10 mg  10 mg Oral Daily    glucose (Dex4) 15 GM/59ML oral liquid 15 g  15 g Oral Q15 Min PRN    Or    glucose (Glutose) 40% oral gel 15 g  15 g Oral Q15 Min PRN    Or    glucose-vitamin C (Dex-4) chewable tab 4 tablet  4 tablet Oral Q15 Min PRN    Or    dextrose 50% injection 50 mL  50 mL Intravenous Q15 Min PRN    Or    glucose (Dex4) 15 GM/59ML oral liquid 30 g  30 g Oral Q15 Min PRN    Or    glucose (Glutose) 40% oral gel 30 g  30 g Oral Q15 Min PRN    Or    glucose-vitamin C (Dex-4) chewable tab 8 tablet  8 tablet Oral Q15 Min PRN    diazepam (Valium) 5 mg/mL injection 5 mg  5 mg Intravenous Q30 Min PRN    acetaminophen (Tylenol Extra Strength) tab 500 mg  500 mg Oral Q4H PRN    melatonin cap/tab 5 mg  5 mg Oral Nightly PRN    polyethylene glycol (PEG 3350) (Miralax) 17 g oral packet 17 g  17 g Oral Daily PRN    sennosides (Senokot) tab 17.2 mg  17.2 mg Oral Nightly PRN    bisacodyl (Dulcolax) 10 MG rectal suppository 10 mg  10 mg Rectal Daily PRN    ondansetron (Zofran) 4 MG/2ML injection 4 mg  4 mg Intravenous Q6H PRN    metoclopramide (Reglan) 5 mg/mL injection 5 mg  5 mg Intravenous Q8H PRN    benzonatate (Tessalon) cap 200 mg  200 mg Oral TID PRN    glycerin-hypromellose- (Artifical Tears) 0.2-0.2-1 % ophthalmic solution 1 drop  1 drop Both Eyes QID PRN    sodium chloride (Saline Mist) 0.65 % nasal solution 1 spray  1 spray Each Nare Q3H PRN     OBJECTIVE  Vitals:  /73   Pulse 99   Temp 98.2 °F (36.8 °C) (Temporal)   Resp 16   Wt 196 lb 4.8 oz (89 kg)   SpO2 99%   BMI 33.69 kg/m²           Physical Exam:    General Appearance: Obtunded, responsive to painful stimuli, appears stated age  Neck: Supple, symmetrical, trachea midline, no carotid bruits, adenopathy, or JVD  Lungs: Clear to auscultation  bilaterally, respirations unlabored  Heart: Regular rate and rhythm, S1 and S2 normal, systolic murmur, no rub or gallop  Abdomen: Soft, non-tender, bowel sounds active all four quadrants, no masses, no organomegaly  Extremities: Extremities normal, atraumatic, no cyanosis or edema, capillary refill <3 sec.    Pulses: 2+ and symmetric all extremities  Skin: Skin color, texture, turgor normal for ethnicity, no rashes or lesions, warm and dry  Neurologic: This patient is obtunded, does not open eyes to voice or pain, yells with painful stimuli but otherwise no attempt at speech, +cough and gag, Pupils equally round and reactive to light.  3+ brisk bilaterally.  EOMs intact.  Face is symmetrical.  Sensation to painful stimuli is intact bilaterally but she does not move to painful stimuli.  When pinched she yells but does not withdrawal or make any movements in her limbs.    Data this admission:  XR CHEST AP PORTABLE  (CPT=71045)    Result Date: 2/29/2024  CONCLUSION:  Dobbhoff tube tip is in region of gastric antrum or duodenal bulb.   LOCATION:  [facility]      Dictated by (CST): Mayank Burleson MD on 2/29/2024 at 6:26 PM     Finalized by (CST): Mayank Burleson MD on 2/29/2024 at 6:26 PM       CT BRAIN OR HEAD (12992)    Result Date: 2/29/2024  CONCLUSION:  No acute intracranial abnormality identified.  Stable minimal chronic microvascular ischemic changes in the cerebral white matter. If there is clinical concern for acute ischemia/infarction, an MRI of the brain would be recommended for further evaluation.  LOCATION:  [id number]   Dictated by (CST): Jey Arevalo MD on 2/29/2024 at 1:20 PM     Finalized by (CST): Jey Arevalo MD on 2/29/2024 at 1:22 PM       XR CHEST AP PORTABLE  (CPT=71045)    Result Date: 2/28/2024  CONCLUSION:  There are scar-like densities in both lungs.  There is otherwise no evidence of active cardiopulmonary disease.  There is no tube present.  Based on the technologist note the nurse was aware of  this.  No tube was placed.   LOCATION:  [facility]      Dictated by (CST): Mayank Burleson MD on 2/28/2024 at 12:57 PM     Finalized by (CST): Mayank Burleson MD on 2/28/2024 at 12:59 PM       MRI BRAIN (CPT=70551)    Result Date: 2/20/2024  CONCLUSION:  Stable chronic changes.  No acute disease.   LOCATION:  [facility]   Dictated by (CST): Quentin Angeles MD on 2/20/2024 at 10:39 PM     Finalized by (CST): Quentin Angeles MD on 2/20/2024 at 10:43 PM       CT BRAIN OR HEAD (29190)    Result Date: 2/19/2024  CONCLUSION: 1. No acute intracranial findings 2. Cerebral atrophy with chronic microvascular ischemic changes.    LOCATION:  [facility]   Dictated by (CST): Armando Ahmadi MD on 2/19/2024 at 8:28 PM     Finalized by (CST): Armando Ahmadi MD on 2/19/2024 at 8:31 PM       XR CHEST AP PORTABLE  (CPT=71045)    Result Date: 2/19/2024  CONCLUSION:   Stable cardiac and mediastinal contours.  Findings of congestive heart failure with mild interstitial pulmonary edema.  No associated pleural effusion or pneumothorax.   LOCATION:  [id number]      Dictated by (CST): Edgar Curtis MD on 2/19/2024 at 7:04 PM     Finalized by (CST): Edgar Curtis MD on 2/19/2024 at 7:05 PM       CT SPINE CERVICAL (CPT=72125)    Result Date: 2/15/2024  CONCLUSION:  1. No acute displaced osseous fracture. 2. Moderate degenerative changes in the cervical spine.    LOCATION:  [facility]   Dictated by (CST): Stromberg, LeRoy, MD on 2/15/2024 at 11:58 AM     Finalized by (CST): Stromberg, LeRoy, MD on 2/15/2024 at 12:02 PM       XR HIP W OR WO PELVIS 2 OR 3 VIEWS, LEFT (CPT=73502)    Result Date: 2/15/2024  CONCLUSION:  1. No acute osseous injuries. 2. Severe left hip osteoarthritis and moderate right hip osteoarthritis. 3. Moderate bilateral SI joint arthropathy.   LOCATION:  [facility]   Dictated by (CST): Emily Friedman DO on 2/15/2024 at 11:45 AM     Finalized by (CST): Emily Friedman, DO on 2/15/2024 at 11:45 AM       XR WRIST COMPLETE (MIN 3 VIEWS), LEFT  (CPT=73110)    Result Date: 2/15/2024  CONCLUSION:  1. No acute osseous injuries. 2. Diffuse osteoarthritic changes as described above. 3. Osseous demineralization suspicious for osteopenia versus osteoporosis.   LOCATION:  [facility]   Dictated by (CST): Emily Friedman DO on 2/15/2024 at 11:43 AM     Finalized by (CST): Emily Friedman DO on 2/15/2024 at 11:44 AM       XR CHEST AP PORTABLE  (CPT=71045)    Result Date: 2/15/2024  CONCLUSION:  Increased mild reticular opacities throughout the lungs may represent edema versus infiltrate.  Correlate clinically.   LOCATION:  [facility]      Dictated by (CST): David Patricia MD on 2/15/2024 at 11:40 AM     Finalized by (CST): David Patricia MD on 2/15/2024 at 11:42 AM       XR HAND (MIN 3 VIEWS), LEFT (CPT=73130)    Result Date: 2/15/2024  CONCLUSION:  1. No acute osseous injury/fractures. 2. Diffuse osseous demineralization suggestive of osteopenia versus osteoporosis. 3. Diffuse osteoarthritic changes as described above.   LOCATION:  [facility]   Dictated by (CST): Emily Friedman DO on 2/15/2024 at 11:40 AM     Finalized by (CST): Emily Friedman DO on 2/15/2024 at 11:41 AM       CT BRAIN OR HEAD (45281)    Result Date: 2/15/2024  CONCLUSION:  1. No acute intracranial hemorrhage or hydrocephalus. 2. Trace chronic small vessel ischemic disease. If there is clinical concern for acute ischemia/infarction, an MRI of the brain would be recommended for further evaluation.    LOCATION:  [facility]   Dictated by (CST): Stromberg, LeRoy, MD on 2/15/2024 at 9:50 AM     Finalized by (CST): Stromberg, LeRoy, MD on 2/15/2024 at 9:52 AM       Labs:  Lab Results   Component Value Date    .0 03/01/2024    CREATSERUM 2.26 03/01/2024    BUN 57 03/01/2024     03/01/2024    K 4.2 03/01/2024     03/01/2024    CO2 22.0 03/01/2024     03/01/2024    CA 9.4 03/01/2024    PTT 90.6 03/01/2024    MG 2.8 03/01/2024    PHOS 3.1 03/01/2024     Assessment/Plan:  Nonconvulsive status  epilepticus  -Keppra 1gm BID  -Vimpat 200 mg BID  -Depacon 750 mg Q8h  -vEEG continuous  -PRN Valium   -Seizure precautions  -CTH 2/19 and 2/29 without acute findings  -MRI brain 2/20 without acute findings    AMS- encephalopathy due to #1  -ABG, ph 7.45  -Ammonia 26  -Monitor airway closely    HIREN on CKD 4  -Cr back to baseline around 2  -IVF  -Holding ARB, diuretics, SGLT2  -Renal on consult    Hypernatremia  -D5W and FWF  -BMP now with  (unchanged from previous at 2 pm)  -Maintain normal level, recheck in am    HFpEF, aortic stenosis, CAD, HTN, HL  -Norvasc, Hydralazine, Lopressor, Crestor    DM2  -Accu-check with ISS    History of LLE DVT  -Heparin gtt in place of Eliquis  -?restart Eliquis per [facility] tomorrow     F/E/N:  -IV fluids  -Follow lytes and replete prn  -TF per [facility]    ABCDEF Bundle  A- Assess, prevent and manage pain--no signs of pain  B- Spontaneous breathing trials--on RA  C- Sedation--RASS, no sedation given  D- Delirium--CAM-ICU, unable to perform testing  E- Early mobility- activity as tolerated  F- Family engagement--Update as available  G- Central Line/ Covarrubias-- no central line or covarrubias    Proph:  -Heparin gtt in place of Eliquis - ?restart Eliquis today per [facility]  -SCD    Dispo:     Code Status: Full Code  -Neuro ICU monitoring    Plan of care discussed with Neuro-Intensivist On-Call, Dr. Farrukh DUGGAN  Critical Care Nurse Practitioner  Spec [id number]    A total of 35 minutes of critical care time (exclusive of billable procedures) was administered. This involved direct patient intervention, complex decision making, and/or extensive discussions with the patient, family, and clinical staff.    The 21st Century Cures Act makes medical notes like these available to patients in the interest of transparency. Please be advised this is a medical document. Medical documents are intended to carry relevant information, facts as evident, and the clinical opinion of the practitioner. The medical  note is intended as peer to peer communication and may appear blunt or direct. It is written in medical language and may contain abbreviations or verbiage that are unfamiliar.

## 2024-03-02 NOTE — PROGRESS NOTES
[facility]  Nephrology Progress Note    Stephen Rodrigues Attending:  Brody Boston MD       Assessment and Plan:    1) CKD 4- baseline Cr > 2 mg/dl is due to longstanding DM / HTN; no further w/u     2) HIREN- due to volume depletion in setting of AMS / poor PO intake / diuretics. Improving rapidly. PLAN- adjust IVF; holding ARB / diuretics / SGLT2     3) DM 2     4) HTN- on HEARN / amlodipine / BB via dobhoff     5) Gout / hyperuricemia- normally on allopurinol      6) AMS- due to nonconvulsive status epilepticus; keppra / depakote / vimpat per neuro     7) Hypernatremia- due to insensible losses; adjust IVF / FWF    8) Nutrition- continue TF via Dobhoff      Subjective:  Pretty lethargic, essentially nonverbal    Physical Exam:   /67   Pulse 74   Temp 98.3 °F (36.8 °C) (Temporal)   Resp 18   Wt 196 lb 4.8 oz (89 kg)   SpO2 98%   BMI 33.69 kg/m²   Temp (24hrs), Av.3 °F (36.8 °C), Min:98 °F (36.7 °C), Max:98.6 °F (37 °C)       Intake/Output Summary (Last 24 hours) at 3/2/2024 0459  Last data filed at 3/2/2024 0000  Gross per 24 hour   Intake 300 ml   Output 1055 ml   Net -755 ml     Wt Readings from Last 3 Encounters:   24 196 lb 4.8 oz (89 kg)   02/15/24 203 lb 9.6 oz (92.4 kg)   23 200 lb (90.7 kg)     General: awake  HEENT: No scleral icterus, MMM  Neck: Supple, no WILLIAM or thyromegaly  Cardiac: Regular rate and rhythm, S1, S2 normal, no murmur or tub  Lungs: Decreased BS at bases bilaterally   Abdomen: Soft, non-tender. + bowel sounds, no palpable organomegaly  Extremities: Without clubbing, cyanosis; no edema  Neurologic: Cranial nerves grossly intact, moving all extremities  Skin: Warm and dry, no rashes       Labs:   Lab Results   Component Value Date    .0 2024    CREATSERUM 2.26 2024    BUN 54 2024     2024    K 4.2 2024     2024    CO2 25.0 2024     2024    CA 9.0 2024    PTT 90.6 2024    TSH  1.690 03/01/2024    MG 2.8 03/01/2024    PHOS 3.1 03/01/2024     03/01/2024    B12 1,006 03/01/2024    PGLU 210 03/02/2024       Imaging:  All imaging studies reviewed.    Meds:           Questions/concerns were discussed with patient and/or family by bedside.          Apple Aguilar MD  3/2/2024  459 AM

## 2024-03-02 NOTE — SLP NOTE
Attempted to see pt for BSSE. Per the pt's RN, pt remains inappropriate at this time. Will follow-up 3/3/24.

## 2024-03-02 NOTE — PROGRESS NOTES
[facility]  Neurocritical Care Progress Note     Stephen Rodrigues Patient Status:  Inpatient    1945 MRN [id number]   Location [facility] 6NE-A Attending Brody Boston MD   Hosp Day # 12 PCP PHYSICIAN NONSTAFF     Subjective:   Patient is a 79 year old female h/o htn, hl, dm2, cad, HFpEF, ckd, stroke, yrn, copd, gout, fibromyalgia, migraines, and seizure do p/w recurrent seizures 3/1  Hospital course significant for admitted  with AMS and c/f seizures, eeg at that time with generalized epileptiform discharges but no seizures, keppra was lowered for c/f drowsiness, then noted worsening mentation 3/1 and rpt eeg c/w subclinical status epilepticus thus pt was loaded with keppra, vpa, and vimpat and transferred to cnicu for further monitoring.     No acute events overnight.    Vitals:   Temp:  [98 °F (36.7 °C)-98.6 °F (37 °C)] 98.3 °F (36.8 °C)  Pulse:  [] 77  Resp:  [13-21] 19  BP: (119-168)/(58-80) 155/66  SpO2:  [96 %-100 %] 100 %  Body mass index is 33.69 kg/m².    Intake/Output:    Intake/Output Summary (Last 24 hours) at 3/2/2024 0801  Last data filed at 3/2/2024 0633  Gross per 24 hour   Intake 5344 ml   Output 1300 ml   Net 4044 ml     Current Meds:  Current Facility-Administered Medications   Medication Dose Route Frequency    hydrALAZINE (Apresoline) tab 50 mg  50 mg Per NG Tube Q8H ANIBAL    amLODIPine (Norvasc) tab 5 mg  5 mg Per NG Tube Daily    metoprolol tartrate (Lopressor) tab 25 mg  25 mg Per NG Tube 2x Daily(Beta Blocker)    aspirin chewable tab 81 mg  81 mg Per NG Tube Daily    valproate (Depacon) 750 mg in sodium chloride 0.9% 50 mL IVPB  750 mg Intravenous Q8H    levETIRAcetam (Keppra) 500 mg/5mL injection 1,000 mg  1,000 mg Intravenous Q12H    lacosamide (Vimpat) 200 mg/20mL injection 200 mg  200 mg Intravenous Q12H    dextrose 5% infusion   Intravenous Continuous    pancrelipase (Lip-Prot-Amyl) (Zenpep) DR particles cap 10,000 Units  10,000 Units Per G Tube  PRN    And    sodium bicarbonate tab 325 mg  325 mg Per G Tube PRN    multivitamin (Centrum) chewable tab (Adult) 1 tablet  1 tablet Oral Daily    thiamine (Vitamin B1) tab 100 mg  100 mg Per NG Tube BID    insulin aspart (NovoLOG) 100 Units/mL FlexPen 1-10 Units  1-10 Units Subcutaneous q6h    heparin (Porcine) 44203 units/250mL infusion PE/DVT/THROMBUS CONTINUOUS  200-3,000 Units/hr Intravenous Continuous    pantoprazole (Protonix) 40 mg in sodium chloride 0.9% PF 10 mL IV push  40 mg Intravenous Q24H    albuterol (Ventolin HFA) 108 (90 Base) MCG/ACT inhaler 2 puff  2 puff Inhalation Q4H PRN    albuterol (Ventolin) (2.5 MG/3ML) 0.083% nebulizer solution 3 mL  3 mL Nebulization TID PRN    escitalopram (Lexapro) tab 10 mg  10 mg Oral Daily    mirtazapine (REMERON SOL-TAB) disintegrating tab 15 mg  15 mg Oral Nightly    rosuvastatin (Crestor) tab 10 mg  10 mg Oral Daily    glucose (Dex4) 15 GM/59ML oral liquid 15 g  15 g Oral Q15 Min PRN    Or    glucose (Glutose) 40% oral gel 15 g  15 g Oral Q15 Min PRN    Or    glucose-vitamin C (Dex-4) chewable tab 4 tablet  4 tablet Oral Q15 Min PRN    Or    dextrose 50% injection 50 mL  50 mL Intravenous Q15 Min PRN    Or    glucose (Dex4) 15 GM/59ML oral liquid 30 g  30 g Oral Q15 Min PRN    Or    glucose (Glutose) 40% oral gel 30 g  30 g Oral Q15 Min PRN    Or    glucose-vitamin C (Dex-4) chewable tab 8 tablet  8 tablet Oral Q15 Min PRN    diazepam (Valium) 5 mg/mL injection 5 mg  5 mg Intravenous Q30 Min PRN    acetaminophen (Tylenol Extra Strength) tab 500 mg  500 mg Oral Q4H PRN    melatonin cap/tab 5 mg  5 mg Oral Nightly PRN    polyethylene glycol (PEG 3350) (Miralax) 17 g oral packet 17 g  17 g Oral Daily PRN    sennosides (Senokot) tab 17.2 mg  17.2 mg Oral Nightly PRN    bisacodyl (Dulcolax) 10 MG rectal suppository 10 mg  10 mg Rectal Daily PRN    ondansetron (Zofran) 4 MG/2ML injection 4 mg  4 mg Intravenous Q6H PRN    metoclopramide (Reglan) 5 mg/mL injection 5 mg   5 mg Intravenous Q8H PRN    benzonatate (Tessalon) cap 200 mg  200 mg Oral TID PRN    glycerin-hypromellose- (Artifical Tears) 0.2-0.2-1 % ophthalmic solution 1 drop  1 drop Both Eyes QID PRN    sodium chloride (Saline Mist) 0.65 % nasal solution 1 spray  1 spray Each Nare Q3H PRN     Physical Examination:   General- No acute distress  CV- RRR  Resp- CTA Bilat  Neuro-  Mental status- attempts to open eyes to voice, does not regard or follow commands  Cranial nerves- pupils equally round and reactive to light, extraocular muscles intact, face symmetric, +cough/gag  Motor/Sens- min w/d x4    Diagnostics:   No results found.  Lab Review     Recent Labs   Lab 03/01/24  1407 03/01/24  2049 03/02/24  0453    144 143   K 4.2 4.2 4.4   * 116* 115*   CO2 22.0 25.0 22.0   * 217* 215*   BUN 57* 54* 51*   CREATSERUM 2.26* 2.26* 2.19*     Recent Labs   Lab 02/28/24  0633 02/29/24  0616 03/01/24  0551 03/02/24  0453   WBC 7.9 9.2  --  9.1   HGB 9.5* 9.2*  --  8.9*   .0 298.0  298.0 337.0 315.0     Assesment/Plan:     Neuro:  Subclinical status epilepticus-   2/2 underlying seizure disorder.   EEG improved overnight after increase in keppra and addition of vpa/vimpat.   Cont current doses and f/u vEEG, f/u vpa level in AM.   CT/MRI no acute changes.   Cont neurochecks/pt/ot/st.  Cardiac:  Htn/hl/cad/HFpEF- sbp goal 100-150, cont norvasc, metop, hydral, crestor  Pulmonary:  Copd- stable on RA  Renal:  HIREN on CKD- IVF per renal  GI:  Check swallow eval  Heme/ID:  Afebrile, no leukocytosis  LLE DVT- on hep gtt  Endocrine/Rheum:  Monitor glucose, sliding scale insulin prn    Gout- cont allopurinol  DVT Prophylaxis:  SCD’s,hep gtt    Goals of the Day: neurochecks, vEEG   A total of 80 minutes of critical care time (exclusive of billable procedures) was administered to manage and/or prevent neurologic instability. This involved direct patient intervention, complex decision making, and/or extensive  discussions with the patient, family, and clinical staff.    Thank you for allowing me to participate in the care of this patient.     Chandrika Chadwick MD, [facility]  Medical Director of System Neurosciences  Chief, Section of Neurocritical Care  [facility]

## 2024-03-02 NOTE — PLAN OF CARE
Continuous EEG in place, no visible seizure activity noted. PERRL, attempts to open eyes to stim, not following commands, weakly DAO and vocalizes to painful stim. Tongue scabbed and swollen, frequent oral care. Snoring at times, SPO2 100% on RA. NSR, BP WDL. Tolerating tubedfeed. Urine cloudy. Repositioned q2h. Family at bedside in afternoon, updated on condition and POC.      Problem: NEUROLOGICAL - ADULT  Goal: Achieves stable or improved neurological status  Description: INTERVENTIONS  - Assess for and report changes in neurological status  - Initiate measures to prevent increased intracranial pressure  - Maintain blood pressure and fluid volume within ordered parameters to optimize cerebral perfusion and minimize risk of hemorrhage  - Monitor temperature, glucose, and sodium. Initiate appropriate interventions as ordered  Outcome: Progressing  Goal: Absence of seizures  Description: INTERVENTIONS  - Monitor for seizure activity  - Administer anti-seizure medications as ordered  - Monitor neurological status  Outcome: Progressing  Goal: Remains free of injury related to seizure activity  Description: INTERVENTIONS:  - Maintain airway, patient safety  and administer oxygen as ordered  - Monitor patient for seizure activity, document and report duration and description of seizure to MD/LIP  - If seizure occurs, turn patient to side and suction secretions as needed  - Reorient patient post seizure  - Seizure pads on all 4 side rails  - Instruct patient/family to notify RN of any seizure activity  - Instruct patient/family to call for assistance with activity based on assessment  Outcome: Progressing  Goal: Achieves maximal functionality and self care  Description: INTERVENTIONS  - Monitor swallowing and airway patency with patient fatigue and changes in neurological status  - Encourage and assist patient to increase activity and self care with guidance from PT/OT  - Encourage visually impaired, hearing impaired and  aphasic patients to use assistive/communication devices  Outcome: Progressing

## 2024-03-03 NOTE — PROGRESS NOTES
[facility]  Neurocritical Care Progress Note     Stephen Rodrigues Patient Status:  Inpatient    1945 MRN [id number]   Location [facility] 6NE-A Attending Brody Boston MD   Hosp Day # 13 PCP PHYSICIAN NONSTAFF     Subjective:   Patient is a 79 year old female h/o htn, hl, dm2, cad, HFpEF, ckd, stroke, yrn, copd, gout, fibromyalgia, migraines, and seizure do p/w recurrent seizures 3/1  Hospital course significant for admitted  with AMS and c/f seizures, eeg at that time with generalized epileptiform discharges but no seizures, keppra was lowered for c/f drowsiness, then noted worsening mentation 3/1 and rpt eeg c/w subclinical status epilepticus thus pt was loaded with keppra, vpa, and vimpat and transferred to cnicu for further monitoring.     No acute events overnight.    Vitals:   Temp:  [96.5 °F (35.8 °C)-98.7 °F (37.1 °C)] 97.2 °F (36.2 °C)  Pulse:  [69-82] 75  Resp:  [12-38] 38  BP: (103-147)/() 109/43  SpO2:  [96 %-100 %] 97 %  Body mass index is 33.69 kg/m².    Intake/Output:    Intake/Output Summary (Last 24 hours) at 3/3/2024 1046  Last data filed at 3/3/2024 0800  Gross per 24 hour   Intake 3712 ml   Output 1750 ml   Net 1962 ml     Current Meds:      Physical Examination:   General- No acute distress  CV- RRR  Resp- CTA Bilat  Neuro-  Mental status- attempts to open eyes to voice, does not regard or follow commands  Cranial nerves- pupils equally round and reactive to light, extraocular muscles intact, face symmetric, +cough/gag  Motor/Sens- min w/d x4    Diagnostics:   No results found.  Lab Review     Recent Labs   Lab 24  0453 24  19424  0506    138 137   K 4.4 4.2 4.4   * 111 111   CO2 22.0 24.0 23.0   * 207* 176*   BUN 51* 47* 51*   CREATSERUM 2.19* 2.05* 2.08*     Recent Labs   Lab 24  0616 24  0551 24  0453 24  0506   WBC 9.2  --  9.1 7.2   HGB 9.2*  --  8.9* 9.1*   .0  298.0 337.0 315.0  295.0     Assesment/Plan:     Neuro:  Subclinical status epilepticus-   2/2 underlying seizure disorder.   EEG improved after increase in keppra and addition of vpa/vimpat.   Cont current doses and f/u vEEG  CT/MRI no acute changes.   Cont neurochecks/pt/ot/st.  Cardiac:  Htn/hl/cad/HFpEF- sbp goal 100-150, cont norvasc, metop, hydral, crestor  Pulmonary:  Copd- stable on RA  Renal:  HIREN on CKD- IVF per renal  GI:  TF  Heme/ID:  Afebrile, no leukocytosis  LLE DVT- on hep gtt  Endocrine/Rheum:  Monitor glucose, sliding scale insulin prn    Gout- cont allopurinol  DVT Prophylaxis:  SCD’s, hep gtt    Goals of the Day: neurochecks, vEEG  A total of 40 minutes of critical care time (exclusive of billable procedures) was administered to manage and/or prevent neurologic instability. This involved direct patient intervention, complex decision making, and/or extensive discussions with the patient, family, and clinical staff.    Thank you for allowing me to participate in the care of this patient.     Chandrika Chadwick MD, [facility]  Medical Director of System Neurosciences  Chief, Section of Neurocritical Care  [facility]

## 2024-03-03 NOTE — SLP NOTE
Attempted to see pt for BSSE. Pt remains somnolent and unarousable. Pt remains inappropriate for BSSE at this time. Will continue to monitor.

## 2024-03-03 NOTE — PLAN OF CARE
Assumed care of pt at approximately 1930. NSR on tele. Neuro checks Q2, largely unchanged from previous night. Face/head reacts when each extremity is pinched, but individual extremities do not react/move. Neuro APRN aware. Continuous EEG remains in place. At 0000 neurocheck, pt moved her R arm but not to command. Heparin remains infusing per PE/DVT protocol. Tube feeds infusing at goal rate via NG tube. D5 infusion stopped by Neuro APRN.     Problem: NEUROLOGICAL - ADULT  Goal: Remains free of injury related to seizure activity  Description: INTERVENTIONS:  - Maintain airway, patient safety  and administer oxygen as ordered  - Monitor patient for seizure activity, document and report duration and description of seizure to MD/LIP  - If seizure occurs, turn patient to side and suction secretions as needed  - Reorient patient post seizure  - Seizure pads on all 4 side rails  - Instruct patient/family to notify RN of any seizure activity  - Instruct patient/family to call for assistance with activity based on assessment  Outcome: Progressing     Problem: Diabetes/Glucose Control  Goal: Glucose maintained within prescribed range  Description: INTERVENTIONS:  - Monitor Blood Glucose as ordered  - Assess for signs and symptoms of hyperglycemia and hypoglycemia  - Administer ordered medications to maintain glucose within target range  - Assess barriers to adequate nutritional intake and initiate nutrition consult as needed  - Instruct patient on self management of diabetes  Outcome: Not Progressing     Problem: Patient/Family Goals  Goal: Patient/Family Long Term Goal  Description: Patient's Long Term Goal: to go home    Interventions:  - Mds to see  - IV keppra  - See additional Care Plan goals for specific interventions  Outcome: Not Progressing  Goal: Patient/Family Short Term Goal  Description: Patient's Short Term Goal: to feel better    Interventions:   - IV keppra  - Mds to see  - Comply to care plan  - See  additional Care Plan goals for specific interventions  Outcome: Not Progressing     Problem: NEUROLOGICAL - ADULT  Goal: Achieves stable or improved neurological status  Description: INTERVENTIONS  - Assess for and report changes in neurological status  - Initiate measures to prevent increased intracranial pressure  - Maintain blood pressure and fluid volume within ordered parameters to optimize cerebral perfusion and minimize risk of hemorrhage  - Monitor temperature, glucose, and sodium. Initiate appropriate interventions as ordered  Outcome: Not Progressing  Goal: Absence of seizures  Description: INTERVENTIONS  - Monitor for seizure activity  - Administer anti-seizure medications as ordered  - Monitor neurological status  Outcome: Not Progressing  Goal: Achieves maximal functionality and self care  Description: INTERVENTIONS  - Monitor swallowing and airway patency with patient fatigue and changes in neurological status  - Encourage and assist patient to increase activity and self care with guidance from PT/OT  - Encourage visually impaired, hearing impaired and aphasic patients to use assistive/communication devices  Outcome: Not Progressing     Problem: Delirium  Goal: Minimize duration of delirium  Description: Interventions:  - Encourage use of hearing aids, eye glasses  - Promote highest level of mobility daily  - Provide frequent reorientation  - Promote wakefulness i.e. lights on, blinds open  - Promote sleep, encourage patient's normal rest cycle i.e. lights off, TV off, minimize noise and interruptions  - Encourage family to assist in orientation and promotion of home routines  Outcome: Not Progressing

## 2024-03-03 NOTE — PROGRESS NOTES
[facility]  Nephrology Progress Note    Stephen Rodrigues Attending:  Brody Boston MD       Assessment and Plan:    1) CKD 4- baseline Cr > 2 mg/dl is due to longstanding DM / HTN; no further w/u     2) HIREN- due to volume depletion in setting of AMS / poor PO intake / diuretics. Improving rapidly. PLAN- adjust IVF; holding ARB / diuretics / SGLT2     3) DM 2     4) HTN- on HEARN / amlodipine / BB via dobhoff     5) Gout / hyperuricemia- normally on allopurinol      6) AMS- due to nonconvulsive status epilepticus; keppra / depakote / vimpat per neuro     7) Hypernatremia- due to insensible losses; much improved; adjust FWF (200 ml q6)    8) Nutrition- continue TF via Dobhoff      Subjective:  Pretty lethargic, essentially nonverbal    Physical Exam:   /66   Pulse 80   Temp 98.3 °F (36.8 °C) (Temporal)   Resp 16   Wt 196 lb 4.8 oz (89 kg)   SpO2 100%   BMI 33.69 kg/m²   Temp (24hrs), Av.8 °F (36.6 °C), Min:96.5 °F (35.8 °C), Max:98.7 °F (37.1 °C)       Intake/Output Summary (Last 24 hours) at 3/3/2024 0820  Last data filed at 3/3/2024 0543  Gross per 24 hour   Intake 3462 ml   Output 1750 ml   Net 1712 ml     Wt Readings from Last 3 Encounters:   24 196 lb 4.8 oz (89 kg)   02/15/24 203 lb 9.6 oz (92.4 kg)   23 200 lb (90.7 kg)     General: awake  HEENT: No scleral icterus, MMM  Neck: Supple, no WILLIAM or thyromegaly  Cardiac: Regular rate and rhythm, S1, S2 normal, no murmur or tub  Lungs: Decreased BS at bases bilaterally   Abdomen: Soft, non-tender. + bowel sounds, no palpable organomegaly  Extremities: Without clubbing, cyanosis; no edema  Neurologic: Cranial nerves grossly intact, moving all extremities  Skin: Warm and dry, no rashes       Labs:   Lab Results   Component Value Date    WBC 7.2 2024    HGB 9.1 2024    HCT 28.0 2024    .0 2024    CREATSERUM 2.08 2024    BUN 51 2024     2024    K 4.4 2024     2024     CO2 23.0 03/03/2024     03/03/2024    CA 9.0 03/03/2024    PTT 66.3 03/03/2024    MG 2.4 03/03/2024    PHOS 3.2 03/03/2024    PGLU 171 03/03/2024       Imaging:  All imaging studies reviewed.    Meds:           Questions/concerns were discussed with patient and/or family by bedside.          Apple Aguilar MD  3/3/2024  820 AM

## 2024-03-03 NOTE — PROGRESS NOTES
[facility]     Hospitalist Progress Note     Stephen Rodrigues Patient Status:  Inpatient    1945 MRN [id number]   [facility] 2NE-A Attending Brody Boston MD   Hosp Day # 13 PCP PHYSICIAN NONSTAFF     Subjective:   Minimally responsive to verbal stimuli and painful stimuli, moves ext with painful stimuli     Objective:    Review of Systems:   A comprehensive review of systems was completed; pertinent positive and negatives stated in subjective.  Vital signs:  Temp:  [96.5 °F (35.8 °C)-98.7 °F (37.1 °C)] 98.3 °F (36.8 °C)  Pulse:  [69-82] 80  Resp:  [12-27] 16  BP: (103-147)/() 135/66  SpO2:  [96 %-100 %] 100 %  Physical Exam:    General: No acute distress, confused   Respiratory: no wheezes, no rhonchi  Cardiovascular: S1, S2, RRR  Abdomen: Soft, NT/ND, +BS  Extremities: no edema. Responds more to painful stimuli than verbal stimuli but opens eyes to loud verbal stimuli     Diagnostic Data:    Labs:  Recent Labs   Lab 24  0633 24  0616 24  0551 24  0453 24  0506   WBC 7.9 9.2  --  9.1 7.2   HGB 9.5* 9.2*  --  8.9* 9.1*   .0 100.3*  --  98.2 98.2   .0 298.0  298.0 337.0 315.0 295.0       Recent Labs   Lab 24  0453 24  1940 24  0506   * 207* 176*   BUN 51* 47* 51*   CREATSERUM 2.19* 2.05* 2.08*   CA 8.8 9.2 9.0    138 137   K 4.4 4.2 4.4   * 111 111   CO2 22.0 24.0 23.0     Estimated Creatinine Clearance: 18.9 mL/min (A) (based on SCr of 2.08 mg/dL (H)).  No results for input(s): \"PTP\", \"INR\" in the last 168 hours.         Microbiology  No results found for this visit on 24.  Imaging: Reviewed in Epic.  Medications:    levETIRAcetam  1,500 mg Intravenous Q12H    hydrALAZINE  50 mg Per NG Tube Q8H ANIBAL    amLODIPine  5 mg Per NG Tube Daily    metoprolol tartrate  25 mg Per NG Tube 2x Daily(Beta Blocker)    aspirin  81 mg Per NG Tube Daily    valproate  750 mg Intravenous Q8H    lacosamide  200 mg  Intravenous Q12H    multivitamin  1 tablet Oral Daily    thiamine  100 mg Per NG Tube BID    insulin aspart  1-10 Units Subcutaneous q6h    pantoprazole  40 mg Intravenous Q24H    escitalopram  10 mg Oral Daily    mirtazapine  15 mg Oral Nightly    rosuvastatin  10 mg Oral Daily       Assessment & Plan:      #Acute encephalopathy with breakthrough seizures  -on Keppra, Depakote, Vimpat   -EEG monitoring per NeuroICU   -no obvious infection  -Neuro continues to follow     #HIREN on CKD IV- back to baseline   -renal consult appreciated    #Hx of seizure disorder- as above   #Hypernatremia- monitor   #Acute on chronic diastolic CHF-resolved  #Aortic stenosis-Mild to moderate on most recent echo  #CAD s/p stent-Aspirin, statin  #Hypertension-home meds   #Hyperlipidemia-Statin  #DM type II with A1c 5.8-Insulin sliding scale  #Hx of CVA-Aspirin, statin  #JHONY-JHONY protocol  #COPD, stable-Resume albuterol inhaler as needed  #Gout-Allopurinol  #Fibromyalgia-Gabapentin held for now  #LLE DVT- heoarin as NPO, will start DOAC once more alert and tolerating PO   #Anxiety and depression-Resume Lexapro and Remeron once PO  #GERD-PPI  #TEN- dobhoff and tube feeds for now       Brody Boston MD  Supplementary Documentation:     Quality:  DVT Mechanical Prophylaxis:   SCDs,    DVT Pharmacologic Prophylaxis   Medication    heparin (Porcine) 00136 units/250mL infusion PE/DVT/THROMBUS CONTINUOUS      DVT Pharmacologic prophylaxis: Aspirin 162 mg         Code Status: Full Code  Gurrola: External urinary catheter in place  Gurrola Duration (in days):   Central line:    ELROY:   Discharge is dependent on: renal consult  At this point Ms. Rodrigues is expected to be discharge to: tbd     The 21st Century Cures Act makes medical notes like these available to patients in the interest of transparency. Please be advised this is a medical document. Medical documents are intended to carry relevant information, facts as evident, and the clinical opinion of  the practitioner. The medical note is intended as peer to peer communication and may appear blunt or direct. It is written in medical language and may contain abbreviations or verbiage that are unfamiliar.

## 2024-03-04 PROBLEM — Z86.73 HISTORY OF STROKE: Status: ACTIVE | Noted: 2024-01-01

## 2024-03-04 PROBLEM — Z86.73 HISTORY OF STROKE: Status: ACTIVE | Noted: 2024-03-04

## 2024-03-04 NOTE — PROGRESS NOTES
[facility]  Neurocritical Care   Progress Note       Subjective:   cEEG with occasional generalized periodic discharges but obvious seizures noted. HIREN resolved    Vitals:   Temp:  [96.5 °F (35.8 °C)-98.6 °F (37 °C)] 98.1 °F (36.7 °C)  Pulse:  [69-87] 83  Resp:  [13-38] 24  BP: (100-140)/(43-83) 115/58  SpO2:  [93 %-100 %] 97 %  Body mass index is 33.41 kg/m².    Intake/Output:  Intake/Output Summary (Last 24 hours) at 3/4/2024 0857  Last data filed at 3/4/2024 0628  Gross per 24 hour   Intake 2302.5 ml   Output 1500 ml   Net 802.5 ml     Scheduled Meds:   insulin degludec  7 Units Subcutaneous Daily    levETIRAcetam  1,500 mg Intravenous Q12H    hydrALAZINE  50 mg Per NG Tube Q8H ANIBAL    amLODIPine  5 mg Per NG Tube Daily    metoprolol tartrate  25 mg Per NG Tube 2x Daily(Beta Blocker)    aspirin  81 mg Per NG Tube Daily    valproate  750 mg Intravenous Q8H    lacosamide  200 mg Intravenous Q12H    multivitamin  1 tablet Oral Daily    thiamine  100 mg Per NG Tube BID    insulin aspart  1-10 Units Subcutaneous q6h    pantoprazole  40 mg Intravenous Q24H    escitalopram  10 mg Oral Daily    mirtazapine  15 mg Oral Nightly    rosuvastatin  10 mg Oral Daily     Continuous Infusions:   continuous dose heparin 1,250 Units/hr (03/04/24 0628)     PRN Meds:lipase-protease-amylase (Lip-Prot-Amyl) **AND** sodium bicarbonate, albuterol, albuterol, glucose **OR** glucose **OR** glucose-vitamin C **OR** dextrose **OR** glucose **OR** glucose **OR** glucose-vitamin C, diazepam, acetaminophen, melatonin, polyethylene glycol (PEG 3350), sennosides, bisacodyl, ondansetron, metoclopramide, benzonatate, glycerin-hypromellose-, sodium chloride    Physical Examination:   General- No acute distress  CV- RRR  Resp- No increased work of breath   Neuro-   Mental status- attempts to open eyes to voice, does not regard or follow commands, says ouch   Cranial nerves- pupils equally round and reactive to light,  extraocular muscles intact, R gaze, face symmetric, +cough/gag  Motor/Sens- min w/d x4 R > L    Diagnostics:   No results found.  Lab Review     Recent Labs   Lab 03/02/24 1940 03/03/24  0506 03/04/24 0412    137 139   K 4.2 4.4 4.8    111 112   CO2 24.0 23.0 24.0   * 176* 182*   BUN 47* 51* 57*   CREATSERUM 2.05* 2.08* 2.05*     Recent Labs   Lab 03/02/24 0453 03/03/24  0506 03/04/24  0412   WBC 9.1 7.2 7.7   HGB 8.9* 9.1* 9.2*   .0 295.0 299.0     Assesment/Plan:   79 year old female h/o htn, hl, dm2, cad, HFpEF, ckd, stroke, yrn, copd, gout, fibromyalgia, migraines, and seizure do p/w recurrent seizures 3/1 after keppra dose lowered in setting of lethargy. Now on 3 AEDs     Neuro:  Subclinical status epilepticus-   2/2 underlying seizure disorder and lowering of keppra  CT/MRI no acute changes.  EEG improved after increase in keppra and addition of vpa/vimpat.    -Unimpressed for right gaze deviation during exam today, if negative, will need MRI scan to rule out new acute stroke.  Less likely given patient on heparin drip  VPA level 3/2 88.7  Repeat VPA level and LFTs today  Cont current doses and f/u cEEG  PT/OT evals     Cardiac:  Htn/hl/cad/HFpEF- sbp goal 100-150, cont norvasc, metop, hydral, crestor  Pulmonary:  Copd- stable on RA  Renal:  HIREN on CKD- Resolved, FWF per renal for hypernatremia which has also improved  GI:  TF  Heme/ID:  Afebrile, no leukocytosis  LLE DVT- on hep gtt  Endocrine/Rheum:  Monitor glucose, sliding scale insulin prn    Gout- cont allopurinol  Checklist   - Lines: PIVs   - DVT Prophylaxis: SCDs and hep gtt   - Diet: TFs   - IVF: -    - Electrolytes: Replete per protocol   - Code Status: FULL    Dispo: Floor    Sandoval Dorman MD  Neurocritical Care  [facility]

## 2024-03-04 NOTE — PLAN OF CARE
Assumed care of pt around 1930. Stuporous. Neuros q4h. Continuous EEG. Seizure precautions. Occasionally opens eyes to painful stimuli. All extremities muscles contract to pain, 1/5. See flowsheets for full assessment. SR. -150. Heparin gtt. Breath sounds clear, diminished on RA. Pure wick. Tolerating continuous TF through DHT. Turn q2h. Tele orders.    Pt's ring removed from L hand due to concerns for swelling. Ring placed in container with pt label on it and placed in belongs bag.

## 2024-03-04 NOTE — PROGRESS NOTES
[facility]  Nephrology Progress Note    Stephen Rodrigues Attending:  Brody Boston MD       Assessment and Plan:    1) CKD 4- baseline Cr > 2 mg/dl is due to longstanding DM / HTN; no further w/u     2) HIREN- due to volume depletion in setting of AMS / poor PO intake / diuretics- resolved     3) DM 2     4) HTN- on HEARN / amlodipine / BB via dobhoff     5) Gout / hyperuricemia- normally on allopurinol      6) AMS- due to nonconvulsive status epilepticus; keppra / depakote / vimpat per neuro     7) Hypernatremia- due to insensible losses; much improved. Na stable on  ml q6    8) Nutrition- continue TF via Dobhoff      Subjective:  Pretty lethargic, essentially nonverbal- no significant change    Physical Exam:   /58 (BP Location: Right arm)   Pulse 83   Temp 98.1 °F (36.7 °C) (Temporal)   Resp 24   Wt 194 lb 10.7 oz (88.3 kg)   SpO2 97%   BMI 33.41 kg/m²   Temp (24hrs), Av.4 °F (36.3 °C), Min:96.5 °F (35.8 °C), Max:98.6 °F (37 °C)       Intake/Output Summary (Last 24 hours) at 3/4/2024 0852  Last data filed at 3/4/2024 0628  Gross per 24 hour   Intake 2302.5 ml   Output 1500 ml   Net 802.5 ml     Wt Readings from Last 3 Encounters:   24 194 lb 10.7 oz (88.3 kg)   02/15/24 203 lb 9.6 oz (92.4 kg)   23 200 lb (90.7 kg)     General: awake  HEENT: No scleral icterus, MMM  Neck: Supple, no WILLIAM or thyromegaly  Cardiac: Regular rate and rhythm, S1, S2 normal, no murmur or tub  Lungs: Decreased BS at bases bilaterally   Abdomen: Soft, non-tender. + bowel sounds, no palpable organomegaly  Extremities: Without clubbing, cyanosis; no edema  Neurologic: Cranial nerves grossly intact, moving all extremities  Skin: Warm and dry, no rashes       Labs:   Lab Results   Component Value Date    WBC 7.7 2024    HGB 9.2 2024    HCT 29.4 2024    .0 2024    CREATSERUM 2.05 2024    BUN 57 2024     2024    K 4.8 2024     2024    CO2  24.0 03/04/2024     03/04/2024    CA 9.4 03/04/2024    PTT 53.4 03/04/2024    MG 2.4 03/04/2024    PHOS 3.6 03/04/2024    PGLU 181 03/04/2024       Imaging:  All imaging studies reviewed.    Meds:           Questions/concerns were discussed with patient and/or family by bedside.          Apple Aguilar MD  3/4/2024  952 AM

## 2024-03-04 NOTE — PLAN OF CARE
Q4 neuro checks, unchanged.  Remains on RA. SR on monitors. SBP maintained 100-150.  Continuous EEG in place. Seizure precautions maintained.  Heparin gtt therapeutic per PE/DVT protocol.  Multiple BMs noted this shift. Purewick in place.  Son and daughter updated separately via telephone.

## 2024-03-04 NOTE — PROGRESS NOTES
[facility]     Hospitalist Progress Note     Stephen Rodrigues Patient Status:  Inpatient    1945 MRN [id number]   [facility] 2NE-A Attending Brody Boston MD   Hosp Day # 14 PCP PHYSICIAN NONSTAFF     Subjective:   Continuous EEG monitoring  Somnolent, will arouse to strong verbal stimuli with opening eyes, not following commands     Objective:    Review of Systems:   A comprehensive review of systems was completed; pertinent positive and negatives stated in subjective.  Vital signs:  Temp:  [96.5 °F (35.8 °C)-98.6 °F (37 °C)] 96.7 °F (35.9 °C)  Pulse:  [69-87] 83  Resp:  [12-38] 24  BP: (100-143)/(43-83) 115/58  SpO2:  [93 %-100 %] 97 %  Physical Exam:    General: No acute distress, somnolent   Respiratory: no wheezes, no rhonchi  Cardiovascular: S1, S2, RRR  Abdomen: Soft, NT/ND, +BS  Extremities: Minimal response to painful stimuli. Opens eyes briefly to verbal stimuli      Diagnostic Data:    Labs:  Recent Labs   Lab 24  0633 24  0616 24  0551 24  0453 24  0506 24  0412   WBC 7.9 9.2  --  9.1 7.2 7.7   HGB 9.5* 9.2*  --  8.9* 9.1* 9.2*   .0 100.3*  --  98.2 98.2 101.7*   .0 298.0  298.0 337.0 315.0 295.0 299.0       Recent Labs   Lab 24  19424  0506 24  0412   * 176* 182*   BUN 47* 51* 57*   CREATSERUM 2.05* 2.08* 2.05*   CA 9.2 9.0 9.4    137 139   K 4.2 4.4 4.8    111 112   CO2 24.0 23.0 24.0     Estimated Creatinine Clearance: 19.2 mL/min (A) (based on SCr of 2.05 mg/dL (H)).  No results for input(s): \"PTP\", \"INR\" in the last 168 hours.         Microbiology  No results found for this visit on 24.  Imaging: Reviewed in Epic.  Medications:    insulin degludec  5 Units Subcutaneous Daily    levETIRAcetam  1,500 mg Intravenous Q12H    hydrALAZINE  50 mg Per NG Tube Q8H ANIBAL    amLODIPine  5 mg Per NG Tube Daily    metoprolol tartrate  25 mg Per NG Tube 2x Daily(Beta Blocker)    aspirin  81 mg  Per NG Tube Daily    valproate  750 mg Intravenous Q8H    lacosamide  200 mg Intravenous Q12H    multivitamin  1 tablet Oral Daily    thiamine  100 mg Per NG Tube BID    insulin aspart  1-10 Units Subcutaneous q6h    pantoprazole  40 mg Intravenous Q24H    escitalopram  10 mg Oral Daily    mirtazapine  15 mg Oral Nightly    rosuvastatin  10 mg Oral Daily       Assessment & Plan:      #Acute encephalopathy with breakthrough seizures  -on Keppra, Depakote, Vimpat   -EEG monitoring per NeuroICU   -no obvious infection  -Neuro continues to follow     #HIREN on CKD IV- back to baseline   -renal consult appreciated    #Hx of seizure disorder- as above   #Hypernatremia- monitor   #Acute on chronic diastolic CHF-resolved  #Aortic stenosis-Mild to moderate on most recent echo  #CAD s/p stent-Aspirin, statin  #Hypertension-home meds   #Hyperlipidemia-Statin  #DM type II with A1c 5.8-Insulin sliding scale  #Hx of CVA-Aspirin, statin  #JHONY-JHONY protocol  #COPD, stable-Resume albuterol inhaler as needed  #Gout-Allopurinol  #Fibromyalgia-Gabapentin held for now  #LLE DVT- heparin as NPO  #Anxiety and depression-Resume Lexapro and Remeron once PO  #GERD-PPI  #TEN- dobhoff and tube feeds for now     ADDENDUM:  Paged by nurse with continuous foul smelling watery diarrhea- check C diff       Will f/u Neuro recs  D/W Alla Daughter and POA     Brody Boston MD  Supplementary Documentation:   Quality:  DVT Mechanical Prophylaxis:   SCDs,    DVT Pharmacologic Prophylaxis   Medication    heparin (Porcine) 72819 units/250mL infusion PE/DVT/THROMBUS CONTINUOUS      DVT Pharmacologic prophylaxis: Aspirin 162 mg         Code Status: Full Code  Gurrola: External urinary catheter in place  Gurrola Duration (in days):   Central line:    ELROY:   At this point Ms. Rodrigues is expected to be discharge to: tbd     The 21st Century Cures Act makes medical notes like these available to patients in the interest of transparency. Please be advised this is a  medical document. Medical documents are intended to carry relevant information, facts as evident, and the clinical opinion of the practitioner. The medical note is intended as peer to peer communication and may appear blunt or direct. It is written in medical language and may contain abbreviations or verbiage that are unfamiliar.

## 2024-03-04 NOTE — DIETARY NOTE
[facility]    NUTRITION ASSESSMENT    Unable to diagnose malnutrition criteria at this time.    NUTRITION INTERVENTION:    Meal and Snacks - NPO  Coordination of Nutrition Care - SLP consult prior to diet advancement. Consider Palliative care consult for goals of care.  Vitamin and Mineral Supplements - adding Thiamine and Chewable MVI  Enteral Nutrition - via DHT Glucerna 1.5 at 50 ml/hr.   Recommend free water flush of 200 ml q 6hours or per Nephrology.  Goal TF to provide:1200 ml, 1800 kcal, 99 gm protein, 912 ml free H20, Total H20 (TF+free water flush)=1712 ml, and 100% RDI's.    PATIENT STATUS:   3/4 - Pt somnolent, remains on DHT feeds. TF tolerated without issue. No n/v. +  BM 3/4 (soft). Hyperglycemia noted - on Glucerna continuous.  Insulin adjustment per MD.  Wt trending down.  Hypernatremic - flushes per nephrology and now WNL. No nutrition concerns from RN at this time.    2/29- Received nutrition consult for DHT feeds. Pt remains lethargic and inappropriate for PO trials. NPO x ~4 days. Plans for DHT placement and TF's to start today per RN. Will continue to monitor.    2/28- Pt w/ poor PO intakes this admit + NPO x 3 days. SLP re-evaluated on 2/27 and 2/28 w/ continued diet recs for NPO. Recommend placing DHT for TF's, if aggressive measures wish to be taken. RD available for TF recs upon consult. Will continue to monitor.     2/26/24- 78 y/o female admitted with AMS. Pt seen d/t length of stay. A&O to self. Very lethargic.  Pt stated she had a good appetite PTA. Pt then fell back asleep and did not answer any other questions. Has had a poor appetite/PO intakes this admit. Recorded PO intakes vary:0-80% with 50% or less most meals. Evaluated by SLP today and made NPO d/t lethargy. Will monitor for diet advancement vs need for nutrition support.   PMH:HTN, HLD, CAD s/p stent, aortic stenosis, DM2, CVA, CKD4, COPD, JHONY, IBS, fibromyalgia, seizure disorder.       ANTHROPOMETRICS:  Ht:  5' 4\"  Wt:  88.3 kg (194 lb 10.7 oz).   BMI: Body mass index is 33.41 kg/m².  IBW: 54.5 kg      WEIGHT HISTORY:     Wts vary per EMR hx. Per EMR hx, pt w/ 11 lb (5.4%) wt loss in about 1.5 weeks?    Wt Readings from Last 10 Encounters:   03/04/24 88.3 kg (194 lb 10.7 oz)   02/15/24 92.4 kg (203 lb 9.6 oz)   12/13/23 90.7 kg (200 lb)   01/28/23 94.6 kg (208 lb 8.9 oz)   12/06/22 98.7 kg (217 lb 9.5 oz)   11/26/22 99 kg (218 lb 4.1 oz)   10/24/22 99.8 kg (220 lb)   08/27/22 98.7 kg (217 lb 9.5 oz)   07/23/22 100.4 kg (221 lb 4.8 oz)   11/09/21 99.8 kg (220 lb)        NUTRITION:  Diet:       Procedures    NPO      Food Allergies: No  Cultural/Ethnic/Mandaen Preferences Addressed: Yes    Percent Meals Eaten (last 3 days)       Date/Time Percent Meals Eaten (%)    03/01/24 1556 0 %     Percent Meals Eaten (%): NG Tube Feeding at 03/01/24 1556          GI system review:  Last BM:3/3    Skin and wounds: excoriation to sacrum    NUTRITION RELATED PHYSICAL FINDINGS:     1. Body Fat/Muscle Mass: no wasting noted     2. Fluid Accumulation: upper extremity edema     NUTRITION PRESCRIPTION: 54.5 kg (IBW)  Calories: 9655-3600 calories/day (25-30 kcal/kg)  Protein:  grams protein/day (1.5-2.0 grams protein per kg)  Fluid: ~1 ml/kcal or per MD discretion    NUTRITION DIAGNOSIS/PROBLEM:  Inadequate energy intake related to  decreased ability to consume sufficient energy intakes  as evidenced by documented/reported insufficient oral intake and NPO status.       MONITOR AND EVALUATE/NUTRITION GOALS:  Weight stable within 1 to 2 lbs during admission - Ongoing  Return to PO intake or advance diet in 24-48 hrs - defer  Start alternative nutrition in 24-48 hrs if diet is not able to advance- Resolved  Tolerate alternative nutrition at 100% of goal - Met, continues      MEDICATIONS:  Novolog, Remeron, MVI, Protonix, Thiamine    LABS:  BUN 57; Cr 2.05; -230    Pt is at High nutrition risk    Ruby Palacios RD, LDN, CNSC  Clinical  Dietitian  Phone [phone]

## 2024-03-04 NOTE — PLAN OF CARE
Received patient at 07:30 opens eyes to physical and verbal stimulation.  Does not follow commands.  Painful stimulus slight response and grimaces.  Incontinent of 2 liquid stools.  Called MD.  Order for specimen for c-dif.  Turning q2h.  Tube feeds and water flush.    Daughter at bedside gave her patient ring to take home.    Problem: NEUROLOGICAL - ADULT  Goal: Achieves stable or improved neurological status  Description: INTERVENTIONS  - Assess for and report changes in neurological status  - Initiate measures to prevent increased intracranial pressure  - Maintain blood pressure and fluid volume within ordered parameters to optimize cerebral perfusion and minimize risk of hemorrhage  - Monitor temperature, glucose, and sodium. Initiate appropriate interventions as ordered  Outcome: Progressing  Goal: Absence of seizures  Description: INTERVENTIONS  - Monitor for seizure activity  - Administer anti-seizure medications as ordered  - Monitor neurological status  Outcome: Progressing  Goal: Remains free of injury related to seizure activity  Description: INTERVENTIONS:  - Maintain airway, patient safety  and administer oxygen as ordered  - Monitor patient for seizure activity, document and report duration and description of seizure to MD/LIP  - If seizure occurs, turn patient to side and suction secretions as needed  - Reorient patient post seizure  - Seizure pads on all 4 side rails  - Instruct patient/family to notify RN of any seizure activity  - Instruct patient/family to call for assistance with activity based on assessment  Outcome: Progressing  Goal: Achieves maximal functionality and self care  Description: INTERVENTIONS  - Monitor swallowing and airway patency with patient fatigue and changes in neurological status  - Encourage and assist patient to increase activity and self care with guidance from PT/OT  - Encourage visually impaired, hearing impaired and aphasic patients to use assistive/communication  devices  Outcome: Progressing

## 2024-03-04 NOTE — SLP NOTE
Spoke to RN. Patient remains inappropriate for a swallowing evaluation. Patient remains somnolent. Will continue to follow and attempt to complete swallowing evaluation as medically appropriate.

## 2024-03-05 ENCOUNTER — NURSE ONLY (OUTPATIENT)
Dept: ELECTROPHYSIOLOGY | Facility: HOSPITAL | Age: 79
End: 2024-03-05
Attending: Other
Payer: MEDICARE

## 2024-03-05 ENCOUNTER — APPOINTMENT (OUTPATIENT)
Dept: GENERAL RADIOLOGY | Facility: HOSPITAL | Age: 79
End: 2024-03-05
Attending: HOSPITALIST
Payer: MEDICARE

## 2024-03-05 NOTE — PHYSICAL THERAPY NOTE
Attempted to see pt for PT treatment session, however pt on cEEG. Will hold today and will follow and re-attempt as able and appropriate. RN aware.

## 2024-03-05 NOTE — PLAN OF CARE
Received patient at 07:30 open eyes says ouch for pain to legs and arms.  Called Dr. Dandy oh for transfer.  Called report.   Updated daughter with transfer and room number.  Problem: NEUROLOGICAL - ADULT  Goal: Achieves stable or improved neurological status  Description: INTERVENTIONS  - Assess for and report changes in neurological status  - Initiate measures to prevent increased intracranial pressure  - Maintain blood pressure and fluid volume within ordered parameters to optimize cerebral perfusion and minimize risk of hemorrhage  - Monitor temperature, glucose, and sodium. Initiate appropriate interventions as ordered  Outcome: Progressing  Goal: Absence of seizures  Description: INTERVENTIONS  - Monitor for seizure activity  - Administer anti-seizure medications as ordered  - Monitor neurological status  Outcome: Progressing  Goal: Remains free of injury related to seizure activity  Description: INTERVENTIONS:  - Maintain airway, patient safety  and administer oxygen as ordered  - Monitor patient for seizure activity, document and report duration and description of seizure to MD/LIP  - If seizure occurs, turn patient to side and suction secretions as needed  - Reorient patient post seizure  - Seizure pads on all 4 side rails  - Instruct patient/family to notify RN of any seizure activity  - Instruct patient/family to call for assistance with activity based on assessment  Outcome: Progressing  Goal: Achieves maximal functionality and self care  Description: INTERVENTIONS  - Monitor swallowing and airway patency with patient fatigue and changes in neurological status  - Encourage and assist patient to increase activity and self care with guidance from PT/OT  - Encourage visually impaired, hearing impaired and aphasic patients to use assistive/communication devices  Outcome: Progressing

## 2024-03-05 NOTE — PROCEDURES
LONG-TERM VIDEO EEG REPORT;    Reason for Examination: Status epilepticus    Technical Summary:   18 Channels of EEG and 1 Channel of EKG was performed utilizing internation 10/20 method. Motion, muscle and machine artifacts were noted.       Recording Start date/time: 03/04 at 700 am  Recording end date/time: 03/05 at 700 a.m.      Background Activity:   The background activity consisted of 8-9 Hz waveforms, reactive to eye opening/ external stimulation.    Abnormality:  Throughout the recording moderate, intermittent generalized sharp wave activity was noted.  There were runs of generalized sharp wave activities lasting anywhere from 10 to 30 seconds, consistent with electrographic seizures.    Throughout the recording low to medium voltage, polymorphic, 3 to 6 Hz slow activity was noted diffusely over both hemispheres    Activation:    Hyperventilation:   Not Performed.    Photic Stimulation:  Driving response seen.No       Sleep:  Stage I and II sleep seen.    Impression:  This is a Abnormal prolonged Video EEG study.   Multiple episodes of electrographic seizures were noted.  Moderate generalized sharp wave activity was noted throughout the recording.  Mild to moderate diffuse slowing into delta and theta range was noted.  This constellation of findings can be seen in encephalopathy due to metabolic/toxic etiology, medication effects or diffuse cerebral injury.  Clinical correlation is recommended.        Jimmie Gonsalez MD  [facility].

## 2024-03-05 NOTE — PROGRESS NOTES
[facility]     Hospitalist Progress Note     Stephen Rodrigues Patient Status:  Inpatient    1945 MRN [id number]   [facility] 2NE-A Attending Brody Boston MD   Hosp Day # 15 PCP PHYSICIAN NONSTAFF     Subjective:   Was seen in ICU  Had more seizures on EEG  Topomax added   Neuro following     Objective:    Review of Systems:   A comprehensive review of systems was completed; pertinent positive and negatives stated in subjective.  Vital signs:  Temp:  [96.6 °F (35.9 °C)-98.1 °F (36.7 °C)] 97.7 °F (36.5 °C)  Pulse:  [73-88] 84  Resp:  [13-25] 18  BP: (131-148)/(52-74) 144/66  SpO2:  [97 %-100 %] 100 %  Physical Exam:    General: No acute distress, somnolent   Respiratory: no wheezes, no rhonchi  Cardiovascular: S1, S2, RRR  Abdomen: Soft, NT/ND, +BS  Extremities: Minimal response to painful stimuli. Opens eyes briefly to verbal stimuli  - no significant change from yesterday     Diagnostic Data:    Labs:  Recent Labs   Lab 24  0633 24  0616 24  0551 24  0453 24  0506 24  0412   WBC 7.9 9.2  --  9.1 7.2 7.7   HGB 9.5* 9.2*  --  8.9* 9.1* 9.2*   .0 100.3*  --  98.2 98.2 101.7*   .0 298.0  298.0 337.0 315.0 295.0 299.0       Recent Labs   Lab 24  0506 24  0412   * 176* 182*   BUN 47* 51* 57*   CREATSERUM 2.05* 2.08* 2.05*   CA 9.2 9.0 9.4   ALB  --   --  2.1*    137 139   K 4.2 4.4 4.8    111 112   CO2 24.0 23.0 24.0   ALKPHO  --   --  166*   AST  --   --  26   ALT  --   --  70*   BILT  --   --  0.2   TP  --   --  6.9     Estimated Creatinine Clearance: 19.2 mL/min (A) (based on SCr of 2.05 mg/dL (H)).  No results for input(s): \"PTP\", \"INR\" in the last 168 hours.         Microbiology  No results found for this visit on 24.  Imaging: Reviewed in Epic.  Medications:    topiramate  100 mg Oral BID    insulin degludec  7 Units Subcutaneous Daily    lacosamide  200 mg Per NG Tube BID    levETIRAcetam   1,500 mg Per NG Tube BID    valproic acid  750 mg Per NG Tube TID    hydrALAZINE  50 mg Per NG Tube Q8H ANIBAL    amLODIPine  5 mg Per NG Tube Daily    metoprolol tartrate  25 mg Per NG Tube 2x Daily(Beta Blocker)    aspirin  81 mg Per NG Tube Daily    multivitamin  1 tablet Oral Daily    thiamine  100 mg Per NG Tube BID    insulin aspart  1-10 Units Subcutaneous q6h    pantoprazole  40 mg Intravenous Q24H    escitalopram  10 mg Oral Daily    mirtazapine  15 mg Oral Nightly    rosuvastatin  10 mg Oral Daily       Assessment & Plan:      #Acute encephalopathy with breakthrough seizures  -on Keppra, Depakote, Vimpat, Topomax   -EEG monitoring per NeuroICU   -no obvious infection  -Neuro continues to follow   -MRI brain ordered     #HIREN on CKD IV- back to baseline   -renal consult appreciated    #Hx of seizure disorder- as above   #Hypernatremia- monitor   #Acute on chronic diastolic CHF-resolved  #Aortic stenosis-Mild to moderate on most recent echo  #CAD s/p stent-Aspirin, statin  #Hypertension-home meds   #Hyperlipidemia-Statin  #DM type II with A1c 5.8-Insulin sliding scale  #Hx of CVA-Aspirin, statin  #JHONY-JHONY protocol  #COPD, stable-Resume albuterol inhaler as needed  #Gout-Allopurinol  #Fibromyalgia-Gabapentin held for now  #LLE DVT- heparin as NPO  #Anxiety and depression-Resume Lexapro and Remeron once PO  #GERD-PPI  #TEN- dobhoff and tube feeds for now   #Diarrhea- improved and will monitor       Will f/u Neuro recs  D/W Alla Daughter/ POA     Brody Boston MD  Supplementary Documentation:   Quality:  DVT Mechanical Prophylaxis:   SCDs,    DVT Pharmacologic Prophylaxis   Medication    heparin (Porcine) 99756 units/250mL infusion PE/DVT/THROMBUS CONTINUOUS      DVT Pharmacologic prophylaxis: Aspirin 162 mg         Code Status: Full Code  Gurrola: External urinary catheter in place  Gurrola Duration (in days):   Central line:    ELROY:   At this point Ms. Rodrigues is expected to be discharge to: tbd     The 21st  Century Cures Act makes medical notes like these available to patients in the interest of transparency. Please be advised this is a medical document. Medical documents are intended to carry relevant information, facts as evident, and the clinical opinion of the practitioner. The medical note is intended as peer to peer communication and may appear blunt or direct. It is written in medical language and may contain abbreviations or verbiage that are unfamiliar.

## 2024-03-05 NOTE — PROGRESS NOTES
History of Presenting Illness:  Patient is a 79 year old female h/o htn, hl, dm2, cad, HFpEF, ckd, stroke, yrn, copd, gout, fibromyalgia, migraines, and seizure do p/w recurrent seizures 3/1  Hospital course significant for admitted 2/19 with AMS and c/f seizures, eeg at that time with generalized epileptiform discharges but no seizures, keppra was lowered for c/f drowsiness, then noted worsening mentation 3/1 and rpt eeg c/w subclinical status epilepticus thus pt was loaded with keppra, vpa, and vimpat and transferred to cnicu for further monitoring.  During the course in ICU patient did have 2 seizures in was also noted to have at least 2 subclinical seizures last night and continuous EEG monitoring.  Topamax added to current regimen of medications neurocritical care.  Patient transferred to the floor this morning.  Patient still drowsy, not easily arousable but opens eyes to loud verbal stimulation and response.  No clinical episode of seizure seen since transfer to the floor.      Vitals:   Vitals:    03/05/24 1201   BP:    Pulse: 70   Resp:    Temp:           Examination:    Drowsy but arousable, dysarthric, expressive an receptive language normal.   Pupils round and reactive to light, extra ocular movement normal, no facial weakness, hearing normal.  Motor strength and reflexes symmetrical.  Finger to Nose testing normal.     Investigations:       Lab Results   Component Value Date    A1C 5.8 (H) 01/27/2023        Lab Results   Component Value Date    LDL 25 02/15/2024    HDL 60 (H) 02/15/2024    TRIG 75 02/15/2024        Recent Labs   Lab 03/02/24  0453 03/03/24  0506 03/04/24  0412   RBC 2.81* 2.85* 2.89*   HGB 8.9* 9.1* 9.2*   HCT 27.6* 28.0* 29.4*   MCV 98.2 98.2 101.7*   MCH 31.7 31.9 31.8   MCHC 32.2 32.5 31.3   RDW 14.2 14.0 14.0   WBC 9.1 7.2 7.7   .0 295.0 299.0       Recent Labs   Lab 03/03/24  0506 03/04/24  0412 03/05/24  0539   * 182* 176*   BUN 51* 57* 58*   CREATSERUM 2.08* 2.05*  2.15*   EGFRCR 24* 24* 23*   CA 9.0 9.4 9.0    139 140   K 4.4 4.8 5.0    112 109   CO2 23.0 24.0 24.0         Impression/MDM.    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  Currently on Vimpat, Keppra, valproic acid and recently started Topamax.  Patient transferred to the floor.  MRI of the brain with and without contrast requested.  Also discussed CSF studies with patient's daughter.  Patient in the meantime to continue medications as well as continuous EEG monitoring.  History of stroke.  Continue aspirin and rosuvastatin.  Macrocytosis.  Most recent .7.  Advised folic acid supplementation.  Toxic valproic acid level.  Patient's most recent level was 105.8.  Will adjust medication.  Encephalopathy, complicated by underlying dementia.  Symptoms most related to postictal phenomena and multiple medication use.  Will discuss further with family regarding long-term expectations and plan of care.  Case discussed personally with critical care physician.

## 2024-03-05 NOTE — PROGRESS NOTES
[facility]  Nephrology Progress Note    Stephen Rodrigues Attending:  Brody Boston MD       Assessment and Plan:    1) CKD 4- baseline Cr > 2 mg/dl is due to longstanding DM / HTN; no further w/u     2) HIREN- due to volume depletion in setting of AMS / poor PO intake / diuretics- resolved     3) DM 2     4) HTN- on HEARN / amlodipine / BB via dobhoff     5) Gout / hyperuricemia- normally on allopurinol      6) AMS- due to nonconvulsive status epilepticus; keppra / depakote / vimpat per neuro     7) Hypernatremia- due to insensible losses; much improved. Increase free water flushes- 200 ml q4    8) Nutrition- continue TF via Dobhoff      Subjective:  Pretty lethargic, essentially nonverbal- no significant change    Physical Exam:   /52 (BP Location: Left arm)   Pulse 73   Temp 97.9 °F (36.6 °C) (Temporal)   Resp 22   Wt 194 lb 10.7 oz (88.3 kg)   SpO2 100%   BMI 33.41 kg/m²   Temp (24hrs), Av.3 °F (36.3 °C), Min:96.6 °F (35.9 °C), Max:97.9 °F (36.6 °C)       Intake/Output Summary (Last 24 hours) at 3/5/2024 0858  Last data filed at 3/5/2024 0600  Gross per 24 hour   Intake 2214.7 ml   Output 1350 ml   Net 864.7 ml     Wt Readings from Last 3 Encounters:   24 194 lb 10.7 oz (88.3 kg)   02/15/24 203 lb 9.6 oz (92.4 kg)   23 200 lb (90.7 kg)     General: awake  HEENT: No scleral icterus, MMM  Neck: Supple, no WILLIAM or thyromegaly  Cardiac: Regular rate and rhythm, S1, S2 normal, no murmur or tub  Lungs: Decreased BS at bases bilaterally   Abdomen: Soft, non-tender. + bowel sounds, no palpable organomegaly  Extremities: Without clubbing, cyanosis; no edema  Neurologic: Cranial nerves grossly intact, moving all extremities  Skin: Warm and dry, no rashes       Labs:   Lab Results   Component Value Date    CREATSERUM 2.15 2024    BUN 58 2024     2024    K 5.0 2024     2024    CO2 24.0 2024     2024    CA 9.0 2024    PTT 49.1  03/05/2024    MG 2.4 03/05/2024    PHOS 3.6 03/05/2024    PGLU 214 03/05/2024       Imaging:  All imaging studies reviewed.    Meds:           Questions/concerns were discussed with patient and/or family by bedside.          Apple Aguilar MD  3/5/2024  959 AM

## 2024-03-05 NOTE — SLP NOTE
Patient con't to remain obtunded and not appropriate for PO trials at this time.  Will follow-up as mental status improves.   Amber Wan MS CCC-SLP/L, pager [phone]  Speech-Language Pathologist

## 2024-03-05 NOTE — PLAN OF CARE
Assumed care around 1930.  NQ4, DEISY alertness. Pt stuporous but opens eyes to physical and verbal stimulation. Extremities move to pain. Does not respond to commands. Notified by Neuro APN that pt still having seizures. Meds adjusted and MRI ordered per Neuro APN.   NSR on tele, RA. No signs of pain at rest.   TF running, H2O flushes Q6.   Voiding via Purewick.  (+) BM but unable to collect sample d/t urine contamination.   Turns Q2.   Pt needs met.

## 2024-03-05 NOTE — OCCUPATIONAL THERAPY NOTE
Attempted to see patient for OT, however patient on cEEG.  Will hold today and continue to follow, resuming services as clinically appropriate.  Discussed with RN.

## 2024-03-06 ENCOUNTER — APPOINTMENT (OUTPATIENT)
Dept: GENERAL RADIOLOGY | Facility: HOSPITAL | Age: 79
End: 2024-03-06
Attending: INTERNAL MEDICINE
Payer: MEDICARE

## 2024-03-06 ENCOUNTER — NURSE ONLY (OUTPATIENT)
Dept: ELECTROPHYSIOLOGY | Facility: HOSPITAL | Age: 79
End: 2024-03-06
Attending: Other
Payer: MEDICARE

## 2024-03-06 NOTE — PROCEDURES
LONG-TERM VIDEO EEG REPORT;     Reason for Examination: Status epilepticus     Technical Summary:   18 Channels of EEG and 1 Channel of EKG was performed utilizing internation 10/20 method. Motion, muscle and machine artifacts were noted.         Recording Start date/time: 03/05 at 700 am  Recording end date/time: 03/06 at 700 a.m.        Background Activity:   The background activity consisted of 8-9 Hz waveforms, reactive to eye opening/ external stimulation.     Abnormality:  Throughout the recording moderate, intermittent generalized sharp wave activity was noted.  There were runs of generalized sharp wave activities lasting anywhere from 10 to 30 seconds, consistent with electrographic seizures.    Throughout the recording low to medium voltage, polymorphic, 3 to 6 Hz slow activity was noted diffusely over both hemispheres     Activation:     Hyperventilation:   Not Performed.     Photic Stimulation:  Driving response seen.No        Sleep:  Stage I and II sleep seen.     Impression:  This is a Abnormal prolonged Video EEG study.   Multiple episodes of electrographic seizures were noted.  Moderate generalized sharp wave activity was noted throughout the recording.  Mild to moderate diffuse slowing into delta and theta range was noted.  This constellation of findings can be seen in encephalopathy due to metabolic/toxic etiology, medication effects or diffuse cerebral injury.  Clinical correlation is recommended.           Jimmie Gonsalez MD  [facility].

## 2024-03-06 NOTE — PHYSICAL THERAPY NOTE
Chart reviewed. Pt not following commands, only awakes to pain at this time, also on continuous EEG. Will hold and follow as appropriate.     Rachael Montes, PT, DPT  03/06/24

## 2024-03-06 NOTE — PROGRESS NOTES
History of Presenting Illness:  Patient is a 79 year old female h/o htn, hl, dm2, cad, HFpEF, ckd, stroke, yrn, copd, gout, fibromyalgia, migraines, and seizure do p/w recurrent seizures 3/1  Hospital course significant for admitted 2/19 with AMS and c/f seizures, eeg at that time with generalized epileptiform discharges but no seizures, keppra was lowered for c/f drowsiness, then noted worsening mentation 3/1 and rpt eeg c/w subclinical status epilepticus thus pt was loaded with keppra, vpa, and vimpat and transferred to cnicu for further monitoring.  During the course in ICU patient did have 2 seizures in was also noted to have at least 2 subclinical seizures last night and continuous EEG monitoring.  Topamax added to current regimen of medications neurocritical care.  Patient transferred to the floor this morning.  Patient still drowsy, not easily arousable but opens eyes to loud verbal stimulation and response.  No clinical episode of seizure seen since transfer to the floor.  Patient still quite drowsy, although arousable but not following commands consistently.      Vitals:   Vitals:    03/06/24 0800   BP: 118/58   Pulse: 71   Resp: 18   Temp: 98.3 °F (36.8 °C)          Examination:    Drowsy but arousable, intermittently following commands, dysarthric, expressive an receptive language normal.   Pupils round and reactive to light, extra ocular movement normal, no facial weakness, hearing normal.  Motor strength and reflexes symmetrical.  Finger to Nose testing normal.     Investigations:    XR CHEST AP PORTABLE  (CPT=71045)    Result Date: 3/6/2024  CONCLUSION:  Dobbhoff tube terminates in the distal stomach.   LOCATION:  [facility]      Dictated by (CST): Stromberg, LeRoy, MD on 3/06/2024 at 5:51 AM     Finalized by (CST): Stromberg, LeRoy, MD on 3/06/2024 at 5:52 AM       XR CHEST AP PORTABLE  (CPT=71045)    Result Date: 3/5/2024  CONCLUSION:    Feeding tube present with the tip in the stomach.  LOCATION:   [facility]      Dictated by (CST): Tab Huang MD on 3/05/2024 at 8:52 PM     Finalized by (CST): Tab Huang MD on 3/05/2024 at 8:53 PM            Lab Results   Component Value Date    A1C 5.8 (H) 01/27/2023        Lab Results   Component Value Date    LDL 25 02/15/2024    HDL 60 (H) 02/15/2024    TRIG 75 02/15/2024        Recent Labs   Lab 03/03/24  0506 03/04/24  0412 03/06/24  0802   RBC 2.85* 2.89* 3.08*   HGB 9.1* 9.2* 9.7*   HCT 28.0* 29.4* 30.8*   MCV 98.2 101.7* 100.0   MCH 31.9 31.8 31.5   MCHC 32.5 31.3 31.5   RDW 14.0 14.0 14.5   WBC 7.2 7.7 8.2   .0 299.0 306.0       Recent Labs   Lab 03/04/24  0412 03/05/24  0539 03/06/24  0802   * 176* 202*   BUN 57* 58* 65*   CREATSERUM 2.05* 2.15* 2.33*   EGFRCR 24* 23* 21*   CA 9.4 9.0 9.1    140 137   K 4.8 5.0 4.6    109 109   CO2 24.0 24.0 23.0         Impression/MDM.    Partial complex status epilepticus.  Patient did have few electrographic seizures on continuous EEG monitoring.  Clobazam was added.  Patient to also continue Vimpat, Keppra, valproic acid and Topamax.  Will increase clobazam today.  MRI of the brain previously had not shown any acute changes.  Repeat MRI of the brain with and without contrast requested.  History of stroke.  Continue aspirin and statin.  Cytosis.  On folic acid supplementation.  Encephalopathy, multifactorial, complicated by underlying dementia, seizures, toxic/metabolic etiology.  Awaiting family members to discuss long-term care plan.

## 2024-03-06 NOTE — PROGRESS NOTES
1930: Dobhoff had come out of nare a few inches- TF stopped and the dobhoff advanced and in place at 67cm, where initially noted to be. Checked placement with CXR, noted to be in tip of stomach. Then, was able to partially unclog with warm water to get 2100 medications down with a 20ml syringe only. Multiple attempts to unclog tube for feedings, TF tubing set broke from pressure build up of trying to unclog. TF removed from set, dobhoff removed. Placed a completely new dobhoff in L nare, had to verify placement with a second CXR and placement verified in tip of the stomach. TF restarted, flushing without issue. Will notify MD in AM to see if some medications can be administered by IV instead of tube.   Room air  Continuous EEG running, seizure precautions no seizure activity noted- will defer EEG results to Neurology.  Neuro Q 4- some left sided facial droop noted but unable to assess otherwise, pt still non-verbal. Opened eyes a few times when being moved but unable to say anything or follow commands.  Tele NSR  Heparin drip at 14ml, PTT redraw tomorrow Wed 3/6 AM  TF running Glucerna at 50ml hour, 200 ml water flushes  Strict NPO  Purewick intact with moderate urine output overnight  Cdiff (-), isolation DCd  All needs met at this time

## 2024-03-06 NOTE — PROGRESS NOTES
[facility]  Nephrology Progress Note    Stephen Rodrigues Attending:  Brody Boston MD       Assessment and Plan:    1) CKD 4- baseline Cr > 2 mg/dl is due to longstanding DM / HTN; no further w/u     2) HIREN- due to volume depletion in setting of AMS / poor PO intake / diuretics- resolved     3) DM 2     4) HTN- on HEARN / amlodipine / BB via dobhoff     5) AMS- due to nonconvulsive status epilepticus; keppra / depakote / vimpat per neuro     7) Hypernatremia- due to insensible losses- resolved on  ml q4    8) Nutrition- continue TF via Dobhoff      Subjective:  Pretty lethargic, essentially nonverbal- no significant change    Physical Exam:   /58 (BP Location: Right arm)   Pulse 71   Temp 98.3 °F (36.8 °C) (Oral)   Resp 18   Wt 194 lb 10.7 oz (88.3 kg)   SpO2 98%   BMI 33.41 kg/m²   Temp (24hrs), Av.5 °F (36.9 °C), Min:97.4 °F (36.3 °C), Max:99.1 °F (37.3 °C)       Intake/Output Summary (Last 24 hours) at 3/6/2024 0944  Last data filed at 3/5/2024 2200  Gross per 24 hour   Intake 200 ml   Output --   Net 200 ml     Wt Readings from Last 3 Encounters:   24 194 lb 10.7 oz (88.3 kg)   02/15/24 203 lb 9.6 oz (92.4 kg)   23 200 lb (90.7 kg)     General: awake  HEENT: No scleral icterus, MMM  Neck: Supple, no WILLIAM or thyromegaly  Cardiac: Regular rate and rhythm, S1, S2 normal, no murmur or tub  Lungs: Decreased BS at bases bilaterally   Abdomen: Soft, non-tender. + bowel sounds, no palpable organomegaly  Extremities: Without clubbing, cyanosis; no edema  Neurologic: Cranial nerves grossly intact, moving all extremities  Skin: Warm and dry, no rashes       Labs:   Lab Results   Component Value Date    WBC 8.2 2024    HGB 9.7 2024    HCT 30.8 2024    .0 2024    CREATSERUM 2.33 2024    BUN 65 2024     2024    K 4.6 2024     2024    CO2 23.0 2024     2024    CA 9.1 2024    PTT 96.6 2024     PGLU 199 03/06/2024       Imaging:  All imaging studies reviewed.    Meds:           Questions/concerns were discussed with patient and/or family by bedside.          Apple Aguilar MD  3/5/2024  959 AM

## 2024-03-06 NOTE — OCCUPATIONAL THERAPY NOTE
OCCUPATIONAL THERAPY                       Per chart review, patient is not following commands and is responding only to pain at this time per RN. Patient on continuous EEG. Will hold for now and resume treatment when medically and clinically appropriate.

## 2024-03-06 NOTE — SLP NOTE
SPEECH DAILY NOTE - INPATIENT    ASSESSMENT & PLAN   ASSESSMENT  Pt seen for dysphagia tx to reassess swallow fxn, determine safest/least restrictive means of nutrition/hydration, develop plan of care and educate pt/family. Pt found lying down in bed asleep. Family members, including daughter present in room. Unable to arouse patient sufficiently for po trials despite max auditory/tactile stim. Discussion re: pt's status, prognosis and plan ensued with family. Answered questions to their apparent satisfaction with good understanding reported. Daughter considering hospice. Will continue to follow and proceed based upon POA decisions. Recommend patient remain NPO with enteral support for nutrition/hydration at this time.      Diet Recommendations - Solids: NPO  Diet Recommendations - Liquids: NPO    Compensatory Strategies Recommended:  (n/a)  Aspiration Precautions:  (n/a)  Medication Administration Recommendations: Non-oral    Patient Experiencing Pain: No (no pain behaviors observed)                Treatment Plan  Treatment Plan/Recommendations: SLP to reassess    Interdisciplinary Communication:  daughter            GOALS  Goal #1 On-going reassessment of swallow fxn  In Progress                                        FOLLOW UP  Follow Up Needed (Documentation Required): Yes  SLP Follow-up Date: 03/08/24  Number of Visits to Meet Established Goals: 1      If you have any questions, please contact Astrid BOWDEN, SLP    Astrid Stevens MA, CCC-SLP  Pager [phone]

## 2024-03-06 NOTE — CM/SW NOTE
Per chart review, GOC discussions ongoing with family. Details of discussion unclear, however would suggest potentially having Palliative Care involvement to aid discussions or direct consult to hospice.    TORRI RothN, RN-BC    [phone]

## 2024-03-06 NOTE — PLAN OF CARE
Received report from CNICU RN, pt transferred to floor. Stuporous, moans at times. Neuros Q4, pt does not follow commands. Normal sinus/sinus tach on tele. Heparin gtt @ 14ml/hr, next ptt in am. NPO Q6 accu. Tube feeds infusing per order, Glucerna 1.5. Isolation for r/o cdiff. Bedbound. All safety precautions are in place and will continue with plan of care.    Problem: NEUROLOGICAL - ADULT  Goal: Achieves stable or improved neurological status  Description: INTERVENTIONS  - Assess for and report changes in neurological status  - Initiate measures to prevent increased intracranial pressure  - Maintain blood pressure and fluid volume within ordered parameters to optimize cerebral perfusion and minimize risk of hemorrhage  - Monitor temperature, glucose, and sodium. Initiate appropriate interventions as ordered  Outcome: Progressing  Goal: Absence of seizures  Description: INTERVENTIONS  - Monitor for seizure activity  - Administer anti-seizure medications as ordered  - Monitor neurological status  Outcome: Progressing  Goal: Remains free of injury related to seizure activity  Description: INTERVENTIONS:  - Maintain airway, patient safety  and administer oxygen as ordered  - Monitor patient for seizure activity, document and report duration and description of seizure to MD/LIP  - If seizure occurs, turn patient to side and suction secretions as needed  - Reorient patient post seizure  - Seizure pads on all 4 side rails  - Instruct patient/family to notify RN of any seizure activity  - Instruct patient/family to call for assistance with activity based on assessment  Outcome: Progressing  Goal: Achieves maximal functionality and self care  Description: INTERVENTIONS  - Monitor swallowing and airway patency with patient fatigue and changes in neurological status  - Encourage and assist patient to increase activity and self care with guidance from PT/OT  - Encourage visually impaired, hearing impaired and aphasic patients  to use assistive/communication devices  Outcome: Progressing     Problem: Delirium  Goal: Minimize duration of delirium  Description: Interventions:  - Encourage use of hearing aids, eye glasses  - Promote highest level of mobility daily  - Provide frequent reorientation  - Promote wakefulness i.e. lights on, blinds open  - Promote sleep, encourage patient's normal rest cycle i.e. lights off, TV off, minimize noise and interruptions  - Encourage family to assist in orientation and promotion of home routines  Outcome: Progressing

## 2024-03-07 ENCOUNTER — APPOINTMENT (OUTPATIENT)
Dept: GENERAL RADIOLOGY | Facility: HOSPITAL | Age: 79
End: 2024-03-07
Attending: HOSPITALIST
Payer: MEDICARE

## 2024-03-07 ENCOUNTER — NURSE ONLY (OUTPATIENT)
Dept: ELECTROPHYSIOLOGY | Facility: HOSPITAL | Age: 79
End: 2024-03-07
Attending: Other
Payer: MEDICARE

## 2024-03-07 ENCOUNTER — APPOINTMENT (OUTPATIENT)
Dept: MRI IMAGING | Facility: HOSPITAL | Age: 79
End: 2024-03-07
Attending: NURSE PRACTITIONER
Payer: MEDICARE

## 2024-03-07 PROBLEM — E87.1 HYPONATREMIA: Status: ACTIVE | Noted: 2024-01-01

## 2024-03-07 PROBLEM — Z51.5 PALLIATIVE CARE ENCOUNTER: Status: ACTIVE | Noted: 2024-01-01

## 2024-03-07 PROBLEM — Z51.5 PALLIATIVE CARE ENCOUNTER: Status: ACTIVE | Noted: 2024-03-07

## 2024-03-07 PROBLEM — Z71.89 GOALS OF CARE, COUNSELING/DISCUSSION: Status: ACTIVE | Noted: 2024-03-07

## 2024-03-07 PROBLEM — E87.1 HYPONATREMIA: Status: ACTIVE | Noted: 2024-03-07

## 2024-03-07 PROBLEM — Z71.89 GOALS OF CARE, COUNSELING/DISCUSSION: Status: ACTIVE | Noted: 2024-01-01

## 2024-03-07 NOTE — PROGRESS NOTES
[facility]  Neurocritical Care   Progress Note       Subjective:   cEEG with intermittent electrographic seizures throughout.  Overnight event not correlating with any evolution/progression of discharges so unlikely clinical seizure.  Event today after MRI unclear as patient was not hooked up to EEG at that time.  Had temporary apnea on the floor prior to transfer to ICU.  Currently satting 99 on room air.  Upper airway secretions noted.  Chest x-ray from this morning appears clear after overnight event.    Talked to the patient's daughter, Alla, over the phone regarding current medical situation.  Stated that patient was moved to the neuro ICU for concern for airway in setting of multiple antiseizure medications with CKD and ongoing seizures.  Discussed how next step would be to add more seizure medication but given her current respiratory status that would involve intubating her before we proceed.  Alla stated that she had spoken to palliative care and the initial decision was to be okay with intubation overnight if needed as more family would be coming into town tomorrow for further goals of care discussions.    Stated at this point that my concern would be involving long-term care, explaining that Stephen would likely need to be transitioned to a tracheostomy once neurologically stabilized as the likelihood of her getting extubated on 6+ antiseizure medications given her current mental and respiratory status is highly unlikely.  Daughter stated at this time that her mom would not want a surgical feeding or breathing tube.  Explained my hesitancy to intubate if that would lead to an outcome not in line with Stephen's wishes and Alla agreed so decision was made to continue with current medical care overnight but if respiratory status were to worsen, patient would not be intubated and family would be updated.  Discussed the same with cardiac status, patient's code changed to DNR/Select.  With  this goal in mind, no further seizure medication will be added overnight to current regimen unless subclinical seizures became more persistent and prolonged or patient develop clinical events with EEG correlate.  Then further discussion will be had with family.       Vitals:   Temp:  [97.2 °F (36.2 °C)-98.5 °F (36.9 °C)] 98.5 °F (36.9 °C)  Pulse:  [] 100  Resp:  [18] 18  BP: (117-141)/(54-73) 117/54  SpO2:  [95 %-99 %] 99 %  Body mass index is 33.41 kg/m².    Intake/Output:No intake or output data in the 24 hours ending 03/07/24 1348    Scheduled Meds:   topiramate  100 mg Oral BID    insulin degludec  12 Units Subcutaneous Daily    cloBAZam  2.5 mg Oral BID    lacosamide  200 mg Per NG Tube BID    levETIRAcetam  1,500 mg Per NG Tube BID    valproic acid  750 mg Per NG Tube TID    hydrALAZINE  50 mg Per NG Tube Q8H ANIBAL    amLODIPine  5 mg Per NG Tube Daily    metoprolol tartrate  25 mg Per NG Tube 2x Daily(Beta Blocker)    aspirin  81 mg Per NG Tube Daily    multivitamin  1 tablet Oral Daily    thiamine  100 mg Per NG Tube BID    insulin aspart  1-10 Units Subcutaneous q6h    pantoprazole  40 mg Intravenous Q24H    [Held by provider] escitalopram  10 mg Oral Daily    [Held by provider] mirtazapine  15 mg Oral Nightly    rosuvastatin  10 mg Oral Daily     Continuous Infusions:   continuous dose heparin 1,400 Units/hr (03/07/24 0648)     PRN Meds:lipase-protease-amylase (Lip-Prot-Amyl) **AND** sodium bicarbonate, albuterol, albuterol, glucose **OR** glucose **OR** glucose-vitamin C **OR** dextrose **OR** glucose **OR** glucose **OR** glucose-vitamin C, diazepam, acetaminophen, melatonin, polyethylene glycol (PEG 3350), sennosides, bisacodyl, ondansetron, metoclopramide, benzonatate, glycerin-hypromellose-, sodium chloride    Physical Examination:   General- No acute distress  CV- Sinus tachycardia   Resp- Shallow breathing, upper airway secretions  Neuro-   Mental status-opens eyes to deep noxious stim,  not tracking or following commands   Cranial nerves- pupils equally round and reactive to light, extraocular muscles intact, gaze midline, face symmetric, +cough  Motor/Sens- Flaccid throughout, minimal flicker to pain in lower extremities    Diagnostics:   MRI BRAIN (W+WO) (CPT=70553)    Result Date: 3/7/2024  CONCLUSION:  1. No acute infarct, acute intracranial hemorrhage, or hydrocephalus. 2. Mild chronic small vessel ischemic disease within the cerebral white matter and yenny. 3. There are 2 punctate foci of enhancement within the left cerebellum.  This may represent volume-averaging artifact or vascular enhancement.  However, a follow-up brain MRI in 6-8 weeks is recommended to exclude an underlying lesion.   LOCATION:  [id number]    Dictated by (CST): Stromberg, LeRoy, MD on 3/07/2024 at 10:55 AM     Finalized by (CST): Stromberg, LeRoy, MD on 3/07/2024 at 11:06 AM       XR CHEST AP PORTABLE  (CPT=71045)    Result Date: 3/7/2024  CONCLUSION:  1. The reticular densities in lower lungs are probably a combination of postinflammatory scarring and atelectasis. 2. Dobbhoff tube is noted to extend into abdomen below the inferior margin of the image.  Preliminary report was reviewed and there is no significant discrepancy.    LOCATION:  [facility]      Dictated by (CST): Mayank Burleson MD on 3/07/2024 at 7:28 AM     Finalized by (CST): Mayank Burleson MD on 3/07/2024 at 7:30 AM      Lab Review     Recent Labs   Lab 03/05/24  0539 03/06/24  0802 03/07/24  0645    137 135*   K 5.0 4.6 5.0    109 109   CO2 24.0 23.0 20.0*   * 202* 268*   BUN 58* 65* 73*   CREATSERUM 2.15* 2.33* 2.47*     Recent Labs   Lab 03/03/24  0506 03/04/24  0412 03/06/24  0802   WBC 7.2 7.7 8.2   HGB 9.1* 9.2* 9.7*   .0 299.0 306.0     Assesment/Plan:   79 year old female h/o htn, hl, dm2, cad, HFpEF, ckd, stroke, yrn, copd, gout, fibromyalgia, migraines, and seizure do p/w recurrent seizures 3/1 after keppra dose lowered in setting  of lethargy. Now on 3 AEDs     Neuro:  Subclinical status epilepticus-   2/2 underlying seizure disorder and lowering of keppra  CT/MRI no acute changes. Repeat MRI 3/7 without new abnormalities, 2 punctate areas of enhancement in the cerebellum possibly artifact  cEEG w/ subclinical generalized epileptiform discharges and seizures    Continue cEEG  Continue current AED: Keppra 1.5 g twice daily, Vimpat 200 mg twice daily, Depakote 750 mg twice daily, Onfi 2.5 mg twice daily, Topamax 100 mg twice daily        VPA level 3/2 88.7, 3/4 105  so rechecking today   Ongoing GoC disussions with family in AM.     Cardiac:  Htn/hl/cad/HFpEF- sbp goal 100-150, cont norvasc, metop, hydral, crestor  Pulmonary:  Copd- Stable on RA, aggressive bronchial hygiene, frequent suctioning, ABG pending   Renal:  HIREN on CKD- Resolved, nephrology following, rising BUN, hyponatremia, acidosis      GI:  TF on hold given respiratory instability   Heme/ID:  Afebrile, no leukocytosis  LLE DVT- on hep gtt  Endocrine/Rheum:  DM Type 2, uncontrolled with hyperglycemia         - Monitor glucose, sliding scale insulin prn  Checklist   - Lines: PIVs   - DVT Prophylaxis: SCDs and hep gtt   - Diet: TFs on hold    - IVF: -    - Electrolytes: Replete per protocol   - Code Status: DNR/Select    Dispo: CNICU    A total of 80 minutes of critical care time (exclusive of billable procedures) was administered to manage and/or prevent neurologic instability. This involved direct patient intervention, complex decision making, and/or extensive discussions with the patient, family, and clinical staff.     Sandoval Dorman MD  Neurocritical Care  [facility]

## 2024-03-07 NOTE — PROGRESS NOTES
Pt rec'd from CTU, Pt unresponsive, intermittently and slightly withdraws to painful stimulation. EEG tech at bedside. Dr. Dorman at bedside. Pt O2 sats 98% at this time.

## 2024-03-07 NOTE — PLAN OF CARE
Assumed patient care at 730. Stuporous, moans at times. Neuros Qshift, pt does not follow commands. Normal sinus/sinus tach on tele. Heparin gtt @ 14ml/hr, next ptt in am. NPO Q6 accu. Tube feeds infusing per order, Glucerna 1.5. Bedbound. All safety precautions are in place and will continue with plan of care.     Problem: NEUROLOGICAL - ADULT  Goal: Achieves stable or improved neurological status  Description: INTERVENTIONS  - Assess for and report changes in neurological status  - Initiate measures to prevent increased intracranial pressure  - Maintain blood pressure and fluid volume within ordered parameters to optimize cerebral perfusion and minimize risk of hemorrhage  - Monitor temperature, glucose, and sodium. Initiate appropriate interventions as ordered  Outcome: Progressing  Goal: Absence of seizures  Description: INTERVENTIONS  - Monitor for seizure activity  - Administer anti-seizure medications as ordered  - Monitor neurological status  Outcome: Progressing  Goal: Remains free of injury related to seizure activity  Description: INTERVENTIONS:  - Maintain airway, patient safety  and administer oxygen as ordered  - Monitor patient for seizure activity, document and report duration and description of seizure to MD/LIP  - If seizure occurs, turn patient to side and suction secretions as needed  - Reorient patient post seizure  - Seizure pads on all 4 side rails  - Instruct patient/family to notify RN of any seizure activity  - Instruct patient/family to call for assistance with activity based on assessment  Outcome: Progressing  Goal: Achieves maximal functionality and self care  Description: INTERVENTIONS  - Monitor swallowing and airway patency with patient fatigue and changes in neurological status  - Encourage and assist patient to increase activity and self care with guidance from PT/OT  - Encourage visually impaired, hearing impaired and aphasic patients to use assistive/communication  devices  Outcome: Progressing

## 2024-03-07 NOTE — PROGRESS NOTES
Assumed care 0730.  Alert and oriented x0.   SZ precautions. Continuous EEG.   Came back from MRI- foaming in mouth. Suctioned.   Per neuro- Wants to have a family meeting  Dobhoff to left nostril.  TF- Glucerna 1.5 @ 5 ml/hr q6 water flushes 200 ml.  Aspiration precautions.   Heparin gtt @ 14 ml/hr. Theraputic appt tomorrow.   SBP parameters 100-150.  Eliquis on hold.   Daily wt   Bed rest  DEISY pain .  Satge 2 on sacrum. Mepilex   Continue to monitor pt.      1130: received patient from MRI. Pt appeared to have white foam in her mouth. EEG connected back on. MD notified. Daughter updated. Palliative care consulted. Transfer to CNICU orders placed,     1313: pt tansferred pt to CNICU. Pt apneic and desating in 70s. RN suctioned before transfer and sating in 90s.

## 2024-03-07 NOTE — PHYSICAL THERAPY NOTE
Per chart review, patient still not following commands and only responding to pain at this time, also SALLY at MRI. Will hold and follow as appropriate.     Rachael Montes, PT, DPT  03/07/24

## 2024-03-07 NOTE — PROGRESS NOTES
Assumed care at 1930  Seizure precautions  Stuporous, unable to follow commands. Will moan to painful stimuli  QS neuro  RA  NSR on tele  Purewick in place  Dobhoff tube feeds glucerna 1.5 going at 50 the goal rate q4 200 ml water flush  Q6 acucheck  Bed rest  Continuous EEG  Seizure like activity noted at 0035 neurology paged. New order for one time dose IV Keppra 1g   CXR ordered possible aspiration from saliva after seizure  Heparin gtt going at 14ml/hr   AM ptt redraw  Safety precautions in place  All needs met at this time  Continue current plan of care

## 2024-03-07 NOTE — PROGRESS NOTES
[facility]     Hospitalist Progress Note     Stephen Rodrigues Patient Status:  Inpatient    1945 MRN [id number]   [facility] 2NE-A Attending Brody Boston MD   Hosp Day # 17 PCP PHYSICIAN NONSTAFF     Subjective:   seizures    Objective:    Review of Systems:   A comprehensive review of systems was  not completed  Vital signs:  Temp:  [97.2 °F (36.2 °C)-98.7 °F (37.1 °C)] 98.7 °F (37.1 °C)  Pulse:  [] 100  Resp:  [18-21] 21  BP: (112-141)/(54-73) 133/64  SpO2:  [95 %-99 %] 97 %  Physical Exam:    General: No acute distress, somnolent   Respiratory: no wheezes, no rhonchi  Cardiovascular: S1, S2, RRR  Abdomen: Soft, NT/ND, +BS  Extremities: Minimal response to painful stimuli. Opens eyes briefly to verbal stimuli  - no significant change from yesterday     Diagnostic Data:    Labs:  Recent Labs   Lab 24  0551 24  0453 24  0506 24  0412 24  0802   WBC  --  9.1 7.2 7.7 8.2   HGB  --  8.9* 9.1* 9.2* 9.7*   MCV  --  98.2 98.2 101.7* 100.0   .0 315.0 295.0 299.0 306.0       Recent Labs   Lab 24  0412 24  0539 24  0802 24  0645   * 176* 202* 268*   BUN 57* 58* 65* 73*   CREATSERUM 2.05* 2.15* 2.33* 2.47*   CA 9.4 9.0 9.1 8.5   ALB 2.1*  --   --   --     140 137 135*   K 4.8 5.0 4.6 5.0    109 109 109   CO2 24.0 24.0 23.0 20.0*   ALKPHO 166*  --   --   --    AST 26  --   --   --    ALT 70*  --   --   --    BILT 0.2  --   --   --    TP 6.9  --   --   --      Estimated Creatinine Clearance: 15.9 mL/min (A) (based on SCr of 2.47 mg/dL (H)).  No results for input(s): \"PTP\", \"INR\" in the last 168 hours.         Microbiology  No results found for this visit on 24.  Imaging: Reviewed in Epic.  Medications:    topiramate  100 mg Oral BID    insulin degludec  12 Units Subcutaneous Daily    cloBAZam  2.5 mg Oral BID    lacosamide  200 mg Per NG Tube BID    levETIRAcetam  1,500 mg Per NG Tube BID    valproic acid  750  mg Per NG Tube TID    hydrALAZINE  50 mg Per NG Tube Q8H ANIBAL    amLODIPine  5 mg Per NG Tube Daily    metoprolol tartrate  25 mg Per NG Tube 2x Daily(Beta Blocker)    aspirin  81 mg Per NG Tube Daily    multivitamin  1 tablet Oral Daily    thiamine  100 mg Per NG Tube BID    insulin aspart  1-10 Units Subcutaneous q6h    pantoprazole  40 mg Intravenous Q24H    [Held by provider] escitalopram  10 mg Oral Daily    [Held by provider] mirtazapine  15 mg Oral Nightly    rosuvastatin  10 mg Oral Daily       Assessment & Plan:      #Acute encephalopathy with breakthrough seizures  -on Keppra, Depakote, Vimpat, Topomax clobazam  -poor prognosis  -Neuro continues to follow   -MRI brain pending    #HIREN on CKD IV- back to baseline   -renal consult appreciated    #Hx of seizure disorder- as above   #Hypernatremia- monitor   #Acute on chronic diastolic CHF-resolved  #Aortic stenosis-Mild to moderate on most recent echo  #CAD s/p stent-Aspirin, statin  #Hypertension-home meds   #Hyperlipidemia-Statin  #DM type II with A1c 5.8-Insulin sliding scale  #Hx of CVA-Aspirin, statin  #JHONY-JHONY protocol  #COPD, stable-Resume albuterol inhaler as needed  #Gout-Allopurinol  #Fibromyalgia  #LLE DVT- heparin  #Anxiety and depression-Resume Lexapro and Remeron once PO  #GERD-PPI  #TEN-continue tube feeds      Will f/u Neuro recs and d/w family about comfort care      Supplementary Documentation:   Quality:  DVT Mechanical Prophylaxis:   SCDs,    DVT Pharmacologic Prophylaxis   Medication    heparin (Porcine) 34337 units/250mL infusion PE/DVT/THROMBUS CONTINUOUS      DVT Pharmacologic prophylaxis: Aspirin 162 mg         Code Status: DNAR/Selective Treatment  Gurrola: External urinary catheter in place  Gurrola Duration (in days):   Central line:    ELROY:   At this point Ms. Rodrigues is expected to be discharge to: tbd     The 21st Century Cures Act makes medical notes like these available to patients in the interest of transparency. Please be advised  this is a medical document. Medical documents are intended to carry relevant information, facts as evident, and the clinical opinion of the practitioner. The medical note is intended as peer to peer communication and may appear blunt or direct. It is written in medical language and may contain abbreviations or verbiage that are unfamiliar.

## 2024-03-07 NOTE — PROGRESS NOTES
[facility]     Hospitalist Progress Note     Stephen Rodrigues Patient Status:  Inpatient    1945 MRN [id number]   [facility] 2NE-A Attending Brody Boston MD   Hosp Day # 17 PCP PHYSICIAN NONSTAFF     Subjective:   Had more seizures on EEG  Topomax added   Neuro following     Objective:    Review of Systems:   A comprehensive review of systems was  not completed  Vital signs:  Temp:  [97.2 °F (36.2 °C)-98.7 °F (37.1 °C)] 98.7 °F (37.1 °C)  Pulse:  [] 100  Resp:  [18-21] 21  BP: (112-141)/(54-73) 133/64  SpO2:  [95 %-99 %] 97 %  Physical Exam:    General: No acute distress, somnolent   Respiratory: no wheezes, no rhonchi  Cardiovascular: S1, S2, RRR  Abdomen: Soft, NT/ND, +BS  Extremities: Minimal response to painful stimuli. Opens eyes briefly to verbal stimuli  - no significant change from yesterday     Diagnostic Data:    Labs:  Recent Labs   Lab 24  0551 24  0453 24  0506 24  0412 24  0802   WBC  --  9.1 7.2 7.7 8.2   HGB  --  8.9* 9.1* 9.2* 9.7*   MCV  --  98.2 98.2 101.7* 100.0   .0 315.0 295.0 299.0 306.0       Recent Labs   Lab 24  0412 24  0539 24  0802 24  0645   * 176* 202* 268*   BUN 57* 58* 65* 73*   CREATSERUM 2.05* 2.15* 2.33* 2.47*   CA 9.4 9.0 9.1 8.5   ALB 2.1*  --   --   --     140 137 135*   K 4.8 5.0 4.6 5.0    109 109 109   CO2 24.0 24.0 23.0 20.0*   ALKPHO 166*  --   --   --    AST 26  --   --   --    ALT 70*  --   --   --    BILT 0.2  --   --   --    TP 6.9  --   --   --      Estimated Creatinine Clearance: 15.9 mL/min (A) (based on SCr of 2.47 mg/dL (H)).  No results for input(s): \"PTP\", \"INR\" in the last 168 hours.         Microbiology  No results found for this visit on 24.  Imaging: Reviewed in Epic.  Medications:    topiramate  100 mg Oral BID    insulin degludec  12 Units Subcutaneous Daily    cloBAZam  2.5 mg Oral BID    lacosamide  200 mg Per NG Tube BID    levETIRAcetam   1,500 mg Per NG Tube BID    valproic acid  750 mg Per NG Tube TID    hydrALAZINE  50 mg Per NG Tube Q8H ANIBAL    amLODIPine  5 mg Per NG Tube Daily    metoprolol tartrate  25 mg Per NG Tube 2x Daily(Beta Blocker)    aspirin  81 mg Per NG Tube Daily    multivitamin  1 tablet Oral Daily    thiamine  100 mg Per NG Tube BID    insulin aspart  1-10 Units Subcutaneous q6h    pantoprazole  40 mg Intravenous Q24H    [Held by provider] escitalopram  10 mg Oral Daily    [Held by provider] mirtazapine  15 mg Oral Nightly    rosuvastatin  10 mg Oral Daily       Assessment & Plan:      #Acute encephalopathy with breakthrough seizures  -on Keppra, Depakote, Vimpat, Topomax   -no obvious infection  -Neuro continues to follow   -MRI brain ordered     #HIREN on CKD IV- back to baseline   -renal consult appreciated    #Hx of seizure disorder- as above   #Hypernatremia- monitor   #Acute on chronic diastolic CHF-resolved  #Aortic stenosis-Mild to moderate on most recent echo  #CAD s/p stent-Aspirin, statin  #Hypertension-home meds   #Hyperlipidemia-Statin  #DM type II with A1c 5.8-Insulin sliding scale  #Hx of CVA-Aspirin, statin  #JHONY-JHONY protocol  #COPD, stable-Resume albuterol inhaler as needed  #Gout-Allopurinol  #Fibromyalgia-Gabapentin held for now  #LLE DVT- heparin as NPO  #Anxiety and depression-Resume Lexapro and Remeron once PO  #GERD-PPI  #TEN- dobhoff and tube feeds for now   #Diarrhea- improved and will monitor       Will f/u Neuro recs      Supplementary Documentation:   Quality:  DVT Mechanical Prophylaxis:   SCDs,    DVT Pharmacologic Prophylaxis   Medication    heparin (Porcine) [id number] units/250mL infusion PE/DVT/THROMBUS CONTINUOUS      DVT Pharmacologic prophylaxis: Aspirin 162 mg         Code Status: DNAR/Selective Treatment  Gurrola: External urinary catheter in place  Gurrola Duration (in days):   Central line:    ELROY:   At this point Ms. Rodrigues is expected to be discharge to: tbd     The 21st Century Cures Act makes  medical notes like these available to patients in the interest of transparency. Please be advised this is a medical document. Medical documents are intended to carry relevant information, facts as evident, and the clinical opinion of the practitioner. The medical note is intended as peer to peer communication and may appear blunt or direct. It is written in medical language and may contain abbreviations or verbiage that are unfamiliar.

## 2024-03-07 NOTE — PROGRESS NOTES
[facility]  Nephrology Progress Note    Stephen Rodrigues Attending:  Brody Boston MD       Assessment and Plan:    1) CKD 4- baseline Cr > 2 mg/dl is due to longstanding DM / HTN; no further w/u     2) HRIEN- due to volume depletion in setting of AMS / poor PO intake / diuretics- resolved     3) DM 2     4) HTN- on HEARN / amlodipine / BB via dobhoff     5) AMS- due to nonconvulsive status epilepticus; keppra / depakote / vimpat per neuro     7) Hypernatremia- due to insensible losses- resolved on FWF    8) Nutrition- continue TF via Dobhoff      Subjective:  Pretty lethargic, essentially nonverbal- no significant change. Seizure overnight     Physical Exam:   /57 (BP Location: Right arm)   Pulse 86   Temp 98.5 °F (36.9 °C) (Axillary)   Resp 18   Wt 194 lb 10.7 oz (88.3 kg)   SpO2 97%   BMI 33.41 kg/m²   Temp (24hrs), Av.5 °F (36.4 °C), Min:97.2 °F (36.2 °C), Max:98.5 °F (36.9 °C)     No intake or output data in the 24 hours ending 24 0918    Wt Readings from Last 3 Encounters:   24 194 lb 10.7 oz (88.3 kg)   02/15/24 203 lb 9.6 oz (92.4 kg)   23 200 lb (90.7 kg)     General: awake  HEENT: No scleral icterus, MMM  Neck: Supple, no WILLIAM or thyromegaly  Cardiac: Regular rate and rhythm, S1, S2 normal, no murmur or tub  Lungs: Decreased BS at bases bilaterally   Abdomen: Soft, non-tender. + bowel sounds, no palpable organomegaly  Extremities: Without clubbing, cyanosis; no edema  Neurologic: Cranial nerves grossly intact, moving all extremities  Skin: Warm and dry, no rashes       Labs:   Lab Results   Component Value Date    CREATSERUM 2.47 2024    BUN 73 2024     2024    K 5.0 2024     2024    CO2 20.0 2024     2024    CA 8.5 2024    PTT 80.0 2024    PGLU 273 2024       Imaging:  All imaging studies reviewed.    Meds:           Questions/concerns were discussed with patient and/or family by  bedside.          Apple Aguilar MD  3/7/2024  918 AM

## 2024-03-07 NOTE — DIETARY NOTE
[facility]    NUTRITION ASSESSMENT    Unable to diagnose malnutrition criteria at this time.    NUTRITION INTERVENTION:    Meal and Snacks - NPO  Coordination of Nutrition Care - SLP consult prior to diet advancement. Consider Palliative care consult for goals of care.  Vitamin and Mineral Supplements - adding Thiamine and Chewable MVI  Enteral Nutrition - via DHT Glucerna 1.5 at 50 ml/hr.   Recommend free water flush of 200 ml q 6hours or per Nephrology.  Goal TF to provide:1200 ml, 1800 kcal, 99 gm protein, 912 ml free H20, Total H20 (TF+free water flush)=1712 ml, and 100% RDI's.    PATIENT STATUS:   3/7 - Pt has been on TF at goal and tolerating well. Went for MRI this AM, and after returning had event concerning for Sz activity.  Pt transferred to CNICU for closer monitoring. When appropriate, continue TF as above.  Flushes per Nephrology. No n/v. + BM - loose.       3/4 - Pt somnolent, remains on DHT feeds. TF tolerated without issue. No n/v. +  BM 3/4 (soft). Hyperglycemia noted - on Glucerna continuous.  Insulin adjustment per MD.  Wt trending down.  Hypernatremic - flushes per nephrology and now WNL. No nutrition concerns from RN at this time.    2/29- Received nutrition consult for DHT feeds. Pt remains lethargic and inappropriate for PO trials. NPO x ~4 days. Plans for DHT placement and TF's to start today per RN. Will continue to monitor.    2/28- Pt w/ poor PO intakes this admit + NPO x 3 days. SLP re-evaluated on 2/27 and 2/28 w/ continued diet recs for NPO. Recommend placing DHT for TF's, if aggressive measures wish to be taken. RD available for TF recs upon consult. Will continue to monitor.     2/26/24- 80 y/o female admitted with AMS. Pt seen d/t length of stay. A&O to self. Very lethargic.  Pt stated she had a good appetite PTA. Pt then fell back asleep and did not answer any other questions. Has had a poor appetite/PO intakes this admit. Recorded PO intakes vary:0-80% with 50% or less most  meals. Evaluated by SLP today and made NPO d/t lethargy. Will monitor for diet advancement vs need for nutrition support.   PMH:HTN, HLD, CAD s/p stent, aortic stenosis, DM2, CVA, CKD4, COPD, JHONY, IBS, fibromyalgia, seizure disorder.       ANTHROPOMETRICS:  Ht:  5' 4\"  Wt: 88.3 kg (194 lb 10.7 oz).   BMI: Body mass index is 33.41 kg/m².  IBW: 54.5 kg      WEIGHT HISTORY:     Wts vary per EMR hx. Per EMR hx, pt w/ 11 lb (5.4%) wt loss in about 1.5 weeks?    Wt Readings from Last 10 Encounters:   03/05/24 88.3 kg (194 lb 10.7 oz)   02/15/24 92.4 kg (203 lb 9.6 oz)   12/13/23 90.7 kg (200 lb)   01/28/23 94.6 kg (208 lb 8.9 oz)   12/06/22 98.7 kg (217 lb 9.5 oz)   11/26/22 99 kg (218 lb 4.1 oz)   10/24/22 99.8 kg (220 lb)   08/27/22 98.7 kg (217 lb 9.5 oz)   07/23/22 100.4 kg (221 lb 4.8 oz)   11/09/21 99.8 kg (220 lb)        NUTRITION:  Diet:       Procedures    NPO      Food Allergies: No  Cultural/Ethnic/Druze Preferences Addressed: Yes    Percent Meals Eaten (last 3 days)       None          GI system review:  Last BM:3/6    Skin and wounds: excoriation to sacrum    NUTRITION RELATED PHYSICAL FINDINGS:     1. Body Fat/Muscle Mass: no wasting noted     2. Fluid Accumulation: upper extremity edema     NUTRITION PRESCRIPTION: 54.5 kg (IBW)  Calories: 6958-0596 calories/day (25-30 kcal/kg)  Protein:  grams protein/day (1.5-2.0 grams protein per kg)  Fluid: ~1 ml/kcal or per MD discretion    NUTRITION DIAGNOSIS/PROBLEM:  Inadequate energy intake related to  decreased ability to consume sufficient energy intakes  as evidenced by documented/reported insufficient oral intake and NPO status.       MONITOR AND EVALUATE/NUTRITION GOALS:  Weight stable within 1 to 2 lbs during admission - Ongoing  Return to PO intake or advance diet in 24-48 hrs - defer  Tolerate alternative nutrition at 100% of goal - Met, continues      MEDICATIONS:  Novolog, Degludec, MVI, Protonix, Thiamine    LABS:  Glu 268; Na 135; BUN 73; Cr  2.47; -022    Pt is at High nutrition risk    Ruby Palacios RD, LDN, [facility]  Clinical Dietitian  Phone [phone]

## 2024-03-07 NOTE — CONSULTS
[facility]   part of [facility]  Palliative Care Initial Consult Note    Stephen Rodrigues Patient Status:  Inpatient    1945 MRN [id number]   Location [facility] 3NE-A Attending Nichole Kaur MD   Hosp Day # 17 PCP PHYSICIAN NONSTAFF     Date of Consult: 3/7/2024  Patient seen at: [facility] Inpatient    Reason for Consultation: Dr. Gonsalez requested a consult for goals of care      Subjective     History of Present Illness: Stephen Rodrigues is a 79 year old female with a history of CKD, Aortic stenosis s/p stent, hypertension, diabetes type II, CVA, CHF, seizures, fibromyalgia who was admitted on 2024 for altered mental status. Prior to this admission on 2/15/2024 she had a syncope episode hospitalized, treated and discharged home. 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       History was obtained from Epic and daughter Alla .   Today is day #17 of hospitalization.   This is the 2nd hospitalization in the past 6 months.    When I entered the room, the patient was supine, eyes closed not responding to pain, EEG in place. No family present at bedside. I called Alla, the daughter, I introduced palliative care.   I clarified her role with her mother, RANI? She stated she is the HCPOA . She has a brother in [place] who is coming tomorrow. Alla stated I am not coming to the hospital today, I was there yesterday. \"I explained Stephen is transferring to ICU due to appearance of ongoing seizures/ frothing at the mouth episodes, change in breathing, overall not doing well. .     We discussed the potential of needing a ventilator due to her respiratory condition and or to control the seizures if different medications are necessary for seizure management. Initially Stephen stated her mother would not want a ventilator or cpr. We discussed respecting Stephen's wishes are  important. I explained her current code status is full code meaning if she needs ventilator to sustain life she will be placed on the ventilator. Stephen verbalized understanding. When I clarified if she wishes for limitations to resuscitation and treatment with no CPR and no ventilator she stated she would like her brother to be present to discuss which can not happen until tomorrow afternoon so she will leave her code status as full code. Alla verbalized understanding with a full code CPR and intubation may be performed if medically Stephen requires the aggressive treatment.    Edgar, Stephen's grandson, was also on the phone. He verbalized understanding to the seriousness of her Stephen's condition and aware if her condition requires being placed on a ventilator due to a decline in her condition ventilator support will occur.      Review of Systems: DEISY       Bowel Movement         3/6/2024  1153             Stool Count Calculated for I/O: 1          Wt Readings from Last 6 Encounters:   03/05/24 194 lb 10.7 oz (88.3 kg)   02/15/24 203 lb 9.6 oz (92.4 kg)   12/13/23 200 lb (90.7 kg)   01/28/23 208 lb 8.9 oz (94.6 kg)   12/06/22 217 lb 9.5 oz (98.7 kg)   11/26/22 218 lb 4.1 oz (99 kg)        Palliative Care Social History:     Children: Yes 2  Living Situation Prior to Admit: Lives home alone   Is Patient Confused: Yes  Substance History:   reports that she has never smoked. She has never used smokeless tobacco.  reports no history of alcohol use.  reports no history of drug use.    Spiritual Assessment:   [place] - Parish Not Listed  Past Medical History/Past Surgical History:     Medical History: obtained from [employer]  Past Medical History:   Diagnosis Date    Anemia     Aortic stenosis     Arrhythmia     Arthritis     Asthma (HCC)     Back problem     Cataract     Deep vein thrombosis (DVT) of proximal lower extremity (HCC)     Depressive disorder 08/07/2010    Diverticulitis of colon 04/17/2009    Edema     Esophageal  reflux     Extrinsic asthma, unspecified     Fibromyalgia     Gout     Heart attack (HCC) 1989    Heart valve disease     High blood pressure     High cholesterol     History of stomach ulcers     IBS (irritable bowel syndrome)     Incontinence     Migraines     Muscle weakness     Neuropathy     Osteoarthritis     Other and unspecified hyperlipidemia     Pneumonia due to organism     Pulmonary emphysema (HCC)     Renal disorder     Seizure disorder (HCC)     Shortness of breath     Sleep apnea     Stroke (HCC)     Type II or unspecified type diabetes mellitus without mention of complication, not stated as uncontrolled     Unspecified essential hypertension     Visual impairment      Past Surgical History:   Procedure Laterality Date          CATH DRUG ELUTING STENT      EEG PHY/QHP EA INCR W/VEEG  3/5/2024    HC  SECTION LEVEL I      KNEE SURGERY      TOTAL KNEE REPLACEMENT         Family History: obtained from Psychiatric  Family History   Problem Relation Age of Onset    Hypertension Other        Allergies:  Allergies   Allergen Reactions    Influenza Vaccines OTHER (SEE COMMENTS)     Fever    Dust Mite Extract UNKNOWN     Sneezing, itchy, watery eyes    Hydrocodone     Sulfa Antibiotics     Vicodin Tuss [Hydrocodone-Guaifenesin]        Medications:     Current Facility-Administered Medications:     topiramate (TopaMAX) 6 mg/mL oral suspension 100 mg, 100 mg, Oral, BID    insulin degludec 100 units/mL flextouch 12 Units, 12 Units, Subcutaneous, Daily    cloBAZam (Onfi) 2.5 mg/ml SUSP 2.5 mg, 2.5 mg, Oral, BID    lacosamide (Vimpat) 10 MG/ML oral solution 200 mg, 200 mg, Per NG Tube, BID    levETIRAcetam (Keppra) tab 1,500 mg, 1,500 mg, Per NG Tube, BID    valproic acid (Depakene) 250 MG/5ML oral solution 750 mg, 750 mg, Per NG Tube, TID    hydrALAZINE (Apresoline) tab 50 mg, 50 mg, Per NG Tube, Q8H ANIBAL    amLODIPine (Norvasc) tab 5 mg, 5 mg, Per NG Tube, Daily    metoprolol tartrate (Lopressor)  tab 25 mg, 25 mg, Per NG Tube, 2x Daily(Beta Blocker)    aspirin chewable tab 81 mg, 81 mg, Per NG Tube, Daily    pancrelipase (Lip-Prot-Amyl) (Zenpep) DR particles cap 10,000 Units, 10,000 Units, Per G Tube, PRN **AND** sodium bicarbonate tab 325 mg, 325 mg, Per G Tube, PRN    multivitamin (Centrum) chewable tab (Adult) 1 tablet, 1 tablet, Oral, Daily    thiamine (Vitamin B1) tab 100 mg, 100 mg, Per NG Tube, BID    insulin aspart (NovoLOG) 100 Units/mL FlexPen 1-10 Units, 1-10 Units, Subcutaneous, q6h    heparin (Porcine) 54726 units/250mL infusion PE/DVT/THROMBUS CONTINUOUS, 200-3,000 Units/hr, Intravenous, Continuous    pantoprazole (Protonix) 40 mg in sodium chloride 0.9% PF 10 mL IV push, 40 mg, Intravenous, Q24H    albuterol (Ventolin HFA) 108 (90 Base) MCG/ACT inhaler 2 puff, 2 puff, Inhalation, Q4H PRN    albuterol (Ventolin) (2.5 MG/3ML) 0.083% nebulizer solution 3 mL, 3 mL, Nebulization, TID PRN    [Held by provider] escitalopram (Lexapro) tab 10 mg, 10 mg, Oral, Daily    [Held by provider] mirtazapine (REMERON SOL-TAB) disintegrating tab 15 mg, 15 mg, Oral, Nightly    rosuvastatin (Crestor) tab 10 mg, 10 mg, Oral, Daily    glucose (Dex4) 15 GM/59ML oral liquid 15 g, 15 g, Oral, Q15 Min PRN **OR** glucose (Glutose) 40% oral gel 15 g, 15 g, Oral, Q15 Min PRN **OR** glucose-vitamin C (Dex-4) chewable tab 4 tablet, 4 tablet, Oral, Q15 Min PRN **OR** dextrose 50% injection 50 mL, 50 mL, Intravenous, Q15 Min PRN **OR** glucose (Dex4) 15 GM/59ML oral liquid 30 g, 30 g, Oral, Q15 Min PRN **OR** glucose (Glutose) 40% oral gel 30 g, 30 g, Oral, Q15 Min PRN **OR** glucose-vitamin C (Dex-4) chewable tab 8 tablet, 8 tablet, Oral, Q15 Min PRN    diazepam (Valium) 5 mg/mL injection 5 mg, 5 mg, Intravenous, Q30 Min PRN    acetaminophen (Tylenol Extra Strength) tab 500 mg, 500 mg, Oral, Q4H PRN    melatonin cap/tab 5 mg, 5 mg, Oral, Nightly PRN    polyethylene glycol (PEG 3350) (Miralax) 17 g oral packet 17 g, 17 g, Oral,  Daily PRN    sennosides (Senokot) tab 17.2 mg, 17.2 mg, Oral, Nightly PRN    bisacodyl (Dulcolax) 10 MG rectal suppository 10 mg, 10 mg, Rectal, Daily PRN    ondansetron (Zofran) 4 MG/2ML injection 4 mg, 4 mg, Intravenous, Q6H PRN    metoclopramide (Reglan) 5 mg/mL injection 5 mg, 5 mg, Intravenous, Q8H PRN    benzonatate (Tessalon) cap 200 mg, 200 mg, Oral, TID PRN    glycerin-hypromellose- (Artifical Tears) 0.2-0.2-1 % ophthalmic solution 1 drop, 1 drop, Both Eyes, QID PRN    sodium chloride (Saline Mist) 0.65 % nasal solution 1 spray, 1 spray, Each Nare, Q3H PRN    Functional Status History:  ADLs: bathing or showering, dressing, getting in and out of bed or a chair, walking, using the toilet, and eating  - she was living independently, no deficits per Alla.   Alla shares her mother had not experienced a seizure for over a year.     Palliative Performance Scale:     (pt/family reported) 80%    Current: 20%  % Ambulation Activity Level Self-Care Intake Consciousness   100 Full  Normal  No Disease Full Normal Full   90 Full  Normal  Some Disease Full Normal Full   80 Full  Normal w/effort  Some Disease Full Normal or reduced Full   70 Reduced  Can't Perform Job  Some Disease Full Normal or reduced Full   60 Reduced  Can't Perform Hobby   Significant Disease Occ Assist Normal or reduced Full or confused   50 Mainly sit/lie Can't do any work  Extensive Disease Partial Assist Normal or reduced Full or confused   40 Mainly in bed Can't do any work  Extensive Disease Mainly Assist Normal or reduced Full or confused   30 Bed Bound Can't do any work  Extensive Disease Max Assist  Total Care Reduced  Drowsy/confused   20 Bed Bound Can't do any work  Extensive Disease Max Assist  Total Care Minimal  Drowsy/confused   10 Bed Bound Can't do any work  Extensive Disease Max Assist  Total Care Mouth Care  Drowsy/confused   0 Death        Objective      Vital Signs:  Blood pressure 118/57, pulse 86, temperature 98.5 °F  (36.9 °C), temperature source Axillary, resp. rate 18, weight 194 lb 10.7 oz (88.3 kg), SpO2 97%.  Body mass index is 33.41 kg/m².      Physical Exam:  General: somnolent. In mild respiratory distress. Body habitus BMI WNL   HEENT: + focal deficits . Dry MM   Cardiac: regular rate  Lungs: slightly labored use of accessory muscles, coarse lung sounds. Dobhoff in place  Abdomen: Soft,  normal bowel sounds X 4 quadrants   Extremities: no pitting edema  BLE edema present  Neurologic: not able to follow commands, somnolent. Continuous EEG in place.   Skin: Warm and dry.    Hematology:  Lab Results   Component Value Date    WBC 8.2 03/06/2024    HGB 9.7 (L) 03/06/2024    HCT 30.8 (L) 03/06/2024    .0 03/06/2024       Coags:  Lab Results   Component Value Date    INR 1.16 02/19/2024    PTT 80.0 (H) 03/07/2024       Chemistry:  Lab Results   Component Value Date    CREATSERUM 2.47 (H) 03/07/2024    BUN 73 (H) 03/07/2024     (L) 03/07/2024    K 5.0 03/07/2024     03/07/2024    CO2 20.0 (L) 03/07/2024     (H) 03/07/2024    CA 8.5 03/07/2024    ALB 2.1 (L) 03/04/2024    ALKPHO 166 (H) 03/04/2024    BILT 0.2 03/04/2024    TP 6.9 03/04/2024    AST 26 03/04/2024    ALT 70 (H) 03/04/2024    DDIMER 0.70 01/26/2023    MG 2.4 03/05/2024    PHOS 3.6 03/05/2024    TROP <0.045 11/10/2021       Imaging:  MRI BRAIN (W+WO) (CPT=70553)    Result Date: 3/7/2024  CONCLUSION:  1. No acute infarct, acute intracranial hemorrhage, or hydrocephalus. 2. Mild chronic small vessel ischemic disease within the cerebral white matter and yenny. 3. There are 2 punctate foci of enhancement within the left cerebellum.  This may represent volume-averaging artifact or vascular enhancement.  However, a follow-up brain MRI in 6-8 weeks is recommended to exclude an underlying lesion.   LOCATION:  [id number]    Dictated by (CST): Stromberg, LeRoy, MD on 3/07/2024 at 10:55 AM     Finalized by (CST): Stromberg, LeRoy, MD on 3/07/2024 at 11:06  AM       XR CHEST AP PORTABLE  (CPT=71045)    Result Date: 3/7/2024  CONCLUSION:  1. The reticular densities in lower lungs are probably a combination of postinflammatory scarring and atelectasis. 2. Dobbhoff tube is noted to extend into abdomen below the inferior margin of the image.  Preliminary report was reviewed and there is no significant discrepancy.    LOCATION:  [facility]      Dictated by (CST): Mayank Burleson MD on 3/07/2024 at 7:28 AM     Finalized by (CST): Mayank Burleson MD on 3/07/2024 at 7:30 AM       XR CHEST AP PORTABLE  (CPT=71045)    Result Date: 3/6/2024  CONCLUSION:  Dobbhoff tube terminates in the distal stomach.   LOCATION:  [facility]      Dictated by (CST): Stromberg, LeRoy, MD on 3/06/2024 at 5:51 AM     Finalized by (CST): Stromberg, LeRoy, MD on 3/06/2024 at 5:52 AM       XR CHEST AP PORTABLE  (CPT=71045)    Result Date: 3/5/2024  CONCLUSION:    Feeding tube present with the tip in the stomach.  LOCATION:  [facility]      Dictated by (CST): Tab Huang MD on 3/05/2024 at 8:52 PM     Finalized by (CST): Tab Huang MD on 3/05/2024 at 8:53 PM        Summary of Discussion      I discussed reason for palliative care consultation with Stephen Gold's daughter over the phone. Stephen is unable to participate in the discussion and Stephen was unable to come to the hospital       Prognostic awareness/understanding: froilan Gold does not have a good understanding of Stephen's prognosis, the seriousness of prolonged seizures and altered mental status.  We discussed in her current condition she is not stable and the possibility of declining requiring ventilator support may become medically necessary.      Alla based on the information she has been given and wishes for her brother to be present wishes to continue full aggressive medical management. I explained to Stephen other providers may discuss resuscitation wishes with her again due to Stephen's critical condition.   It is also difficult for Alla as Stephen was  living independently and now is critically ill. Emotional support provided.     Advance Care Planning counseling and discussion:   Alla voluntarily participated in a advanced care planning discussion. We discussed the risks vs benefits of life sustaining treatments in the setting of seizures, altered mental status, advanced age and co-morbidities.     We reviewed the difference between   A full code includes aggressive life saving measures with possible CPR, defibrillation/ shocking, intubation and placement on the ventilator with administration of emergent IV medications to assist in restoring circulation.    vs  DNR ( do not resuscitate) indicates if the heart stops and no spontaneous breaths end of life is present, \" allow natural death.\"    We discussed options related to intubation:  DNAR with Full treatment includes ongoing medical management and treatment for new potential symptoms or complications including intubation for respiratory distress,  the exception is NO CPR.     DNAR with Selective treatment is appropriate for on going medical management and treatment for new potential symptoms or complications with the exception of CPR AND INTUBATION.  DNI ( do not intubate) indicates no breathing tube will be placed for respiratory distress or if no spontaneous breaths.    DNAR with Comfort care is appropriate when further readmissions, aggressive treatment and evaluation of potential new symptoms do not benefit the patient nor provide quality of life. Consideration for hospice support when comfort care is appropriate.     At this time Alla, Daughter, wishes for her code status to remain full code.      POA surrogate: Alla Rodrigues/ daughter [phone].  POLST FORM - deferred currently.   Full Code    Principal Problem:    Altered mental status, unspecified altered mental status type  Active Problems:    CKD (chronic kidney disease) stage 4, GFR 15-29 ml/min ([facility])    Acute on chronic congestive heart  failure, unspecified heart failure type (HCC)    History of seizure    Cognitive decline    Metabolic acidosis    Stage 3 chronic kidney disease (HCC)    ATN (acute tubular necrosis) (HCC)    Hypernatremia    Status epilepticus (HCC)    History of stroke    Hyponatremia    Palliative Care encounter    Assessment and Recommendations      Goals of care:   Alla, the daughter is gaining information on Stephen's critical condition. At this time she wishes for ongoing aggressive medical management.   Code Status: Full Code    Append: Following my discussion with Alla, Dr. Dorman graciously called Alla and provided prognosis and clinical information. Alla changed her code status to DNAR selective treatment.     Discussed today's visit with Madhav GOODEN CNICU and Latonia GOODEN    Palliative Care Follow Up: Palliative care team will Continue to follow while inpatient.      Thank you for allowing Palliative Care services to participate in the care of Stephen Rodrigues.    A total of 55 minutes were spent on this consult, which included all of the following: chart review, direct face to face contact, history taking, physical examination, advanced care planning,  counseling and documentation.     URBAN Noe  3/7/2024  12:18 PM  Palliative Care Services    The 21st Century Cures Act makes medical notes like these available to patients in the interest of transparency. Please be advised this is a medical document. Medical documents are intended to carry relevant information, facts as evident, and the clinical opinion of the practitioner. The medical note is intended as peer to peer communication and may appear blunt or direct. It is written in medical language and may contain abbreviations or verbiage that are unfamiliar.

## 2024-03-07 NOTE — PROCEDURES
LONG-TERM VIDEO EEG REPORT;     Reason for Examination: Status epilepticus     Technical Summary:   18 Channels of EEG and 1 Channel of EKG was performed utilizing internation 10/20 method. Motion, muscle and machine artifacts were noted.         Recording Start date/time: 03/06 at 700 am  Recording end date/time: 03/07 at 700 a.m.        Background Activity:   The background activity consisted of 8-9 Hz waveforms, reactive to eye opening/ external stimulation.     Abnormality:  Throughout the recording moderate, intermittent generalized sharp wave activity was noted.  There were runs of generalized sharp wave activities lasting anywhere from 10 to 30 seconds, consistent with electrographic seizures.    Throughout the recording low to medium voltage, polymorphic, 3 to 6 Hz slow activity was noted diffusely over both hemispheres     Activation:     Hyperventilation:   Not Performed.     Photic Stimulation:  Driving response seen.No        Sleep:  Stage I and II sleep seen.     Impression:  This is a Abnormal prolonged Video EEG study.   Multiple episodes of electrographic seizures were noted.  Moderate generalized sharp wave activity was noted throughout the recording.  Mild to moderate diffuse slowing into delta and theta range was noted.  This constellation of findings can be seen in encephalopathy due to metabolic/toxic etiology, medication effects or diffuse cerebral injury.  Clinical correlation is recommended.           Jimmie Gonsalez MD  [facility].

## 2024-03-07 NOTE — OCCUPATIONAL THERAPY NOTE
Per chart review, patient still not following commands and only responding to pain at this time. Patient still on continuous EEG. Will hold for now and resume treatment when medically and clinically appropriate.

## 2024-03-07 NOTE — SLP NOTE
Patient off the floor for MRI.  Will follow-up at another time, as patient able to participate.   Amebr Wan MS CCC-SLP/L, pager [phone]  Speech-Language Pathologist

## 2024-03-08 NOTE — PHYSICAL THERAPY NOTE
Attempted to see pt for PT. Per chart review and confirmation with RN, pt is unresponsive and awaiting goals of care discussion. Will hold. Will follow up 3/11/24 as appropriate.

## 2024-03-08 NOTE — SPIRITUAL CARE NOTE
Spiritual Care Visit Note    Patient Name: Stephen Rodrigues Date of Spiritual Care Visit: 24   : 1945 Primary Dx: Altered mental status, unspecified altered mental status type       Referred By: Referral From: Other (Comment) (LOS List)    Spiritual Care Taxonomy:    Intended Effects: Convey a calming presence    Methods: Offer support;Offer emotional support    Interventions: Silent prayer    Visit Type/Summary:     - Spiritual Care: Attempted visit. Patient is unavailable. Left a Spiritual Care contact information card.    Spiritual Care support can be requested via an [facility] consult. For urgent/immediate needs, please contact the On Call  at: [facility]: ext [phone]    Seferino Garcia  Chaplain Resident  Ext: [phone]

## 2024-03-08 NOTE — PLAN OF CARE
Assumed care of patient at 1930. Continuous EEG in place. Pt is stuporous and at times will slightly withdraw to painful stimulation. Patient appears to become more stiff with seizure like activity when stimulated with nursing care. Heparin infusing per orders. For complete assessment see E charting.

## 2024-03-08 NOTE — PROGRESS NOTES
Continuous EEG. Pt remains stuporous, for Dr. Dorman pt open eye, no tracking.intermittent slight without to painful stimulation.   No seizure activity noted. Heparin gtt per ordered. Family at bedside for short period of time. Daughter states \" she'll be back tomorrow AM\".

## 2024-03-08 NOTE — SLP NOTE
Pt with decline in medical condition and transferred to ICU. Will hold on follow up and re-attempt when patient more stable, alert & appropriate.    Astrid Stevens MA, CCC-SLP  Pager [phone]

## 2024-03-08 NOTE — PROGRESS NOTES
[facility]  Nephrology Progress Note    Stephen Rodrigues Attending:  Brody Boston MD       Assessment and Plan:    1) CKD 4- baseline Cr > 2 mg/dl is due to longstanding DM / HTN; no further w/u     2) HIREN- due to volume depletion in setting of AMS / poor PO intake / diuretics- resolved     3) DM 2     4) HTN- on HEARN / amlodipine / BB via dobhoff     5) AMS- due to nonconvulsive status epilepticus; keppra / depakote / vimpat / topamax per neuro     7) Hypernatremia- due to insensible losses- resolved on FWF    8) Nutrition- continue TF via Dobhoff    Family conference pending.       Subjective:  Lethargic, nonverbal    Physical Exam:   /61   Pulse 113   Temp 97.8 °F (36.6 °C) (Temporal)   Resp 18   Wt 194 lb 10.7 oz (88.3 kg)   SpO2 98%   BMI 33.41 kg/m²   Temp (24hrs), Av.1 °F (36.7 °C), Min:97.8 °F (36.6 °C), Max:98.7 °F (37.1 °C)       Intake/Output Summary (Last 24 hours) at 3/8/2024 0841  Last data filed at 3/8/2024 0700  Gross per 24 hour   Intake 580 ml   Output 650 ml   Net -70 ml       Wt Readings from Last 3 Encounters:   24 194 lb 10.7 oz (88.3 kg)   02/15/24 203 lb 9.6 oz (92.4 kg)   23 200 lb (90.7 kg)     General: awake  HEENT: No scleral icterus, MMM  Neck: Supple, no WILLIAM or thyromegaly  Cardiac: Regular rate and rhythm, S1, S2 normal, no murmur or tub  Lungs: Decreased BS at bases bilaterally   Abdomen: Soft, non-tender. + bowel sounds, no palpable organomegaly  Extremities: Without clubbing, cyanosis; no edema  Neurologic: Cranial nerves grossly intact, moving all extremities  Skin: Warm and dry, no rashes       Labs:   Lab Results   Component Value Date    CREATSERUM 2.50 2024    BUN 77 2024     2024    K 4.6 2024     2024    CO2 23.0 2024     2024    CA 9.1 2024    ALB 2.1 2024    ALKPHO 113 2024    BILT 0.2 2024    TP 7.1 2024    AST 13 2024    ALT 30 2024    PTT  86.7 03/08/2024    PGLU 114 03/08/2024       Imaging:  All imaging studies reviewed.    Meds:           Questions/concerns were discussed with patient and/or family by bedside.          Apple Aguilar MD  3/8/2024  841 AM

## 2024-03-08 NOTE — PROCEDURES
LONG-TERM VIDEO EEG REPORT;     Reason for Examination: Status epilepticus     Technical Summary:   18 Channels of EEG and 1 Channel of EKG was performed utilizing internation 10/20 method. Motion, muscle and machine artifacts were noted.         Recording Start date/time: 03/07 at 910 am  Recording end date/time: 03/07 at 910 a.m.        Background Activity:   The background activity consisted of 8-9 Hz waveforms, reactive to eye opening/ external stimulation.     Abnormality:  Throughout the recording moderate, intermittent generalized sharp wave activity was noted.  There were rare runs of generalized sharp wave activities lasting anywhere from 10 to 20 seconds, consistent with electrographic seizures.  Compared to previous 24 hours there was interval improvement noted.  Throughout the recording low to medium voltage, polymorphic, 3 to 6 Hz slow activity was noted diffusely over both hemispheres     Activation:     Hyperventilation:   Not Performed.     Photic Stimulation:  Driving response seen.No        Sleep:  Stage I and II sleep seen.     Impression:  This is a Abnormal prolonged Video EEG study.   Multiple episodes of electrographic seizures were noted.  Compared to previous 24 hours, there was interval improvement noted.  Moderate generalized sharp wave activity was noted throughout the recording.  Mild to moderate diffuse slowing into delta and theta range was noted.  This constellation of findings can be seen in encephalopathy due to metabolic/toxic etiology, medication effects or diffuse cerebral injury.  Clinical correlation is recommended.           Jimmie Gonsalez MD  [facility].

## 2024-03-08 NOTE — PROGRESS NOTES
[facility]  Neurocritical Care   Progress Note       Subjective:   Respiratory status stable overnight, BUN rising.  Depakote level upper level of therapeutic.  cEEG yesterday afternoon with mild improvement.  Hold hydralazine for soft pressures.    Vitals:   Temp:  [97.8 °F (36.6 °C)-98.7 °F (37.1 °C)] 97.8 °F (36.6 °C)  Pulse:  [] 113  Resp:  [14-21] 18  BP: ()/(47-75) 126/61  SpO2:  [95 %-100 %] 98 %  Body mass index is 33.41 kg/m².    Intake/Output:  Intake/Output Summary (Last 24 hours) at 3/8/2024 0835  Last data filed at 3/8/2024 0700  Gross per 24 hour   Intake 580 ml   Output 650 ml   Net -70 ml     Scheduled Meds:   topiramate  100 mg Oral BID    insulin degludec  12 Units Subcutaneous Daily    cloBAZam  2.5 mg Oral BID    lacosamide  200 mg Per NG Tube BID    levETIRAcetam  1,500 mg Per NG Tube BID    valproic acid  750 mg Per NG Tube TID    hydrALAZINE  50 mg Per NG Tube Q8H ANIBAL    amLODIPine  5 mg Per NG Tube Daily    metoprolol tartrate  25 mg Per NG Tube 2x Daily(Beta Blocker)    aspirin  81 mg Per NG Tube Daily    multivitamin  1 tablet Oral Daily    thiamine  100 mg Per NG Tube BID    insulin aspart  1-10 Units Subcutaneous q6h    pantoprazole  40 mg Intravenous Q24H    [Held by provider] escitalopram  10 mg Oral Daily    [Held by provider] mirtazapine  15 mg Oral Nightly    rosuvastatin  10 mg Oral Daily     Continuous Infusions:   continuous dose heparin       PRN Meds:lipase-protease-amylase (Lip-Prot-Amyl) **AND** sodium bicarbonate, albuterol, albuterol, glucose **OR** glucose **OR** glucose-vitamin C **OR** dextrose **OR** glucose **OR** glucose **OR** glucose-vitamin C, diazepam, acetaminophen, melatonin, polyethylene glycol (PEG 3350), sennosides, bisacodyl, ondansetron, metoclopramide, benzonatate, glycerin-hypromellose-, sodium chloride    Physical Examination:   General- No acute distress  CV- Sinus tachycardia   Resp- Shallow breathing, upper airway  secretions improved  Neuro-   Mental status-opens eyes to deep noxious stim but keeps open today, not tracking or following commands   Cranial nerves- pupils equally round and reactive to light, extraocular muscles intact, right gaze preference, unable to formally assess for facial symmetry, +cough  Motor/Sens- Flaccid throughout, minimal flicker to pain in lower extremities    Diagnostics:   MRI BRAIN (W+WO) (CPT=70553)    Result Date: 3/7/2024  CONCLUSION:  1. No acute infarct, acute intracranial hemorrhage, or hydrocephalus. 2. Mild chronic small vessel ischemic disease within the cerebral white matter and yenny. 3. There are 2 punctate foci of enhancement within the left cerebellum.  This may represent volume-averaging artifact or vascular enhancement.  However, a follow-up brain MRI in 6-8 weeks is recommended to exclude an underlying lesion.   LOCATION:  [id number]    Dictated by (CST): Stromberg, LeRoy, MD on 3/07/2024 at 10:55 AM     Finalized by (CST): Stromberg, LeRoy, MD on 3/07/2024 at 11:06 AM      Lab Review     Recent Labs   Lab 03/06/24  0802 03/07/24  0645 03/08/24  0506    135* 137   K 4.6 5.0 4.6    109 109   CO2 23.0 20.0* 23.0   * 268* 117*   BUN 65* 73* 77*   CREATSERUM 2.33* 2.47* 2.50*     Recent Labs   Lab 03/03/24  0506 03/04/24  0412 03/06/24  0802   WBC 7.2 7.7 8.2   HGB 9.1* 9.2* 9.7*   .0 299.0 306.0     Assesment/Plan:   79 year old female h/o htn, hl, dm2, cad, HFpEF, ckd, stroke, yrn, copd, gout, fibromyalgia, migraines, and seizure do p/w recurrent seizures 3/1 after keppra dose lowered in setting of lethargy. Now on 3 AEDs     Neuro:  Subclinical status epilepticus-   2/2 underlying seizure disorder and lowering of keppra  CT/MRI no acute changes. Repeat MRI 3/7 without new abnormalities, 2 punctate areas of enhancement in the cerebellum possibly artifact  cEEG w/ subclinical generalized epileptiform discharges and seizures    Continue cEEG  Continue current  AED: Keppra 1.5 g twice daily, Vimpat 200 mg twice daily, Depakote 750 mg twice daily, Onfi 2.5 mg twice daily, Topamax 100 mg twice daily        VPA level 3/2 88.7, 3/4 105, 3/7 95 --> Lower to 500mg q8 if EEG remains stable   Ongoing GoC disussions with family     Cardiac:  Htn/hl/cad/HFpEF- sbp goal 100-150, cont norvasc, metop, hydral, crestor  Pulmonary:  Copd- Stable on RA, aggressive bronchial hygiene, frequent suctioning, ABG 3/7 without hypercapnia  Renal:  HIREN on CKD- Seen by nephrology, Cr stable, rising BUN, hyponatremia improved, acidosis improved      GI:  TF on hold given respiratory instability   Heme/ID:  Afebrile, no leukocytosis  LLE DVT- on hep gtt  Endocrine/Rheum:  DM Type 2, uncontrolled with hyperglycemia         - Monitor glucose, sliding scale insulin prn  Checklist   - Lines: PIVs   - DVT Prophylaxis: SCDs and hep gtt   - Diet: TFs on hold    - IVF: -    - Electrolytes: Replete per protocol   - Code Status: DNR/Select    Dispo: CNICU    A total of 50 minutes of critical care time (exclusive of billable procedures) was administered to manage and/or prevent neurologic instability. This involved direct patient intervention, complex decision making, and/or extensive discussions with the patient, family, and clinical staff.     Sandoval Dorman MD  Neurocritical Care  [facility]

## 2024-03-08 NOTE — PHYSICAL THERAPY NOTE
PHYSICAL THERAPY TREATMENT NOTE - INPATIENT    Room Number: 6602/6602-A     Session: 3     Number of Visits to Meet Established Goals: 5    Presenting Problem: AMS  Co-Morbidities : Seizure, CHF, aortic stenosis, CAD, CKD, HTN, CVA, COPD, Fibromyalgia    History related to current admission: Patient is a 79 year old female admitted on 2/19/2024 from home for AMS.  Pt diagnosed with TME. MRI brain (-) for acute pathology.   EEG with epileptiform activity but no evidence of electrographic seizures    Pt w/ declining mental/cognitive status.  Pt w/ continuous EEG going. Per chart pt has had multiple episodes of electrographic seizures.  Pt w/ transfer to CNICU on 3/7 due to frothing at mouth and is not arousable.  3/7 MRI of brain is negative for acute process.    ASSESSMENT   Patient demonstrates no progress this session, goals  updated to reflect patient performance.    Patient continues to function below baseline with bed mobility, transfers, and gait.  Contributing factors to remaining limitations include decreased functional strength, decreased muscular endurance, cognitive deficits (pt non-responsive, unable to follow commands), and medical status.  Next session anticipate patient to progress  alertness level .  Physical Therapy will continue to follow patient for duration of hospitalization.    Patient continues to benefit from continued skilled PT services: to determine if pt is able to participate in any therapy going forward.  Pt will be placed on a 3 day trial.    PLAN  PT Treatment Plan: Bed mobility;Endurance;Energy conservation;Patient education;Gait training;Strengthening;Balance training;Transfer training  Rehab Potential : Good  Frequency (Obs): 3-5x/week    CURRENT GOALS     Goal #1 Patient is able to follow commands 25% of time    Goal #2 Patient is able to open eyes      Goal #3 Update goals as appropriate    Goal #4     Goal #5     Goal #6     Goal Comments: Goals established on  2/21/2024  3/8/2024 all goals updated to reflect pt's current medical/therapy status    SUBJECTIVE  Pt not responsive during session w/ exception of one questionable grunt when asked her name and possible movement of L le on command.    OBJECTIVE  Precautions: Bed/chair alarm    WEIGHT BEARING RESTRICTION  Weight Bearing Restriction: None                PAIN ASSESSMENT   Rating: Unable to rate  Location: CPOT 0/8 throughout session       BALANCE                                                                                                                       Static Sitting: Not tested  Dynamic Sitting: Not tested           Static Standing: Not tested  Dynamic Standing: Not tested    ACTIVITY TOLERANCE  Pulse: 95  Heart Rate Source: Monitor                   O2 WALK  Oxygen Therapy  SPO2% on Room Air at Rest: 95      AM-PAC '6-Clicks' INPATIENT SHORT FORM - BASIC MOBILITY  How much difficulty does the patient currently have...  Patient Difficulty: Turning over in bed (including adjusting bedclothes, sheets and blankets)?: Unable   Patient Difficulty: Sitting down on and standing up from a chair with arms (e.g., wheelchair, bedside commode, etc.): Unable   Patient Difficulty: Moving from lying on back to sitting on the side of the bed?: Unable   How much help from another person does the patient currently need...   Help from Another: Moving to and from a bed to a chair (including a wheelchair)?: Total   Help from Another: Need to walk in hospital room?: Total   Help from Another: Climbing 3-5 steps with a railing?: Total       AM-PAC Score:  Raw Score: 6   Approx Degree of Impairment: 100%   Standardized Score (AM-PAC Scale): 23.55   CMS Modifier (G-Code): CN    FUNCTIONAL ABILITY STATUS  Gait Assessment   Functional Mobility/Gait Assessment  Gait Assistance: Not tested  Distance (ft): 0  Assistive Device:  (na)  Pattern:  (NA)    Skilled Therapy Provided  Rn cleared session after speaking w/ neuro team - requested  PT see pt to determine wakefulness and response during cEEG.    Oriented pt to time, place, situation and her name.  Reassured pt that she is being well cared for.  Completed prom to all 4 limbs as outlined below.  Encouraged pt to count aloud w/ reps of exercises.  Sang Happy Birthday to pt (as a therapy technique) and encouraged pt to join in - note slight increased eye activity below lids.  Assisted pt in touching her own face w/ each hand.  Transitioned to chair mode of bed for 7 minutes to stimulate pt w/ the position change. Pt not responsive during session w/ exception of one questionable grunt when asked her name and possible movement of L le on command. Informed rn that pt needed cleaning after session due to bowel movement.      THERAPEUTIC EXERCISES  Lower Extremity Hip AB/AD  Heel slides  Christopher's stretch     Upper Extremity Elbow flex/ext,  - open/close, Shoulder flex/ext, Wrist flex/ext, and cervical rotation     Position Supine     Repetitions   10   Sets   1     Patient End of Session: In bed;Needs met;RN aware of session/findings (Paulette Marshall aware of treatment session)    PT Session Time: 27 minutes  Gait Trainin minutes  Therapeutic Activity: 12 minutes  Therapeutic Exercise: 12 minutes   Neuromuscular Re-education: 2 minutes

## 2024-03-08 NOTE — PROGRESS NOTES
[facility]     Hospitalist Progress Note     Stephen Rodrigues Patient Status:  Inpatient    1945 MRN [id number]   [facility] 2NE-A Attending Brody Boston MD   Hosp Day # 18 PCP PHYSICIAN NONSTAFF     Subjective:   seizures    Objective:    Review of Systems:   A comprehensive review of systems was  not completed  Vital signs:  Temp:  [97.8 °F (36.6 °C)-98.8 °F (37.1 °C)] 98.8 °F (37.1 °C)  Pulse:  [] 88  Resp:  [14-21] 17  BP: ()/(47-75) 123/60  SpO2:  [94 %-100 %] 98 %  Physical Exam:    General: No acute distress, somnolent   Respiratory: no wheezes, no rhonchi  Cardiovascular: S1, S2, RRR  Abdomen: Soft, NT/ND, +BS  Extremities: Minimal response to painful stimuli. Opens eyes briefly to verbal stimuli  - no significant change from yesterday     Diagnostic Data:    Labs:  Recent Labs   Lab 24  0453 24  0506 24  0412 24  0802 24  0839   WBC 9.1 7.2 7.7 8.2 10.2   HGB 8.9* 9.1* 9.2* 9.7* 9.0*   MCV 98.2 98.2 101.7* 100.0 96.4   .0 295.0 299.0 306.0 272.0       Recent Labs   Lab 24  0412 24  0539 24  0802 24  0645 24  1739 24  0506   *   < > 202* 268*  --  117*   BUN 57*   < > 65* 73*  --  77*   CREATSERUM 2.05*   < > 2.33* 2.47*  --  2.50*   CA 9.4   < > 9.1 8.5  --  9.1   ALB 2.1*  --   --   --  2.1*  --       < > 137 135*  --  137   K 4.8   < > 4.6 5.0  --  4.6      < > 109 109  --  109   CO2 24.0   < > 23.0 20.0*  --  23.0   ALKPHO 166*  --   --   --  113  --    AST 26  --   --   --  13*  --    ALT 70*  --   --   --  30  --    BILT 0.2  --   --   --  0.2  --    TP 6.9  --   --   --  7.1  --     < > = values in this interval not displayed.     Estimated Creatinine Clearance: 15.8 mL/min (A) (based on SCr of 2.5 mg/dL (H)).  No results for input(s): \"PTP\", \"INR\" in the last 168 hours.         Microbiology  No results found for this visit on 24.  Imaging: Reviewed in  Epic.  Medications:    valproic acid  500 mg Per NG Tube TID    topiramate  100 mg Oral BID    insulin degludec  12 Units Subcutaneous Daily    cloBAZam  2.5 mg Oral BID    lacosamide  200 mg Per NG Tube BID    levETIRAcetam  1,500 mg Per NG Tube BID    hydrALAZINE  50 mg Per NG Tube Q8H ANIBAL    amLODIPine  5 mg Per NG Tube Daily    metoprolol tartrate  25 mg Per NG Tube 2x Daily(Beta Blocker)    aspirin  81 mg Per NG Tube Daily    multivitamin  1 tablet Oral Daily    thiamine  100 mg Per NG Tube BID    insulin aspart  1-10 Units Subcutaneous q6h    pantoprazole  40 mg Intravenous Q24H    [Held by provider] escitalopram  10 mg Oral Daily    [Held by provider] mirtazapine  15 mg Oral Nightly    rosuvastatin  10 mg Oral Daily       Assessment & Plan:      #Acute encephalopathy with breakthrough seizures  -on Keppra, Depakote, Vimpat, Topomax clobazam  -poor prognosis  -Neuro continues to follow   -MRI brain pending    #HIREN on CKD IV- back to baseline   -renal consult appreciated    #Hx of seizure disorder- as above   #Hypernatremia- monitor   #Acute on chronic diastolic CHF-resolved  #Aortic stenosis-Mild to moderate on most recent echo  #CAD s/p stent-Aspirin, statin  #Hypertension-home meds   #Hyperlipidemia-Statin  #DM type II with A1c 5.8-Insulin sliding scale  #Hx of CVA-Aspirin, statin  #JHONY-JHONY protocol  #COPD, stable-Resume albuterol inhaler as needed  #Gout-Allopurinol  #Fibromyalgia  #LLE DVT- heparin  #Anxiety and depression-Resume Lexapro and Remeron once PO  #GERD-PPI  #TEN-continue tube feeds      Will f/u Neuro recs and d/w family about comfort care      Supplementary Documentation:   Quality:  DVT Mechanical Prophylaxis:   SCDs,    DVT Pharmacologic Prophylaxis   Medication    heparin (Porcine) 52768 units/250mL infusion PE/DVT/THROMBUS CONTINUOUS      DVT Pharmacologic prophylaxis: Aspirin 162 mg         Code Status: DNAR/Selective Treatment  Gurrola: External urinary catheter in place  Gurrola Duration  (in days):   Central line:    ELROY:   At this point Ms. Rodrigues is expected to be discharge to: tbd     The 21st Century Cures Act makes medical notes like these available to patients in the interest of transparency. Please be advised this is a medical document. Medical documents are intended to carry relevant information, facts as evident, and the clinical opinion of the practitioner. The medical note is intended as peer to peer communication and may appear blunt or direct. It is written in medical language and may contain abbreviations or verbiage that are unfamiliar.

## 2024-03-09 NOTE — PROGRESS NOTES
[facility]  Nephrology Progress Note    Stephen Rodrigues Attending:  Brody Boston MD       Assessment and Plan:    1) CKD 4- baseline Cr > 2 mg/dl is due to longstanding DM / HTN; no further w/u     2) HIREN- due to volume depletion in setting of AMS / poor PO intake / diuretics- resolved     3) DM 2     4) HTN- on HEARN / amlodipine / BB via dobhoff     5) AMS- due to nonconvulsive status epilepticus; keppra / depakote / vimpat / topamax per neuro     7) Hypernatremia- due to insensible losses- resolved on FWF    8) Nutrition- continue TF via Dobhoff    Ongoing mgmt / GOC, etc per neuro. Unfortunately without improvement.       Subjective:  Lethargic, nonverbal    Physical Exam:   /60   Pulse 75   Temp 98.8 °F (37.1 °C) (Axillary)   Resp 17   Wt 191 lb 2.2 oz (86.7 kg)   SpO2 95%   BMI 32.81 kg/m²   Temp (24hrs), Av.5 °F (36.9 °C), Min:97.7 °F (36.5 °C), Max:98.8 °F (37.1 °C)       Intake/Output Summary (Last 24 hours) at 3/9/2024 0731  Last data filed at 3/9/2024 0600  Gross per 24 hour   Intake 342.53 ml   Output 850 ml   Net -507.47 ml       Wt Readings from Last 3 Encounters:   24 191 lb 2.2 oz (86.7 kg)   02/15/24 203 lb 9.6 oz (92.4 kg)   23 200 lb (90.7 kg)     General: awake  HEENT: No scleral icterus, MMM  Neck: Supple, no WILLIAM or thyromegaly  Cardiac: Regular rate and rhythm, S1, S2 normal, no murmur or tub  Lungs: Decreased BS at bases bilaterally   Abdomen: Soft, non-tender. + bowel sounds, no palpable organomegaly  Extremities: Without clubbing, cyanosis; no edema  Neurologic: Cranial nerves grossly intact, moving all extremities  Skin: Warm and dry, no rashes       Labs:   Lab Results   Component Value Date    WBC 13.0 2024    HGB 8.7 2024    HCT 26.7 2024    .0 2024    CREATSERUM 2.35 2024    BUN 70 2024     2024    K 4.2 2024     2024    CO2 23.0 2024    GLU 87 2024    CA 8.9 2024     .8 03/09/2024    MG 2.9 03/09/2024    PHOS 5.6 03/09/2024    PGLU 83 03/09/2024       Imaging:  All imaging studies reviewed.    Meds:           Questions/concerns were discussed with patient and/or family by bedside.          Apple Aguilar MD  3/9/2024  731 AM

## 2024-03-09 NOTE — PROCEDURES
LONG-TERM VIDEO EEG REPORT;     Reason for Examination: Status epilepticus     Technical Summary:   18 Channels of EEG and 1 Channel of EKG was performed utilizing internation 10/20 method. Motion, muscle and machine artifacts were noted.         Recording Start date/time: 03/08 at 910 am  Recording end date/time: 03/09 at 910 a.m.        Background Activity:   The background activity consisted of 8-9 Hz waveforms, reactive to eye opening/ external stimulation.     Abnormality:  Throughout the recording moderate, intermittent generalized sharp wave activity was noted.  Compared to previous 24 hours there was interval improvement noted.  Throughout the recording low to medium voltage, polymorphic, 3 to 6 Hz slow activity was noted diffusely over both hemispheres     Activation:     Hyperventilation:   Not Performed.     Photic Stimulation:  Driving response seen.No        Sleep:  Stage I and II sleep seen.     Impression:  This is a Abnormal prolonged Video EEG study.   Multiple episodes of electrographic seizures were noted.  Compared to previous 24 hours, there was interval improvement noted.  Moderate generalized sharp wave activity was noted throughout the recording.  Mild to moderate diffuse slowing into delta and theta range was noted.  This constellation of findings can be seen in encephalopathy due to metabolic/toxic etiology, medication effects or diffuse cerebral injury.  Clinical correlation is recommended.           Jimmie Gonsalez MD  [facility].

## 2024-03-09 NOTE — PLAN OF CARE
Received patient obtunded. Moans occasionally. Withdraws to pain. On room air, clear lungs. Suctioned prn, oral care done. NSR on tele. Had 1x loose stools at 2200. CHG bath given, jaxon care done. Incontinent, purewick replaced. On Heparin drip for PE/DVT prophylaxis. On continuous EEG. NPO. Meds crushed per NGT.   2230: Opened her eyes spontaneously.   0000: Opened her eyes spontaneously when I checked the blood sugar.     Problem: Diabetes/Glucose Control  Goal: Glucose maintained within prescribed range  Description: INTERVENTIONS:  - Monitor Blood Glucose as ordered  - Assess for signs and symptoms of hyperglycemia and hypoglycemia  - Administer ordered medications to maintain glucose within target range  - Assess barriers to adequate nutritional intake and initiate nutrition consult as needed  - Instruct patient on self management of diabetes  Outcome: Progressing     Problem: Patient/Family Goals  Goal: Patient/Family Long Term Goal  Description: Patient's Long Term Goal: to go home    Interventions:  - Mds to see  - IV keppra  - See additional Care Plan goals for specific interventions  Outcome: Progressing  Goal: Patient/Family Short Term Goal  Description: Patient's Short Term Goal: to feel better    Interventions:   - IV keppra  - Mds to see  - Comply to care plan  - See additional Care Plan goals for specific interventions  Outcome: Progressing     Problem: NEUROLOGICAL - ADULT  Goal: Achieves stable or improved neurological status  Description: INTERVENTIONS  - Assess for and report changes in neurological status  - Initiate measures to prevent increased intracranial pressure  - Maintain blood pressure and fluid volume within ordered parameters to optimize cerebral perfusion and minimize risk of hemorrhage  - Monitor temperature, glucose, and sodium. Initiate appropriate interventions as ordered  Outcome: Progressing  Goal: Absence of seizures  Description: INTERVENTIONS  - Monitor for seizure  activity  - Administer anti-seizure medications as ordered  - Monitor neurological status  Outcome: Progressing  Goal: Remains free of injury related to seizure activity  Description: INTERVENTIONS:  - Maintain airway, patient safety  and administer oxygen as ordered  - Monitor patient for seizure activity, document and report duration and description of seizure to MD/LIP  - If seizure occurs, turn patient to side and suction secretions as needed  - Reorient patient post seizure  - Seizure pads on all 4 side rails  - Instruct patient/family to notify RN of any seizure activity  - Instruct patient/family to call for assistance with activity based on assessment  Outcome: Progressing  Goal: Achieves maximal functionality and self care  Description: INTERVENTIONS  - Monitor swallowing and airway patency with patient fatigue and changes in neurological status  - Encourage and assist patient to increase activity and self care with guidance from PT/OT  - Encourage visually impaired, hearing impaired and aphasic patients to use assistive/communication devices  Outcome: Progressing     Problem: Delirium  Goal: Minimize duration of delirium  Description: Interventions:  - Encourage use of hearing aids, eye glasses  - Promote highest level of mobility daily  - Provide frequent reorientation  - Promote wakefulness i.e. lights on, blinds open  - Promote sleep, encourage patient's normal rest cycle i.e. lights off, TV off, minimize noise and interruptions  - Encourage family to assist in orientation and promotion of home routines  Outcome: Progressing

## 2024-03-09 NOTE — PROGRESS NOTES
[facility]     Hospitalist Progress Note     Stephen Rodrigues Patient Status:  Inpatient    1945 MRN [id number]   [facility] 2NE-A Attending Brody Boston MD   Hosp Day # 19 PCP PHYSICIAN NONSTAFF     Subjective:   seizures    Objective:    Review of Systems:   A comprehensive review of systems was  not completed  Vital signs:  Temp:  [97.7 °F (36.5 °C)-98.8 °F (37.1 °C)] 98.1 °F (36.7 °C)  Pulse:  [57-93] 75  Resp:  [14-20] 14  BP: ()/(43-69) 103/44  SpO2:  [71 %-100 %] 99 %  Physical Exam:    General: No acute distress, somnolent   Respiratory: no wheezes, no rhonchi  Cardiovascular: S1, S2, RRR  Abdomen: Soft, NT/ND, +BS  Extremities: Minimal response to painful stimuli. Opens eyes briefly to verbal stimuli  - no significant change from yesterday     Diagnostic Data:    Labs:  Recent Labs   Lab 24  0506 24  0412 24  0802 24  0839 24  0406   WBC 7.2 7.7 8.2 10.2 13.0*   HGB 9.1* 9.2* 9.7* 9.0* 8.7*   MCV 98.2 101.7* 100.0 96.4 98.5   .0 299.0 306.0 272.0 244.0       Recent Labs   Lab 24  0412 24  0539 24  0645 24  1739 24  0506 24  0406   *   < > 268*  --  117* 87   BUN 57*   < > 73*  --  77* 70*   CREATSERUM 2.05*   < > 2.47*  --  2.50* 2.35*   CA 9.4   < > 8.5  --  9.1 8.9   ALB 2.1*  --   --  2.1*  --   --       < > 135*  --  137 140   K 4.8   < > 5.0  --  4.6 4.2      < > 109  --  109 113*   CO2 24.0   < > 20.0*  --  23.0 23.0   ALKPHO 166*  --   --  113  --   --    AST 26  --   --  13*  --   --    ALT 70*  --   --  30  --   --    BILT 0.2  --   --  0.2  --   --    TP 6.9  --   --  7.1  --   --     < > = values in this interval not displayed.     Estimated Creatinine Clearance: 16.8 mL/min (A) (based on SCr of 2.35 mg/dL (H)).  No results for input(s): \"PTP\", \"INR\" in the last 168 hours.         Microbiology  No results found for this visit on 24.  Imaging: Reviewed in  Epic.  Medications:    valproic acid  500 mg Per NG Tube TID    topiramate  100 mg Oral BID    insulin degludec  12 Units Subcutaneous Daily    cloBAZam  2.5 mg Oral BID    lacosamide  200 mg Per NG Tube BID    levETIRAcetam  1,500 mg Per NG Tube BID    hydrALAZINE  50 mg Per NG Tube Q8H ANIBAL    amLODIPine  5 mg Per NG Tube Daily    metoprolol tartrate  25 mg Per NG Tube 2x Daily(Beta Blocker)    aspirin  81 mg Per NG Tube Daily    multivitamin  1 tablet Oral Daily    thiamine  100 mg Per NG Tube BID    insulin aspart  1-10 Units Subcutaneous q6h    pantoprazole  40 mg Intravenous Q24H    [Held by provider] escitalopram  10 mg Oral Daily    [Held by provider] mirtazapine  15 mg Oral Nightly    rosuvastatin  10 mg Oral Daily       Assessment & Plan:      #Acute encephalopathy with breakthrough seizures  -on Keppra, Depakote, Vimpat, Topomax clobazam  -poor prognosis    #HIREN on CKD IV- back to baseline   -renal consult appreciated    #Hx of seizure disorder- as above   Awaiting family decision  #Hypernatremia- monitor   #Acute on chronic diastolic CHF-resolved  #Aortic stenosis-Mild to moderate on most recent echo  #CAD s/p stent-Aspirin, statin  #Hypertension-home meds   #Hyperlipidemia-Statin  #DM type II with A1c 5.8-Insulin sliding scale  #Hx of CVA-Aspirin, statin  #JHONY-JHONY protocol  #COPD, stable-Resume albuterol inhaler as needed  #Gout-Allopurinol  #Fibromyalgia  #LLE DVT- heparin  #Anxiety and depression-Resume Lexapro and Remeron once PO  #GERD-PPI  #TEN-continue tube feeds          Supplementary Documentation:   Quality:  DVT Mechanical Prophylaxis:   SCDs,    DVT Pharmacologic Prophylaxis   Medication    heparin (Porcine) 14725 units/250mL infusion PE/DVT/THROMBUS CONTINUOUS      DVT Pharmacologic prophylaxis: Aspirin 162 mg         Code Status: DNAR/Selective Treatment  Gurrola: External urinary catheter in place  Gurrola Duration (in days):   Central line:    ELROY:   At this point Ms. Rodrigues is expected to be  discharge to: tbd     The 21st Century Cures Act makes medical notes like these available to patients in the interest of transparency. Please be advised this is a medical document. Medical documents are intended to carry relevant information, facts as evident, and the clinical opinion of the practitioner. The medical note is intended as peer to peer communication and may appear blunt or direct. It is written in medical language and may contain abbreviations or verbiage that are unfamiliar.

## 2024-03-09 NOTE — PROGRESS NOTES
[facility]   NEUROCRITICAL CARE   PROGRESS NOTE      S: Met with Alla, patient daughter, patient's son and grandson at bedside.  Went over clinical events over last 24 hours.  Discussed how Stephen opened her eyes for the first time today to verbal stim.  Discussed ongoing medical issues and concern for possible evolving infection given rising white count.  Discussed that patient still has generalized periodic discharges which have the potential to evolve back into seizures, especially in setting of any stress on the body such as new infections that may occur.     Rediscussed goals of care with family, daughter reiterated that patient would not want long-term tubes including tracheostomy or PEG.  Discussed how best case scenario would involve patient remaining seizure-free and thus allowing time to wake up enough to possibly eat on her own in the coming days, discussed how if this does not occur family will likely transition to comfort care at that time.  Discussed how transition may occur earlier if patient started seizing again as that would mean she would need to be intubated which family has already stated she would not want given high likelihood of eventual transition to tracheostomy.     All questions currently answered.    Updated care team regarding above discussion    O: Exam unchanged, EEG without seizures, ongoing GPDs    A/P:  Refractory status epilepticus                - Continue current antiseizure medications   - If seizures recur, plan to transition to comfort care    Nutrition               - Resume TFs    - Monitor ability to eat with repeat speech evals in the coming days with hopes that patient can eventually start eating on her own, if this does not appear to be the trajectory that patient takes, family will likely transition to comfort care at that time as they know that patient would not want long-term surgical tubes including feeding tube.    CC Time 25 minutes    Sandoval  Ever Dorman MD  Neurocritical Care  [facility]

## 2024-03-09 NOTE — PROGRESS NOTES
[facility]  Neurocritical Care   Progress Note     Subjective:   cEEG read pending, no clinical events.  Intermittent low blood pressure. No family at bedside this morning. Planning to come in later today per report.     Vitals:   Temp:  [97.7 °F (36.5 °C)-98.8 °F (37.1 °C)] 98.8 °F (37.1 °C)  Pulse:  [60-95] 63  Resp:  [14-20] 16  BP: ()/(43-69) 104/50  SpO2:  [71 %-100 %] 98 %  Body mass index is 32.81 kg/m².    Intake/Output:  Intake/Output Summary (Last 24 hours) at 3/9/2024 0855  Last data filed at 3/9/2024 0600  Gross per 24 hour   Intake 342.53 ml   Output 850 ml   Net -507.47 ml     Scheduled Meds:   valproic acid  500 mg Per NG Tube TID    topiramate  100 mg Oral BID    insulin degludec  12 Units Subcutaneous Daily    cloBAZam  2.5 mg Oral BID    lacosamide  200 mg Per NG Tube BID    levETIRAcetam  1,500 mg Per NG Tube BID    hydrALAZINE  50 mg Per NG Tube Q8H ANIBAL    amLODIPine  5 mg Per NG Tube Daily    metoprolol tartrate  25 mg Per NG Tube 2x Daily(Beta Blocker)    aspirin  81 mg Per NG Tube Daily    multivitamin  1 tablet Oral Daily    thiamine  100 mg Per NG Tube BID    insulin aspart  1-10 Units Subcutaneous q6h    pantoprazole  40 mg Intravenous Q24H    [Held by provider] escitalopram  10 mg Oral Daily    [Held by provider] mirtazapine  15 mg Oral Nightly    rosuvastatin  10 mg Oral Daily     Continuous Infusions:   continuous dose heparin 1,150 Units/hr (03/09/24 5597)     PRN Meds:lipase-protease-amylase (Lip-Prot-Amyl) **AND** sodium bicarbonate, albuterol, albuterol, glucose **OR** glucose **OR** glucose-vitamin C **OR** dextrose **OR** glucose **OR** glucose **OR** glucose-vitamin C, diazepam, acetaminophen, melatonin, polyethylene glycol (PEG 3350), sennosides, bisacodyl, ondansetron, metoclopramide, benzonatate, glycerin-hypromellose-, sodium chloride    Physical Examination:   General- No acute distress  CV- Sinus tachycardia   Resp- Shallow breathing, upper  airway secretions improved  Neuro-   Mental status-opens eyes to verbal stim, keeps them open, tracks intermittently, not following commands    Cranial nerves- pupils equally round and reactive to light, extraocular muscles intact, right gaze preference, unable to formally assess for facial symmetry, +cough  Motor/Sens- Flaccid throughout, 2/5 to pain throughout    Diagnostics:   No results found.  Lab Review     Recent Labs   Lab 03/07/24  0645 03/08/24  0506 03/09/24  0406   * 137 140   K 5.0 4.6 4.2    109 113*   CO2 20.0* 23.0 23.0   * 117* 87   BUN 73* 77* 70*   CREATSERUM 2.47* 2.50* 2.35*     Recent Labs   Lab 03/06/24  0802 03/08/24  0839 03/09/24  0406   WBC 8.2 10.2 13.0*   HGB 9.7* 9.0* 8.7*   .0 272.0 244.0     Assesment/Plan:   79 year old female h/o htn, hl, dm2, cad, HFpEF, ckd, stroke, yrn, copd, gout, fibromyalgia, migraines, and seizure do p/w recurrent seizures 3/1 after keppra dose lowered in setting of lethargy. Now on 3 AEDs     Neuro:  Subclinical status epilepticus-   2/2 underlying seizure disorder and lowering of keppra  CT/MRI no acute changes. Repeat MRI 3/7 without new abnormalities, 2 punctate areas of enhancement in the cerebellum possibly artifact  cEEG w/ subclinical generalized epileptiform discharges and seizures        VPA level 3/2 88.7, 3/4 105, 3/7 95 --> Decreased VPA dose to 500mg q8 3/8   Continue current AED: Keppra 1.5 g BID, Vimpat 200 mg BID, Depakote 500 mg BID, Onfi 2.5 mg BID, Topamax 100 mg BID  Continue cEEG  Ongoing GoC disussions with family     Cardiac:  Htn/hl/cad/HFpEF- sbp goal 100-150, cont norvasc, metop, hydral - dose decreased today, crestor  Pulmonary:  Copd- Stable on RA, aggressive bronchial hygiene, frequent suctioning, ABG 3/7 without hypercapnia  Renal:  HIREN on CKD- Seen by nephrology, Cr stable, BUN stabilizing, hyponatremia improved, acidosis improved      GI:  TF - resume  Heme/ID:  Afebrile, no leukocytosis  LLE DVT- on  hep gtt  Endocrine/Rheum:  DM Type 2, uncontrolled with hyperglycemia         - Monitor glucose, sliding scale insulin prn  Checklist   - Lines: PIVs   - DVT Prophylaxis: SCDs and hep gtt   - Diet: TFs - resume if EEG continues to improve    - IVF: -    - Electrolytes: Replete per protocol   - Code Status: DNR/Select    Dispo: CNICU    A total of 50 minutes of critical care time (exclusive of billable procedures) was administered to manage and/or prevent neurologic instability. This involved direct patient intervention, complex decision making, and/or extensive discussions with the patient, family, and clinical staff.     Sandoval Dorman MD  Neurocritical Care  [facility]

## 2024-03-10 NOTE — PLAN OF CARE
1930: Received asleep. On room air, sats %. NSR on tele. Opened her eyes to movement and she moaned. Does not track. Does not follow commands. Suctioned secretions and moisturized the mouth. Turned to sides. NGT w/ TF at 10 ml/hr. Purewick in place. Seizure precaution in place. On continuous EEG.     Problem: Diabetes/Glucose Control  Goal: Glucose maintained within prescribed range  Description: INTERVENTIONS:  - Monitor Blood Glucose as ordered  - Assess for signs and symptoms of hyperglycemia and hypoglycemia  - Administer ordered medications to maintain glucose within target range  - Assess barriers to adequate nutritional intake and initiate nutrition consult as needed  - Instruct patient on self management of diabetes  Outcome: Progressing     Problem: Patient/Family Goals  Goal: Patient/Family Long Term Goal  Description: Patient's Long Term Goal: to go home    Interventions:  - Mds to see  - IV keppra  - See additional Care Plan goals for specific interventions  Outcome: Progressing  Goal: Patient/Family Short Term Goal  Description: Patient's Short Term Goal: to feel better    Interventions:   - IV keppra  - Mds to see  - Comply to care plan  - See additional Care Plan goals for specific interventions  Outcome: Progressing     Problem: NEUROLOGICAL - ADULT  Goal: Achieves stable or improved neurological status  Description: INTERVENTIONS  - Assess for and report changes in neurological status  - Initiate measures to prevent increased intracranial pressure  - Maintain blood pressure and fluid volume within ordered parameters to optimize cerebral perfusion and minimize risk of hemorrhage  - Monitor temperature, glucose, and sodium. Initiate appropriate interventions as ordered  Outcome: Progressing  Goal: Absence of seizures  Description: INTERVENTIONS  - Monitor for seizure activity  - Administer anti-seizure medications as ordered  - Monitor neurological status  Outcome: Progressing  Goal: Remains  free of injury related to seizure activity  Description: INTERVENTIONS:  - Maintain airway, patient safety  and administer oxygen as ordered  - Monitor patient for seizure activity, document and report duration and description of seizure to MD/LIP  - If seizure occurs, turn patient to side and suction secretions as needed  - Reorient patient post seizure  - Seizure pads on all 4 side rails  - Instruct patient/family to notify RN of any seizure activity  - Instruct patient/family to call for assistance with activity based on assessment  Outcome: Progressing  Goal: Achieves maximal functionality and self care  Description: INTERVENTIONS  - Monitor swallowing and airway patency with patient fatigue and changes in neurological status  - Encourage and assist patient to increase activity and self care with guidance from PT/OT  - Encourage visually impaired, hearing impaired and aphasic patients to use assistive/communication devices  Outcome: Progressing     Problem: Delirium  Goal: Minimize duration of delirium  Description: Interventions:  - Encourage use of hearing aids, eye glasses  - Promote highest level of mobility daily  - Provide frequent reorientation  - Promote wakefulness i.e. lights on, blinds open  - Promote sleep, encourage patient's normal rest cycle i.e. lights off, TV off, minimize noise and interruptions  - Encourage family to assist in orientation and promotion of home routines  Outcome: Progressing

## 2024-03-10 NOTE — PLAN OF CARE
Assumed patient care at 0730.  Vital signs stable.  See flowsheet for neuro documentation.  Patient stuporous throughout the shift but will intermittently open eyes to voice or painful stimuli.  Patient's family at bedside updated by Dr. Dorman.  Tube feedings resumed.  Continuous EEG ongoing.     Problem: Diabetes/Glucose Control  Goal: Glucose maintained within prescribed range  Description: INTERVENTIONS:  - Monitor Blood Glucose as ordered  - Assess for signs and symptoms of hyperglycemia and hypoglycemia  - Administer ordered medications to maintain glucose within target range  - Assess barriers to adequate nutritional intake and initiate nutrition consult as needed  - Instruct patient on self management of diabetes  Outcome: Progressing     Problem: Patient/Family Goals  Goal: Patient/Family Long Term Goal  Description: Patient's Long Term Goal: to go home    Interventions:  - Mds to see  - IV keppra  - See additional Care Plan goals for specific interventions  Outcome: Progressing  Goal: Patient/Family Short Term Goal  Description: Patient's Short Term Goal: to feel better    Interventions:   - IV keppra  - Mds to see  - Comply to care plan  - See additional Care Plan goals for specific interventions  Outcome: Progressing     Problem: NEUROLOGICAL - ADULT  Goal: Achieves stable or improved neurological status  Description: INTERVENTIONS  - Assess for and report changes in neurological status  - Initiate measures to prevent increased intracranial pressure  - Maintain blood pressure and fluid volume within ordered parameters to optimize cerebral perfusion and minimize risk of hemorrhage  - Monitor temperature, glucose, and sodium. Initiate appropriate interventions as ordered  Outcome: Progressing  Goal: Absence of seizures  Description: INTERVENTIONS  - Monitor for seizure activity  - Administer anti-seizure medications as ordered  - Monitor neurological status  Outcome: Progressing  Goal: Remains free of  injury related to seizure activity  Description: INTERVENTIONS:  - Maintain airway, patient safety  and administer oxygen as ordered  - Monitor patient for seizure activity, document and report duration and description of seizure to MD/LIP  - If seizure occurs, turn patient to side and suction secretions as needed  - Reorient patient post seizure  - Seizure pads on all 4 side rails  - Instruct patient/family to notify RN of any seizure activity  - Instruct patient/family to call for assistance with activity based on assessment  Outcome: Progressing  Goal: Achieves maximal functionality and self care  Description: INTERVENTIONS  - Monitor swallowing and airway patency with patient fatigue and changes in neurological status  - Encourage and assist patient to increase activity and self care with guidance from PT/OT  - Encourage visually impaired, hearing impaired and aphasic patients to use assistive/communication devices  Outcome: Progressing     Problem: Delirium  Goal: Minimize duration of delirium  Description: Interventions:  - Encourage use of hearing aids, eye glasses  - Promote highest level of mobility daily  - Provide frequent reorientation  - Promote wakefulness i.e. lights on, blinds open  - Promote sleep, encourage patient's normal rest cycle i.e. lights off, TV off, minimize noise and interruptions  - Encourage family to assist in orientation and promotion of home routines  Outcome: Progressing

## 2024-03-10 NOTE — PROGRESS NOTES
[facility]     Hospitalist Progress Note     Stephen Rodrigues Patient Status:  Inpatient    1945 MRN [id number]   [facility] 2NE-A Attending Brody Boston MD   Hosp Day # 20 PCP PHYSICIAN NONSTAFF     Subjective:   seizures    Objective:    Review of Systems:   A comprehensive review of systems was  not completed  Vital signs:  Temp:  [97.6 °F (36.4 °C)-98.9 °F (37.2 °C)] 98.2 °F (36.8 °C)  Pulse:  [60-80] 62  Resp:  [14-20] 17  BP: (113-156)/(43-88) 118/49  SpO2:  [96 %-100 %] 97 %  Physical Exam:    General: No acute distress, somnolent   Respiratory: no wheezes, no rhonchi  Cardiovascular: S1, S2, RRR  Abdomen: Soft, NT/ND, +BS  Extremities: Minimal response to painful stimuli. Opens eyes briefly to verbal stimuli  - no significant change from yesterday     Diagnostic Data:    Labs:  Recent Labs   Lab 24  0412 24  0802 24  0839 24  0406 03/10/24  0518   WBC 7.7 8.2 10.2 13.0* 10.6   HGB 9.2* 9.7* 9.0* 8.7* 8.6*   .7* 100.0 96.4 98.5 99.6   .0 306.0 272.0 244.0 270.0       Recent Labs   Lab 24  0412 24  0539 24  1739 24  0506 24  0406 03/10/24  0518   *   < >  --  117* 87 174*   BUN 57*   < >  --  77* 70* 60*   CREATSERUM 2.05*   < >  --  2.50* 2.35* 2.13*   CA 9.4   < >  --  9.1 8.9 9.0   ALB 2.1*  --  2.1*  --   --   --       < >  --  137 140 140   K 4.8   < >  --  4.6 4.2 3.7      < >  --  109 113* 112   CO2 24.0   < >  --  23.0 23.0 20.0*   ALKPHO 166*  --  113  --   --   --    AST 26  --  13*  --   --   --    ALT 70*  --  30  --   --   --    BILT 0.2  --  0.2  --   --   --    TP 6.9  --  7.1  --   --   --     < > = values in this interval not displayed.     Estimated Creatinine Clearance: 18.5 mL/min (A) (based on SCr of 2.13 mg/dL (H)).  No results for input(s): \"PTP\", \"INR\" in the last 168 hours.         Microbiology  No results found for this visit on 24.  Imaging: Reviewed in  Epic.  Medications:    valproic acid  500 mg Per NG Tube TID    topiramate  100 mg Oral BID    insulin degludec  12 Units Subcutaneous Daily    cloBAZam  2.5 mg Oral BID    lacosamide  200 mg Per NG Tube BID    levETIRAcetam  1,500 mg Per NG Tube BID    hydrALAZINE  50 mg Per NG Tube Q8H ANIBAL    amLODIPine  5 mg Per NG Tube Daily    metoprolol tartrate  25 mg Per NG Tube 2x Daily(Beta Blocker)    aspirin  81 mg Per NG Tube Daily    multivitamin  1 tablet Oral Daily    thiamine  100 mg Per NG Tube BID    insulin aspart  1-10 Units Subcutaneous q6h    pantoprazole  40 mg Intravenous Q24H    [Held by provider] escitalopram  10 mg Oral Daily    [Held by provider] mirtazapine  15 mg Oral Nightly    rosuvastatin  10 mg Oral Daily       Assessment & Plan:      #Acute encephalopathy with breakthrough seizures  -on Keppra, Depakote, Vimpat, Topomax clobazam  -poor prognosis    #HIREN on CKD IV- back to baseline   -resolved    #Hx of seizure disorder- as above   Awaiting family decision  #Hypernatremia- monitor   #Acute on chronic diastolic CHF-resolved  #Aortic stenosis-Mild to moderate on most recent echo  #CAD s/p stent-Aspirin, statin  #Hypertension-home meds   #Hyperlipidemia-Statin  #DM type II with A1c 5.8-Insulin sliding scale  #Hx of CVA-Aspirin, statin  #JHONY-JHONY protocol  #COPD, stable-Resume albuterol inhaler as needed  #Gout-Allopurinol  #Fibromyalgia  #LLE DVT- heparin  #Anxiety and depression-Resume Lexapro and Remeron once PO  #GERD-PPI  #TEN-continue tube feeds          Supplementary Documentation:   Quality:  DVT Mechanical Prophylaxis:   SCDs,    DVT Pharmacologic Prophylaxis   Medication    heparin (Porcine) 81474 units/250mL infusion PE/DVT/THROMBUS CONTINUOUS      DVT Pharmacologic prophylaxis: Aspirin 162 mg         Code Status: DNAR/Selective Treatment  Gurrola: External urinary catheter in place  Gurrola Duration (in days):   Central line:    ELROY:   At this point Ms. Rodrigues is expected to be discharge to:  tbd     The 21st Century Cures Act makes medical notes like these available to patients in the interest of transparency. Please be advised this is a medical document. Medical documents are intended to carry relevant information, facts as evident, and the clinical opinion of the practitioner. The medical note is intended as peer to peer communication and may appear blunt or direct. It is written in medical language and may contain abbreviations or verbiage that are unfamiliar.

## 2024-03-10 NOTE — PROGRESS NOTES
[facility]  Nephrology Progress Note    Stephen Rodrigues Attending:  Brody Boston MD       Assessment and Plan:    1) CKD 4- baseline Cr > 2 mg/dl is due to longstanding DM / HTN; no further w/u     2) HIREN- due to volume depletion in setting of AMS / poor PO intake / diuretics- resolved     3) DM 2     4) HTN- on HEARN / amlodipine / BB via dobhoff     5) AMS- due to nonconvulsive status epilepticus; keppra / depakote / vimpat / topamax per neuro     7) Hypernatremia- due to insensible losses- resolved on FWF    8) Nutrition- continue TF via Dobhoff    Will follow peripherally- please call with questions.       Subjective:  Lethargic, nonverbal    Physical Exam:   /50   Pulse 73   Temp 98.9 °F (37.2 °C) (Axillary)   Resp 17   Wt 190 lb 0.6 oz (86.2 kg)   SpO2 96%   BMI 32.62 kg/m²   Temp (24hrs), Av.4 °F (36.9 °C), Min:97.6 °F (36.4 °C), Max:98.9 °F (37.2 °C)       Intake/Output Summary (Last 24 hours) at 3/10/2024 0741  Last data filed at 3/10/2024 0600  Gross per 24 hour   Intake 1832.1 ml   Output 1090 ml   Net 742.1 ml       Wt Readings from Last 3 Encounters:   03/10/24 190 lb 0.6 oz (86.2 kg)   02/15/24 203 lb 9.6 oz (92.4 kg)   23 200 lb (90.7 kg)     General: awake  HEENT: No scleral icterus, MMM  Neck: Supple, no WILLIAM or thyromegaly  Cardiac: Regular rate and rhythm, S1, S2 normal, no murmur or tub  Lungs: Decreased BS at bases bilaterally   Abdomen: Soft, non-tender. + bowel sounds, no palpable organomegaly  Extremities: Without clubbing, cyanosis; no edema  Neurologic: Cranial nerves grossly intact, moving all extremities  Skin: Warm and dry, no rashes       Labs:   Lab Results   Component Value Date    WBC 10.6 03/10/2024    HGB 8.6 03/10/2024    HCT 27.1 03/10/2024    .0 03/10/2024    CREATSERUM 2.13 03/10/2024    BUN 60 03/10/2024     03/10/2024    K 3.7 03/10/2024     03/10/2024    CO2 20.0 03/10/2024     03/10/2024    CA 9.0 03/10/2024    PTT 51.6  03/10/2024    MG 2.6 03/10/2024    PHOS 4.4 03/10/2024    PGLU 173 03/10/2024       Imaging:  All imaging studies reviewed.    Meds:           Questions/concerns were discussed with patient and/or family by bedside.          Apple Aguilar MD  3/10/2024  741 AM

## 2024-03-10 NOTE — PROCEDURES
LONG-TERM VIDEO EEG REPORT;     Reason for Examination: Status epilepticus     Technical Summary:   18 Channels of EEG and 1 Channel of EKG was performed utilizing internation 10/20 method. Motion, muscle and machine artifacts were noted.         Recording Start date/time: 03/09 at 1339  Recording end date/time: 03/10 at 1339        Background Activity:   The background activity consisted of 8-9 Hz waveforms, reactive to eye opening/ external stimulation.     Abnormality:  Throughout the recording moderate, intermittent generalized sharp wave activity was noted.  Compared to previous 24 hours there was interval improvement noted.  At least 1 run of generalized sharp wave activity seen.  45 seconds was noted during the recording.  This finding is consistent with electrographic seizure.  Throughout the recording low to medium voltage, polymorphic, 3 to 6 Hz slow activity was noted diffusely over both hemispheres.     Activation:     Hyperventilation:   Not Performed.     Photic Stimulation:  Driving response seen.No        Sleep:  Stage I and II sleep seen.     Impression:  This is a Abnormal prolonged Video EEG study.   At least 1 episode of electrographic seizure was noted.  Compared to previous 24 hours, there was interval improvement noted.  Moderate generalized sharp wave activity was noted throughout the recording.  Mild to moderate diffuse slowing into delta and theta range was noted. This constellation of findings can be seen in encephalopathy due to metabolic/toxic etiology, medication effects or diffuse cerebral injury.  Clinical correlation is recommended.           Jimmie Gonsalez MD  [facility].

## 2024-03-10 NOTE — PLAN OF CARE
Assumed patient care at 0730.  Vital signs stable.  Patient's neuro status remains unchanged - see flowsheets.  Patient appears to be pain free.  Transfer orders received.     Problem: Diabetes/Glucose Control  Goal: Glucose maintained within prescribed range  Description: INTERVENTIONS:  - Monitor Blood Glucose as ordered  - Assess for signs and symptoms of hyperglycemia and hypoglycemia  - Administer ordered medications to maintain glucose within target range  - Assess barriers to adequate nutritional intake and initiate nutrition consult as needed  - Instruct patient on self management of diabetes  Outcome: Progressing     Problem: Patient/Family Goals  Goal: Patient/Family Long Term Goal  Description: Patient's Long Term Goal: to go home    Interventions:  - Mds to see  - IV keppra  - See additional Care Plan goals for specific interventions  Outcome: Progressing  Goal: Patient/Family Short Term Goal  Description: Patient's Short Term Goal: to feel better    Interventions:   - IV keppra  - Mds to see  - Comply to care plan  - See additional Care Plan goals for specific interventions  Outcome: Progressing     Problem: NEUROLOGICAL - ADULT  Goal: Achieves stable or improved neurological status  Description: INTERVENTIONS  - Assess for and report changes in neurological status  - Initiate measures to prevent increased intracranial pressure  - Maintain blood pressure and fluid volume within ordered parameters to optimize cerebral perfusion and minimize risk of hemorrhage  - Monitor temperature, glucose, and sodium. Initiate appropriate interventions as ordered  Outcome: Progressing  Goal: Absence of seizures  Description: INTERVENTIONS  - Monitor for seizure activity  - Administer anti-seizure medications as ordered  - Monitor neurological status  Outcome: Progressing  Goal: Remains free of injury related to seizure activity  Description: INTERVENTIONS:  - Maintain airway, patient safety  and administer oxygen as  ordered  - Monitor patient for seizure activity, document and report duration and description of seizure to MD/LIP  - If seizure occurs, turn patient to side and suction secretions as needed  - Reorient patient post seizure  - Seizure pads on all 4 side rails  - Instruct patient/family to notify RN of any seizure activity  - Instruct patient/family to call for assistance with activity based on assessment  Outcome: Progressing  Goal: Achieves maximal functionality and self care  Description: INTERVENTIONS  - Monitor swallowing and airway patency with patient fatigue and changes in neurological status  - Encourage and assist patient to increase activity and self care with guidance from PT/OT  - Encourage visually impaired, hearing impaired and aphasic patients to use assistive/communication devices  Outcome: Progressing     Problem: Delirium  Goal: Minimize duration of delirium  Description: Interventions:  - Encourage use of hearing aids, eye glasses  - Promote highest level of mobility daily  - Provide frequent reorientation  - Promote wakefulness i.e. lights on, blinds open  - Promote sleep, encourage patient's normal rest cycle i.e. lights off, TV off, minimize noise and interruptions  - Encourage family to assist in orientation and promotion of home routines  Outcome: Progressing

## 2024-03-10 NOTE — PROGRESS NOTES
[facility]  Neurocritical Care   Progress Note     Subjective:   cEEG read pending.    Vitals:   Temp:  [97.6 °F (36.4 °C)-98.9 °F (37.2 °C)] 98.9 °F (37.2 °C)  Pulse:  [57-80] 73  Resp:  [14-20] 17  BP: (100-156)/(43-88) 127/50  SpO2:  [96 %-100 %] 96 %  Body mass index is 32.62 kg/m².    Intake/Output:  Intake/Output Summary (Last 24 hours) at 3/10/2024 0854  Last data filed at 3/10/2024 0600  Gross per 24 hour   Intake 1632.1 ml   Output 890 ml   Net 742.1 ml     Scheduled Meds:   valproic acid  500 mg Per NG Tube TID    topiramate  100 mg Oral BID    insulin degludec  12 Units Subcutaneous Daily    cloBAZam  2.5 mg Oral BID    lacosamide  200 mg Per NG Tube BID    levETIRAcetam  1,500 mg Per NG Tube BID    hydrALAZINE  50 mg Per NG Tube Q8H ANIBAL    amLODIPine  5 mg Per NG Tube Daily    metoprolol tartrate  25 mg Per NG Tube 2x Daily(Beta Blocker)    aspirin  81 mg Per NG Tube Daily    multivitamin  1 tablet Oral Daily    thiamine  100 mg Per NG Tube BID    insulin aspart  1-10 Units Subcutaneous q6h    pantoprazole  40 mg Intravenous Q24H    [Held by provider] escitalopram  10 mg Oral Daily    [Held by provider] mirtazapine  15 mg Oral Nightly    rosuvastatin  10 mg Oral Daily     Continuous Infusions:   continuous dose heparin 1,050 Units/hr (03/10/24 0718)     PRN Meds:lipase-protease-amylase (Lip-Prot-Amyl) **AND** sodium bicarbonate, albuterol, albuterol, glucose **OR** glucose **OR** glucose-vitamin C **OR** dextrose **OR** glucose **OR** glucose **OR** glucose-vitamin C, diazepam, acetaminophen, melatonin, polyethylene glycol (PEG 3350), sennosides, bisacodyl, ondansetron, metoclopramide, benzonatate, glycerin-hypromellose-, sodium chloride    Physical Examination:   General- No acute distress  CV- Sinus tachycardia   Resp- Shallow breathing, upper airway secretions improved  Neuro-   Mental status- Opens eyes today to repeat noxious stim, does not keep them open, not tracking  todya, not following commands    Cranial nerves- pupils equally round and reactive to light, extraocular muscles intact, right gaze preference, unable to formally assess for facial symmetry, +cough  Motor/Sens- Flaccid throughout, 2/5 to pain throughout    Diagnostics:   No results found.  Lab Review     Recent Labs   Lab 03/08/24  0506 03/09/24  0406 03/10/24  0518    140 140   K 4.6 4.2 3.7    113* 112   CO2 23.0 23.0 20.0*   * 87 174*   BUN 77* 70* 60*   CREATSERUM 2.50* 2.35* 2.13*     Recent Labs   Lab 03/08/24  0839 03/09/24  0406 03/10/24  0518   WBC 10.2 13.0* 10.6   HGB 9.0* 8.7* 8.6*   .0 244.0 270.0     Assesment/Plan:   79 year old female h/o htn, hl, dm2, cad, HFpEF, ckd, stroke, yrn, copd, gout, fibromyalgia, migraines, and seizure do p/w recurrent seizures 3/1 after keppra dose lowered in setting of lethargy. Now on 3 AEDs     Neuro:  Subclinical status epilepticus-   2/2 underlying seizure disorder and lowering of keppra  CT/MRI no acute changes. Repeat MRI 3/7 without new abnormalities, 2 punctate areas of enhancement in the cerebellum possibly artifact, nothing else developing concerning for infectious or alternative etiology  cEEG w/ subclinical generalized epileptiform discharges and seizures        VPA level 3/2 88.7, 3/4 105, 3/7 95 --> Decreased VPA dose to 500mg q8 3/8     Continue current AED: Keppra 1.5 g BID, Vimpat 200 mg BID, Depakote 500 mg BID, Onfi 2.5 mg BID, Topamax 100 mg BID  Do not lower medications further while inpatient given hard to control seizures  Continue cEEG  If seizures recur, family will likely transition to comfort care     Cardiac:  Htn/hl/cad/HFpEF- sbp goal 100-150, cont norvasc, metop, hydral - dose decreased 3/9, crestor  Pulmonary:  Copd- Stable on RA, aggressive bronchial hygiene, frequent suctioning, ABG 3/7 without hypercapnia  Renal:  HIREN on CKD- Seen by nephrology, Cr stable, BUN stabilizing, hyponatremia resolved, acidosis  resolved      GI:  TF - resumed, if patient unable to pass swallow eval's in the coming days, ongoing palliative discussion as family stated patient would not want long-term feeding tube  Heme/ID:  Afebrile, no leukocytosis  LLE DVT- on hep gtt  Endocrine/Rheum:  DM Type 2, uncontrolled with hyperglycemia         - Monitor glucose, sliding scale insulin prn  Checklist   - Lines: PIVs   - DVT Prophylaxis: SCDs and hep gtt   - Diet: TFs      - IVF: -    - Electrolytes: Replete per protocol   - Code Status: DNR/Select    Dispo: CNICU pending stability on EEG    A total of 45 minutes of critical care time (exclusive of billable procedures) was administered to manage and/or prevent neurologic instability. This involved direct patient intervention, complex decision making, and/or extensive discussions with the patient, family, and clinical staff.     Sandoval Dorman MD  Neurocritical Care  [facility]

## 2024-03-11 NOTE — DIETARY NOTE
[facility]    NUTRITION ASSESSMENT    Unable to diagnose malnutrition criteria at this time.    NUTRITION INTERVENTION:    Meal and Snacks - NPO  Coordination of Nutrition Care - SLP consult prior to diet advancement. Consider Palliative care consult for goals of care.  Vitamin and Mineral Supplements - Continued Thiamine and Chewable MVI  Enteral Nutrition - Continued via DHT Glucerna 1.5 at 50 ml/hr.   Continued free water flush of 200 ml q 6hours or per Nephrology.  Goal TF to provide:1200 ml, 1800 kcal, 99 gm protein, 912 ml free H20, Total H20 (TF+free water flush)=1712 ml.    PATIENT STATUS:   3/11 -   Continues to tolerate TF at GOAL - Glucerna 1.5 @ 50 ml/hr x 24 hr. Flushes per nephrology. Noted BGMs elevated, but improving. MRI 3/7 unremarkable. Potential plan for comfort care.     3/7 - Pt has been on TF at goal and tolerating well. Went for MRI this AM, and after returning had event concerning for Sz activity.  Pt transferred to CNICU for closer monitoring. When appropriate, continue TF as above.  Flushes per Nephrology. No n/v. + BM - loose.       3/4 - Pt somnolent, remains on DHT feeds. TF tolerated without issue. No n/v. +  BM 3/4 (soft). Hyperglycemia noted - on Glucerna continuous.  Insulin adjustment per MD.  Wt trending down.  Hypernatremic - flushes per nephrology and now WNL. No nutrition concerns from RN at this time.    2/29- Received nutrition consult for DHT feeds. Pt remains lethargic and inappropriate for PO trials. NPO x ~4 days. Plans for DHT placement and TF's to start today per RN. Will continue to monitor.    2/28- Pt w/ poor PO intakes this admit + NPO x 3 days. SLP re-evaluated on 2/27 and 2/28 w/ continued diet recs for NPO. Recommend placing DHT for TF's, if aggressive measures wish to be taken. RD available for TF recs upon consult. Will continue to monitor.     2/26/24- 78 y/o female admitted with AMS. Pt seen d/t length of stay. A&O to self. Very lethargic.  Pt stated she  had a good appetite PTA. Pt then fell back asleep and did not answer any other questions. Has had a poor appetite/PO intakes this admit. Recorded PO intakes vary:0-80% with 50% or less most meals. Evaluated by SLP today and made NPO d/t lethargy. Will monitor for diet advancement vs need for nutrition support.   PMH:HTN, HLD, CAD s/p stent, aortic stenosis, DM2, CVA, CKD4, COPD, JHONY, IBS, fibromyalgia, seizure disorder.       ANTHROPOMETRICS:  Ht:  5' 4\"  Wt: 86.2 kg (190 lb 0.6 oz).   BMI: Body mass index is 32.62 kg/m².  IBW: 54.5 kg      WEIGHT HISTORY:     Wts vary per EMR hx. Per EMR hx, pt w/ 11 lb (5.4%) wt loss in about 1.5 weeks?    Wt Readings from Last 10 Encounters:   03/10/24 86.2 kg (190 lb 0.6 oz)   02/15/24 92.4 kg (203 lb 9.6 oz)   12/13/23 90.7 kg (200 lb)   01/28/23 94.6 kg (208 lb 8.9 oz)   12/06/22 98.7 kg (217 lb 9.5 oz)   11/26/22 99 kg (218 lb 4.1 oz)   10/24/22 99.8 kg (220 lb)   08/27/22 98.7 kg (217 lb 9.5 oz)   07/23/22 100.4 kg (221 lb 4.8 oz)   11/09/21 99.8 kg (220 lb)        NUTRITION:  Diet:       Procedures    NPO    Glucerna 1.5 at 50 ml/hr. + 200ml q6h per nephrology    Food Allergies: No  Cultural/Ethnic/Yarsani Preferences Addressed: Yes    Percent Meals Eaten (last 3 days)       Date/Time Percent Meals Eaten (%)    03/09/24 0800 0 %          GI system review:  Last BM:3/11    Skin and wounds: excoriation to sacrum    NUTRITION RELATED PHYSICAL FINDINGS:     1. Body Fat/Muscle Mass: no wasting noted     2. Fluid Accumulation: upper extremity edema     NUTRITION PRESCRIPTION: 54.5 kg (IBW)  Calories: 6322-9296 calories/day (25-30 kcal/kg)  Protein:  grams protein/day (1.5-2.0 grams protein per kg)  Fluid: ~1 ml/kcal or per MD discretion    NUTRITION DIAGNOSIS/PROBLEM:  Inadequate energy intake related to  decreased ability to consume sufficient energy intakes orally  as evidenced by documented/reported insufficient oral intake and NPO status and current enteral nutrition  to meet needs.       MONITOR AND EVALUATE/NUTRITION GOALS:  Weight stable within 1 to 2 lbs during admission - Ongoing  Return to PO intake or advance diet in 24-48 hrs - Not met, Continues  Tolerate alternative nutrition at 100% of goal - Met, continues    Monitor: SLP updates, diet advancement, labs, meds, POC.     MEDICATIONS:  Novolog, Degludec, MVI, Protonix, Thiamine, Valsartan    LABS:  Glu 156; Na 138; BUN 53; Cr 1.91;  - 215    Pt is at High nutrition risk    Kacey \"Shira\" Mauro RD, LDN  Clinical Dietitian

## 2024-03-11 NOTE — PLAN OF CARE
Assumed patient care at 0730.  Vital signs stable.  Patient stuporous, unable to track with eyes or follow commands.  See flowsheets for neuro assessment.  Tube feedings infusing and patient tolerating.  EEG remains in progress.  Transfer orders to tele.  Family met with palliative today and is discussing plan of care.   Problem: Diabetes/Glucose Control  Goal: Glucose maintained within prescribed range  Description: INTERVENTIONS:  - Monitor Blood Glucose as ordered  - Assess for signs and symptoms of hyperglycemia and hypoglycemia  - Administer ordered medications to maintain glucose within target range  - Assess barriers to adequate nutritional intake and initiate nutrition consult as needed  - Instruct patient on self management of diabetes  Outcome: Progressing     Problem: Patient/Family Goals  Goal: Patient/Family Long Term Goal  Description: Patient's Long Term Goal: to go home    Interventions:  - Mds to see  - IV keppra  - See additional Care Plan goals for specific interventions  Outcome: Progressing  Goal: Patient/Family Short Term Goal  Description: Patient's Short Term Goal: to feel better    Interventions:   - IV keppra  - Mds to see  - Comply to care plan  - See additional Care Plan goals for specific interventions  Outcome: Progressing     Problem: NEUROLOGICAL - ADULT  Goal: Achieves stable or improved neurological status  Description: INTERVENTIONS  - Assess for and report changes in neurological status  - Initiate measures to prevent increased intracranial pressure  - Maintain blood pressure and fluid volume within ordered parameters to optimize cerebral perfusion and minimize risk of hemorrhage  - Monitor temperature, glucose, and sodium. Initiate appropriate interventions as ordered  Outcome: Progressing  Goal: Absence of seizures  Description: INTERVENTIONS  - Monitor for seizure activity  - Administer anti-seizure medications as ordered  - Monitor neurological status  Outcome:  Progressing  Goal: Remains free of injury related to seizure activity  Description: INTERVENTIONS:  - Maintain airway, patient safety  and administer oxygen as ordered  - Monitor patient for seizure activity, document and report duration and description of seizure to MD/LIP  - If seizure occurs, turn patient to side and suction secretions as needed  - Reorient patient post seizure  - Seizure pads on all 4 side rails  - Instruct patient/family to notify RN of any seizure activity  - Instruct patient/family to call for assistance with activity based on assessment  Outcome: Progressing  Goal: Achieves maximal functionality and self care  Description: INTERVENTIONS  - Monitor swallowing and airway patency with patient fatigue and changes in neurological status  - Encourage and assist patient to increase activity and self care with guidance from PT/OT  - Encourage visually impaired, hearing impaired and aphasic patients to use assistive/communication devices  Outcome: Progressing     Problem: Delirium  Goal: Minimize duration of delirium  Description: Interventions:  - Encourage use of hearing aids, eye glasses  - Promote highest level of mobility daily  - Provide frequent reorientation  - Promote wakefulness i.e. lights on, blinds open  - Promote sleep, encourage patient's normal rest cycle i.e. lights off, TV off, minimize noise and interruptions  - Encourage family to assist in orientation and promotion of home routines  Outcome: Progressing

## 2024-03-11 NOTE — PROCEDURES
LONG-TERM VIDEO EEG REPORT;     Reason for Examination: Status epilepticus     Technical Summary:   18 Channels of EEG and 1 Channel of EKG was performed utilizing internation 10/20 method. Motion, muscle and machine artifacts were noted.         Recording Start date/time: 03/10 at 1339  Recording end date/time: 03/11 at 1339        Background Activity:   The background activity consisted of 8-9 Hz waveforms, reactive to eye opening/ external stimulation.     Abnormality:  Throughout the recording moderate, intermittent generalized sharp wave activity was noted.  Compared to previous 24 hours there was interval improvement noted.  Throughout the recording low to medium voltage, polymorphic, 3 to 6 Hz slow activity was noted diffusely over both hemispheres.     Activation:     Hyperventilation:   Not Performed.     Photic Stimulation:  Driving response seen.No        Sleep:  Stage I and II sleep seen.     Impression:  This is a Abnormal prolonged Video EEG study.   Moderate generalized sharp wave activity was noted throughout the recording.  Mild to moderate diffuse slowing into delta and theta range was noted. This constellation of findings can be seen in encephalopathy due to metabolic/toxic etiology, medication effects or diffuse cerebral injury. Clinical correlation is recommended.           Jimmie Gonsalez MD  [facility].

## 2024-03-11 NOTE — SLP NOTE
Attempted to see for follow up however patient continues to be lethargic and unable to arouse sufficiently for safe po trials. Will re-attempt as appropriate.    Astrid Stevens MA, CCC-SLP  Pager [phone]

## 2024-03-11 NOTE — PROGRESS NOTES
[facility]  Neurocritical Care   Progress Note     Subjective:   No acute events overnight.    Vitals:   Temp:  [97.4 °F (36.3 °C)-98.5 °F (36.9 °C)] 97.4 °F (36.3 °C)  Pulse:  [57-89] 76  Resp:  [15-23] 19  BP: ()/(44-69) 160/57  SpO2:  [91 %-100 %] 91 %  Body mass index is 32.62 kg/m².    Intake/Output:  Intake/Output Summary (Last 24 hours) at 3/11/2024 1106  Last data filed at 3/11/2024 0800  Gross per 24 hour   Intake 1985.35 ml   Output 1250 ml   Net 735.35 ml     Scheduled Meds:   valproic acid  500 mg Per NG Tube TID    topiramate  100 mg Oral BID    insulin degludec  12 Units Subcutaneous Daily    cloBAZam  2.5 mg Oral BID    lacosamide  200 mg Per NG Tube BID    levETIRAcetam  1,500 mg Per NG Tube BID    hydrALAZINE  50 mg Per NG Tube Q8H ANIBAL    amLODIPine  5 mg Per NG Tube Daily    metoprolol tartrate  25 mg Per NG Tube 2x Daily(Beta Blocker)    aspirin  81 mg Per NG Tube Daily    multivitamin  1 tablet Oral Daily    thiamine  100 mg Per NG Tube BID    insulin aspart  1-10 Units Subcutaneous q6h    pantoprazole  40 mg Intravenous Q24H    [Held by provider] escitalopram  10 mg Oral Daily    [Held by provider] mirtazapine  15 mg Oral Nightly    rosuvastatin  10 mg Oral Daily     Continuous Infusions:   continuous dose heparin 1,200 Units/hr (03/11/24 0500)     PRN Meds:lipase-protease-amylase (Lip-Prot-Amyl) **AND** sodium bicarbonate, albuterol, albuterol, glucose **OR** glucose **OR** glucose-vitamin C **OR** dextrose **OR** glucose **OR** glucose **OR** glucose-vitamin C, diazepam, acetaminophen, melatonin, polyethylene glycol (PEG 3350), sennosides, bisacodyl, ondansetron, metoclopramide, benzonatate, glycerin-hypromellose-, sodium chloride    Physical Examination:   General- No acute distress  CV- Sinus tachycardia   Resp- Shallow breathing, upper airway secretions improved  Neuro-   Mental status- Opens eyes today to repeat noxious stim, does not keep them open, not  tracking today, not following commands    Cranial nerves- pupils equally round and reactive to light, extraocular muscles intact, right gaze preference, unable to formally assess for facial symmetry, +cough  Motor/Sens- Flaccid throughout, 2/5 to pain throughout    Diagnostics:   No results found.  Lab Review     Recent Labs   Lab 03/09/24  0406 03/10/24  0518 03/11/24  0414    140 138   K 4.2 3.7 3.7   * 112 111   CO2 23.0 20.0* 20.0*   GLU 87 174* 156*   BUN 70* 60* 53*   CREATSERUM 2.35* 2.13* 1.91*     Recent Labs   Lab 03/09/24  0406 03/10/24  0518 03/11/24  0414   WBC 13.0* 10.6 8.4   HGB 8.7* 8.6* 7.2*   .0 270.0 204.0     Assesment/Plan:   79 year old female h/o htn, hl, dm2, cad, HFpEF, ckd, stroke, yrn, copd, gout, fibromyalgia, migraines, and seizure do p/w recurrent seizures 3/1 after keppra dose lowered in setting of lethargy. Now on 3 AEDs     Neuro:  Subclinical status epilepticus-   2/2 underlying seizure disorder and lowering of keppra  CT/MRI no acute changes. Repeat MRI 3/7 without new abnormalities, 2 punctate areas of enhancement in the cerebellum possibly artifact, nothing else developing concerning for infectious or alternative etiology  cEEG w/ subclinical generalized epileptiform discharges and seizures        VPA level 3/2 88.7, 3/4 105, 3/7 95 --> Decreased VPA dose to 500mg q8 3/8     Continue current AED: Keppra 1.5 g BID, Vimpat 200 mg BID, Depakote 500 mg BID, Onfi 2.5 mg BID, Topamax 100 mg BID  Do not lower medications further while inpatient given hard to control seizures  Continue cEEG  If seizures recur, family will likely transition to comfort care     Cardiac:  Htn/hl/cad/HFpEF- sbp goal 100-150, cont norvasc, metop, hydral - dose decreased 3/9, crestor  Pulmonary:  Copd- Stable on RA, aggressive bronchial hygiene, frequent suctioning, ABG 3/7 without hypercapnia  Renal:  HIREN on CKD- Seen by nephrology, Cr stable, BUN stabilizing, hyponatremia resolved,  acidosis resolved      GI:  TF - resumed, if patient unable to pass swallow eval's in the coming days, ongoing palliative discussion as family stated patient would not want long-term feeding tube  Heme/ID:  Afebrile, no leukocytosis  LLE DVT- on hep gtt  Endocrine/Rheum:  DM Type 2, uncontrolled with hyperglycemia         - Monitor glucose, sliding scale insulin prn  Checklist   - Lines: PIVs   - DVT Prophylaxis: SCDs and hep gtt   - Diet: TFs      - IVF: -    - Electrolytes: Replete per protocol   - Code Status: DNR/Select    A total of 45 minutes of critical care time (exclusive of billable procedures) was administered to manage and/or prevent neurologic instability. This involved direct patient intervention, complex decision making, and/or extensive discussions with the patient, family, and clinical staff.     Chandrika Chadwick MD [facility]  Neurocritical Care  [facility]

## 2024-03-11 NOTE — PROGRESS NOTES
[facility]   part of [facility]  Palliative Care Progress Note    Stephen Rodrigues Patient Status:  Inpatient    1945 MRN [id number]   [facility] 6NE-A Attending Nichole Kaur MD   Hosp Day # 21 PCP PHYSICIAN NONSTAFF     History/Other:     history of CKD, Aortic stenosis s/p stent, hypertension, diabetes type II, CVA, CHF, seizures, fibromyalgia who was admitted on 2024 for altered mental status. Prior to this admission on 2/15/2024 she had a syncope episode hospitalized, treated and discharged home. 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        Allergies:  Allergies   Allergen Reactions    Influenza Vaccines OTHER (SEE COMMENTS)     Fever    Dust Mite Extract UNKNOWN     Sneezing, itchy, watery eyes    Hydrocodone     Sulfa Antibiotics     Vicodin Tuss [Hydrocodone-Guaifenesin]      Medications:     Current Facility-Administered Medications:     apixaban (Eliquis) tab 5 mg, 5 mg, Oral, BID    valproic acid (Depakene) 250 MG/5ML oral solution 500 mg, 500 mg, Per NG Tube, TID    topiramate (TopaMAX) 6 mg/mL oral suspension 100 mg, 100 mg, Oral, BID    insulin degludec 100 units/mL flextouch 12 Units, 12 Units, Subcutaneous, Daily    cloBAZam (Onfi) 2.5 mg/ml SUSP 2.5 mg, 2.5 mg, Oral, BID    lacosamide (Vimpat) 10 MG/ML oral solution 200 mg, 200 mg, Per NG Tube, BID    levETIRAcetam (Keppra) tab 1,500 mg, 1,500 mg, Per NG Tube, BID    hydrALAZINE (Apresoline) tab 50 mg, 50 mg, Per NG Tube, Q8H ANIBAL    amLODIPine (Norvasc) tab 5 mg, 5 mg, Per NG Tube, Daily    metoprolol tartrate (Lopressor) tab 25 mg, 25 mg, Per NG Tube, 2x Daily(Beta Blocker)    aspirin chewable tab 81 mg, 81 mg, Per NG Tube, Daily    pancrelipase (Lip-Prot-Amyl) (Zenpep) DR particles cap 10,000 Units, 10,000 Units, Per G Tube, PRN **AND** sodium bicarbonate tab 325 mg, 325 mg, Per G  Tube, PRN    multivitamin (Centrum) chewable tab (Adult) 1 tablet, 1 tablet, Oral, Daily    thiamine (Vitamin B1) tab 100 mg, 100 mg, Per NG Tube, BID    insulin aspart (NovoLOG) 100 Units/mL FlexPen 1-10 Units, 1-10 Units, Subcutaneous, q6h    pantoprazole (Protonix) 40 mg in sodium chloride 0.9% PF 10 mL IV push, 40 mg, Intravenous, Q24H    albuterol (Ventolin HFA) 108 (90 Base) MCG/ACT inhaler 2 puff, 2 puff, Inhalation, Q4H PRN    albuterol (Ventolin) (2.5 MG/3ML) 0.083% nebulizer solution 3 mL, 3 mL, Nebulization, TID PRN    rosuvastatin (Crestor) tab 10 mg, 10 mg, Oral, Daily    glucose (Dex4) 15 GM/59ML oral liquid 15 g, 15 g, Oral, Q15 Min PRN **OR** glucose (Glutose) 40% oral gel 15 g, 15 g, Oral, Q15 Min PRN **OR** glucose-vitamin C (Dex-4) chewable tab 4 tablet, 4 tablet, Oral, Q15 Min PRN **OR** dextrose 50% injection 50 mL, 50 mL, Intravenous, Q15 Min PRN **OR** glucose (Dex4) 15 GM/59ML oral liquid 30 g, 30 g, Oral, Q15 Min PRN **OR** glucose (Glutose) 40% oral gel 30 g, 30 g, Oral, Q15 Min PRN **OR** glucose-vitamin C (Dex-4) chewable tab 8 tablet, 8 tablet, Oral, Q15 Min PRN    diazepam (Valium) 5 mg/mL injection 5 mg, 5 mg, Intravenous, Q30 Min PRN    acetaminophen (Tylenol Extra Strength) tab 500 mg, 500 mg, Oral, Q4H PRN    melatonin tab 5 mg, 5 mg, Oral, Nightly PRN    polyethylene glycol (PEG 3350) (Miralax) 17 g oral packet 17 g, 17 g, Oral, Daily PRN    sennosides (Senokot) tab 17.2 mg, 17.2 mg, Oral, Nightly PRN    bisacodyl (Dulcolax) 10 MG rectal suppository 10 mg, 10 mg, Rectal, Daily PRN    ondansetron (Zofran) 4 MG/2ML injection 4 mg, 4 mg, Intravenous, Q6H PRN    metoclopramide (Reglan) 5 mg/mL injection 5 mg, 5 mg, Intravenous, Q8H PRN    benzonatate (Tessalon) cap 200 mg, 200 mg, Oral, TID PRN    glycerin-hypromellose- (Artifical Tears) 0.2-0.2-1 % ophthalmic solution 1 drop, 1 drop, Both Eyes, QID PRN    sodium chloride (Saline Mist) 0.65 % nasal solution 1 spray, 1 spray,  Each Nare, Q3H PRN    Subjective      Interval events:  no purposeful movement, opens eyes to stimulation, not tracking.   EEG report from 3/10 showed at least 1 episode of seizure  Blood count trending down  Creatinine improving  Review of Systems: DEISY    Bowel Movement         3/11/2024  0615             Stool Count Calculated for I/O: 1          Objective:     Vital Signs:  Blood pressure 101/42, pulse 76, temperature 98.6 °F (37 °C), temperature source Axillary, resp. rate 19, weight 190 lb 0.6 oz (86.2 kg), SpO2 98%.  Body mass index is 32.62 kg/m².      Performance scale: 20%  % Ambulation Activity Level Self-Care Intake Consciousness   100 Full  Normal  No Disease Full Normal Full   90 Full  Normal  Some Disease Full Normal Full   80 Full  Normal w/effort  Some Disease Full Normal or reduced Full   70 Reduced  Can't Perform Job  Some Disease Full Normal or reduced Full   60 Reduced  Can't Perform Hobby   Significant Disease Occ Assist Normal or reduced Full or confused   50 Mainly sit/lie Can't do any work  Extensive Disease Partial Assist Normal or reduced Full or confused   40 Mainly in bed Can't do any work  Extensive Disease Mainly Assist Normal or reduced Full or confused   30 Bed Bound Can't do any work  Extensive Disease Max Assist  Total Care Reduced  Drowsy/confused   20 Bed Bound Can't do any work  Extensive Disease Max Assist  Total Care Minimal  Drowsy/confused   10 Bed Bound Can't do any work  Extensive Disease Max Assist  Total Care Mouth Care  Drowsy/confused   0 Death      Physical Exam:  General:  In no respiratory distress. Body habitus BMI WNL   HEENT: Dry MM   Cardiac:  regular rate  Neurologic: not following commands  Psychiatric: Mood - calm    Results:     Hematology:  Lab Results   Component Value Date    WBC 8.4 03/11/2024    HGB 7.2 (L) 03/11/2024    HCT 22.5 (L) 03/11/2024    .0 03/11/2024     Coags:  Lab Results   Component Value Date    INR 1.16 02/19/2024    PTT 45.7 (H)  03/11/2024     Chemistry:  Lab Results   Component Value Date    CREATSERUM 1.91 (H) 03/11/2024    BUN 53 (H) 03/11/2024     03/11/2024    K 3.7 03/11/2024     03/11/2024    CO2 20.0 (L) 03/11/2024     (H) 03/11/2024    CA 8.8 03/11/2024    ALB 2.1 (L) 03/07/2024    ALKPHO 113 03/07/2024    BILT 0.2 03/07/2024    TP 7.1 03/07/2024    AST 13 (L) 03/07/2024    ALT 30 03/07/2024    DDIMER 0.70 01/26/2023    MG 2.6 03/11/2024    PHOS 3.3 03/11/2024    TROP <0.045 11/10/2021          Summary of Discussion I met with Alla, her son and Tyson Mitchell's son. Alla is surrogate HCPOA. Tyson stated Alla is the decision maker. Alla verbalized understanding to the limited progress Stephen has made since Thursday. She neurologically has not improved enough to take in oral nutrition or participate in daily life. The change in condition is difficult for Tyson as he has not walked the daily routine with Stephen as Alla, as well Alla has known her mother's wishes related to aggressive measures.      Alla remains consistent in not wanting a PEG. This lead to a long term care discussion related to her inability at this time to take in nutrition other then the dobhoff. I explained the dobhoff is not a long term means to provide nutrition, without a long term route for nutrition she is hospice appropriate. She knows her mother would not want to have her life prolonged. We discussed hospice providing comfort care. There will have to coordinate of care to manage the seizures under the care of hospice. Alla is allowing time to verify her behavior remains consistent with the prognosis.    She will let me know or the medical team when she feels it is time to transition to hospice. She is not ready to make a decision today.       Advance Care Planning counseling and discussion: DNAR with selective treatment.       HC POA  surrogate . Healthcare Agent's Name: Alla Rodrigues   DNAR/Selective Treatment    Principal Problem:     Altered mental status, unspecified altered mental status type  Active Problems:    CKD (chronic kidney disease) stage 4, GFR 15-29 ml/min (HCC)    Acute on chronic congestive heart failure, unspecified heart failure type (HCC)    History of seizure    Cognitive decline    Metabolic acidosis    Stage 3 chronic kidney disease (HCC)    ATN (acute tubular necrosis) (HCC)    Hypernatremia    Status epilepticus (HCC)    History of stroke    Hyponatremia    Palliative care encounter    Goals of care, counseling/discussion      Assessment and Recommendations   Goals of care:    Continue current medical management. Alla/ surrogate RANI has good insight into her mother's wishes related to medical management prolonging life vs treatments. Alla is considering transitioning to comfort care supported by hospice.   [place] according to Alla would not want a PEG.   Code Status: DNAR/Selective Treatment  Healthcare Agent's Name: Alla Rodrigues      Discussed today's visit with Kristina GOODEN.     Palliative Care Follow Up: Palliative care team will follow while inpatient.   Thank you for allowing Palliative Care services to participate in the care of Stephen Rodrigues.    A total of 50 minutes were spent on this consult, which included all of the following: chart review, direct face to face contact,  physical examination, counseling and coordinating care, and documentation.     Daria Sun, URBAN  3/11/2024   12:51 PM   Palliative Care Services    The 21st Century Cures Act makes medical notes like these available to patients in the interest of transparency. Please be advised this is a medical document. Medical documents are intended to carry relevant information, facts as evident, and the clinical opinion of the practitioner. The medical note is intended as peer to peer communication and may appear blunt or direct. It is written in medical language and may contain abbreviations or verbiage that are unfamiliar.

## 2024-03-11 NOTE — PLAN OF CARE
Care assumed 1930 in bed, cEEG in place, heparin infusing per PE/DVT order set. Purewick in place, incontinent. NSR, normotensive; Q4 neuros, withdraws slightly to pain, will occasionally open eyes to verbal stim but does not track.

## 2024-03-12 ENCOUNTER — NURSE ONLY (OUTPATIENT)
Dept: ELECTROPHYSIOLOGY | Facility: HOSPITAL | Age: 79
End: 2024-03-12
Attending: Other
Payer: MEDICARE

## 2024-03-12 NOTE — PLAN OF CARE
Assumed care this morning. Pt neurologically unresponsive. Does not move extremities, follow commands or attempt to talk. Occ will open eyes spontaneously. +cough and gag. Pupils reactive. Video EEG in place. NSR on monitor, BP stable. RA with sats >90% however pt sounds course. Attempted to suction but not able to clear secretions. Tolerating tube feeding. Fecal tube in place, no stool in tubing or bag.  Purewick in place.     Pt to transfer to CTU room 7615. Report given to receiving RNJesusita. Belongings packed. Family informed of transfer. Pt will transfer on bed, with tele monitor. EEG called and will need to call back when transferred to walk through how to set it up again.   Problem: Diabetes/Glucose Control  Goal: Glucose maintained within prescribed range  Description: INTERVENTIONS:  - Monitor Blood Glucose as ordered  - Assess for signs and symptoms of hyperglycemia and hypoglycemia  - Administer ordered medications to maintain glucose within target range  - Assess barriers to adequate nutritional intake and initiate nutrition consult as needed  - Instruct patient on self management of diabetes  Outcome: Progressing     Problem: Patient/Family Goals  Goal: Patient/Family Long Term Goal  Description: Patient's Long Term Goal: to go home    Interventions:  - Mds to see  - IV keppra  - See additional Care Plan goals for specific interventions  Outcome: Not Progressing  Goal: Patient/Family Short Term Goal  Description: Patient's Short Term Goal: to feel better    Interventions:   - IV keppra  - Mds to see  - Comply to care plan  - See additional Care Plan goals for specific interventions  Outcome: Not Progressing     Problem: NEUROLOGICAL - ADULT  Goal: Achieves stable or improved neurological status  Description: INTERVENTIONS  - Assess for and report changes in neurological status  - Initiate measures to prevent increased intracranial pressure  - Maintain blood pressure and fluid volume within ordered  parameters to optimize cerebral perfusion and minimize risk of hemorrhage  - Monitor temperature, glucose, and sodium. Initiate appropriate interventions as ordered  Outcome: Not Progressing  Goal: Absence of seizures  Description: INTERVENTIONS  - Monitor for seizure activity  - Administer anti-seizure medications as ordered  - Monitor neurological status  Outcome: Not Progressing  Goal: Remains free of injury related to seizure activity  Description: INTERVENTIONS:  - Maintain airway, patient safety  and administer oxygen as ordered  - Monitor patient for seizure activity, document and report duration and description of seizure to MD/LIP  - If seizure occurs, turn patient to side and suction secretions as needed  - Reorient patient post seizure  - Seizure pads on all 4 side rails  - Instruct patient/family to notify RN of any seizure activity  - Instruct patient/family to call for assistance with activity based on assessment  Outcome: Progressing  Goal: Achieves maximal functionality and self care  Description: INTERVENTIONS  - Monitor swallowing and airway patency with patient fatigue and changes in neurological status  - Encourage and assist patient to increase activity and self care with guidance from PT/OT  - Encourage visually impaired, hearing impaired and aphasic patients to use assistive/communication devices  Outcome: Not Progressing

## 2024-03-12 NOTE — PROGRESS NOTES
[facility]     Hospitalist Progress Note     Stephen Rodrigues Patient Status:  Inpatient    1945 MRN [id number]   [facility] 2NE-A Attending Brody Boston MD   Hosp Day # 21 PCP PHYSICIAN NONSTAFF     Subjective:   seizures    Objective:    Review of Systems:   A comprehensive review of systems was  not completed  Vital signs:  Temp:  [97.4 °F (36.3 °C)-98.6 °F (37 °C)] 98.4 °F (36.9 °C)  Pulse:  [64-89] 82  Resp:  [15-23] 21  BP: ()/(42-69) 156/50  SpO2:  [91 %-99 %] 94 %  Physical Exam:    General: No acute distress, somnolent   Respiratory: no wheezes, no rhonchi  Cardiovascular: S1, S2, RRR  Abdomen: Soft, NT/ND, +BS  Extremities: Minimal response to painful stimuli. Opens eyes briefly to verbal stimuli  - no significant change from yesterday     Diagnostic Data:    Labs:  Recent Labs   Lab 24  0802 24  0839 24  0406 03/10/24  0518 24  0414   WBC 8.2 10.2 13.0* 10.6 8.4   HGB 9.7* 9.0* 8.7* 8.6* 7.2*   .0 96.4 98.5 99.6 100.0   .0 272.0 244.0 270.0 204.0       Recent Labs   Lab 24  1739 24  0506 24  0406 03/10/24  0518 24  0414   GLU  --    < > 87 174* 156*   BUN  --    < > 70* 60* 53*   CREATSERUM  --    < > 2.35* 2.13* 1.91*   CA  --    < > 8.9 9.0 8.8   ALB 2.1*  --   --   --   --    NA  --    < > 140 140 138   K  --    < > 4.2 3.7 3.7   CL  --    < > 113* 112 111   CO2  --    < > 23.0 20.0* 20.0*   ALKPHO 113  --   --   --   --    AST 13*  --   --   --   --    ALT 30  --   --   --   --    BILT 0.2  --   --   --   --    TP 7.1  --   --   --   --     < > = values in this interval not displayed.     Estimated Creatinine Clearance: 20.6 mL/min (A) (based on SCr of 1.91 mg/dL (H)).  No results for input(s): \"PTP\", \"INR\" in the last 168 hours.         Microbiology  No results found for this visit on 24.  Imaging: Reviewed in Epic.  Medications:    apixaban  5 mg Oral BID    valproic acid  500 mg Per NG Tube TID     topiramate  100 mg Oral BID    insulin degludec  12 Units Subcutaneous Daily    cloBAZam  2.5 mg Oral BID    lacosamide  200 mg Per NG Tube BID    levETIRAcetam  1,500 mg Per NG Tube BID    hydrALAZINE  50 mg Per NG Tube Q8H ANIBAL    amLODIPine  5 mg Per NG Tube Daily    metoprolol tartrate  25 mg Per NG Tube 2x Daily(Beta Blocker)    aspirin  81 mg Per NG Tube Daily    multivitamin  1 tablet Oral Daily    thiamine  100 mg Per NG Tube BID    insulin aspart  1-10 Units Subcutaneous q6h    pantoprazole  40 mg Intravenous Q24H    rosuvastatin  10 mg Oral Daily       Assessment & Plan:      #Acute encephalopathy with breakthrough seizures  -on Keppra, Depakote, Vimpat, Topomax clobazam  -poor prognosis    #HIREN on CKD IV- back to baseline   -resolved    #Hx of seizure disorder- as above   Awaiting family decision  #Hypernatremia- monitor   #Acute on chronic diastolic CHF-resolved  #Aortic stenosis-Mild to moderate on most recent echo  #CAD s/p stent-Aspirin, statin  #Hypertension-home meds   #Hyperlipidemia-Statin  #DM type II with A1c 5.8-Insulin sliding scale  #Hx of CVA-Aspirin, statin  #JHONY-JHONY protocol  #COPD, stable-Resume albuterol inhaler as needed  #Gout-Allopurinol  #Fibromyalgia  #LLE DVT- heparin  #Anxiety and depression-Resume Lexapro and Remeron once PO  #GERD-PPI  #TEN-continue tube feeds          Supplementary Documentation:   Quality:  DVT Mechanical Prophylaxis:   SCDs,    DVT Pharmacologic Prophylaxis   Medication    apixaban (Eliquis) tab 5 mg      DVT Pharmacologic prophylaxis: Aspirin 162 mg         Code Status: DNAR/Selective Treatment  Gurrola: External urinary catheter in place  Gurrola Duration (in days):   Central line:    ELROY:   At this point Ms. Rodrigues is expected to be discharge to: tbd     The 21st Century Cures Act makes medical notes like these available to patients in the interest of transparency. Please be advised this is a medical document. Medical documents are intended to carry relevant  information, facts as evident, and the clinical opinion of the practitioner. The medical note is intended as peer to peer communication and may appear blunt or direct. It is written in medical language and may contain abbreviations or verbiage that are unfamiliar.

## 2024-03-12 NOTE — PROGRESS NOTES
[facility]  Neurocritical Care   Progress Note     Subjective:   No acute events overnight. EEG with persistent sharp wave activity.     Vitals:   Temp:  [98.4 °F (36.9 °C)-99.2 °F (37.3 °C)] 99.2 °F (37.3 °C)  Pulse:  [69-86] 79  Resp:  [16-27] 20  BP: (101-169)/(42-98) 137/47  SpO2:  [92 %-100 %] 95 %  Body mass index is 32.51 kg/m².    Intake/Output:  Intake/Output Summary (Last 24 hours) at 3/12/2024 0906  Last data filed at 3/12/2024 0600  Gross per 24 hour   Intake 1415.4 ml   Output 1186 ml   Net 229.4 ml     Scheduled Meds:   apixaban  5 mg Oral BID    valproic acid  500 mg Per NG Tube TID    topiramate  100 mg Oral BID    insulin degludec  12 Units Subcutaneous Daily    cloBAZam  2.5 mg Oral BID    lacosamide  200 mg Per NG Tube BID    levETIRAcetam  1,500 mg Per NG Tube BID    hydrALAZINE  50 mg Per NG Tube Q8H ANIBAL    amLODIPine  5 mg Per NG Tube Daily    metoprolol tartrate  25 mg Per NG Tube 2x Daily(Beta Blocker)    aspirin  81 mg Per NG Tube Daily    multivitamin  1 tablet Oral Daily    thiamine  100 mg Per NG Tube BID    insulin aspart  1-10 Units Subcutaneous q6h    pantoprazole  40 mg Intravenous Q24H    rosuvastatin  10 mg Oral Daily     Continuous Infusions:      PRN Meds:loperamide, lipase-protease-amylase (Lip-Prot-Amyl) **AND** sodium bicarbonate, albuterol, albuterol, glucose **OR** glucose **OR** glucose-vitamin C **OR** dextrose **OR** glucose **OR** glucose **OR** glucose-vitamin C, diazepam, acetaminophen, melatonin, polyethylene glycol (PEG 3350), sennosides, bisacodyl, ondansetron, metoclopramide, benzonatate, glycerin-hypromellose-, sodium chloride    Physical Examination:   General- No acute distress  CV- Sinus tachycardia   Resp- Shallow breathing, upper airway secretions improved  Neuro-   Mental status- Opens eyes today to repeat noxious stim, does not keep them open, not tracking today, not following commands    Cranial nerves- pupils equally round and  reactive to light, extraocular muscles intact, right gaze preference, unable to formally assess for facial symmetry, +cough  Motor/Sens- Flaccid throughout, 2/5 to pain throughout    Diagnostics:   No results found.  Lab Review     Recent Labs   Lab 03/10/24  0518 03/11/24  0414 03/12/24  0656    138 140   K 3.7 3.7 3.5    111 113*   CO2 20.0* 20.0* 21.0   * 156* 204*   BUN 60* 53* 54*   CREATSERUM 2.13* 1.91* 1.90*     Recent Labs   Lab 03/10/24  0518 03/11/24  0414 03/12/24  0656   WBC 10.6 8.4 7.4   HGB 8.6* 7.2* 8.2*   .0 204.0 247.0     Assesment/Plan:   79 year old female h/o htn, hl, dm2, cad, HFpEF, ckd, stroke, yrn, copd, gout, fibromyalgia, migraines, and seizure do p/w recurrent seizures 3/1 after keppra dose lowered in setting of lethargy. Now on 3 AEDs     Neuro:  Subclinical super refractory status epilepticus-   2/2 underlying seizure disorder and lowering of keppra  CT/MRI no acute changes. Repeat MRI 3/7 without new abnormalities, 2 punctate areas of enhancement in the cerebellum possibly artifact, nothing else developing concerning for infectious or alternative etiology  cEEG w/ subclinical generalized epileptiform discharges and seizures        VPA level 3/2 88.7, 3/4 105, 3/7 95 --> Decreased VPA dose to 500mg q8 3/8     Continue current AED: Keppra 1.5 g BID, Vimpat 200 mg BID, Depakote 500 mg BID, Onfi 2.5 mg BID, Topamax 100 mg BID  Do not lower medications further while inpatient given hard to control seizures  Continue cEEG, with persistent generalized sharp wave activity.    Cardiac:  Htn/hl/cad/HFpEF- sbp goal 100-150, cont norvasc, metop, hydral - dose decreased 3/9, crestor  Pulmonary:  Copd- Stable on RA, aggressive bronchial hygiene, frequent suctioning, ABG 3/7 without hypercapnia  Renal:  HIREN on CKD- Seen by nephrology, Cr stable, BUN stabilizing, hyponatremia resolved, acidosis resolved      GI:  TF - resumed, if patient unable to pass swallow eval's in the  coming days, ongoing palliative discussion as family stated patient would not want long-term feeding tube  Heme/ID:  Afebrile, no leukocytosis  LLE DVT- on doac  Endocrine/Rheum:  DM Type 2, uncontrolled with hyperglycemia         - Monitor glucose, sliding scale insulin prn  Checklist   - Lines: PIVs   - DVT Prophylaxis: SCDs and doac   - Diet: TFs      - IVF: -    - Electrolytes: Replete per protocol   - Code Status: DNR/Select    A total of 45 minutes of critical care time (exclusive of billable procedures) was administered to manage and/or prevent neurologic instability. This involved direct patient intervention, complex decision making, and/or extensive discussions with the patient, family, and clinical staff.     Chandrika Chadwick MD [facility]  Neurocritical Care  [facility]

## 2024-03-12 NOTE — PROGRESS NOTES
[facility]   part of [facility]  Palliative Care Progress Note    Stephen Rodrigues Patient Status:  Inpatient    1945 MRN [id number]   [facility] 6NE-A Attending Nichole Kaur MD   Hosp Day # 22 PCP PHYSICIAN NONSTAFF     History/Other:  history of CKD, Aortic stenosis s/p stent, hypertension, diabetes type II, CVA, CHF, seizures, fibromyalgia who was admitted on 2024 for altered mental status. Prior to this admission on 2/15/2024 she had a syncope episode hospitalized, treated and discharged home. 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         Allergies:  Allergies   Allergen Reactions    Influenza Vaccines OTHER (SEE COMMENTS)     Fever    Dust Mite Extract UNKNOWN     Sneezing, itchy, watery eyes    Hydrocodone     Sulfa Antibiotics     Vicodin Tuss [Hydrocodone-Guaifenesin]      Medications:     Current Facility-Administered Medications:     loperamide (Imodium) cap 2 mg, 2 mg, Oral, QID PRN    apixaban (Eliquis) tab 5 mg, 5 mg, Oral, BID    valproic acid (Depakene) 250 MG/5ML oral solution 500 mg, 500 mg, Per NG Tube, TID    topiramate (TopaMAX) 6 mg/mL oral suspension 100 mg, 100 mg, Oral, BID    insulin degludec 100 units/mL flextouch 12 Units, 12 Units, Subcutaneous, Daily    cloBAZam (Onfi) 2.5 mg/ml SUSP 2.5 mg, 2.5 mg, Oral, BID    lacosamide (Vimpat) 10 MG/ML oral solution 200 mg, 200 mg, Per NG Tube, BID    levETIRAcetam (Keppra) tab 1,500 mg, 1,500 mg, Per NG Tube, BID    hydrALAZINE (Apresoline) tab 50 mg, 50 mg, Per NG Tube, Q8H ANIBAL    amLODIPine (Norvasc) tab 5 mg, 5 mg, Per NG Tube, Daily    metoprolol tartrate (Lopressor) tab 25 mg, 25 mg, Per NG Tube, 2x Daily(Beta Blocker)    aspirin chewable tab 81 mg, 81 mg, Per NG Tube, Daily    pancrelipase (Lip-Prot-Amyl) (Zenpep) DR particles cap 10,000 Units, 10,000 Units, Per G Tube, PRN  **AND** sodium bicarbonate tab 325 mg, 325 mg, Per G Tube, PRN    multivitamin (Centrum) chewable tab (Adult) 1 tablet, 1 tablet, Oral, Daily    thiamine (Vitamin B1) tab 100 mg, 100 mg, Per NG Tube, BID    insulin aspart (NovoLOG) 100 Units/mL FlexPen 1-10 Units, 1-10 Units, Subcutaneous, q6h    pantoprazole (Protonix) 40 mg in sodium chloride 0.9% PF 10 mL IV push, 40 mg, Intravenous, Q24H    albuterol (Ventolin HFA) 108 (90 Base) MCG/ACT inhaler 2 puff, 2 puff, Inhalation, Q4H PRN    albuterol (Ventolin) (2.5 MG/3ML) 0.083% nebulizer solution 3 mL, 3 mL, Nebulization, TID PRN    rosuvastatin (Crestor) tab 10 mg, 10 mg, Oral, Daily    glucose (Dex4) 15 GM/59ML oral liquid 15 g, 15 g, Oral, Q15 Min PRN **OR** glucose (Glutose) 40% oral gel 15 g, 15 g, Oral, Q15 Min PRN **OR** glucose-vitamin C (Dex-4) chewable tab 4 tablet, 4 tablet, Oral, Q15 Min PRN **OR** dextrose 50% injection 50 mL, 50 mL, Intravenous, Q15 Min PRN **OR** glucose (Dex4) 15 GM/59ML oral liquid 30 g, 30 g, Oral, Q15 Min PRN **OR** glucose (Glutose) 40% oral gel 30 g, 30 g, Oral, Q15 Min PRN **OR** glucose-vitamin C (Dex-4) chewable tab 8 tablet, 8 tablet, Oral, Q15 Min PRN    diazepam (Valium) 5 mg/mL injection 5 mg, 5 mg, Intravenous, Q30 Min PRN    acetaminophen (Tylenol Extra Strength) tab 500 mg, 500 mg, Oral, Q4H PRN    melatonin tab 5 mg, 5 mg, Oral, Nightly PRN    polyethylene glycol (PEG 3350) (Miralax) 17 g oral packet 17 g, 17 g, Oral, Daily PRN    sennosides (Senokot) tab 17.2 mg, 17.2 mg, Oral, Nightly PRN    bisacodyl (Dulcolax) 10 MG rectal suppository 10 mg, 10 mg, Rectal, Daily PRN    ondansetron (Zofran) 4 MG/2ML injection 4 mg, 4 mg, Intravenous, Q6H PRN    metoclopramide (Reglan) 5 mg/mL injection 5 mg, 5 mg, Intravenous, Q8H PRN    benzonatate (Tessalon) cap 200 mg, 200 mg, Oral, TID PRN    glycerin-hypromellose- (Artifical Tears) 0.2-0.2-1 % ophthalmic solution 1 drop, 1 drop, Both Eyes, QID PRN    sodium chloride  (Saline Mist) 0.65 % nasal solution 1 spray, 1 spray, Each Nare, Q3H PRN    Subjective      Interval events: moving bowels, no change in mental status, course breath sounds    Review of Systems:  Dyspnea: not observed  Dobhoff with feedings infusing.     Bowel Movement         3/11/2024  2300             Stool Count Calculated for I/O: 1            Objective:     Vital Signs:  Blood pressure 124/51, pulse 79, temperature 99.2 °F (37.3 °C), temperature source Oral, resp. rate 21, weight 189 lb 6 oz (85.9 kg), SpO2 94%.  Body mass index is 32.51 kg/m².    Performance scale: 20%  % Ambulation Activity Level Self-Care Intake Consciousness   100 Full  Normal  No Disease Full Normal Full   90 Full  Normal  Some Disease Full Normal Full   80 Full  Normal w/effort  Some Disease Full Normal or reduced Full   70 Reduced  Can't Perform Job  Some Disease Full Normal or reduced Full   60 Reduced  Can't Perform Hobby   Significant Disease Occ Assist Normal or reduced Full or confused   50 Mainly sit/lie Can't do any work  Extensive Disease Partial Assist Normal or reduced Full or confused   40 Mainly in bed Can't do any work  Extensive Disease Mainly Assist Normal or reduced Full or confused   30 Bed Bound Can't do any work  Extensive Disease Max Assist  Total Care Reduced  Drowsy/confused   20 Bed Bound Can't do any work  Extensive Disease Max Assist  Total Care Minimal  Drowsy/confused   10 Bed Bound Can't do any work  Extensive Disease Max Assist  Total Care Mouth Care  Drowsy/confused   0 Death          Physical Exam:  General: unresponsive  Cardiac:  regular rate  Lungs: coarse on RA  Abdomen: Soft, non-tender, non-distended, normal bowel sounds X 4 quadrants   Neurologic: not following commands, eyes closed, able to protect airway        Results:     Hematology:  Lab Results   Component Value Date    WBC 7.4 03/12/2024    HGB 8.2 (L) 03/12/2024    HCT 25.1 (L) 03/12/2024    .0 03/12/2024     Coags:  Lab Results    Component Value Date    INR 1.16 02/19/2024    .3 (H) 03/11/2024     Chemistry:  Lab Results   Component Value Date    CREATSERUM 1.90 (H) 03/12/2024    BUN 54 (H) 03/12/2024     03/12/2024    K 3.5 03/12/2024     (H) 03/12/2024    CO2 21.0 03/12/2024     (H) 03/12/2024    CA 8.9 03/12/2024    ALB 2.1 (L) 03/07/2024    ALKPHO 113 03/07/2024    BILT 0.2 03/07/2024    TP 7.1 03/07/2024    AST 13 (L) 03/07/2024    ALT 30 03/07/2024    DDIMER 0.70 01/26/2023    MG 2.6 03/12/2024    PHOS 2.8 03/12/2024    TROP <0.045 11/10/2021         Summary of Discussion : I called Alla, the daughter/ surrogate POA to follow up. There was no answer I left my number on the message for her to call me back.       Advance Care Planning counseling and discussion:     DNR ( do not resuscitate) indicates if the heart stops and no spontaneous breaths end of life is present, \" allow natural death.\"      DNAR with Selective treatment is appropriate for on going medical management and treatment for new potential symptoms or complications with the exception of CPR AND INTUBATION.  DNI ( do not intubate) indicates no breathing tube will be placed for respiratory distress or if no spontaneous breaths.    Alla stated tSephen would not want a PEG for herself.     HC POA  surrogate . Healthcare Agent's Name: Alla Rodrigues   POLST FORM - deferred at this time.   DNAR/Selective Treatment    Principal Problem:    Altered mental status, unspecified altered mental status type  Active Problems:    CKD (chronic kidney disease) stage 4, GFR 15-29 ml/min (HCC)    Acute on chronic congestive heart failure, unspecified heart failure type (HCC)    History of seizure    Cognitive decline    Metabolic acidosis    Stage 3 chronic kidney disease (HCC)    ATN (acute tubular necrosis) (HCC)    Hypernatremia    Status epilepticus (HCC)    History of stroke    Hyponatremia    Palliative care encounter    Goals of care,  counseling/discussion      Assessment and Recommendations   Goals of care:    Continue current medical management. The family is aware without improvement in her neurologic status to take in oral nutrition and Stephen's wishes for no PEG placement her care may need to transition from treatment focus to comfort care.   Code Status: DNAR/Selective Treatment  Healthcare Agent's Name: Alla Rodrigues      Discussed today's visit with   Alejandra GOODEN.     Palliative Care Follow Up: Palliative care team will Continue to follow while inpatient.    Thank you for allowing Palliative Care services to participate in the care of Stephen Rodrigues.    A total of 25 minutes were spent on this consult, which included all of the following: chart review, direct face to face contact,  physical examination, and documentation.     URBAN Noe  3/12/2024   10:45 AM   Palliative Care Services    The 21st Century Cures Act makes medical notes like these available to patients in the interest of transparency. Please be advised this is a medical document. Medical documents are intended to carry relevant information, facts as evident, and the clinical opinion of the practitioner. The medical note is intended as peer to peer communication and may appear blunt or direct. It is written in medical language and may contain abbreviations or verbiage that are unfamiliar.

## 2024-03-12 NOTE — PROCEDURES
LONG-TERM VIDEO EEG REPORT;     Reason for Examination: Status epilepticus     Technical Summary:   18 Channels of EEG and 1 Channel of EKG was performed utilizing internation 10/20 method. Motion, muscle and machine artifacts were noted.         Recording Start date/time: 03/11 at 1400   Recording end date/time: 03/12 at 1400        Background Activity:   The background activity consisted of 8-9 Hz waveforms, reactive to external stimulation.     Abnormality:  Throughout the recording moderate, intermittent generalized sharp wave activity was noted.  Throughout the recording low to medium voltage, polymorphic, 3 to 6 Hz slow activity was noted diffusely over both hemispheres     Activation:     Hyperventilation:   Not Performed.     Photic Stimulation:  Driving response seen.No        Sleep:  Stage I and II sleep seen.     Impression:  This is a Abnormal prolonged Video EEG study.   Moderate generalized sharp wave activity was noted throughout the recording.  Mild to moderate diffuse slowing into delta and theta range was noted.  This constellation of findings can be seen in encephalopathy due to metabolic/toxic etiology, medication effects or diffuse cerebral injury. Clinical correlation is recommended.           Jimmie Gonsalez MD  [facility].

## 2024-03-12 NOTE — PROGRESS NOTES
[facility]     Hospitalist Progress Note     Stephen Rodrigues Patient Status:  Inpatient    1945 MRN [id number]   [facility] 2NE-A Attending Brody Boston MD   Hosp Day # 22 PCP PHYSICIAN NONSTAFF     Subjective:   seizures    Objective:    Review of Systems:   A comprehensive review of systems was  not completed  Vital signs:  Temp:  [98.1 °F (36.7 °C)-99.6 °F (37.6 °C)] 99.6 °F (37.6 °C)  Pulse:  [69-87] 87  Resp:  [17-27] 25  BP: ()/(45-98) 128/49  SpO2:  [92 %-100 %] 95 %  Physical Exam:    General: No acute distress, somnolent   Respiratory: no wheezes, no rhonchi  Cardiovascular: S1, S2, RRR  Abdomen: Soft, NT/ND, +BS  Extremities: Minimal response to painful stimuli. Opens eyes briefly to verbal stimuli  - no significant change from yesterday     Diagnostic Data:    Labs:  Recent Labs   Lab 24  0839 24  0406 03/10/24  0518 24  0414 24  0656   WBC 10.2 13.0* 10.6 8.4 7.4   HGB 9.0* 8.7* 8.6* 7.2* 8.2*   MCV 96.4 98.5 99.6 100.0 96.2   .0 244.0 270.0 204.0 247.0       Recent Labs   Lab 24  1739 24  0506 03/10/24  0518 24  0414 24  0656   GLU  --    < > 174* 156* 204*   BUN  --    < > 60* 53* 54*   CREATSERUM  --    < > 2.13* 1.91* 1.90*   CA  --    < > 9.0 8.8 8.9   ALB 2.1*  --   --   --   --    NA  --    < > 140 138 140   K  --    < > 3.7 3.7 3.5   CL  --    < > 112 111 113*   CO2  --    < > 20.0* 20.0* 21.0   ALKPHO 113  --   --   --   --    AST 13*  --   --   --   --    ALT 30  --   --   --   --    BILT 0.2  --   --   --   --    TP 7.1  --   --   --   --     < > = values in this interval not displayed.     Estimated Creatinine Clearance: 20.7 mL/min (A) (based on SCr of 1.9 mg/dL (H)).  No results for input(s): \"PTP\", \"INR\" in the last 168 hours.         Microbiology  No results found for this visit on 24.  Imaging: Reviewed in Epic.  Medications:    apixaban  5 mg Oral BID    valproic acid  500 mg Per NG Tube TID     topiramate  100 mg Oral BID    insulin degludec  12 Units Subcutaneous Daily    cloBAZam  2.5 mg Oral BID    lacosamide  200 mg Per NG Tube BID    levETIRAcetam  1,500 mg Per NG Tube BID    hydrALAZINE  50 mg Per NG Tube Q8H ANIBAL    amLODIPine  5 mg Per NG Tube Daily    metoprolol tartrate  25 mg Per NG Tube 2x Daily(Beta Blocker)    aspirin  81 mg Per NG Tube Daily    multivitamin  1 tablet Oral Daily    thiamine  100 mg Per NG Tube BID    insulin aspart  1-10 Units Subcutaneous q6h    pantoprazole  40 mg Intravenous Q24H    rosuvastatin  10 mg Oral Daily       Assessment & Plan:      #Acute encephalopathy with breakthrough seizures  -on Keppra, Depakote, Vimpat, Topomax clobazam  -poor prognosis    #HIREN on CKD IV- back to baseline   -resolved    #Hx of seizure disorder- as above   Awaiting family decision  #Hypernatremia- monitor   #Acute on chronic diastolic CHF-resolved  #Aortic stenosis-Mild to moderate on most recent echo  #CAD s/p stent-Aspirin, statin  #Hypertension-home meds   #Hyperlipidemia-Statin  #DM type II with A1c 5.8-Insulin sliding scale  #Hx of CVA-Aspirin, statin  #JHONY-JHONY protocol  #COPD, stable-Resume albuterol inhaler as needed  #Gout-Allopurinol  #Fibromyalgia  #LLE DVT- heparin  #Anxiety and depression-Resume Lexapro and Remeron once PO  #GERD-PPI  #TEN-continue tube feeds    EEG  \"This is a Abnormal prolonged Video EEG study.   Moderate generalized sharp wave activity was noted throughout the recording.  Mild to moderate diffuse slowing into delta and theta range was noted.  This constellation of findings can be seen in encephalopathy due to metabolic/toxic etiology, medication effects or diffuse cerebral injury. Clinical correlation is recommended\"             Supplementary Documentation:   Quality:  DVT Mechanical Prophylaxis:   SCDs,    DVT Pharmacologic Prophylaxis   Medication    apixaban (Eliquis) tab 5 mg      DVT Pharmacologic prophylaxis: Aspirin 162 mg         Code Status:  DNAR/Selective Treatment  Gurrola: External urinary catheter in place  Gurrola Duration (in days):   Central line:    ELROY:   At this point Ms. Rodrigues is expected to be discharge to: tbd     The 21st Century Cures Act makes medical notes like these available to patients in the interest of transparency. Please be advised this is a medical document. Medical documents are intended to carry relevant information, facts as evident, and the clinical opinion of the practitioner. The medical note is intended as peer to peer communication and may appear blunt or direct. It is written in medical language and may contain abbreviations or verbiage that are unfamiliar.

## 2024-03-13 NOTE — PLAN OF CARE
Assumed care at 0730. A&OX0. Non-Verbal & not reacting to any stimuli. Eyes open occasionally. Neuro status unchanged. See flow sheets. Seizure Precautions maintained.  Tele - SR with occasional PVC's.  No appearance of pain. No moaning or grimmacing.  TF infusing per order. Tolerating well.   3 loose stools today. Flexiseal fell out in am & reinserted.   Mepilex on Sacrum. Changed today.  24 hour EEG dc'd.  Family meeting with Hospice this evening.  Resting comfortably.  Will continue to monitor.

## 2024-03-13 NOTE — OCCUPATIONAL THERAPY NOTE
This writer discussed with RN. Pt not following commands and demonstrating increased lethargy. Possible GOC to be discussed with family. OT will discharge pt at this time from the acute care setting as multiple attempts have been made since 03/05. Re-order OT services if there is a change in pt's medical status.

## 2024-03-13 NOTE — PROCEDURES
LONG-TERM VIDEO EEG REPORT;     Reason for Examination: Status epilepticus     Technical Summary:   18 Channels of EEG and 1 Channel of EKG was performed utilizing internation 10/20 method. Motion, muscle and machine artifacts were noted.         Recording Start date/time: 03/12 at 1400   Recording end date/time: 03/13 at 1050        Background Activity:   The background activity consisted of 8-9 Hz waveforms, reactive to external stimulation.     Abnormality:  Throughout the recording moderate, intermittent generalized sharp wave activity was noted, occasionally in 10 to 20 seconds runs, raising suspicion for electrographic seizures.  Throughout the recording low to medium voltage, polymorphic, 3 to 6 Hz slow activity was noted diffusely over both hemispheres     Activation:     Hyperventilation:   Not Performed.     Photic Stimulation:  Driving response seen.No        Sleep:  Stage I and II sleep seen.     Impression:  This is a Abnormal prolonged Video EEG study.   Moderate generalized sharp wave activity was noted throughout the recording, with intermittent runs of sharp wave activity lasting 20 to 20 seconds, concerning for electrographic seizures  Mild to moderate diffuse slowing into delta and theta range was noted.  This constellation of findings can be seen in encephalopathy due to metabolic/toxic etiology, medication effects or diffuse cerebral injury. Clinical correlation is recommended.           Jimmie Gonsalez MD  [facility].

## 2024-03-13 NOTE — PHYSICAL THERAPY NOTE
Reviewed pt's chart and discuss pt with RN. Pt has been on PT case load and was trailed for 3 sessions. Per RN. Pt continues to be  lethargic and not following commands. Pt's family is discuss GOC with hospice today. At this time PT will sign off, please re-order if needed.

## 2024-03-13 NOTE — PROGRESS NOTES
[facility]  [facility] Neurology Progress Note    Stephen Rodrigues Patient Status:  Inpatient    1945 MRN [id number]   Location [facility] 7NE-A Attending Nichole Kaur MD   Hosp Day # 23 PCP PHYSICIAN NONSTAFF     CC: Unresponsive, seizures    Subjective:  Stephen Rodrigues is a 79 year old female with a history of CKD, aortic stenosis s/p stent, hypertension, diabetes type II, CVA, CHF, seizures, gout,  fibromyalgia who was admitted on 2024 for altered mental status. Prior to this admission on 2/15/2024 she had a syncope episode hospitalized, treated and discharged home. 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 Currently on Keppra 1.5 g BID, Vimpat 200 mg BID, Depakote 500 mg BID, Onfi 2.5 mg BID, Topamax 100 mg BID. Was on heparin gtt, now on Eliquis, has a  Dobbhoff tube in for feedings. Has been on continuous EEGs with persisting sharp waves and epileptiform in nature activity.   Transferred out of CNICU yesterday. Seen for a follow up visit today. Patient is obtunded, unresponsive, does not respond to painful stimuli. No acute events overnight. EEG with persistent sharp wave activity.        MEDICATIONS:  No current outpatient medications on file.     Current Facility-Administered Medications   Medication Dose Route Frequency    loperamide (Imodium) cap 2 mg  2 mg Oral QID PRN    apixaban (Eliquis) tab 5 mg  5 mg Oral BID    valproic acid (Depakene) 250 MG/5ML oral solution 500 mg  500 mg Per NG Tube TID    topiramate (TopaMAX) 6 mg/mL oral suspension 100 mg  100 mg Oral BID    insulin degludec 100 units/mL flextouch 12 Units  12 Units Subcutaneous Daily    cloBAZam (Onfi) 2.5 mg/ml SUSP 2.5 mg  2.5 mg Oral BID    lacosamide (Vimpat) 10 MG/ML oral solution 200 mg  200 mg Per NG Tube BID    levETIRAcetam (Keppra) tab 1,500 mg  1,500 mg Per NG Tube BID    hydrALAZINE (Apresoline) tab 50 mg  50 mg Per NG Tube Q8H ANIBAL    amLODIPine  (Norvasc) tab 5 mg  5 mg Per NG Tube Daily    metoprolol tartrate (Lopressor) tab 25 mg  25 mg Per NG Tube 2x Daily(Beta Blocker)    aspirin chewable tab 81 mg  81 mg Per NG Tube Daily    pancrelipase (Lip-Prot-Amyl) (Zenpep) DR particles cap 10,000 Units  10,000 Units Per G Tube PRN    And    sodium bicarbonate tab 325 mg  325 mg Per G Tube PRN    multivitamin (Centrum) chewable tab (Adult) 1 tablet  1 tablet Oral Daily    thiamine (Vitamin B1) tab 100 mg  100 mg Per NG Tube BID    insulin aspart (NovoLOG) 100 Units/mL FlexPen 1-10 Units  1-10 Units Subcutaneous q6h    pantoprazole (Protonix) 40 mg in sodium chloride 0.9% PF 10 mL IV push  40 mg Intravenous Q24H    albuterol (Ventolin HFA) 108 (90 Base) MCG/ACT inhaler 2 puff  2 puff Inhalation Q4H PRN    albuterol (Ventolin) (2.5 MG/3ML) 0.083% nebulizer solution 3 mL  3 mL Nebulization TID PRN    rosuvastatin (Crestor) tab 10 mg  10 mg Oral Daily    glucose (Dex4) 15 GM/59ML oral liquid 15 g  15 g Oral Q15 Min PRN    Or    glucose (Glutose) 40% oral gel 15 g  15 g Oral Q15 Min PRN    Or    glucose-vitamin C (Dex-4) chewable tab 4 tablet  4 tablet Oral Q15 Min PRN    Or    dextrose 50% injection 50 mL  50 mL Intravenous Q15 Min PRN    Or    glucose (Dex4) 15 GM/59ML oral liquid 30 g  30 g Oral Q15 Min PRN    Or    glucose (Glutose) 40% oral gel 30 g  30 g Oral Q15 Min PRN    Or    glucose-vitamin C (Dex-4) chewable tab 8 tablet  8 tablet Oral Q15 Min PRN    diazepam (Valium) 5 mg/mL injection 5 mg  5 mg Intravenous Q30 Min PRN    acetaminophen (Tylenol Extra Strength) tab 500 mg  500 mg Oral Q4H PRN    melatonin tab 5 mg  5 mg Oral Nightly PRN    polyethylene glycol (PEG 3350) (Miralax) 17 g oral packet 17 g  17 g Oral Daily PRN    sennosides (Senokot) tab 17.2 mg  17.2 mg Oral Nightly PRN    bisacodyl (Dulcolax) 10 MG rectal suppository 10 mg  10 mg Rectal Daily PRN    ondansetron (Zofran) 4 MG/2ML injection 4 mg  4 mg Intravenous Q6H PRN    metoclopramide  (Reglan) 5 mg/mL injection 5 mg  5 mg Intravenous Q8H PRN    benzonatate (Tessalon) cap 200 mg  200 mg Oral TID PRN    glycerin-hypromellose- (Artifical Tears) 0.2-0.2-1 % ophthalmic solution 1 drop  1 drop Both Eyes QID PRN    sodium chloride (Saline Mist) 0.65 % nasal solution 1 spray  1 spray Each Nare Q3H PRN       REVIEW OF SYSTEMS:  A 10-point system was reviewed.  Pertinent positives and negatives are noted in HPI.      PHYSICAL EXAMINATION:  VITAL SIGNS: /48 (BP Location: Left arm)   Pulse 102   Temp 99.4 °F (37.4 °C) (Skin)   Resp 17   Wt 189 lb 6 oz (85.9 kg)   SpO2 97%   BMI 32.51 kg/m²   GENERAL:  Patient is a 79 year old female in no acute distress.  HEENT:  Normocephalic, atraumatic  ABD: Soft, non tender  SKIN: Warm, dry, no rashes    NEUROLOGICAL:   Mental status: Obtunded  Speech: Non verbal, does not respond to name calling. Murphy not open eyes. Shallow breathing, does not appear in acute distress.   Cranial Nerves: PERRL, does not keep eyes open, not tracking, not following commands. Extraocular muscles intact, right gaze preference, unable to formally assess for facial symmetry  Motor/Sensory: flaccid throughout, 1/5 to painful stimuli mostly to the left LE. Withdraws to deep pain stim.    Gait: Deferred      Imaging/Diagnostics:  MRI BRAIN (W+WO) (CPT=70553)    Result Date: 3/7/2024  CONCLUSION:  1. No acute infarct, acute intracranial hemorrhage, or hydrocephalus. 2. Mild chronic small vessel ischemic disease within the cerebral white matter and yenny. 3. There are 2 punctate foci of enhancement within the left cerebellum.  This may represent volume-averaging artifact or vascular enhancement.  However, a follow-up brain MRI in 6-8 weeks is recommended to exclude an underlying lesion.   LOCATION:  [id number]    Dictated by (CST): Stromberg, LeRoy, MD on 3/07/2024 at 10:55 AM     Finalized by (CST): Stromberg, LeRoy, MD on 3/07/2024 at 11:06 AM       XR CHEST AP PORTABLE   (CPT=71045)    Result Date: 3/7/2024  CONCLUSION:  1. The reticular densities in lower lungs are probably a combination of postinflammatory scarring and atelectasis. 2. Dobbhoff tube is noted to extend into abdomen below the inferior margin of the image.  Preliminary report was reviewed and there is no significant discrepancy.    LOCATION:  [facility]      Dictated by (CST): Mayank Burleson MD on 3/07/2024 at 7:28 AM     Finalized by (CST): Mayank Burleson MD on 3/07/2024 at 7:30 AM       XR CHEST AP PORTABLE  (CPT=71045)    Result Date: 3/6/2024  CONCLUSION:  Dobbhoff tube terminates in the distal stomach.   LOCATION:  [facility]      Dictated by (CST): Stromberg, LeRoy, MD on 3/06/2024 at 5:51 AM     Finalized by (CST): Stromberg, LeRoy, MD on 3/06/2024 at 5:52 AM       XR CHEST AP PORTABLE  (CPT=71045)    Result Date: 3/5/2024  CONCLUSION:    Feeding tube present with the tip in the stomach.  LOCATION:  [facility]      Dictated by (CST): Tab Huang MD on 3/05/2024 at 8:52 PM     Finalized by (CST): Tab Huang MD on 3/05/2024 at 8:53 PM       XR CHEST AP PORTABLE  (CPT=71045)    Result Date: 2/29/2024  CONCLUSION:  Dobbhoff tube tip is in region of gastric antrum or duodenal bulb.   LOCATION:  [facility]      Dictated by (CST): Mayank Burleson MD on 2/29/2024 at 6:26 PM     Finalized by (CST): Mayank Burleson MD on 2/29/2024 at 6:26 PM       CT BRAIN OR HEAD (55630)    Result Date: 2/29/2024  CONCLUSION:  No acute intracranial abnormality identified.  Stable minimal chronic microvascular ischemic changes in the cerebral white matter. If there is clinical concern for acute ischemia/infarction, an MRI of the brain would be recommended for further evaluation.  LOCATION:  [id number]   Dictated by (CST): Jey Arevalo MD on 2/29/2024 at 1:20 PM     Finalized by (CST): Jey Arevalo MD on 2/29/2024 at 1:22 PM       XR CHEST AP PORTABLE  (CPT=71045)    Result Date: 2/28/2024  CONCLUSION:  There are scar-like densities in both lungs.   There is otherwise no evidence of active cardiopulmonary disease.  There is no tube present.  Based on the technologist note the nurse was aware of this.  No tube was placed.   LOCATION:  [facility]      Dictated by (CST): Mayank Burleson MD on 2/28/2024 at 12:57 PM     Finalized by (CST): Mayank Burleson MD on 2/28/2024 at 12:59 PM          Labs:  Recent Labs   Lab 03/11/24 0414 03/12/24  0656 03/13/24  0626   RBC 2.25* 2.61* 2.55*   HGB 7.2* 8.2* 8.2*   HCT 22.5* 25.1* 24.2*   .0 96.2 94.9   MCH 32.0 31.4 32.2   MCHC 32.0 32.7 33.9   RDW 14.6 14.6 14.9   WBC 8.4 7.4 9.1   .0 247.0 234.0         Recent Labs   Lab 03/11/24 0414 03/12/24  0656 03/13/24  0626   * 204* 172*   BUN 53* 54* 58*   CREATSERUM 1.91* 1.90* 1.98*   EGFRCR 26* 27* 25*   CA 8.8 8.9 9.1    140 138   K 3.7 3.5 3.6    113* 114*   CO2 20.0* 21.0 21.0   LONG-TERM VIDEO EEG REPORT; 3/13:    Impression:  This is a Abnormal prolonged Video EEG study.   Moderate generalized sharp wave activity was noted throughout the recording, with intermittent runs of sharp wave activity lasting 20 to 20 seconds, concerning for electrographic seizures  Mild to moderate diffuse slowing into delta and theta range was noted.  This constellation of findings can be seen in encephalopathy due to metabolic/toxic etiology, medication effects or diffuse cerebral injury. Clinical correlation is recommended.       Assessment/Plan:    A 79 year old female with PMh/o significant for HTN, HLD, DM2, CAD, HFpEF, CKD, stroke, JHONY, COPD, migraines, gout, fibromyalgia, and seizures, transferred out of ICU with LTM EEG, still with persistent sharp waves activity despite being on multiple AEDs.     Subclinical super refractory status epilepticus- secondary to underlying seizure disorder and lowering of Keppra.   CT/MRI no acute changes  Repeat MRI 3/7 without new abnormalities, 2 punctate areas of enhancement in the cerebellum possibly artifact, nothing else  developing concerning for infectious or alternative etiology  cEEG w/ subclinical generalized epileptiform discharges and seizures        VPA level 3/2 88.7, 3/4 105, 3/7 95 --> Decreased VPA dose to 500mg q8 3/8   Continue current AED: Keppra 1.5 g BID, Vimpat 200 mg BID, Depakote 500 mg BID, Onfi 2.5 mg BID, Topamax 100 mg BID  Do not lower medications further while inpatient given hard to control seizures.   Continue cEEG, with persistent generalized sharp wave activity, per dr. Chadwick - will discontinue.  Palliative care following, daughter Alla was contacted. Considering comfort care. Residential hospice will be meeting with her today at 5:30 pm.   Discussed with Dr. Chadwick.     Is this a shared or split note between Advanced Practice Provider and Physician? Oumou DUGGAN  [facility]  3/13/2024, 9:25 AM  John # [phone]

## 2024-03-13 NOTE — PROGRESS NOTES
[facility]   part of [facility]  Palliative Care Progress Note    Stephen Rodrigues Patient Status:  Inpatient    1945 MRN [id number]   [facility] located within [facility] 7NE-A Attending Nichole Kaur MD   Hosp Day # 23 PCP PHYSICIAN NONSTAFF     History/Other:    history of CKD, Aortic stenosis s/p stent, hypertension, diabetes type II, CVA, CHF, seizures, fibromyalgia who was admitted on 2024 for altered mental status. Prior to this admission on 2/15/2024 she had a syncope episode hospitalized, treated and discharged home. 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         Allergies:  Allergies   Allergen Reactions    Influenza Vaccines OTHER (SEE COMMENTS)     Fever    Dust Mite Extract UNKNOWN     Sneezing, itchy, watery eyes    Hydrocodone     Sulfa Antibiotics     Vicodin Tuss [Hydrocodone-Guaifenesin]      Medications:     Current Facility-Administered Medications:     loperamide (Imodium) cap 2 mg, 2 mg, Oral, QID PRN    apixaban (Eliquis) tab 5 mg, 5 mg, Oral, BID    valproic acid (Depakene) 250 MG/5ML oral solution 500 mg, 500 mg, Per NG Tube, TID    topiramate (TopaMAX) 6 mg/mL oral suspension 100 mg, 100 mg, Oral, BID    insulin degludec 100 units/mL flextouch 12 Units, 12 Units, Subcutaneous, Daily    cloBAZam (Onfi) 2.5 mg/ml SUSP 2.5 mg, 2.5 mg, Oral, BID    lacosamide (Vimpat) 10 MG/ML oral solution 200 mg, 200 mg, Per NG Tube, BID    levETIRAcetam (Keppra) tab 1,500 mg, 1,500 mg, Per NG Tube, BID    hydrALAZINE (Apresoline) tab 50 mg, 50 mg, Per NG Tube, Q8H ANIBAL    amLODIPine (Norvasc) tab 5 mg, 5 mg, Per NG Tube, Daily    metoprolol tartrate (Lopressor) tab 25 mg, 25 mg, Per NG Tube, 2x Daily(Beta Blocker)    aspirin chewable tab 81 mg, 81 mg, Per NG Tube, Daily    pancrelipase (Lip-Prot-Amyl) (Zenpep) DR particles cap 10,000 Units, 10,000 Units, Per G Tube, PRN  **AND** sodium bicarbonate tab 325 mg, 325 mg, Per G Tube, PRN    multivitamin (Centrum) chewable tab (Adult) 1 tablet, 1 tablet, Oral, Daily    thiamine (Vitamin B1) tab 100 mg, 100 mg, Per NG Tube, BID    insulin aspart (NovoLOG) 100 Units/mL FlexPen 1-10 Units, 1-10 Units, Subcutaneous, q6h    pantoprazole (Protonix) 40 mg in sodium chloride 0.9% PF 10 mL IV push, 40 mg, Intravenous, Q24H    albuterol (Ventolin HFA) 108 (90 Base) MCG/ACT inhaler 2 puff, 2 puff, Inhalation, Q4H PRN    albuterol (Ventolin) (2.5 MG/3ML) 0.083% nebulizer solution 3 mL, 3 mL, Nebulization, TID PRN    rosuvastatin (Crestor) tab 10 mg, 10 mg, Oral, Daily    glucose (Dex4) 15 GM/59ML oral liquid 15 g, 15 g, Oral, Q15 Min PRN **OR** glucose (Glutose) 40% oral gel 15 g, 15 g, Oral, Q15 Min PRN **OR** glucose-vitamin C (Dex-4) chewable tab 4 tablet, 4 tablet, Oral, Q15 Min PRN **OR** dextrose 50% injection 50 mL, 50 mL, Intravenous, Q15 Min PRN **OR** glucose (Dex4) 15 GM/59ML oral liquid 30 g, 30 g, Oral, Q15 Min PRN **OR** glucose (Glutose) 40% oral gel 30 g, 30 g, Oral, Q15 Min PRN **OR** glucose-vitamin C (Dex-4) chewable tab 8 tablet, 8 tablet, Oral, Q15 Min PRN    diazepam (Valium) 5 mg/mL injection 5 mg, 5 mg, Intravenous, Q30 Min PRN    acetaminophen (Tylenol Extra Strength) tab 500 mg, 500 mg, Oral, Q4H PRN    melatonin tab 5 mg, 5 mg, Oral, Nightly PRN    polyethylene glycol (PEG 3350) (Miralax) 17 g oral packet 17 g, 17 g, Oral, Daily PRN    sennosides (Senokot) tab 17.2 mg, 17.2 mg, Oral, Nightly PRN    bisacodyl (Dulcolax) 10 MG rectal suppository 10 mg, 10 mg, Rectal, Daily PRN    ondansetron (Zofran) 4 MG/2ML injection 4 mg, 4 mg, Intravenous, Q6H PRN    metoclopramide (Reglan) 5 mg/mL injection 5 mg, 5 mg, Intravenous, Q8H PRN    benzonatate (Tessalon) cap 200 mg, 200 mg, Oral, TID PRN    glycerin-hypromellose- (Artifical Tears) 0.2-0.2-1 % ophthalmic solution 1 drop, 1 drop, Both Eyes, QID PRN    sodium chloride  (Saline Mist) 0.65 % nasal solution 1 spray, 1 spray, Each Nare, Q3H PRN    Subjective    Stephen transferred to the floor, + cough with coarse lung sounds. Intermittently opens her eyes to stimulation. Not following commands.     Review of Systems: DEISY    Bowel Movement         3/13/2024  0600             Stool Count Calculated for I/O: 1        Objective:     Vital Signs:  Blood pressure 104/48, pulse 102, temperature 99.4 °F (37.4 °C), temperature source Skin, resp. rate 17, weight 189 lb 6 oz (85.9 kg), SpO2 97%.  Body mass index is 32.51 kg/m².      Performance scale: 20%  % Ambulation Activity Level Self-Care Intake Consciousness   100 Full  Normal  No Disease Full Normal Full   90 Full  Normal  Some Disease Full Normal Full   80 Full  Normal w/effort  Some Disease Full Normal or reduced Full   70 Reduced  Can't Perform Job  Some Disease Full Normal or reduced Full   60 Reduced  Can't Perform Hobby   Significant Disease Occ Assist Normal or reduced Full or confused   50 Mainly sit/lie Can't do any work  Extensive Disease Partial Assist Normal or reduced Full or confused   40 Mainly in bed Can't do any work  Extensive Disease Mainly Assist Normal or reduced Full or confused   30 Bed Bound Can't do any work  Extensive Disease Max Assist  Total Care Reduced  Drowsy/confused   20 Bed Bound Can't do any work  Extensive Disease Max Assist  Total Care Minimal  Drowsy/confused   10 Bed Bound Can't do any work  Extensive Disease Max Assist  Total Care Mouth Care  Drowsy/confused   0 Death          Physical Exam:  HEENT: MMM   Cardiac:  Tachycardic  Lungs: coarse anterior    Abdomen: Soft, rectal tube in place  Extremities: + BLE edema present  Neurologic: lethargic and not oriented to  person, place, time, situation   Skin: Warm and dry.      Results:     Hematology:  Lab Results   Component Value Date    WBC 9.1 03/13/2024    HGB 8.2 (L) 03/13/2024    HCT 24.2 (L) 03/13/2024    .0 03/13/2024     Coags:  Lab  Results   Component Value Date    INR 1.16 02/19/2024    .3 (H) 03/11/2024     Chemistry:  Lab Results   Component Value Date    CREATSERUM 1.98 (H) 03/13/2024    BUN 58 (H) 03/13/2024     03/13/2024    K 3.6 03/13/2024     (H) 03/13/2024    CO2 21.0 03/13/2024     (H) 03/13/2024    CA 9.1 03/13/2024    ALB 2.1 (L) 03/07/2024    ALKPHO 113 03/07/2024    BILT 0.2 03/07/2024    TP 7.1 03/07/2024    AST 13 (L) 03/07/2024    ALT 30 03/07/2024    DDIMER 0.70 01/26/2023    MG 2.4 03/13/2024    PHOS 2.5 03/13/2024    TROP <0.045 11/10/2021     Summary of Discussion Alla, her daughter, returned my call. We reviewed clinically there is no change in her condition related to her mental status, remains lethargic. Alla has shared long term Veta would not want a feeding tube/ PEG nor trach, intubation or CPR. Alla plans to come to the hospital today. She was offered an informational hospice consult as previously discussed her current condition is not quality of life to Stephen.       Advance Care Planning counseling and discussion: DNAR with selective treatment.     HC POA  surrogate . Healthcare Agent's Name: Alla Rodrigues   PINA FORM - deferred at this time.   DNAR/Selective Treatment    Principal Problem:    Altered mental status, unspecified altered mental status type  Active Problems:    CKD (chronic kidney disease) stage 4, GFR 15-29 ml/min (HCC)    Acute on chronic congestive heart failure, unspecified heart failure type (HCC)    History of seizure    Cognitive decline    Metabolic acidosis    Stage 3 chronic kidney disease (HCC)    ATN (acute tubular necrosis) (HCC)    Hypernatremia    Status epilepticus (HCC)    History of stroke    Hyponatremia    Palliative care encounter    Goals of care, counseling/discussion      Assessment and Recommendations   Goals of care:    Informational hospice meeting ordered and scheduled for tonight.    Code Status: DNAR/Selective Treatment  Healthcare Agent's  Name: Alla Rodrigues      Discussed today's visit with Jesusita GOODEN and Ana Luisa MOORE     Palliative Care Follow Up: Palliative care team will follow peripherally.    Thank you for allowing Palliative Care services to participate in the care of Stephen Rodrigues.    A total of 35 minutes were spent on this consult, which included all of the following: chart review, direct face to face contact,  physical examination, counseling and coordinating care and documentation.     Daria Sun, APRN  3/13/2024   10:44 AM   Palliative Care Services    The 21st Century Cures Act makes medical notes like these available to patients in the interest of transparency. Please be advised this is a medical document. Medical documents are intended to carry relevant information, facts as evident, and the clinical opinion of the practitioner. The medical note is intended as peer to peer communication and may appear blunt or direct. It is written in medical language and may contain abbreviations or verbiage that are unfamiliar.

## 2024-03-13 NOTE — PLAN OF CARE
Assumed pt care at 1930  [facility]  A&O to self, nonverbal  On cont EEG  TF infusing per order  Voiding per yvonne Romo BM overnight  Call light in reach  Bed in low position

## 2024-03-13 NOTE — PROGRESS NOTES
[facility]     Hospitalist Progress Note     Stephen Rodrigues Patient Status:  Inpatient    1945 MRN [id number]   [facility] 2NE-A Attending Brody Boston MD   Hosp Day # 23 PCP PHYSICIAN NONSTAFF     Subjective:   Pt does not respond    Objective:    Review of Systems:   A comprehensive review of systems was  not completed  Vital signs:  Temp:  [98.5 °F (36.9 °C)-99.6 °F (37.6 °C)] 99.1 °F (37.3 °C)  Pulse:  [] 78  Resp:  [17-25] 18  BP: ()/(39-53) 97/40  SpO2:  [92 %-100 %] 100 %  Physical Exam:    General: No acute distress, somnolent   Respiratory: no wheezes, no rhonchi  Cardiovascular: S1, S2, RRR  Abdomen: Soft, NT/ND, +BS  Extremities: No response to painful stimuli  Diagnostic Data:    Labs:  Recent Labs   Lab 24  0406 03/10/24  0518 24  0414 24  0656 24  0626   WBC 13.0* 10.6 8.4 7.4 9.1   HGB 8.7* 8.6* 7.2* 8.2* 8.2*   MCV 98.5 99.6 100.0 96.2 94.9   .0 270.0 204.0 247.0 234.0       Recent Labs   Lab 24  1739 24  0506 24  0414 24  0656 24  0626   GLU  --    < > 156* 204* 172*   BUN  --    < > 53* 54* 58*   CREATSERUM  --    < > 1.91* 1.90* 1.98*   CA  --    < > 8.8 8.9 9.1   ALB 2.1*  --   --   --   --    NA  --    < > 138 140 138   K  --    < > 3.7 3.5 3.6   CL  --    < > 111 113* 114*   CO2  --    < > 20.0* 21.0 21.0   ALKPHO 113  --   --   --   --    AST 13*  --   --   --   --    ALT 30  --   --   --   --    BILT 0.2  --   --   --   --    TP 7.1  --   --   --   --     < > = values in this interval not displayed.     Estimated Creatinine Clearance: 19.9 mL/min (A) (based on SCr of 1.98 mg/dL (H)).  No results for input(s): \"PTP\", \"INR\" in the last 168 hours.         Microbiology  No results found for this visit on 24.  Imaging: Reviewed in Epic.  Medications:    apixaban  5 mg Oral BID    valproic acid  500 mg Per NG Tube TID    topiramate  100 mg Oral BID    insulin degludec  12 Units Subcutaneous  Daily    cloBAZam  2.5 mg Oral BID    lacosamide  200 mg Per NG Tube BID    levETIRAcetam  1,500 mg Per NG Tube BID    hydrALAZINE  50 mg Per NG Tube Q8H ANIBAL    amLODIPine  5 mg Per NG Tube Daily    metoprolol tartrate  25 mg Per NG Tube 2x Daily(Beta Blocker)    aspirin  81 mg Per NG Tube Daily    multivitamin  1 tablet Oral Daily    thiamine  100 mg Per NG Tube BID    insulin aspart  1-10 Units Subcutaneous q6h    pantoprazole  40 mg Intravenous Q24H    rosuvastatin  10 mg Oral Daily       Assessment & Plan:      #Acute encephalopathy w/ subclinical refractory status epilepticus   -on Keppra, Depakote, Vimpat, Topomax clobazam  -poor prognosis- GOC being addressed     #HIREN on CKD IV- back to baseline   -resolved    #Hx of seizure disorder- as above   Awaiting family decision  #Hypernatremia- monitor   #Acute on chronic diastolic CHF-resolved  #Aortic stenosis-Mild to moderate on most recent echo  #CAD s/p stent-Aspirin, statin  #Hypertension-home meds   #Hyperlipidemia-Statin  #DM type II with A1c 5.8-Insulin sliding scale  #Hx of CVA-Aspirin, statin  #JHONY-JHONY protocol  #COPD, stable-Resume albuterol inhaler as needed  #Gout-Allopurinol  #Fibromyalgia  #LLE DVT- heparin  #Anxiety and depression-Resume Lexapro and Remeron once PO  #GERD-PPI  #TEN-continue tube feeds          Supplementary Documentation:   Quality:  DVT Mechanical Prophylaxis:   SCDs,    DVT Pharmacologic Prophylaxis   Medication    apixaban (Eliquis) tab 5 mg      DVT Pharmacologic prophylaxis: Aspirin 162 mg         Code Status: DNAR/Selective Treatment  Gurrola: External urinary catheter in place  Gurrola Duration (in days):   Central line:    ELROY:   At this point Ms. Rodrigues is expected to be discharge to: tbd     The 21st Century Cures Act makes medical notes like these available to patients in the interest of transparency. Please be advised this is a medical document. Medical documents are intended to carry relevant information, facts as evident,  and the clinical opinion of the practitioner. The medical note is intended as peer to peer communication and may appear blunt or direct. It is written in medical language and may contain abbreviations or verbiage that are unfamiliar.

## 2024-03-13 NOTE — HOSPICE RN NOTE
[facility] received consult, spoke with daughter Alla, will meet today at 530pm.     Falguni Berry RN BSN  [facility] RN Liaison  [phone]

## 2024-03-13 NOTE — PALLIATIVE CARE NOTE
Stephen was seen this morning at the bedside.   I called Alla, her daughter, as a follow up. There was no answer, I left a non emergent message to call me back.       Daria Sun [facility]  Palliative Care   [phone]

## 2024-03-13 NOTE — HOSPICE RN NOTE
[facility] RN met with Alla, patient's daughter and POA and son Tyson to discuss comfort care. Informational meeting complete. Hospice philosophy, Medicare benefit, and levels of care discussed. All questions answered. The family would like to wait talk about it with other family members before making a decision. Alla agreed to have us call her tomorrow after 430 pm or she will reach out to us since she will be working. She mentioned it may be better to have a  conference call with additional family instead of in person.  [facility] will continue follow to support the patient and family. All phone numbers and information provided to Alla and she was encouraged to call with any questions.    Falguni Berry RN BSN  [facility] RN Liaison  [phone] (After-hours)

## 2024-03-14 NOTE — HOSPICE RN NOTE
[facility] nursing visit for follow up on hospice informational meeting yesterday. Called daughter Alla Rodrigues. No answer. Left VM to please call hospice. [facility] will continue to follow this patient daily. Spoke with hospital nurse Alejandra GOODEN. Alejandra GOODEN will call hospice if daughter arrives. Please call [facility] with any questions or concerns.    Mary Durham RN, [facility]  [facility] Liaison  [phone] after hours

## 2024-03-14 NOTE — PROGRESS NOTES
[facility]     Hospitalist Progress Note     Stephen Rodrigues Patient Status:  Inpatient    1945 MRN [id number]   [facility] 2NE-A Attending Brody Boston MD   Hosp Day # 24 PCP PHYSICIAN NONSTAFF     Subjective:   Pt does not respond- no respond to painful stimuli    Objective:    Review of Systems:   A comprehensive review of systems was  not completed  Vital signs:  Temp:  [98.7 °F (37.1 °C)-99 °F (37.2 °C)] 98.9 °F (37.2 °C)  Pulse:  [73-82] 76  Resp:  [16-18] 18  BP: (106-122)/(36-43) 112/36  SpO2:  [96 %-99 %] 97 %  Physical Exam:    General: No acute distress, somnolent   Respiratory: no wheezes, no rhonchi  Cardiovascular: S1, S2, RRR  Abdomen: Soft, NT/ND, +BS  Extremities: No response to painful stimuli  Diagnostic Data:    Labs:  Recent Labs   Lab 03/10/24  0518 24  0414 24  0656 24  0626 24  0606   WBC 10.6 8.4 7.4 9.1 9.7   HGB 8.6* 7.2* 8.2* 8.2* 7.5*   MCV 99.6 100.0 96.2 94.9 100.0   .0 204.0 247.0 234.0 223.0       Recent Labs   Lab 24  1739 24  0506 24  0656 24  0626 24  0606   GLU  --    < > 204* 172* 190*   BUN  --    < > 54* 58* 61*   CREATSERUM  --    < > 1.90* 1.98* 2.24*   CA  --    < > 8.9 9.1 8.7   ALB 2.1*  --   --   --   --    NA  --    < > 140 138 137   K  --    < > 3.5 3.6 3.6   CL  --    < > 113* 114* 115*   CO2  --    < > 21.0 21.0 17.0*   ALKPHO 113  --   --   --   --    AST 13*  --   --   --   --    ALT 30  --   --   --   --    BILT 0.2  --   --   --   --    TP 7.1  --   --   --   --     < > = values in this interval not displayed.     Estimated Creatinine Clearance: 17.6 mL/min (A) (based on SCr of 2.24 mg/dL (H)).  No results for input(s): \"PTP\", \"INR\" in the last 168 hours.         Microbiology  No results found for this visit on 24.  Imaging: Reviewed in Epic.  Medications:    apixaban  5 mg Oral BID    valproic acid  500 mg Per NG Tube TID    topiramate  100 mg Oral BID    insulin  degludec  12 Units Subcutaneous Daily    cloBAZam  2.5 mg Oral BID    lacosamide  200 mg Per NG Tube BID    levETIRAcetam  1,500 mg Per NG Tube BID    hydrALAZINE  50 mg Per NG Tube Q8H ANIBAL    amLODIPine  5 mg Per NG Tube Daily    metoprolol tartrate  25 mg Per NG Tube 2x Daily(Beta Blocker)    aspirin  81 mg Per NG Tube Daily    multivitamin  1 tablet Oral Daily    thiamine  100 mg Per NG Tube BID    insulin aspart  1-10 Units Subcutaneous q6h    pantoprazole  40 mg Intravenous Q24H    rosuvastatin  10 mg Oral Daily       Assessment & Plan:      #Acute encephalopathy w/ subclinical refractory status epilepticus   -on Keppra, Depakote, Vimpat, Topomax clobazam  -poor prognosis- GOC being addressed     #HIREN on CKD IV- back to baseline   -resolved    #Hx of seizure disorder- as above   Awaiting family decision  #Hypernatremia- monitor   #Acute on chronic diastolic CHF-resolved  #Aortic stenosis-Mild to moderate on most recent echo  #CAD s/p stent-Aspirin, statin  #Hypertension-home meds   #Hyperlipidemia-Statin  #DM type II with A1c 5.8-Insulin sliding scale  #Hx of CVA-Aspirin, statin  #JHONY-JHONY protocol  #COPD, stable-Resume albuterol inhaler as needed  #Gout-Allopurinol  #Fibromyalgia  #LLE DVT- heparin  #Anxiety and depression-Resume Lexapro and Remeron once PO  #GERD-PPI  #TEN-continue tube feeds          Supplementary Documentation:   Quality:  DVT Mechanical Prophylaxis:   SCDs,    DVT Pharmacologic Prophylaxis   Medication    apixaban (Eliquis) tab 5 mg      DVT Pharmacologic prophylaxis: Aspirin 162 mg         Code Status: DNAR/Selective Treatment  Gurrola: External urinary catheter in place  Gurrola Duration (in days):   Central line:    ELROY:   At this point Ms. Rodrigues is expected to be discharge to: tbd     The 21st Century Cures Act makes medical notes like these available to patients in the interest of transparency. Please be advised this is a medical document. Medical documents are intended to carry relevant  information, facts as evident, and the clinical opinion of the practitioner. The medical note is intended as peer to peer communication and may appear blunt or direct. It is written in medical language and may contain abbreviations or verbiage that are unfamiliar.

## 2024-03-14 NOTE — PROGRESS NOTES
[facility]  [facility] Neurology Progress Note    Stephen Rodrigues Patient Status:  Inpatient    1945 MRN [id number]   [facility] 7NE-A Attending Nichole Kaur MD   Hosp Day # 24 PCP PHYSICIAN NONSTAFF     CC: Seizures    Subjective:  No acute events overnight. Pupils equal and responsive to light, sluggish. Opens eyes , but not tracking. Obtunded. Not responding to noxious stimuli. Not responding to name.        MEDICATIONS:  No current outpatient medications on file.     Current Facility-Administered Medications   Medication Dose Route Frequency    diphenhydrAMINE (Benadryl) cap/tab 25 mg  25 mg Oral Nightly PRN    loperamide (Imodium) cap 2 mg  2 mg Oral QID PRN    apixaban (Eliquis) tab 5 mg  5 mg Oral BID    valproic acid (Depakene) 250 MG/5ML oral solution 500 mg  500 mg Per NG Tube TID    topiramate (TopaMAX) 6 mg/mL oral suspension 100 mg  100 mg Oral BID    insulin degludec 100 units/mL flextouch 12 Units  12 Units Subcutaneous Daily    cloBAZam (Onfi) 2.5 mg/ml SUSP 2.5 mg  2.5 mg Oral BID    lacosamide (Vimpat) 10 MG/ML oral solution 200 mg  200 mg Per NG Tube BID    levETIRAcetam (Keppra) tab 1,500 mg  1,500 mg Per NG Tube BID    hydrALAZINE (Apresoline) tab 50 mg  50 mg Per NG Tube Q8H ANIBAL    amLODIPine (Norvasc) tab 5 mg  5 mg Per NG Tube Daily    metoprolol tartrate (Lopressor) tab 25 mg  25 mg Per NG Tube 2x Daily(Beta Blocker)    aspirin chewable tab 81 mg  81 mg Per NG Tube Daily    pancrelipase (Lip-Prot-Amyl) (Zenpep) DR particles cap 10,000 Units  10,000 Units Per G Tube PRN    And    sodium bicarbonate tab 325 mg  325 mg Per G Tube PRN    multivitamin (Centrum) chewable tab (Adult) 1 tablet  1 tablet Oral Daily    thiamine (Vitamin B1) tab 100 mg  100 mg Per NG Tube BID    insulin aspart (NovoLOG) 100 Units/mL FlexPen 1-10 Units  1-10 Units Subcutaneous q6h    pantoprazole (Protonix) 40 mg in sodium chloride 0.9% PF 10 mL IV push  40 mg Intravenous Q24H    albuterol (Ventolin  HFA) 108 (90 Base) MCG/ACT inhaler 2 puff  2 puff Inhalation Q4H PRN    albuterol (Ventolin) (2.5 MG/3ML) 0.083% nebulizer solution 3 mL  3 mL Nebulization TID PRN    rosuvastatin (Crestor) tab 10 mg  10 mg Oral Daily    glucose (Dex4) 15 GM/59ML oral liquid 15 g  15 g Oral Q15 Min PRN    Or    glucose (Glutose) 40% oral gel 15 g  15 g Oral Q15 Min PRN    Or    glucose-vitamin C (Dex-4) chewable tab 4 tablet  4 tablet Oral Q15 Min PRN    Or    dextrose 50% injection 50 mL  50 mL Intravenous Q15 Min PRN    Or    glucose (Dex4) 15 GM/59ML oral liquid 30 g  30 g Oral Q15 Min PRN    Or    glucose (Glutose) 40% oral gel 30 g  30 g Oral Q15 Min PRN    Or    glucose-vitamin C (Dex-4) chewable tab 8 tablet  8 tablet Oral Q15 Min PRN    diazepam (Valium) 5 mg/mL injection 5 mg  5 mg Intravenous Q30 Min PRN    acetaminophen (Tylenol Extra Strength) tab 500 mg  500 mg Oral Q4H PRN    melatonin tab 5 mg  5 mg Oral Nightly PRN    polyethylene glycol (PEG 3350) (Miralax) 17 g oral packet 17 g  17 g Oral Daily PRN    sennosides (Senokot) tab 17.2 mg  17.2 mg Oral Nightly PRN    bisacodyl (Dulcolax) 10 MG rectal suppository 10 mg  10 mg Rectal Daily PRN    ondansetron (Zofran) 4 MG/2ML injection 4 mg  4 mg Intravenous Q6H PRN    metoclopramide (Reglan) 5 mg/mL injection 5 mg  5 mg Intravenous Q8H PRN    benzonatate (Tessalon) cap 200 mg  200 mg Oral TID PRN    glycerin-hypromellose- (Artifical Tears) 0.2-0.2-1 % ophthalmic solution 1 drop  1 drop Both Eyes QID PRN    sodium chloride (Saline Mist) 0.65 % nasal solution 1 spray  1 spray Each Nare Q3H PRN       REVIEW OF SYSTEMS:  A 10-point system was reviewed.  Pertinent positives and negatives are noted in HPI.      PHYSICAL EXAMINATION:  VITAL SIGNS: /46 (BP Location: Right arm)   Pulse 81   Temp 99.6 °F (37.6 °C) (Oral)   Resp 20   Wt 189 lb 6 oz (85.9 kg)   SpO2 97%   BMI 32.51 kg/m²   GENERAL:  Patient is a 79 year old female in no acute distress.  HEENT:   Normocephalic, atraumatic  ABD: Soft, non tender  SKIN: Warm, dry, no rashes    NEUROLOGICAL:   Mental status: Obtunded  Speech: Non verbal, does not respond to name calling. Shallow breathing, does not appear in acute distress.   Cranial Nerves: PERRL, does not keep eyes open, not tracking, not following commands. Extraocular muscles intact, right gaze preference, unable to formally assess for facial symmetry   Motor/Sensory: flaccid throughout, Does not withdraw to  pain stimuli             Imaging/Diagnostics:  MRI BRAIN (W+WO) (CPT=70553)    Result Date: 3/7/2024  CONCLUSION:  1. No acute infarct, acute intracranial hemorrhage, or hydrocephalus. 2. Mild chronic small vessel ischemic disease within the cerebral white matter and yenny. 3. There are 2 punctate foci of enhancement within the left cerebellum.  This may represent volume-averaging artifact or vascular enhancement.  However, a follow-up brain MRI in 6-8 weeks is recommended to exclude an underlying lesion.   LOCATION:  [id number]    Dictated by (CST): Stromberg, LeRoy, MD on 3/07/2024 at 10:55 AM     Finalized by (CST): Stromberg, LeRoy, MD on 3/07/2024 at 11:06 AM       XR CHEST AP PORTABLE  (CPT=71045)    Result Date: 3/7/2024  CONCLUSION:  1. The reticular densities in lower lungs are probably a combination of postinflammatory scarring and atelectasis. 2. Dobbhoff tube is noted to extend into abdomen below the inferior margin of the image.  Preliminary report was reviewed and there is no significant discrepancy.    LOCATION:  [facility]      Dictated by (CST): Mayank Burleson MD on 3/07/2024 at 7:28 AM     Finalized by (CST): Mayank Burleson MD on 3/07/2024 at 7:30 AM       XR CHEST AP PORTABLE  (CPT=71045)    Result Date: 3/6/2024  CONCLUSION:  Dobbhoff tube terminates in the distal stomach.   LOCATION:  [facility]      Dictated by (CST): Stromberg, LeRoy, MD on 3/06/2024 at 5:51 AM     Finalized by (CST): Stromberg, LeRoy, MD on 3/06/2024 at 5:52 AM       XR CHEST  AP PORTABLE  (CPT=71045)    Result Date: 3/5/2024  CONCLUSION:    Feeding tube present with the tip in the stomach.  LOCATION:  [facility]      Dictated by (CST): Tab Huang MD on 3/05/2024 at 8:52 PM     Finalized by (CST): Tab Huang MD on 3/05/2024 at 8:53 PM       XR CHEST AP PORTABLE  (CPT=71045)    Result Date: 2/29/2024  CONCLUSION:  Dobbhoff tube tip is in region of gastric antrum or duodenal bulb.   LOCATION:  [facility]      Dictated by (CST): Mayank Burleson MD on 2/29/2024 at 6:26 PM     Finalized by (CST): Mayank Burleson MD on 2/29/2024 at 6:26 PM          Labs:  Recent Labs   Lab 03/12/24  0656 03/13/24  0626 03/14/24  0606   RBC 2.61* 2.55* 2.31*   HGB 8.2* 8.2* 7.5*   HCT 25.1* 24.2* 23.1*   MCV 96.2 94.9 100.0   MCH 31.4 32.2 32.5   MCHC 32.7 33.9 32.5   RDW 14.6 14.9 15.6   WBC 7.4 9.1 9.7   .0 234.0 223.0         Recent Labs   Lab 03/12/24  0656 03/13/24  0626 03/14/24  0606   * 172* 190*   BUN 54* 58* 61*   CREATSERUM 1.90* 1.98* 2.24*   EGFRCR 27* 25* 22*   CA 8.9 9.1 8.7    138 137   K 3.5 3.6 3.6   * 114* 115*   CO2 21.0 21.0 17.0*         Assessment/ Plan:    A 79 year old female with PMh/o significant for HTN, HLD, DM2, CAD, HFpEF, CKD, stroke, JHONY, COPD, migraines, gout, fibromyalgia, and seizures, transferred out of ICU with LTM EEG, still with persistent sharp waves activity despite being on multiple AEDs.      Subclinical super refractory status epilepticus- secondary to underlying seizure disorder and lowering of Keppra.   CT/MRI no acute changes  Repeat MRI 3/7 without new abnormalities, 2 punctate areas of enhancement in the cerebellum possibly artifact, nothing else developing concerning for infectious or alternative etiology  cEEG w/ subclinical generalized epileptiform discharges and seizures        VPA level 3/2 88.7, 3/4 105, 3/7 95 - Decreased VPA dose to 500mg q8 3/8   Continue current AED: Keppra 1.5 g BID, Vimpat 200 mg BID, Depakote 500 mg BID, Onfi  2.5 mg BID, Topamax 100 mg BID  Do not lower medications further while inpatient given hard to control seizures.   Continue cEEG, with persistent generalized sharp wave activity, per dr. Chadwick - discontinued 3/13.  2. Palliative care following, daughter Alla was contacted. Considering comfort care. [facility] following, plan for conference call today  in the afternoon with Alla and family members for decision making.    Discussed with Dr. Chadwick. Will sign off and follow peripherally. Please call with any questions or concerns.     Is this a shared or split note between Advanced Practice Provider and Physician? Oumou DUGGAN  [facility]  3/14/2024, 1:45 PM   [place] # [id number]

## 2024-03-14 NOTE — PLAN OF CARE
Assumed pt care at 1930  Slept the whole night  Mepilex changed on buttocks  Flexiseal reinserted and now working  Voiding per yvonne  TF infusing per order, tolerating it well  Bed in low position

## 2024-03-14 NOTE — PLAN OF CARE
Resumed care at 0730. Neuro qshift, stable exam. NG tube intact, TF infusing per order. Purewick intact, flexi intact and flushed. Vitals stable. Fingerstick before meals, insulin given per order.

## 2024-03-15 NOTE — PROGRESS NOTES
[facility]     Hospitalist Progress Note     Stephen Rodrigues Patient Status:  Inpatient    1945 MRN [id number]   [facility] 2NE-A Attending Brody Boston MD   Hosp Day # 25 PCP PHYSICIAN NONSTAFF     Subjective:   Pt unchanged     Objective:    Review of Systems:   A comprehensive review of systems was  not completed  Vital signs:  Temp:  [97.8 °F (36.6 °C)-99.8 °F (37.7 °C)] 97.8 °F (36.6 °C)  Pulse:  [] 100  Resp:  [18-20] 18  BP: (123-134)/(43-61) 123/61  SpO2:  [94 %-98 %] 94 %  Physical Exam:    General: No acute distress, somnolent   Respiratory: no wheezes, no rhonchi  Cardiovascular: S1, S2, RRR  Abdomen: Soft, NT/ND, +BS  Extremities: No response to painful stimuli  Diagnostic Data:    Labs:  Recent Labs   Lab 24  0414 24  0656 24  0626 24  0606 03/15/24  0623   WBC 8.4 7.4 9.1 9.7 13.3*   HGB 7.2* 8.2* 8.2* 7.5* 8.2*   .0 96.2 94.9 100.0 94.5   .0 247.0 234.0 223.0 259.0       Recent Labs   Lab 24  0626 24  0606 03/15/24  0623   * 190* 275*   BUN 58* 61* 67*   CREATSERUM 1.98* 2.24* 2.33*   CA 9.1 8.7 9.4    137 141   K 3.6 3.6 3.6   * 115* 116*   CO2 21.0 17.0* 20.0*     Estimated Creatinine Clearance: 16.9 mL/min (A) (based on SCr of 2.33 mg/dL (H)).  No results for input(s): \"PTP\", \"INR\" in the last 168 hours.         Microbiology  No results found for this visit on 24.  Imaging: Reviewed in Epic.  Medications:    apixaban  5 mg Oral BID    valproic acid  500 mg Per NG Tube TID    topiramate  100 mg Oral BID    insulin degludec  12 Units Subcutaneous Daily    cloBAZam  2.5 mg Oral BID    lacosamide  200 mg Per NG Tube BID    levETIRAcetam  1,500 mg Per NG Tube BID    hydrALAZINE  50 mg Per NG Tube Q8H ANIBAL    amLODIPine  5 mg Per NG Tube Daily    metoprolol tartrate  25 mg Per NG Tube 2x Daily(Beta Blocker)    aspirin  81 mg Per NG Tube Daily    multivitamin  1 tablet Oral Daily    thiamine  100 mg  Per NG Tube BID    insulin aspart  1-10 Units Subcutaneous q6h    pantoprazole  40 mg Intravenous Q24H    rosuvastatin  10 mg Oral Daily       Assessment & Plan:      #Acute encephalopathy w/ subclinical refractory status epilepticus   -on Keppra, Depakote, Vimpat, Topomax clobazam  -poor prognosis- GOC being addressed     #HIREN on CKD IV- back to baseline   -resolved    #Hx of seizure disorder- as above   Awaiting family decision  #Hypernatremia- monitor   #Acute on chronic diastolic CHF-resolved  #Aortic stenosis-Mild to moderate on most recent echo  #CAD s/p stent-Aspirin, statin  #Hypertension-home meds   #Hyperlipidemia-Statin  #DM type II with A1c 5.8-Insulin sliding scale  #Hx of CVA-Aspirin, statin  #JHONY-JHONY protocol  #COPD, stable-Resume albuterol inhaler as needed  #Gout-Allopurinol  #Fibromyalgia  #LLE DVT- heparin  #Anxiety and depression-Resume Lexapro and Remeron once PO  #GERD-PPI  #TEN-continue tube feeds          Supplementary Documentation:   Quality:  DVT Mechanical Prophylaxis:   SCDs,    DVT Pharmacologic Prophylaxis   Medication    apixaban (Eliquis) tab 5 mg      DVT Pharmacologic prophylaxis: Aspirin 162 mg         Code Status: DNAR/Selective Treatment  Gurrola: External urinary catheter in place  Gurrola Duration (in days):   Central line:    ELROY:   At this point Ms. Rodrigues is expected to be discharge to: tbd     The 21st Century Cures Act makes medical notes like these available to patients in the interest of transparency. Please be advised this is a medical document. Medical documents are intended to carry relevant information, facts as evident, and the clinical opinion of the practitioner. The medical note is intended as peer to peer communication and may appear blunt or direct. It is written in medical language and may contain abbreviations or verbiage that are unfamiliar.

## 2024-03-15 NOTE — HOSPICE RN NOTE
Addendum: 1:25pm:  made aware Alla is agreeable to hospice care follow up and is requesting a call today between 4:30-5:00pm. RH to call then.    [facility] follow up: Falguni GOODEN called daughter Alla to follow up on hospice referral. No answer, left voicemail with callback number. Will update as able. Please call with questions/concerns.    Nataliia Arizmendi RN, BSN  [facility]  Transitional Nurse Liaison  [phone] or After hours: [phone]

## 2024-03-15 NOTE — PLAN OF CARE
RA, NSR;ST on tele  TF infusing per order  Pt asleep most of the time  Qshift neuro, see flowsheets  Mepilex is intact on buttocks   non verbal and no response to stimuli  Flexiseal in place. Voiding per purewick   Call light within reach. Comfort rounds done  Patient is more likely to transition to hospice care

## 2024-03-15 NOTE — PLAN OF CARE
Assumed care at 1930.   A&Ox0, non verbal and no response to stimuli, RA, NSR;ST on tele  Qshift neuro, see flowsheets   TF infusing per order   Flexiseal in place  Voiding per purewick   Call light within reach    Patient updated on plan of care

## 2024-03-15 NOTE — HOSPICE RN NOTE
Addendum: 18:15--visited pt at bedside, no family present. RH will continue to attempt to reach daughter as able.    RH called daughter Alla as per indicated. No answer, left voicemail again with callback number. Per BERKLEY Cisneros, no family at bedside. Encouraged Blayne to call RH if/when any family arrives at bedside. RN indicated understanding. Please call with any questions/updates.    Nataliia Arizmendi RN, BSN  [facility]  Transitional Nurse Liaison  [phone] or After hours: [phone]

## 2024-03-15 NOTE — CM/SW NOTE
Spoke with pt's dtr, Alla, via phone to follow up on hospice plans. Alla confirms receiving a call yesterday from hospice team, but hadn't had a chance to call them back. Informed Alla that the care team is looking to her for guidance on next steps for her mother. Asked Alla if she is agreeable to moving forward with hospice and she confirms she is. She requests to be contacted by [facility] after she gets off of work today between 4:30 and 5:00 pm to move forward in process. Informed care team.    TORRI RothN, RN-BC    [id number]

## 2024-03-16 NOTE — PLAN OF CARE
RA, NSR;ST on tele  Tube feedings infusing per order  Qshift neuro, see flowsheets  Mepilex is intact on buttocks   non verbal and no response to stimuli  Flexiseal in place. Voiding per purewick   Call light within reach. Comfort rounds done  Hospice still waiting to talk to patient's daughter

## 2024-03-16 NOTE — PLAN OF CARE
Assumed care at 1930.   A&Ox0, RA, NSR on tele  TF infusing per order   Q shift neuro, see flowsheets   Flexiseal in place   Voiding per purewick   Call light within reach    Patient updated on plan of care

## 2024-03-16 NOTE — PROGRESS NOTES
[facility]     Hospitalist Progress Note     Stephen Rodrigues Patient Status:  Inpatient    1945 MRN [id number]   Formerly [facility] 2NE-A Attending Brody Boston MD   Hosp Day # 26 PCP PHYSICIAN NONSTAFF     Subjective:   Pt does not respond- no respond to painful stimuli    Objective:    Review of Systems:   A comprehensive review of systems was  not completed  Vital signs:  Temp:  [98.4 °F (36.9 °C)-99.4 °F (37.4 °C)] 99.4 °F (37.4 °C)  Pulse:  [] 83  Resp:  [16-18] 18  BP: ()/(36-61) 143/49  SpO2:  [96 %-100 %] 98 %  Physical Exam:    General: No acute distress, somnolent   Respiratory: no wheezes, no rhonchi  Cardiovascular: S1, S2, RRR  Abdomen: Soft, NT/ND, +BS  Extremities: No response to painful stimuli  Diagnostic Data:    Labs:  Recent Labs   Lab 24  0414 24  0656 24  0626 24  0606 03/15/24  0623   WBC 8.4 7.4 9.1 9.7 13.3*   HGB 7.2* 8.2* 8.2* 7.5* 8.2*   .0 96.2 94.9 100.0 94.5   .0 247.0 234.0 223.0 259.0       Recent Labs   Lab 24  0626 24  0606 03/15/24  0623   * 190* 275*   BUN 58* 61* 67*   CREATSERUM 1.98* 2.24* 2.33*   CA 9.1 8.7 9.4    137 141   K 3.6 3.6 3.6   * 115* 116*   CO2 21.0 17.0* 20.0*     Estimated Creatinine Clearance: 16.9 mL/min (A) (based on SCr of 2.33 mg/dL (H)).  No results for input(s): \"PTP\", \"INR\" in the last 168 hours.         Microbiology  No results found for this visit on 24.  Imaging: Reviewed in Epic.  Medications:    apixaban  5 mg Oral BID    valproic acid  500 mg Per NG Tube TID    topiramate  100 mg Oral BID    insulin degludec  12 Units Subcutaneous Daily    cloBAZam  2.5 mg Oral BID    lacosamide  200 mg Per NG Tube BID    levETIRAcetam  1,500 mg Per NG Tube BID    hydrALAZINE  50 mg Per NG Tube Q8H ANIBAL    amLODIPine  5 mg Per NG Tube Daily    metoprolol tartrate  25 mg Per NG Tube 2x Daily(Beta Blocker)    aspirin  81 mg Per NG Tube Daily    multivitamin  1  tablet Oral Daily    thiamine  100 mg Per NG Tube BID    insulin aspart  1-10 Units Subcutaneous q6h    pantoprazole  40 mg Intravenous Q24H    rosuvastatin  10 mg Oral Daily       Assessment & Plan:      #Acute encephalopathy w/ subclinical refractory status epilepticus   -on Keppra, Depakote, Vimpat, Topomax clobazam  -poor prognosis- GOC being addressed     #HIREN on CKD IV- back to baseline   -resolved    #Hx of seizure disorder- as above   Awaiting family decision  #Hypernatremia- monitor   #Acute on chronic diastolic CHF-resolved  #Aortic stenosis-Mild to moderate on most recent echo  #CAD s/p stent-Aspirin, statin  #Hypertension-home meds   #Hyperlipidemia-Statin  #DM type II with A1c 5.8-Insulin sliding scale  #Hx of CVA-Aspirin, statin  #JHONY-JHONY protocol  #COPD, stable-Resume albuterol inhaler as needed  #Gout-Allopurinol  #Fibromyalgia  #LLE DVT- heparin  #Anxiety and depression-Resume Lexapro and Remeron once PO  #GERD-PPI  #TEN-continue tube feeds          Supplementary Documentation:   Quality:  DVT Mechanical Prophylaxis:   SCDs,    DVT Pharmacologic Prophylaxis   Medication    apixaban (Eliquis) tab 5 mg      DVT Pharmacologic prophylaxis: Aspirin 162 mg         Code Status: DNAR/Selective Treatment  Gurrola: External urinary catheter in place  Gurrola Duration (in days):   Central line:    ELROY:   At this point Ms. Rodrigues is expected to be discharge to: tbd     The 21st Century Cures Act makes medical notes like these available to patients in the interest of transparency. Please be advised this is a medical document. Medical documents are intended to carry relevant information, facts as evident, and the clinical opinion of the practitioner. The medical note is intended as peer to peer communication and may appear blunt or direct. It is written in medical language and may contain abbreviations or verbiage that are unfamiliar.

## 2024-03-17 ENCOUNTER — HOSPITAL ENCOUNTER (INPATIENT)
Facility: HOSPITAL | Age: 79
End: 2024-03-17
Attending: STUDENT IN AN ORGANIZED HEALTH CARE EDUCATION/TRAINING PROGRAM | Admitting: STUDENT IN AN ORGANIZED HEALTH CARE EDUCATION/TRAINING PROGRAM
Payer: OTHER MISCELLANEOUS

## 2024-03-17 ENCOUNTER — APPOINTMENT (OUTPATIENT)
Dept: GENERAL RADIOLOGY | Facility: HOSPITAL | Age: 79
End: 2024-03-17
Attending: STUDENT IN AN ORGANIZED HEALTH CARE EDUCATION/TRAINING PROGRAM
Payer: MEDICARE

## 2024-03-17 NOTE — HOSPICE RN NOTE
[facility] met with dtr and grandson to review hospice benefit, pt condition, disease trajectory, status epilepticus and GIP level of care.  Consents/POLST signed.  Case discussed with Dr. Lila Genao and Dr. Wagner Rosenberg, both of whom agree, pt meets criteria for GIP level of care requiring frequent nursing assessment, medication administration/titration that cannot be met at a lower level of care at this time.  POC reviewed with BERKLEY Do.  Lashonda Sanders RN  [facility] TNL/SOC RN  [phone]

## 2024-03-17 NOTE — PLAN OF CARE
Received obtunded. Did not open eyes to movement or pain or voice. On room air, has rhonchi. NSR on tele. Purewick changed. Suctioned q 2 hrs. Q 6 accuchecks. Mepilx changed to sacral area. Flexiseal has min leaking, perianal care done.     Problem: Diabetes/Glucose Control  Goal: Glucose maintained within prescribed range  Description: INTERVENTIONS:  - Monitor Blood Glucose as ordered  - Assess for signs and symptoms of hyperglycemia and hypoglycemia  - Administer ordered medications to maintain glucose within target range  - Assess barriers to adequate nutritional intake and initiate nutrition consult as needed  - Instruct patient on self management of diabetes  Outcome: Progressing     Problem: Patient/Family Goals  Goal: Patient/Family Long Term Goal  Description: Patient's Long Term Goal: to go home    Interventions:  - Mds to see  - IV keppra  - See additional Care Plan goals for specific interventions  Outcome: Progressing  Goal: Patient/Family Short Term Goal  Description: Patient's Short Term Goal: to feel better    Interventions:   - IV keppra  - Mds to see  - Comply to care plan  - See additional Care Plan goals for specific interventions  Outcome: Progressing     Problem: NEUROLOGICAL - ADULT  Goal: Achieves stable or improved neurological status  Description: INTERVENTIONS  - Assess for and report changes in neurological status  - Initiate measures to prevent increased intracranial pressure  - Maintain blood pressure and fluid volume within ordered parameters to optimize cerebral perfusion and minimize risk of hemorrhage  - Monitor temperature, glucose, and sodium. Initiate appropriate interventions as ordered  Outcome: Progressing  Goal: Absence of seizures  Description: INTERVENTIONS  - Monitor for seizure activity  - Administer anti-seizure medications as ordered  - Monitor neurological status  Outcome: Progressing  Goal: Remains free of injury related to seizure activity  Description:  INTERVENTIONS:  - Maintain airway, patient safety  and administer oxygen as ordered  - Monitor patient for seizure activity, document and report duration and description of seizure to MD/LIP  - If seizure occurs, turn patient to side and suction secretions as needed  - Reorient patient post seizure  - Seizure pads on all 4 side rails  - Instruct patient/family to notify RN of any seizure activity  - Instruct patient/family to call for assistance with activity based on assessment  Outcome: Progressing  Goal: Achieves maximal functionality and self care  Description: INTERVENTIONS  - Monitor swallowing and airway patency with patient fatigue and changes in neurological status  - Encourage and assist patient to increase activity and self care with guidance from PT/OT  - Encourage visually impaired, hearing impaired and aphasic patients to use assistive/communication devices  Outcome: Progressing

## 2024-03-17 NOTE — PROGRESS NOTES
[facility]     Hospitalist Progress Note     Stephen Rodrigues Patient Status:  Inpatient    1945 MRN [id number]   [facility] 2NE-A Attending Brody Boston MD   Hosp Day # 27 PCP PHYSICIAN NONSTAFF     Subjective:   Pt does not respond- no respond to painful stimuli    Objective:    Review of Systems:   A comprehensive review of systems was  not completed  Vital signs:  Temp:  [96.9 °F (36.1 °C)-98.2 °F (36.8 °C)] 98.2 °F (36.8 °C)  Pulse:  [84-96] 85  Resp:  [18-22] 20  BP: (115-146)/(51-58) 129/56  SpO2:  [95 %-98 %] 98 %  Physical Exam:    General: No acute distress, somnolent   Respiratory: no wheezes, no rhonchi  Cardiovascular: S1, S2, RRR  Abdomen: Soft, NT/ND, +BS  Extremities: No response to painful stimuli  Diagnostic Data:    Labs:  Recent Labs   Lab 24  0414 24  0656 24  0626 24  0606 03/15/24  0623   WBC 8.4 7.4 9.1 9.7 13.3*   HGB 7.2* 8.2* 8.2* 7.5* 8.2*   .0 96.2 94.9 100.0 94.5   .0 247.0 234.0 223.0 259.0       Recent Labs   Lab 24  0626 24  0606 03/15/24  0623   * 190* 275*   BUN 58* 61* 67*   CREATSERUM 1.98* 2.24* 2.33*   CA 9.1 8.7 9.4    137 141   K 3.6 3.6 3.6   * 115* 116*   CO2 21.0 17.0* 20.0*     Estimated Creatinine Clearance: 16.9 mL/min (A) (based on SCr of 2.33 mg/dL (H)).  No results for input(s): \"PTP\", \"INR\" in the last 168 hours.         Microbiology  No results found for this visit on 24.  Imaging: Reviewed in Epic.  Medications:    apixaban  5 mg Oral BID    valproic acid  500 mg Per NG Tube TID    topiramate  100 mg Oral BID    insulin degludec  12 Units Subcutaneous Daily    cloBAZam  2.5 mg Oral BID    lacosamide  200 mg Per NG Tube BID    levETIRAcetam  1,500 mg Per NG Tube BID    hydrALAZINE  50 mg Per NG Tube Q8H ANIBAL    amLODIPine  5 mg Per NG Tube Daily    metoprolol tartrate  25 mg Per NG Tube 2x Daily(Beta Blocker)    aspirin  81 mg Per NG Tube Daily    multivitamin  1  tablet Oral Daily    thiamine  100 mg Per NG Tube BID    insulin aspart  1-10 Units Subcutaneous q6h    pantoprazole  40 mg Intravenous Q24H    rosuvastatin  10 mg Oral Daily       Assessment & Plan:      #Acute encephalopathy w/ subclinical refractory status epilepticus   -on Keppra, Depakote, Vimpat, Topomax clobazam  -poor prognosis- GOC being addressed   I called the patient dtr on 3/17 -we discussed hospice as well as [place] overall prognosis.     #HIREN on CKD IV- back to baseline   -resolved    #Hx of seizure disorder- as above   Awaiting family decision  #Hypernatremia- monitor   #Acute on chronic diastolic CHF-resolved  #Aortic stenosis-Mild to moderate on most recent echo  #CAD s/p stent-Aspirin, statin  #Hypertension-home meds   #Hyperlipidemia-Statin  #DM type II with A1c 5.8-Insulin sliding scale  #Hx of CVA-Aspirin, statin  #JHONY-JHONY protocol  #COPD, stable-Resume albuterol inhaler as needed  #Gout-Allopurinol  #Fibromyalgia  #LLE DVT- heparin  #Anxiety and depression-Resume Lexapro and Remeron once PO  #GERD-PPI  #TEN-continue tube feeds          Supplementary Documentation:   Quality:  DVT Mechanical Prophylaxis:   SCDs,    DVT Pharmacologic Prophylaxis   Medication    apixaban (Eliquis) tab 5 mg      DVT Pharmacologic prophylaxis: Aspirin 162 mg         Code Status: DNAR/Selective Treatment  Gurrola: External urinary catheter in place  Gurrola Duration (in days):   Central line:    ELROY:   At this point Ms. Rodrigues is expected to be discharge to: tbd     The 21st Century Cures Act makes medical notes like these available to patients in the interest of transparency. Please be advised this is a medical document. Medical documents are intended to carry relevant information, facts as evident, and the clinical opinion of the practitioner. The medical note is intended as peer to peer communication and may appear blunt or direct. It is written in medical language and may contain abbreviations or verbiage that are  unfamiliar.

## 2024-03-17 NOTE — PLAN OF CARE
Daughter here earlier and met with hospice. Patient admitted to inpatient hospice. Ativan drip initiated. Tube feeds stopped. Will leave dobhoff in for medications. Comfort measures initiated. Daughter left and wants to be called if patient's condition changes.

## 2024-03-17 NOTE — HOSPICE RN NOTE
[facility] RN stopped by patient's room a couple of times today, no visitors present. Still waiting to hear back from patient's daughter, Alla. Bedside RN aware to inform us if/when daughter arrives. [facility] will continue to follow and support patient and family.    Astrid Alcala RN, BSN  Transitional Nurse Liaison  [facility]  [phone] (After-hours)

## 2024-03-17 NOTE — HOSPICE RN NOTE
Daughter called to confirm 2-2:30 meeting with hospice as requested of daughter by Dr. Genao.  Dtr stated she was getting ready to leave to go to hospital.  Ativan drip ordered with pharmacy so that med is available at time of admission.    Lashonda Sanders RN  [facility] TNL/SOC RN  [phone]

## 2024-03-17 NOTE — PLAN OF CARE
Assumed patient care this morning.   Unresponsive. Not opening eyes. Not following commands. Does not withdraw to pain. Not moving extremities spontaneously. Non-verbal. Limited neuro assessment. Tube feeds at goal, tolerating. Discrepancy between dobhoff position (cm) charting and what is on assessment (charting 68cm vs 75 on assessment). Xray ordered to verify placement. Rectal tube with liquid/watery brown stool. Leaking around rectal tube. Assessed, 35ml of water in balloon instead of 45ml. Rectal tube placed in correct position and balloon inflated to 45ml. Will monitor for proper function. Not family at bedside.

## 2024-03-18 NOTE — HOSPICE RN NOTE
[facility] Inpatient Nursing Rounds:     Patient seen at bedside without family present. Patient unresponsive, RR=22, irregular, some abdominal breathing, RA. Rectal tube minimal output, purewick with minimal output.      Ativan gtt 2mg/hr, Robinul IVP X 2, Morphine IVP x 3, scopolamine patch in place in the past 24 hours. Recommended a dose of Morphine IVP.      PPS: 10%    Patient remains eligible for general inpatient hospice care for symptom management of  requiring frequent nursing assessments and interventions including titration of IV medications. POC discussed with family and Ivan GOODEN. All are in agreement. [facility] will continue to support the patient and family.     Leah Philippe RN  [facility] RN Liaison  [phone] (After-hours)

## 2024-03-18 NOTE — PLAN OF CARE
Assumed pt care ate 1930  Pt unresponsive  PRN Morphine given for dyspnea  PRN Robinul given for secretions  Pt resting comfortably  Bed in low position

## 2024-03-18 NOTE — PROGRESS NOTES
[facility]     Hospitalist Progress Note     Stephen Rodrigues Patient Status:  Inpatient    1945 MRN [id number]   [facility] 2NE-A Attending Lila Genao MD   Hosp Day # 1 PCP PHYSICIAN NONSTAFF     Subjective:   Pt  unresponsive on ativan gtt 2 mg/ hr   Comfort care now     Objective:    Review of Systems:   A comprehensive review of systems was  not completed  unable to do w/ pt   Vital signs:  Temp:  [97.9 °F (36.6 °C)-98.2 °F (36.8 °C)] 97.9 °F (36.6 °C)  Pulse:  [87-97] 97  Resp:  [12-] 29  BP: (104-130)/(46-53) 104/47  SpO2:  [95 %-98 %] 96 %  Physical Exam:    General: No acute distress, somnolent   Respiratory: no wheezes, no rhonchi  unlabored RA   Cardiovascular: S1, S2, RRR  Abdomen: Soft, NT/ND, +BS  Extremities: minimal  response to painful stimuli  Rectal tube in place   Diagnostic Data:    Labs:  Recent Labs   Lab 24  0656 24  0626 24  0606 03/15/24  0623   WBC 7.4 9.1 9.7 13.3*   HGB 8.2* 8.2* 7.5* 8.2*   MCV 96.2 94.9 100.0 94.5   .0 234.0 223.0 259.0       Recent Labs   Lab 24  0626 24  0606 03/15/24  0623   * 190* 275*   BUN 58* 61* 67*   CREATSERUM 1.98* 2.24* 2.33*   CA 9.1 8.7 9.4    137 141   K 3.6 3.6 3.6   * 115* 116*   CO2 21.0 17.0* 20.0*     CrCl cannot be calculated (Unknown ideal weight.).  No results for input(s): \"PTP\", \"INR\" in the last 168 hours.         Microbiology  No results found for this visit on 24.  Imaging: Reviewed in Epic.  Medications:    lacosamide  200 mg Per NG Tube BID    levETIRAcetam  1,500 mg Per NG Tube BID    topiramate  100 mg Oral BID    valproic acid  500 mg Per NG Tube TID    scopolamine  1 patch Transdermal Q72H       Assessment & Plan:      #Acute encephalopathy w/ subclinical refractory status epilepticus   - comfort care now   -on Keppra, Depakote, Vimpat, Topomax clobazam gtt     #HIREN on CKD IV- back to baseline   -resolved    #  seizure disorder- as above      #Hypernatremia-    #Acute on chronic diastolic CHF-resolved  #Aortic stenosis-Mild to moderate on most   #CAD s/p stent-  #Hypertension-   #Hyperlipidemia-  #DM type II with A1c 5.8-  #Hx of CVA-  #JHONY-  #COPD, stable-  #Gout-  #Fibromyalgia  #LLE DVT-   #Anxiety and depression-  #GERD-  #FEN- tube feeds  sopped NG for meds    Comfort care measures, hospice following.  No family at bedside      Suzette Bautista NP        Supplementary Documentation:   Quality:  DVT Mechanical Prophylaxis:        DVT Pharmacologic Prophylaxis   Medication   None                Code Status: DNAR/Comfort Care  Gurrola: External urinary catheter in place  Gurrola Duration (in days):   Central line:    ELROY:   At this point Ms. Rodrigues is expected to be discharge to: [facility]     The 21st Century Cures Act makes medical notes like these available to patients in the interest of transparency. Please be advised this is a medical document. Medical documents are intended to carry relevant information, facts as evident, and the clinical opinion of the practitioner. The medical note is intended as peer to peer communication and may appear blunt or direct. It is written in medical language and may contain abbreviations or verbiage that are unfamiliar.     Addendum:    Patient seen and examined independently.  Agree with above except as otherwise noted.      Gen: NAD  CVS: s1s2  Resp: CTA  Abd: soft    Assessment and Plan:    Inpatient hospice care   Ativan drip  PRN meds for pain , nausea, agitation  Status epilepticus   POLST signed : DNAR/C        Lila Genao MD

## 2024-03-18 NOTE — H&P
[facility]     Karenta Rodrigues Patient Status:  Inpatient    1945 MRN [id number]   Location [facility] 7NE-A Attending Llia Genao MD   Hosp Day # 1 PCP PHYSICIAN NONSTAFF     Chief Complaint: IP hopsice     Subjective:    History of Present Illness:     Pt admitted to inpatient hospice    History/Other:    Past Medical History:  Past Medical History:   Diagnosis Date    Anemia     Aortic stenosis     Arrhythmia     Arthritis     Asthma ([facility])     Back problem     Cataract     Deep vein thrombosis (DVT) of proximal lower extremity ([facility])     Depressive disorder 2010    Diverticulitis of colon 2009    Edema     Esophageal reflux     Extrinsic asthma, unspecified     Fibromyalgia     Gout     Heart attack (HCC) 1989    Heart valve disease     High blood pressure     High cholesterol     History of stomach ulcers     IBS (irritable bowel syndrome)     Incontinence     Migraines     Muscle weakness     Neuropathy     Osteoarthritis     Other and unspecified hyperlipidemia     Pneumonia due to organism     Pulmonary emphysema ([facility])     Renal disorder     Seizure disorder ([facility])     Shortness of breath     Sleep apnea     Stroke ([facility])     Type II or unspecified type diabetes mellitus without mention of complication, not stated as uncontrolled     Unspecified essential hypertension     Visual impairment      Past Surgical History:   Past Surgical History:   Procedure Laterality Date          CATH DRUG ELUTING STENT      EEG PHY/QHP EA INCR W/VEEG  3/5/2024    EEG PHY/QHP EA INCR W/VEEG  3/10/2024    HC  SECTION LEVEL I      KNEE SURGERY      TOTAL KNEE REPLACEMENT        Family History:   Family History   Problem Relation Age of Onset    Hypertension Other      Social History:    reports that she has never smoked. She has never used smokeless tobacco. She reports that she does not drink alcohol and does not use drugs.     Allergies:   Allergies    Allergen Reactions    Influenza Vaccines OTHER (SEE COMMENTS)     Fever    Dust Mite Extract UNKNOWN     Sneezing, itchy, watery eyes    Hydrocodone     Sulfa Antibiotics     Vicodin Tuss [Hydrocodone-Guaifenesin]        Medications:    Current Facility-Administered Medications on File Prior to Encounter   Medication Dose Route Frequency Provider Last Rate Last Admin    [COMPLETED] sodium phosphate 15 mmol in 0.9% NaCl 100mL IVPB premix  15 mmol Intravenous Once Nichole Kaur MD   15 mmol at 03/13/24 1727    [COMPLETED] heparin (Porcine) 1000 UNIT/ML injection - BOLUS IV 2,600 Units  30 Units/kg Intravenous Once Nichole Kaur MD   2,600 Units at 03/11/24 0656    [COMPLETED] heparin (Porcine) 1000 UNIT/ML injection - BOLUS IV 2,600 Units  30 Units/kg Intravenous Once Nichole Kaur MD   2,600 Units at 03/10/24 0750    [COMPLETED] potassium chloride (Klor-Con) 20 MEQ oral powder 40 mEq  40 mEq Per NG Tube Once Apple Aguilar MD   40 mEq at 03/10/24 0810    [COMPLETED] levETIRAcetam (Keppra) 500 mg/5mL injection 1,000 mg  1,000 mg Intravenous Once Jimmie Gonsalez MD   1,000 mg at 03/07/24 0100    [COMPLETED] gadoterate meglumine (Dotarem) 10 MMOL/20ML injection 20 mL  20 mL Intravenous ONCE PRN Nichole Kaur MD   17 mL at 03/07/24 1030    [COMPLETED] heparin (Porcine) 1000 UNIT/ML injection - BOLUS IV 2,600 Units  30 Units/kg Intravenous Once Brody Boston MD   2,600 Units at 03/05/24 0633    [COMPLETED] heparin (Porcine) 1000 UNIT/ML injection - BOLUS IV 2,600 Units  30 Units/kg Intravenous Once Brody Boston MD   2,600 Units at 03/04/24 0626    [COMPLETED] levETIRAcetam (Keppra) 500 mg/5mL injection 500 mg  500 mg Intravenous Once Chandrika Chadwick MD   500 mg at 03/02/24 0927    [COMPLETED] levETIRAcetam (Keppra) 500 mg/5mL injection 1,000 mg  1,000 mg Intravenous Once May Soto APRN   1,000 mg at 03/01/24 1436    [COMPLETED] valproate (Depacon) 1,500 mg in sodium chloride 0.9% 50 mL IVPB  1,500 mg  Intravenous Once Lea Merino DO 50 mL/hr at 24 1,500 mg at 24    [COMPLETED] LORazepam (Ativan) 2 mg/mL injection 1 mg  1 mg Intravenous Once Lea Merino DO   1 mg at 24 193    [COMPLETED] heparin (Porcine) 1000 UNIT/ML injection - BOLUS IV 7,100 Units  80 Units/kg Intravenous Once Brody Boston MD   7,100 Units at 24 09    [COMPLETED] heparin (Porcine) 56495 units/250mL infusion (PE/DVT/THROMBUS) INITIAL DOSE  18 Units/kg/hr Intravenous Once Brody Boston MD   Stopped at 24 1630    [COMPLETED] potassium chloride (K-Dur) tab 40 mEq  40 mEq Oral Once Nichole Kaur MD   40 mEq at 24 011    [COMPLETED] diazepam (Valium) 5 mg/mL injection 2.5 mg  2.5 mg Intravenous Once Nichole Kaur MD   2.5 mg at 24    [COMPLETED] diazepam (Valium) 5 mg/mL injection 5 mg  5 mg Intravenous Once Abdias Hussein MD   5 mg at 24 182    [COMPLETED] furosemide (Lasix) 10 mg/mL injection 40 mg  40 mg Intravenous Once Abdias Hussein MD   40 mg at 24    [COMPLETED] diazepam (Valium) 5 mg/mL injection 5 mg  5 mg Intravenous Once Abdias Hussein MD   5 mg at 24    [] ondansetron (Zofran) 4 MG/2ML injection 4 mg  4 mg Intravenous Q4H PRN Abdias Hussein MD        [COMPLETED] levETIRAcetam (Keppra) 500 mg/5mL injection 1,000 mg  1,000 mg Intravenous Once Abdias Hussein MD   1,000 mg at 24    [COMPLETED] potassium chloride (K-Dur) tab 20 mEq  20 mEq Oral Once Mary Mcleod MD   20 mEq at 24 0956    [COMPLETED] potassium chloride (K-Dur) tab 40 mEq  40 mEq Oral Once Siobhan Laureano MD   40 mEq at 02/15/24 1447     No current outpatient medications on file prior to encounter.       Review of Systems:   A comprehensive review of systems was completed.    Pertinent positives and negatives noted in the HPI.    Objective:   Physical Exam:    /47 (BP  Location: Right arm)   Pulse 97   Temp 97.9 °F (36.6 °C) (Axillary)   Resp (!) 29   SpO2 96%   General: No acute distress, Alert  Respiratory: No rhonchi, no wheezes  Cardiovascular: S1, S2. Regular rate and rhythm  Abdomen: Soft, Non-tender, non-distended, positive bowel sounds  Neuro: No new focal deficits  Extremities: No edema      Results:    Labs:      Labs Last 24 Hours:    Recent Labs   Lab 03/13/24  0626 03/14/24  0606 03/15/24  0623   RBC 2.55* 2.31* 2.55*   HGB 8.2* 7.5* 8.2*   HCT 24.2* 23.1* 24.1*   MCV 94.9 100.0 94.5   MCH 32.2 32.5 32.2   MCHC 33.9 32.5 34.0   RDW 14.9 15.6 15.5   WBC 9.1 9.7 13.3*   .0 223.0 259.0       Recent Labs   Lab 03/13/24  0626 03/14/24  0606 03/15/24  0623   * 190* 275*   BUN 58* 61* 67*   CREATSERUM 1.98* 2.24* 2.33*   EGFRCR 25* 22* 21*   CA 9.1 8.7 9.4    137 141   K 3.6 3.6 3.6   * 115* 116*   CO2 21.0 17.0* 20.0*       Lab Results   Component Value Date    INR 1.16 02/19/2024    INR 0.97 10/24/2022    INR 1.00 03/01/2022       No results for input(s): \"TROP\", \"TROPHS\", \"CK\" in the last 168 hours.    No results for input(s): \"TROP\", \"PBNP\" in the last 168 hours.    No results for input(s): \"PCT\" in the last 168 hours.    Imaging: Imaging data reviewed in Epic.    Assessment & Plan:      #IP hospice  #Acute encephalopathy w/ subclinical refractory status epilepticus   -on Keppra, Depakote, Vimpat, Topomax clobazam  -poor prognosis- GOC being addressed   I called the patient dtr on 3/17 -we discussed hospice as well as [place] overall prognosis.      #HIREN on CKD IV- back to baseline   -resolved     #Hx of seizure disorder- as above   Awaiting family decision  #Hypernatremia- monitor   #Acute on chronic diastolic CHF-resolved  #Aortic stenosis-Mild to moderate on most recent echo  #CAD s/p stent-Aspirin, statin  #Hypertension-home meds   #Hyperlipidemia-Statin  #DM type II with A1c 5.8-Insulin sliding scale  #Hx of CVA-Aspirin, statin  #JHONY-JHONY  protocol  #COPD, stable-Resume albuterol inhaler as needed  #Gout-Allopurinol  #Fibromyalgia  #LLE DVT- heparin  #Anxiety and depression-Resume Lexapro and Remeron once PO  #GERD-PPI  #TEN-continue tube feeds    Plan of care discussed with Hospice     Lila Genao MD    Supplementary Documentation:     The 21st Century Cures Act makes medical notes like these available to patients in the interest of transparency. Please be advised this is a medical document. Medical documents are intended to carry relevant information, facts as evident, and the clinical opinion of the practitioner. The medical note is intended as peer to peer communication and may appear blunt or direct. It is written in medical language and may contain abbreviations or verbiage that are unfamiliar.               **Certification      PHYSICIAN Certification of Need for Inpatient Hospitalization - Initial Certification    Patient will require inpatient services that will reasonably be expected to span two midnight's based on the clinical documentation in H+P.   Based on patients current state of illness, I anticipate that, after discharge, patient will require TBD.

## 2024-03-18 NOTE — HOSPICE RN NOTE
[facility] inpatient nursing rounds. No family at bedside. Patient appears unresponsive to verbal and tactile stimulus. Patient continues on lorazepam gtt infusing at 2 mg/hr. Has required IVP glycopyrrolate for excess secretions and 3 doses IVP morphine today for pain/SOB. No objective signs of distress noted at this time. Remains appropriate for GIP inpatient level of hospice care. Needs continued frequent nursing assessments for administration and titration of IV comfort medications. Unable to take medications by mouth. [facility] will continue to follow this patient closely for EOL pain and symptom management and to support family. Please call [facility] with any questions or concerns.    Mary Durham RN, [facility]  [facility] Liaison  [phone] after hours

## 2024-03-18 NOTE — SPIRITUAL CARE NOTE
Spiritual Care Visit Note    Patient Name: Stephen Rodrigues Date of Spiritual Care Visit: 24   : 1945 Primary Dx: <principal problem not specified>       Referred By: Referral From: Nurse    Spiritual Care Taxonomy:    Intended Effects: Convey a calming presence    Methods: Offer support    Interventions: Acknowledge current situation;Active listening    Visit Type/Summary:     attempted to visit; however, patient appeared sleeping and family was not present. Quietly offered prayers for the patient, family and our team members. Respected patient's time of rest/healing.   Also, placed a Spiritual Care  contact Information Card.    Spiritual Care support can be requested via an Sape consult.   For urgent/immediate needs, please contact the On Call  at Ext [phone]

## 2024-03-18 NOTE — CM/SW NOTE
TERESA called and spoke with the patient's daughter Alla.  She will not be in today but TERESA scheduled an 11am conference call on Wednesday for Crisis Intervention as the family verbalized they are struggling with the patient's impending death.  Hospice team spoke with RN.  Patient will remain GIP.    Rachael Cordoba [facility]  [facility]  [phone]

## 2024-03-19 NOTE — HOSPICE RN NOTE
[facility] inpatient rounds. No family at bedside. Patient unresponsive to verbal and tactile stimulus. Increased Lorazepam gtt to 2.5mg/hr. She was given Robinul x 2 overnight, also given Morphine x 2 overnight. Floor RN in room at time of visit, to administer an additional dose of Morphine and Robinul now. NG tube in place for seizure medications. Spoke with Floor RN and Suzette Bautista, NP about d/c'ing NG tube for comfort, will wait to talk with family before d/cing. Patient BP decreased, temp increased and patient tachycardic. NP to order Tylenol for current temperature. Patient is actively dying. Patient remains GIP inpatient appropriate. Needs continued and frequent nursing assessments for administration of IV comfort medications.     Attempt to reach out to daughter Alla with no response.    TORRI HatchN, RN  [facility], Start of Care  [phone] (After-hours)

## 2024-03-19 NOTE — HOSPICE RN NOTE
[facility] made aware of pt's passing. TOD 14:15. Family now at bedside. Emotional support provided. Condolences offered. Family expressed appreciation for  services and support. No  home/cremation society selected yet. Family instructed to call [facility] with selection within 24hrs. Number to be provided by BERKLEY Cisneros. Also provided #[phone] and #[phone] for family to notify  of selection once made also. Please call  with any questions/concerns.    Nataliia Arizmendi RN, BSN  [facility]  Transitional Nurse Liaison  [phone] or After hours: [phone]

## 2024-03-19 NOTE — PROGRESS NOTES
03/19/24 1637   Attending Physican Contact   Attending Physician Notified Y   Attending Physician Name Lila Genao MD   Post Mortem Checklist   Date of Death 03/19/24   Time of Death 1415   Pronounced by Nurse   RN Name Blayne Lindsey And Taylor Moya   Spontaneous respirations No   Palpable carotid pulse No   Audible heart sounds No   Family member or legal guardian notified? Yes   [facility] Notified Yes

## 2024-03-19 NOTE — PROGRESS NOTES
[facility]     Hospitalist Progress Note     Stephen Rodrigues Patient Status:  Inpatient    1945 MRN [id number]   Location [facility] 2NE-A Attending Lila Genao MD   Hosp Day # 2 PCP PHYSICIAN NONSTAFF     Subjective:   Pt  unresponsive on ativan gtt 2 mg/ hr   Comfort care    TM  100.1, Sbp 80  Ativan increased 2.5 mg/ hr   Left vm for Alla w/ call back number     Objective:    Review of Systems:   A comprehensive review of systems was  not completed  unable to do w/ pt   Vital signs:  Temp:  [100.1 °F (37.8 °C)] 100.1 °F (37.8 °C)  Resp:  [25] 25  BP: (80)/(46) 80/46  SpO2:  [90 %] 90 %  Physical Exam:    General: No acute distress, somnolent   Respiratory: no wheezes, no rhonchi  unlabored RA  upper airway noise   Cardiovascular: S1, S2, RRR  Abdomen: Soft, NT/ND, +BS  Extremities: no  response to painful stimuli  Rectal tube in place   Diagnostic Data:    Labs:  Recent Labs   Lab 24  0626 24  0606 03/15/24  0623   WBC 9.1 9.7 13.3*   HGB 8.2* 7.5* 8.2*   MCV 94.9 100.0 94.5   .0 223.0 259.0       Recent Labs   Lab 24  0626 24  0606 03/15/24  0623   * 190* 275*   BUN 58* 61* 67*   CREATSERUM 1.98* 2.24* 2.33*   CA 9.1 8.7 9.4    137 141   K 3.6 3.6 3.6   * 115* 116*   CO2 21.0 17.0* 20.0*     CrCl cannot be calculated (Unknown ideal weight.).  No results for input(s): \"PTP\", \"INR\" in the last 168 hours.         Microbiology  No results found for this visit on 24.  Imaging: Reviewed in Epic.  Medications:    lacosamide  200 mg Per NG Tube BID    levETIRAcetam  1,500 mg Per NG Tube BID    topiramate  100 mg Oral BID    valproic acid  500 mg Per NG Tube TID    scopolamine  1 patch Transdermal Q72H       Assessment & Plan:      #Acute encephalopathy w/ subclinical refractory status epilepticus   - comfort care now   -on Keppra, Depakote, Vimpat, Topomax clobazam gtt     #HIREN on CKD IV- back to baseline   -resolved    #  seizure disorder- as  above     #Hypernatremia-    #Acute on chronic diastolic CHF-resolved  #Aortic stenosis-Mild to moderate on most   #CAD s/p stent-  #Hypertension-   #Hyperlipidemia-  #DM type II with A1c 5.8-  #Hx of CVA-  #JOHNY-  #COPD, stable-  #Gout-  #Fibromyalgia  #LLE DVT-   #Anxiety and depression-  #GERD-  #FEN- tube feeds  sopped NG for meds    Comfort care measures, hospice following.  No family at bedside  left mess for Alla  Tylenol prn for fever  Robinul to help w/  breathing  asked RN to administer   Conts to decline   ? Dc 1 anti sz med? Will discuss w/ Dr Genao - will dc vimpat today  cont monitoring   Suzette Bautista NP        Supplementary Documentation:   Quality:  DVT Mechanical Prophylaxis:        DVT Pharmacologic Prophylaxis   Medication   None                Code Status: DNAR/Comfort Care  Gurrola: External urinary catheter in place  Gurrola Duration (in days):   Central line:    ELROY:   At this point Ms. Rodrigues is expected to be discharge to: tbd     The 21st Century Cures Act makes medical notes like these available to patients in the interest of transparency. Please be advised this is a medical document. Medical documents are intended to carry relevant information, facts as evident, and the clinical opinion of the practitioner. The medical note is intended as peer to peer communication and may appear blunt or direct. It is written in medical language and may contain abbreviations or verbiage that are unfamiliar.     Addendum:    Patient seen and examined independently.  Agree with above except as otherwise noted.      Gen: NAD  CVS: s1s2  Resp: CTA  Abd: soft    Assessment and Plan:    Inpatient hospice care   Ativan drip increased to 2.5 on 3/19/24  PRN meds for pain , nausea, agitation  Status epilepticus - attempt to weaning AED  POLST signed : DNAR/C       Lila Genao MD

## 2024-03-19 NOTE — PLAN OF CARE
Received pt at 1930  Pt AOx0, RA, VSS  Ativan gtt  PRN Morphine & Robinul   Pt resting quietly.  Needs currently met

## 2024-03-19 NOTE — PLAN OF CARE
Pt AOx0, RA, VSS  Emotional comfort given to family  Post shalom care provided per order  Ativan gtt discontinued due to death  PRN Morphine & Robinul given for comfort   DobHoff, rectal tube, and all IV lines removed due to death

## 2024-03-20 NOTE — DISCHARGE SUMMARY
[facility]     Stephen Rodrigues Patient Status:  Inpatient    1945 MRN [id number]   Location [facility] 7NE-A Attending Lila Genao MD   Hosp Day # 2 PCP PHYSICIAN NONSTAFF     Date of Admission: 3/17/2024  Date of Discharge:  3/19/2024   time of death 1415    Discharge Disposition:  Hospice    Discharge Diagnosis:  Acute encephalopathy with subclinical refractory status epilepticus  Acute kidney injury on chronic kidney disease stage IV  Seizure disorder  Hyponatremia  Acute on chronic diastolic heart failure  Aortic stenosis  CAD status post remote stenting  Essential hypertension  Dyslipidemia  Diabetes mellitus type 2 A1c 5.8  History of remote stroke  JHONY  COPD  Gout  Fibromyalgia  Left lower extremity DVT  Anxiety depression  GERD    History of Present Illness:     Stephen Rodrigues is a 79 year old female with PMHx aortic stenosis, CAD s/p stent, CKD stage IV, HTN, HLD, DM type II, CVA, JHONY, COPD, gout, fibromyalgia, migraines and seizure disorder who presents to the hospital with altered mental status.  Patient was discharged from the hospital 3 days ago when admitted with dizziness.  She was orthostatic and was given IV fluids and was discharged home.  Echo had showed grade 2 diastolic dysfunction with preserved LVEF.  Daughter called her on  and  and she was okay.  Today she was not picking up and so neighbor went to go check on her as a wellbeing check.  She was not acting like her normal self and was only mumbling \"okay\" with the neighbors.  Patient is currently an unreliable historian.  She only responds \"okay\" or \"oh God\" for me but does follow simple commands. Per ER, she did communicate more with the x-ray tech but now again is communicating less. CT head showed no acute intracranial findings.  Patient was in the ER 2022 with similar presentation and at that time her symptoms resolved and it was thought they were secondary to a seizure.  In the  ER today, she was given Valium as well as Keppra.  CT head with no acute abnormality.  UA not suggestive of UTI.  Chest x-ray with findings of mild CHF and patient was given IV Lasix.  She was on room air with good oxygen saturations when I saw her but required 3 L when she fell asleep as she does have a diagnosis of JHONY.   Initial H&P when she was admitted on  she was transition to inpatient hospice 3/17.    Brief Synopsis:   After a lengthy hospitalization patient was transition to inpatient hospice meeting GIP criteria patient was somnolent unresponsive was on Ativan drip for comfort continued on her antiseizure medications.  Tube feedings were stopped.  Patient had rectal tube was repositioned for comfort.  Given as needed morphine for any respiratory distress, scopolamine patch, Ativan drip increased for patient comfort.  Family was kept apprised of changing patient's status off and had to leave messages with delayed callback.  Patient noted low-grade fever on a.m. of 3/19 Tylenol given for comfort and fever relief.  Patient's blood pressure also no lower.  Patient  at 1415.  Family was notified came in.  Emotional support provided to the family.    Procedures during hospitalization:   None    Incidental or significant findings and recommendations (brief descriptions):      Lab/Test results pending at Discharge:   None    Consultants:  [facility]    Vital signs:  Temp:  [100.1 °F (37.8 °C)] 100.1 °F (37.8 °C)  Resp:  [25] 25  BP: (80)/(46) 80/46  SpO2:  [90 %] 90 %    -----------------------------------------------------------------------------------------------  PATIENT DISCHARGE INSTRUCTIONS: See electronic chart    Suzette Bautista NP    Total time spent on discharge planning:      The  Cures Act makes medical notes like these available to patients in the interest of transparency. Please be advised this is a medical document. Medical documents are intended to carry relevant  information, facts as evident, and the clinical opinion of the practitioner. The medical note is intended as peer to peer communication and may appear blunt or direct. It is written in medical language and may contain abbreviations or verbiage that are unfamiliar.

## 2024-04-24 NOTE — PHYSICAL THERAPY NOTE
PHYSICAL THERAPY TREATMENT NOTE - INPATIENT    Room Number: 9707/1573-V     Session: 1/3  Number of Visits to Meet Established Goals: 3    Presenting Problem: TIA-aphasia    Problem List  Principal Problem:    Transient alteration of awareness  Active Pro Static Standing: Fair  Dynamic Standing: Fair -    ACTIVITY TOLERANCE: good       AM-PAC '6-Clicks' INPATIENT SHORT FORM - BASIC MOBILITY  How much difficulty does the patient currently have. ..  -   Turning over in bed (including adjusting bedclothes, sh Repetitions   10   Sets   1     Patient End of Session: Up in chair;Needs met;Call light within reach;RN aware of session/findings; All patient questions and concerns addressed; Alarm set    ASSESSMENT   Progressing and is likely close to her baseline mary ann 23-Apr-2024

## 2025-05-09 NOTE — CM/SW NOTE
06/08/21 1400   CM/SW Referral Data   Referral Source Social Work (self-referral)   Reason for Referral Discharge planning   Informant Children  (Dtr)          HOME SITUATION  Type of Home: Apartment (5th floor, elevator access)   Home Layout: One level
Patient failed inpatient criteria. Second level of review completed and supports observation. UR committee in agreement. Discussed with  who approves observation status. MOON given to the patient and order written and signed.
172

## (undated) DEVICE — Device: Brand: DEFENDO AIR/WATER/SUCTION AND BIOPSY VALVE

## (undated) DEVICE — FORCEP BIOPSY RJ4 LG CAP W/ND

## (undated) NOTE — ED AVS SNAPSHOT
Eduin Engel   MRN: [id number]    Department:  [facility] Emergency Department   Date of Visit:  8/8/2017           Disclosure     Insurance plans vary and the physician(s) referred by the ER may not be covered by your plan.  Please contact your If you have been prescribed any medication(s), please fill your prescription right away and begin taking the medication(s) as directed    If the emergency physician has read X-rays, these will be re-interpreted by a radiologist.  If there is a significant

## (undated) NOTE — IP AVS SNAPSHOT
[facility] [place] [address] [place], [address] ~ [phone]                Discharge Summary   5/28/2017    Angela Bell           Admission Information        Provider Department    5/28/2017 Larry Tijerina MD  8ne-[id number]         T Instructions Authorizing Provider    Morning Afternoon Evening As Needed    RaNITidine HCl 150 MG Tabs   Commonly known as:  ZANTAC   What changed:  when to take this   Next dose due:  6/1/17 PM        Take 1 tablet (150 mg total) by mouth nightly.     Kir MetFORMIN HCl 500 MG Tabs   Commonly known as:  GLUCOPHAGE   Next dose due:  6/1/17 PM        Take 750 mg by mouth 2 (two) times daily with meals.                                Montelukast Sodium 10 MG Tabs   Last time this was given:  10 mg on 5/31/2017 31.0 (L) (05/31/17)  95.1 -- -- -- (05/31/17)  187.0 --    (05/30/17)  5.6 (05/30/17)  3.45 (L) (05/30/17)  10.9 (L) (05/30/17)  32.3 (L) (05/30/17)  93.6    (05/30/17)  210.0     (05/28/17)  5.9 (05/28/17)  3.25 (L) (05/28/17)  10.4 (L) (05/28/17)  30.9 ( harming yourself, contact [facility] at [phone]. - If you don’t have insurance, [facility] has partnered with Patient [address] to help you get signed up for insurance coverage.   Patient [place] your medications while at home.          Blood Pressure and Cardiac Medications     Diltiazem HCl ER Coated Beads (CARTIA XT) 240 MG Oral Capsule SR 24 Hr    valsartan (DIOVAN) 320 MG Oral Tab         Use: Treat abnormal blood pressure (high or low), cardia Use: Treat pain, fever, inflammation   Most common side effects: Stomach upset   What to report to your healthcare team: Stomach upset, unresolved pain           GI Medications     RaNITidine HCl 150 MG Oral Tab    Pantoprazole Sodium 40 MG Oral Tab EC

## (undated) NOTE — LETTER
[facility]  Ce Fitzgerald 61 [address]    Consent for Operation    Date: __________________    Time: _______________    1.  I authorize the performance upon Gomez Belling the following operation:    Procedure(s):  Esophagogastroduoden procedure has been videotaped, the surgeon will obtain the original videotape. The hospital will not be responsible for storage or maintenance of this tape.     6. For the purpose of advancing medical education, I consent to the admittance of observers to t STATEMENTS REQUIRING INSERTION OR COMPLETION WERE FILLED IN.     Signature of Patient:   ___________________________    When the patient is a minor or mentally incompetent to give consent:  Signature of person authorized to consent for patient: ____________ drugs/illegal medications). Failure to inform my anesthesiologist about these medicines may increase my risk of anesthetic complications. · If I am allergic to anything or have had a reaction to anesthesia before.     3. I understand how the anesthesia med I have discussed the procedure and information above with the patient (or patient’s representative) and answered their questions. The patient or their representative has agreed to have anesthesia services.     _______________________________________________

## (undated) NOTE — IP AVS SNAPSHOT
[address]            (For Outpatient Use Only) Initial Admit Date: 2022   Inpt/Obs Admit Date: Inpt: 22 / Obs: N/A   Discharge Date:    Tiffany Castillo:  [de-identified]   MRN: [de-identified]   CSN: [id number]   CEID: [id number]        ENCOUNTER  Patient Class: Inpatient Admitting Provider: Shukri oBnilla MD [facility] Service: Medical Attending Provider: Gracy Huizar MD   Bed: 527-A   Visit Type:   Referring Physician: No ref. provider found Billing Flag:    Admit Diagnosis: Encephalopathy [G93.40]      PATIENT  Legal Name:   Gallito Vázquez    Legal Sex: Female  Gender ID:              [address] [facility] Name:    PCP:  [facility]: [phone]   Address:  [address] [place] [address] : 1945 (77 yrs) Mobile: [phone]         City/State/Zip: [address] [place] [address], [address]  Marital: Single Language: [place] park: Ursula SSN4: [id number] Yazidi: [address] Not [place]*     Race: Black Or  Ethnicity: Non  Or [address] CONTACT   Name Relationship Legal Guardian? Home Phone Work Phone Mobile Phone   1. Alla Rodrigues  2.  Mi Mina Daughter  Relative          [phone]     GUARANTOR  Guarantor: Sapna Engel : 1945 Home Phone: [phone]   Address: [address]  Sex: Female Work Phone:    City/State/Zip: [address] [place] [address], [address]   Rel. to Patient: Self Guarantor ID: [id number]   GUARANTOR EMPLOYER   Employer:  Status: RETIRED     COVERAGE  PRIMARY INSURANCE   Payor: MEDICARE Plan: MEDICARE PART B ONLY   Group Number:  Insurance Type: INDEMNITY   Subscriber Name: Griselda Willoughby : 1945   Subscriber ID: [id number] Pt Rel to Subscriber: Self   SECONDARY INSURANCE   Payor: MEDICAID Plan: MEDICAID   Group Number:  Insurance Type: INDEMNITY   Subscriber Name: Griselda Willoughby : 1945   Subscriber ID: [id number] Pt Rel to Subscriber: SELF   TERTIARY INSURANCE   Payor:  Plan: Group Number:  Insurance Type:    Subscriber Name:  Subscriber :    Subscriber ID:  Pt Rel to Subscriber:    Hospital Account Financial Class: Medicare    2022

## (undated) NOTE — Clinical Note
TORYI, TCM call made, see notes.  NCM confirmed with Neal cMknight that her mother has a TCM HFU at the [facility] on 11/24/2020 at 2:00 pm.

## (undated) NOTE — Clinical Note
TCM call completed. An outreach was sent to [facility] to assist in making an appointment with the TCC. Thank you.

## (undated) NOTE — ED AVS SNAPSHOT
Tejeda Brenden   MRN: [id number]    Department:  [facility] Emergency Department   Date of Visit:  11/30/2019           Disclosure     Insurance plans vary and the physician(s) referred by the ER may not be covered by your plan.  Please contact yo tell this physician (or your personal doctor if your instructions are to return to your personal doctor) about any new or lasting problems. The primary care or specialist physician will see patients referred from the [facility] Emergency Department.  Kareem Washburn

## (undated) NOTE — LETTER
[facility]  Ce Fitzgerald 61 [address]    Consent for Operation    Date: __________________    Time: _______________    1.  I authorize the performance upon Tang Mays the following operation:    Procedure(s):  ESOPHAGOGASTRODUODEN procedure has been videotaped, the surgeon will obtain the original videotape. The hospital will not be responsible for storage or maintenance of this tape.     6. For the purpose of advancing medical education, I consent to the admittance of observers to t STATEMENTS REQUIRING INSERTION OR COMPLETION WERE FILLED IN.     Signature of Patient:   ___________________________    When the patient is a minor or mentally incompetent to give consent:  Signature of person authorized to consent for patient: ____________ drugs/illegal medications). Failure to inform my anesthesiologist about these medicines may increase my risk of anesthetic complications. · If I am allergic to anything or have had a reaction to anesthesia before.     3. I understand how the anesthesia med I have discussed the procedure and information above with the patient (or patient’s representative) and answered their questions. The patient or their representative has agreed to have anesthesia services.     _______________________________________________

## (undated) NOTE — LETTER
[facility]  Ce Fitzgerald 61 [address]    Consent for Operation    Date: __________________    Time: _______________    1.  I authorize the performance upon Tejeda Brow the following operation:    Procedure(s):  ESOPHAGOGASTRODUODEN videotape. [facility] will not be responsible for storage or maintenance of this tape. 6. For the purpose of advancing medical education, I consent to the admittance of observers to the Operating Room.     7. I authorize the use of any specimen, organs Signature of Patient:   ___________________________    When the patient is a minor or mentally incompetent to give consent:  Signature of person authorized to consent for patient: ___________________________   Relationship to patient: _____________________ drugs/illegal medications). Failure to inform my anesthesiologist about these medicines may increase my risk of anesthetic complications. · If I am allergic to anything or have had a reaction to anesthesia before.     3. I understand how the anesthesia med I have discussed the procedure and information above with the patient (or patient’s representative) and answered their questions. The patient or their representative has agreed to have anesthesia services.     _______________________________________________

## (undated) NOTE — ED AVS SNAPSHOT
Rhae Fothergill   MRN: [id number]    Department:  [facility] Emergency Department   Date of Visit:  11/21/2018           Disclosure     Insurance plans vary and the physician(s) referred by the ER may not be covered by your plan.  Please contact yo tell this physician (or your personal doctor if your instructions are to return to your personal doctor) about any new or lasting problems. The primary care or specialist physician will see patients referred from the [facility] Emergency Department.  Salvadore Skiff

## (undated) NOTE — LETTER
Consent to Procedure/Sedation    Date: __________________    Time: _______________    1. I authorize the performance upon Candance Prime the following:    Esophagogastroduodenoscopy with possible biopsy under monitored anesthesia care     2.  I authorize Signature of person authorized to consent for patient: Relationship to patient:  ___________________________    ___________________    Witness: ____________________     Date: ______________    Printed: 5/30/2017   12:04 PM    Patient Name: Marylu Elise